# Patient Record
Sex: FEMALE | Race: WHITE | NOT HISPANIC OR LATINO | Employment: OTHER | URBAN - NONMETROPOLITAN AREA
[De-identification: names, ages, dates, MRNs, and addresses within clinical notes are randomized per-mention and may not be internally consistent; named-entity substitution may affect disease eponyms.]

---

## 2017-01-17 ENCOUNTER — APPOINTMENT (OUTPATIENT)
Dept: CT IMAGING | Facility: HOSPITAL | Age: 77
End: 2017-01-17

## 2017-01-17 ENCOUNTER — HOSPITAL ENCOUNTER (EMERGENCY)
Facility: HOSPITAL | Age: 77
Discharge: HOME | End: 2017-01-17
Attending: FAMILY MEDICINE | Admitting: EMERGENCY MEDICINE

## 2017-01-17 ENCOUNTER — APPOINTMENT (OUTPATIENT)
Dept: GENERAL RADIOLOGY | Facility: HOSPITAL | Age: 77
End: 2017-01-17

## 2017-01-17 VITALS
OXYGEN SATURATION: 96 % | WEIGHT: 153 LBS | BODY MASS INDEX: 26.12 KG/M2 | HEIGHT: 64 IN | TEMPERATURE: 98.6 F | SYSTOLIC BLOOD PRESSURE: 154 MMHG | RESPIRATION RATE: 17 BRPM | HEART RATE: 78 BPM | DIASTOLIC BLOOD PRESSURE: 92 MMHG

## 2017-01-17 DIAGNOSIS — G45.9 TRANSIENT CEREBRAL ISCHEMIA, UNSPECIFIED TYPE: ICD-10-CM

## 2017-01-17 DIAGNOSIS — R42 DIZZINESS: Primary | ICD-10-CM

## 2017-01-17 DIAGNOSIS — I16.0 HYPERTENSIVE URGENCY: ICD-10-CM

## 2017-01-17 DIAGNOSIS — G91.9 HYDROCEPHALUS (HCC): ICD-10-CM

## 2017-01-17 LAB
ANION GAP SERPL CALCULATED.3IONS-SCNC: 16 MMOL/L (ref 4–13)
BACTERIA UR QL AUTO: ABNORMAL /HPF
BASOPHILS # BLD AUTO: 0.01 10*3/MM3 (ref 0–0.2)
BASOPHILS NFR BLD AUTO: 0.1 % (ref 0–2)
BILIRUB UR QL STRIP: NEGATIVE
BUN BLD-MCNC: 19 MG/DL (ref 5–21)
BUN/CREAT SERPL: 14.8 (ref 7–25)
CALCIUM SPEC-SCNC: 9.5 MG/DL (ref 8.4–10.4)
CHLORIDE SERPL-SCNC: 98 MMOL/L (ref 98–110)
CK MB SERPL-CCNC: 1.7 NG/ML (ref 0–5)
CK SERPL-CCNC: 141 U/L (ref 0–203)
CLARITY UR: CLEAR
CO2 SERPL-SCNC: 25 MMOL/L (ref 24–31)
COLOR UR: YELLOW
CREAT BLD-MCNC: 1.28 MG/DL (ref 0.5–1.4)
D DIMER PPP FEU-MCNC: 0.74 MG/L (FEU) (ref 0–0.5)
DEPRECATED RDW RBC AUTO: 41.7 FL (ref 40–54)
EOSINOPHIL # BLD AUTO: 0.13 10*3/MM3 (ref 0–0.7)
EOSINOPHIL NFR BLD AUTO: 1 % (ref 0–4)
ERYTHROCYTE [DISTWIDTH] IN BLOOD BY AUTOMATED COUNT: 14.1 % (ref 12–15)
GFR SERPL CREATININE-BSD FRML MDRD: 41 ML/MIN/1.73
GLUCOSE BLD-MCNC: 129 MG/DL (ref 70–100)
GLUCOSE UR STRIP-MCNC: NEGATIVE MG/DL
HCT VFR BLD AUTO: 37.5 % (ref 37–47)
HGB BLD-MCNC: 12.7 G/DL (ref 12–16)
HGB UR QL STRIP.AUTO: NEGATIVE
HYALINE CASTS UR QL AUTO: ABNORMAL /LPF
IMM GRANULOCYTES # BLD: 0.03 10*3/MM3 (ref 0–0.03)
IMM GRANULOCYTES NFR BLD: 0.2 % (ref 0–5)
KETONES UR QL STRIP: NEGATIVE
LEUKOCYTE ESTERASE UR QL STRIP.AUTO: ABNORMAL
LYMPHOCYTES # BLD AUTO: 1.95 10*3/MM3 (ref 0.72–4.86)
LYMPHOCYTES NFR BLD AUTO: 14.4 % (ref 15–45)
MCH RBC QN AUTO: 27.7 PG (ref 28–32)
MCHC RBC AUTO-ENTMCNC: 33.9 G/DL (ref 33–36)
MCV RBC AUTO: 81.7 FL (ref 82–98)
MONOCYTES # BLD AUTO: 0.82 10*3/MM3 (ref 0.19–1.3)
MONOCYTES NFR BLD AUTO: 6.1 % (ref 4–12)
NEUTROPHILS # BLD AUTO: 10.58 10*3/MM3 (ref 1.87–8.4)
NEUTROPHILS NFR BLD AUTO: 78.2 % (ref 39–78)
NITRITE UR QL STRIP: NEGATIVE
PH UR STRIP.AUTO: 6.5 [PH] (ref 5–8)
PLATELET # BLD AUTO: 170 10*3/MM3 (ref 130–400)
PMV BLD AUTO: 10.5 FL (ref 6–12)
POTASSIUM BLD-SCNC: 4.1 MMOL/L (ref 3.5–5.3)
PROT UR QL STRIP: ABNORMAL
RBC # BLD AUTO: 4.59 10*6/MM3 (ref 4.2–5.4)
RBC # UR: ABNORMAL /HPF
REF LAB TEST METHOD: ABNORMAL
SODIUM BLD-SCNC: 139 MMOL/L (ref 135–145)
SP GR UR STRIP: 1.02 (ref 1–1.03)
SQUAMOUS #/AREA URNS HPF: ABNORMAL /HPF
TROPONIN I SERPL-MCNC: <0.012 NG/ML (ref 0–0.03)
UROBILINOGEN UR QL STRIP: ABNORMAL
WBC NRBC COR # BLD: 13.52 10*3/MM3 (ref 4.8–10.8)
WBC UR QL AUTO: ABNORMAL /HPF

## 2017-01-17 PROCEDURE — 25010000002 ONDANSETRON PER 1 MG: Performed by: EMERGENCY MEDICINE

## 2017-01-17 PROCEDURE — 82553 CREATINE MB FRACTION: CPT | Performed by: FAMILY MEDICINE

## 2017-01-17 PROCEDURE — 70450 CT HEAD/BRAIN W/O DYE: CPT

## 2017-01-17 PROCEDURE — 85025 COMPLETE CBC W/AUTO DIFF WBC: CPT | Performed by: FAMILY MEDICINE

## 2017-01-17 PROCEDURE — 0 IOPAMIDOL PER 1 ML: Performed by: EMERGENCY MEDICINE

## 2017-01-17 PROCEDURE — 81001 URINALYSIS AUTO W/SCOPE: CPT | Performed by: FAMILY MEDICINE

## 2017-01-17 PROCEDURE — 99285 EMERGENCY DEPT VISIT HI MDM: CPT

## 2017-01-17 PROCEDURE — 93005 ELECTROCARDIOGRAM TRACING: CPT

## 2017-01-17 PROCEDURE — 87086 URINE CULTURE/COLONY COUNT: CPT | Performed by: FAMILY MEDICINE

## 2017-01-17 PROCEDURE — 71275 CT ANGIOGRAPHY CHEST: CPT

## 2017-01-17 PROCEDURE — 82550 ASSAY OF CK (CPK): CPT | Performed by: FAMILY MEDICINE

## 2017-01-17 PROCEDURE — 96375 TX/PRO/DX INJ NEW DRUG ADDON: CPT

## 2017-01-17 PROCEDURE — 25010000002 CEFTRIAXONE: Performed by: EMERGENCY MEDICINE

## 2017-01-17 PROCEDURE — 85379 FIBRIN DEGRADATION QUANT: CPT | Performed by: FAMILY MEDICINE

## 2017-01-17 PROCEDURE — 84484 ASSAY OF TROPONIN QUANT: CPT | Performed by: FAMILY MEDICINE

## 2017-01-17 PROCEDURE — 80048 BASIC METABOLIC PNL TOTAL CA: CPT | Performed by: FAMILY MEDICINE

## 2017-01-17 PROCEDURE — 71010 HC CHEST PA OR AP: CPT

## 2017-01-17 PROCEDURE — 96365 THER/PROPH/DIAG IV INF INIT: CPT

## 2017-01-17 PROCEDURE — 93010 ELECTROCARDIOGRAM REPORT: CPT | Performed by: INTERNAL MEDICINE

## 2017-01-17 RX ORDER — CEFUROXIME AXETIL 500 MG/1
500 TABLET ORAL 2 TIMES DAILY
Qty: 20 TABLET | Refills: 0 | Status: SHIPPED | OUTPATIENT
Start: 2017-01-17 | End: 2017-02-13

## 2017-01-17 RX ORDER — ALPRAZOLAM 0.5 MG/1
0.5 TABLET ORAL NIGHTLY
Status: ON HOLD | COMMUNITY
End: 2019-05-09

## 2017-01-17 RX ORDER — CARBIDOPA/LEVODOPA 25MG-250MG
0.5 TABLET ORAL 2 TIMES DAILY
Status: ON HOLD | COMMUNITY
End: 2017-03-17

## 2017-01-17 RX ORDER — UBIDECARENONE 75 MG
100 CAPSULE ORAL DAILY
Status: ON HOLD | COMMUNITY
End: 2019-05-09 | Stop reason: ALTCHOICE

## 2017-01-17 RX ORDER — FUROSEMIDE 20 MG/1
20 TABLET ORAL EVERY OTHER DAY
Status: ON HOLD | COMMUNITY
End: 2019-05-07

## 2017-01-17 RX ORDER — CITALOPRAM 10 MG/1
10 TABLET ORAL NIGHTLY
COMMUNITY

## 2017-01-17 RX ORDER — METOPROLOL SUCCINATE 25 MG/1
25 TABLET, EXTENDED RELEASE ORAL DAILY
Status: ON HOLD | COMMUNITY
End: 2019-05-07

## 2017-01-17 RX ORDER — OMEPRAZOLE 20 MG/1
20 CAPSULE, DELAYED RELEASE ORAL DAILY
COMMUNITY
End: 2017-02-13

## 2017-01-17 RX ORDER — ONDANSETRON 2 MG/ML
4 INJECTION INTRAMUSCULAR; INTRAVENOUS ONCE
Status: COMPLETED | OUTPATIENT
Start: 2017-01-17 | End: 2017-01-17

## 2017-01-17 RX ORDER — AMLODIPINE BESYLATE 5 MG/1
5 TABLET ORAL DAILY
Status: ON HOLD | COMMUNITY
End: 2019-05-07

## 2017-01-17 RX ORDER — ACETAMINOPHEN 325 MG/1
325 TABLET ORAL EVERY 4 HOURS PRN
COMMUNITY
End: 2017-03-15

## 2017-01-17 RX ORDER — ONDANSETRON 2 MG/ML
4 INJECTION INTRAMUSCULAR; INTRAVENOUS ONCE
Status: DISCONTINUED | OUTPATIENT
Start: 2017-01-17 | End: 2017-01-17 | Stop reason: HOSPADM

## 2017-01-17 RX ORDER — ACETAMINOPHEN 500 MG
1000 TABLET ORAL ONCE
Status: COMPLETED | OUTPATIENT
Start: 2017-01-17 | End: 2017-01-17

## 2017-01-17 RX ORDER — LOSARTAN POTASSIUM 50 MG/1
50 TABLET ORAL DAILY
Status: ON HOLD | COMMUNITY
End: 2019-05-07

## 2017-01-17 RX ORDER — PROMETHAZINE HYDROCHLORIDE 25 MG/1
25 TABLET ORAL EVERY 6 HOURS PRN
Status: ON HOLD | COMMUNITY
End: 2017-03-17

## 2017-01-17 RX ADMIN — CEFTRIAXONE 1 G: 1 INJECTION, POWDER, FOR SOLUTION INTRAMUSCULAR; INTRAVENOUS at 09:00

## 2017-01-17 RX ADMIN — ONDANSETRON 4 MG: 2 INJECTION INTRAMUSCULAR; INTRAVENOUS at 07:50

## 2017-01-17 RX ADMIN — IOPAMIDOL 150 ML: 755 INJECTION, SOLUTION INTRAVENOUS at 07:32

## 2017-01-17 RX ADMIN — ACETAMINOPHEN 1000 MG: 500 TABLET ORAL at 07:51

## 2017-01-17 NOTE — DISCHARGE INSTRUCTIONS
I recommend that you follow-up with Dr. Callahan or the potential normal pressure hydrocephalus.  Please call his office and schedule an appointment urgently.  Please complete the course of antibiotics for the urinary tract infection that has been noted.  If your symptoms worsen or you are unable to follow-up with your own primary care physician within the next 24 hours I do recommend that you return to the emergency room immediately for reevaluation thank you.

## 2017-01-17 NOTE — ED PROVIDER NOTES
Subjective   Patient is a 76 y.o. female presenting with dizziness.   History provided by:  Spouse and relative   used: No    Dizziness   Quality:  Unable to specify  Severity:  Severe  Onset quality:  Sudden  Timing:  Constant  Progression:  Partially resolved  Chronicity:  New  Context: not when bending over, not with bowel movement, not with ear pain, not with eye movement, not with head movement, not with inactivity, not with loss of consciousness, not with medication, not with physical activity, not when standing up and not when urinating    Context comment:  Patient reports this terrible dizziness woke her up in the middle of the night she became severely nauseous secondary to the dizziness.  Relieved by:  Nothing  Worsened by:  Nothing  Ineffective treatments:  Being still, change in position, closing eyes, lying down and turning head  Associated symptoms: nausea and vomiting    Associated symptoms: no blood in stool, no chest pain, no diarrhea, no headaches, no hearing loss, no palpitations, no shortness of breath, no syncope, no tinnitus, no vision changes and no weakness        Review of Systems   Constitutional: Negative for activity change, appetite change, chills and fever.   HENT: Negative for congestion, dental problem, drooling, hearing loss and tinnitus.    Eyes: Negative for discharge and itching.   Respiratory: Negative for apnea, cough, chest tightness and shortness of breath.    Cardiovascular: Negative for chest pain, palpitations and syncope.   Gastrointestinal: Positive for nausea and vomiting. Negative for abdominal pain, blood in stool and diarrhea.   Endocrine: Negative for polydipsia and polyuria.   Genitourinary: Negative for difficulty urinating and dysuria.   Skin: Negative for color change, pallor and rash.   Neurological: Positive for dizziness. Negative for facial asymmetry, weakness and headaches.   Hematological: Negative for adenopathy. Does not bruise/bleed  easily.   Psychiatric/Behavioral: Negative for behavioral problems and suicidal ideas.   All other systems reviewed and are negative.      Past Medical History   Diagnosis Date   • Breast cancer    • Dementia    • Hypertension        Allergies   Allergen Reactions   • Adhesive Tape    • Morphine And Related        Past Surgical History   Procedure Laterality Date   • Colostomy     • Breast reconstruction Left    • Mastectomy Left    • Cholecystectomy     • Hysterectomy     • Appendectomy     • Colon surgery     • Abdominal surgery     • Colonoscopy         History reviewed. No pertinent family history.    Social History     Social History   • Marital status:      Spouse name: N/A   • Number of children: N/A   • Years of education: N/A     Social History Main Topics   • Smoking status: Former Smoker   • Smokeless tobacco: None   • Alcohol use No   • Drug use: No   • Sexual activity: Defer     Other Topics Concern   • None     Social History Narrative   • None       Lab Results (last 24 hours)     Procedure Component Value Units Date/Time    CBC & Differential [[id number]] Collected:  01/17/17 0530    Specimen:  Blood Updated:  01/17/17 0550    Narrative:       The following orders were created for panel order CBC & Differential.  Procedure                               Abnormality         Status                     ---------                               -----------         ------                     CBC Auto Differential[[id number]]         Abnormal            Final result                 Please view results for these tests on the individual orders.    Basic Metabolic Panel [[id number]]  (Abnormal) Collected:  01/17/17 0530    Specimen:  Blood Updated:  01/17/17 0558     Glucose 129 (H) mg/dL      BUN 19 mg/dL      Creatinine 1.28 mg/dL      Sodium 139 mmol/L      Potassium 4.1 mmol/L      Chloride 98 mmol/L      CO2 25.0 mmol/L      Calcium 9.5 mg/dL      eGFR Non African Amer 41 (L) mL/min/1.73       BUN/Creatinine Ratio 14.8      Anion Gap 16.0 (H) mmol/L     Narrative:       The MDRD GFR formula is only valid for adults with stable renal function between ages 18 and 70.    Troponin [[id number]] Collected:  01/17/17 0530    Specimen:  Blood Updated:  01/17/17 0539    CK-MB [[id number]] Collected:  01/17/17 0530    Specimen:  Blood Updated:  01/17/17 0539    CK [[id number]] Collected:  01/17/17 0530    Specimen:  Blood Updated:  01/17/17 0539    D-dimer, Quantitative [[id number]] Collected:  01/17/17 0530    Specimen:  Blood Updated:  01/17/17 0539    CBC Auto Differential [[id number]]  (Abnormal) Collected:  01/17/17 0530    Specimen:  Blood Updated:  01/17/17 0550     WBC 13.52 (H) 10*3/mm3      RBC 4.59 10*6/mm3      Hemoglobin 12.7 g/dL      Hematocrit 37.5 %      MCV 81.7 (L) fL      MCH 27.7 (L) pg      MCHC 33.9 g/dL      RDW 14.1 %      RDW-SD 41.7 fl      MPV 10.5 fL      Platelets 170 10*3/mm3      Neutrophil % 78.2 (H) %      Lymphocyte % 14.4 (L) %      Monocyte % 6.1 %      Eosinophil % 1.0 %      Basophil % 0.1 %      Immature Grans % 0.2 %      Neutrophils, Absolute 10.58 (H) 10*3/mm3      Lymphocytes, Absolute 1.95 10*3/mm3      Monocytes, Absolute 0.82 10*3/mm3      Eosinophils, Absolute 0.13 10*3/mm3      Basophils, Absolute 0.01 10*3/mm3      Immature Grans, Absolute 0.03 10*3/mm3           Objective   Physical Exam   Constitutional: She is oriented to person, place, and time. She appears well-developed and well-nourished.   HENT:   Head: Normocephalic and atraumatic.   Eyes: Conjunctivae and EOM are normal. Pupils are equal, round, and reactive to light.   Neck: Normal range of motion. Neck supple.   Cardiovascular: Normal rate, regular rhythm, normal heart sounds and intact distal pulses.    Pulmonary/Chest: Effort normal and breath sounds normal.   Abdominal: Soft. Bowel sounds are normal.   Neurological: She is alert and oriented to person, place, and time.   Skin: Skin is warm and dry.  "  Psychiatric: She has a normal mood and affect. Her behavior is normal.   Nursing note and vitals reviewed.      Procedures         XR Chest 1 View    (Results Pending)   CT Head Without Contrast    (Results Pending)       Visit Vitals   • /61   • Pulse 82   • Temp 98.8 °F (37.1 °C) (Oral)   • Resp 18   • Ht 64\" (162.6 cm)   • Wt 153 lb (69.4 kg)   • SpO2 99%   • BMI 26.26 kg/m2       ED Course    ED Course   Comment By Time   I did discuss the findings with the patient's daughter as well as the patient.  We will get a stat dose of IV antibiotics.  She tells me that her dizziness has resolved although she remained somewhat nauseated.  And is to discharge her to home following a short receiving her antibiotic. Courtney Hernandez MD 01/17 1252   Also discuss the findings on the CT with the daughter and recommended that the patient follow-up with Dr. Callahan of the neurosurgeon choice for further evaluation of her potential hydrocephalus.  I will discuss this with Dr. Callahan prior to the patient and discharge.  Her admitting blood pressure was 143/61 and discharge blood pressure will be 138/57.  These are acceptable blood pressures at this point.  Again her symptoms have completely resolved. Courtney Hernandez MD 01/17 9298       Medications - No data to display         MDM  Number of Diagnoses or Management Options  Dizziness: new and requires workup  Hypertensive urgency: new and requires workup  Transient cerebral ischemia, unspecified type: new and requires workup     Amount and/or Complexity of Data Reviewed  Clinical lab tests: ordered and reviewed  Tests in the radiology section of CPT®: ordered and reviewed  Tests in the medicine section of CPT®: ordered and reviewed  Decide to obtain previous medical records or to obtain history from someone other than the patient: yes  Review and summarize past medical records: yes  Independent visualization of images, tracings, or specimens: yes    Risk of " Complications, Morbidity, and/or Mortality  Presenting problems: moderate  Diagnostic procedures: moderate  Management options: moderate    Patient Progress  Patient progress: improved      Final diagnoses:   Dizziness   Transient cerebral ischemia, unspecified type   Hypertensive urgency          Tarah Boss DO  01/17/17 2024

## 2017-01-19 LAB — BACTERIA SPEC AEROBE CULT: ABNORMAL

## 2017-02-06 ENCOUNTER — OFFICE VISIT (OUTPATIENT)
Dept: NEUROSURGERY | Facility: CLINIC | Age: 77
End: 2017-02-06

## 2017-02-06 VITALS
DIASTOLIC BLOOD PRESSURE: 60 MMHG | HEIGHT: 64 IN | WEIGHT: 150 LBS | SYSTOLIC BLOOD PRESSURE: 132 MMHG | BODY MASS INDEX: 25.61 KG/M2

## 2017-02-06 DIAGNOSIS — Z78.9 TOBACCO NON-USER: ICD-10-CM

## 2017-02-06 DIAGNOSIS — E66.09 NON MORBID OBESITY DUE TO EXCESS CALORIES: ICD-10-CM

## 2017-02-06 DIAGNOSIS — G91.2 NORMAL PRESSURE HYDROCEPHALUS (HCC): Primary | ICD-10-CM

## 2017-02-06 PROCEDURE — 99204 OFFICE O/P NEW MOD 45 MIN: CPT | Performed by: NURSE PRACTITIONER

## 2017-02-06 NOTE — PROGRESS NOTES
Neurosurgery Initial Patient Visit    Patient: Susy Ko  : 1940    Primary Care Provider: Monster Zuniga MD    Requesting Provider: [facility] Emergency Department       History    Chief Complaint:   Chief Complaint   Patient presents with   • Headache     Patient awoke on 17 with severe headache, dizziness and vomiting. She came to [facility] ER where they found her BP very elevated and also discovered normal pressure hydrocephalus. Patient has had a few other episodes since of headaches and vomiting. She does have dementia, her daughter is assisting her today.       History of Present Illness: She is a 76-year-old  female who returns to the office for reexamination with a new issue of possible hydrocephalus.  She has been referred through the emergency Department here at [facility] where she was treated last month, and we have been asked to see her in consultation for further evaluation of apparent hydrocephalus on CT imaging.    She is a former patient known as Susy Huber, who was last seen in 2012.  She had mainly been followed for chronic low back and leg pain.  She currently resides at an assisted living facility and her daughter, who accompanies her, provides much of the needed information.  Apparently on , the patient woke up with a severe headache, was nauseated and vomiting.  Upon arrival to the emergency department her blood pressure was apparently very high.  Her evaluation included a CT of the head which questioned apparent hydrocephalus and the patient was to be seen here for outpatient evaluation and workup.  Review of her her records also indicates that she has had previous syncopal episodes as well.    Her daughter feels that the patient has been having issues for about the last 4 years, but that she has particularly been getting worse in the last year.  She seems to be having issues being very off balance, her memory is poor, she is having  bladder incontinence, and significant difficulty with walking.  She is known to have dementia and is currently being treated for apparent Parkinson's disease which is mostly manifested in tremor affecting the right upper extremity.  The patient does not particularly complain of headache.  He is aware that she has been having increasing difficulties and when asked about this becomes emotional and almost tearful.    Allergies:   Morphine and related and Adhesive tape    Past Medical History:    Past Medical History   Diagnosis Date   • Breast cancer    • Dementia    • Hypertension        Past Surgical History:   Past Surgical History   Procedure Laterality Date   • Colostomy     • Breast reconstruction Left    • Mastectomy Left    • Cholecystectomy     • Hysterectomy     • Appendectomy     • Colon surgery     • Abdominal surgery     • Colonoscopy         Medications: Tylenol, Xanax, Norvasc, Ceftin, Celexa, Lasix, Cozaar, Toprol, Prilosec, Phenergan, Vitamin B12     Social History:   reports that she has quit smoking. She does not have any smokeless tobacco history on file. She reports that she does not drink alcohol or use illicit drugs. She is  currently lives in assisted living.  Her daughter seems to be very supportive and caring.  She also seems very familiar with the patient's medical history.    Family History:  No family history on file.    Review of Systems:  Review of Systems   Constitutional: Negative for chills, fever and unexpected weight change.   HENT: Negative for congestion, hearing loss, rhinorrhea, sore throat and tinnitus.    Eyes: Negative for photophobia and visual disturbance.   Respiratory: Negative for cough, shortness of breath and wheezing.    Cardiovascular: Negative for chest pain and palpitations.   Gastrointestinal: Negative for constipation, diarrhea, nausea and vomiting.        Stoma in place left lower abdomen   Genitourinary: Positive for enuresis. Negative for difficulty  urinating.   Musculoskeletal: Negative for arthralgias, back pain, gait problem and neck pain.   Skin: Negative for rash.   Allergic/Immunologic: Negative for environmental allergies.   Neurological: Positive for dizziness, tremors and headaches. Negative for seizures and numbness.   Hematological: Does not bruise/bleed easily.   Psychiatric/Behavioral: Negative for confusion. The patient is not nervous/anxious.    All other systems reviewed and are negative.          Neurological Physical Examination    Physical Exam   Constitutional: She is oriented to person, place, and time. She appears well-developed and well-nourished. No distress.   She is generally pleasant and cooperative, appearing in no acute distress.  She sometimes seems a bit slow to respond, but is generally appropriate. ShHe is able to obey commands quite well.   HENT:   Head: Normocephalic and atraumatic.   Eyes: No scleral icterus.   Neck: Neck supple. No tracheal deviation and normal range of motion present.   Cardiovascular: Normal rate, regular rhythm and normal heart sounds.    No murmur heard.  Pulmonary/Chest: Effort normal and breath sounds normal. No respiratory distress. She has no wheezes.   Lung fields are coarse but generally clear.   Musculoskeletal:   She ambulates with a very slow, stuttered gait.  It may be slightly broad-based.  Physically uses a walker for assistance.  Unassisted, she can only ambulate a very short distance, probably 5 feet or so.   Neurological: She is alert and oriented to person, place, and time. She displays normal reflexes. No cranial nerve deficit.   Optic discs not visualized.  She is alert and oriented, speech is clear and appropriate.  She obeys commands quite well.  Rapid alternating movements and finger-to-nose are done relatively well.  She is noted with tremor affecting the right upper extremity.  She does not exhibit a significant deficit and short term memory on exam.   Skin: Skin is warm and dry.  No rash noted.   Psychiatric: She has a normal mood and affect. Her speech is normal and behavior is normal. Cognition and memory are normal.   Vitals reviewed.         Medical Decision Making    Management Options:  In the office we have talked about her plan of care at length.  I have reviewed her emergency department records and see that his CT of the head was also done in 2014.  The patient was noted with some degree of ventriculomegaly at that time; however, she was having significant abdominal issues and I believe required extensive abdominal surgery for treatment of diverticulitis.  He not see that there was actual follow-up of possible enlarged ventricles.  At that time I would measure ventricular size at approximately 46 mm.  And compared with a recent scan from last month I would measure these at approximately 49 mm, possibly supporting diagnosis of advancing hydrocephalus.    I have talked about this with the patient and her daughter.  Her examination today does not strongly indicate obvious normal pressure hydrocephalus.  However, there is finding on the imaging that may be showing a slight increase in ventricular size over the last 3-4 years.  This also coincides with the daughters report of the patient having increasing difficulties and symptoms that can very specifically be normal pressure hydrocephalus.  After a lengthy discussion we have talked about large volume CSF removal.  The patient was at first extremely reluctant to consider this, as well as any thought of  shunt placement if indicated.  After a lot of discussion she is agreeable to go ahead and have the large volume removal to determine if there is obvious improvement and response.  Following this, we will see the patient back in the office for Dr. Callahan to review the findings and determine if a  shunt would be in the patient's best interest.    The patient will probably need consideration of surgical clearance.  She has a stoma  "located in the left lower abdomen that is herniating; however, the patient has not wanted to consider any type of surgical revision.  The right side of the abdomen, the patient is thought to have a great deal of scar tissue and possible adhesions.  Her daughter explains that for breast reconstruction her abdominal tissue was \"pushed up to create a right breast.\"  All of these issues together might make placement of the shunt catheter much more difficult.  And again, it is not clear that the patient would be agreeable to proceed with placement of a shunt, even if it were thought to be helpful for her.  They understand and are agreeable with our plan of care.    Susy was seen today for headache.    Diagnoses and all orders for this visit:    Normal pressure hydrocephalus  -     IR Lumbar Puncture Diagnosis; Future    Non morbid obesity due to excess calories    Tobacco non-user        "

## 2017-02-07 DIAGNOSIS — Z01.812 PRE-PROCEDURE LAB EXAM: ICD-10-CM

## 2017-02-07 DIAGNOSIS — F41.9 ANXIETY: Primary | ICD-10-CM

## 2017-02-07 RX ORDER — LORAZEPAM 2 MG/ML
1 INJECTION INTRAMUSCULAR TAKE AS DIRECTED
Status: SHIPPED | OUTPATIENT
Start: 2017-02-13 | End: 2017-02-22

## 2017-02-13 ENCOUNTER — HOSPITAL ENCOUNTER (OUTPATIENT)
Dept: GENERAL RADIOLOGY | Facility: HOSPITAL | Age: 77
Discharge: HOME | End: 2017-02-13
Admitting: NURSE PRACTITIONER

## 2017-02-13 VITALS
BODY MASS INDEX: 25.99 KG/M2 | HEART RATE: 72 BPM | DIASTOLIC BLOOD PRESSURE: 67 MMHG | OXYGEN SATURATION: 97 % | TEMPERATURE: 97.1 F | WEIGHT: 152.2 LBS | HEIGHT: 64 IN | SYSTOLIC BLOOD PRESSURE: 129 MMHG | RESPIRATION RATE: 16 BRPM

## 2017-02-13 DIAGNOSIS — G91.2 NORMAL PRESSURE HYDROCEPHALUS (HCC): ICD-10-CM

## 2017-02-13 LAB
APPEARANCE CSF: CLEAR
APTT PPP: 29.4 SECONDS (ref 24.1–34.8)
BASOPHILS # BLD AUTO: 0.01 10*3/MM3 (ref 0–0.2)
BASOPHILS NFR BLD AUTO: 0.1 % (ref 0–2)
COLOR CSF: COLORLESS
COLOR SPUN CSF: COLORLESS
DEPRECATED RDW RBC AUTO: 42.7 FL (ref 40–54)
EOSINOPHIL # BLD AUTO: 0.25 10*3/MM3 (ref 0–0.7)
EOSINOPHIL NFR BLD AUTO: 2.7 % (ref 0–4)
ERYTHROCYTE [DISTWIDTH] IN BLOOD BY AUTOMATED COUNT: 14.3 % (ref 12–15)
GLUCOSE CSF-MCNC: 60 MG/DL (ref 40–70)
HCT VFR BLD AUTO: 36.6 % (ref 37–47)
HGB BLD-MCNC: 12.4 G/DL (ref 12–16)
IMM GRANULOCYTES # BLD: 0.01 10*3/MM3 (ref 0–0.03)
IMM GRANULOCYTES NFR BLD: 0.1 % (ref 0–5)
INR PPP: 0.88 (ref 0.91–1.09)
LYMPHOCYTES # BLD AUTO: 2.93 10*3/MM3 (ref 0.72–4.86)
LYMPHOCYTES NFR BLD AUTO: 31.4 % (ref 15–45)
MCH RBC QN AUTO: 27.7 PG (ref 28–32)
MCHC RBC AUTO-ENTMCNC: 33.9 G/DL (ref 33–36)
MCV RBC AUTO: 81.9 FL (ref 82–98)
METHOD: ABNORMAL
MONOCYTES # BLD AUTO: 0.84 10*3/MM3 (ref 0.19–1.3)
MONOCYTES NFR BLD AUTO: 9 % (ref 4–12)
NEUTROPHILS # BLD AUTO: 5.29 10*3/MM3 (ref 1.87–8.4)
NEUTROPHILS NFR BLD AUTO: 56.7 % (ref 39–78)
NUC CELL # CSF MANUAL: 1 /MM3 (ref 0–8)
PLATELET # BLD AUTO: 161 10*3/MM3 (ref 130–400)
PMV BLD AUTO: 11 FL (ref 6–12)
PROT CSF-MCNC: 32 MG/DL (ref 12–60)
PROTHROMBIN TIME: 12.2 SECONDS (ref 11.9–14.6)
RBC # BLD AUTO: 4.47 10*6/MM3 (ref 4.2–5.4)
RBC # CSF MANUAL: 2 /MM3 (ref 0–0)
SPECIMEN VOL CSF: 10 ML
TUBE # CSF: 4
WBC NRBC COR # BLD: 9.33 10*3/MM3 (ref 4.8–10.8)

## 2017-02-13 PROCEDURE — A9270 NON-COVERED ITEM OR SERVICE: HCPCS | Performed by: NURSE PRACTITIONER

## 2017-02-13 PROCEDURE — 89050 BODY FLUID CELL COUNT: CPT | Performed by: NURSE PRACTITIONER

## 2017-02-13 PROCEDURE — 63710000001 LORAZEPAM 1 MG TABLET: Performed by: NURSE PRACTITIONER

## 2017-02-13 PROCEDURE — 77003 FLUOROGUIDE FOR SPINE INJECT: CPT

## 2017-02-13 PROCEDURE — 84157 ASSAY OF PROTEIN OTHER: CPT | Performed by: NURSE PRACTITIONER

## 2017-02-13 PROCEDURE — 85610 PROTHROMBIN TIME: CPT | Performed by: PSYCHIATRY & NEUROLOGY

## 2017-02-13 PROCEDURE — 85730 THROMBOPLASTIN TIME PARTIAL: CPT | Performed by: PSYCHIATRY & NEUROLOGY

## 2017-02-13 PROCEDURE — 82945 GLUCOSE OTHER FLUID: CPT | Performed by: NURSE PRACTITIONER

## 2017-02-13 PROCEDURE — 85025 COMPLETE CBC W/AUTO DIFF WBC: CPT | Performed by: PSYCHIATRY & NEUROLOGY

## 2017-02-13 RX ORDER — LORAZEPAM 1 MG/1
2 TABLET ORAL ONCE
Status: COMPLETED | OUTPATIENT
Start: 2017-02-13 | End: 2017-02-13

## 2017-02-13 RX ADMIN — LORAZEPAM 2 MG: 1 TABLET ORAL at 11:18

## 2017-02-13 NOTE — PLAN OF CARE
Problem: Patient Care Overview (Adult)  Goal: Plan of Care Review  Outcome: Outcome(s) achieved Date Met:  02/13/17 02/13/17 1336   Coping/Psychosocial Response Interventions   Plan Of Care Reviewed With patient;daughter   Patient Care Overview   Progress improving         Problem: Perioperative Period (Adult)  Goal: Signs and Symptoms of Listed Potential Problems Will be Absent or Manageable (Perioperative Period)  Outcome: Outcome(s) achieved Date Met:  02/13/17 02/13/17 1336   Perioperative Period   Problems Assessed (Perioperative Period) all   Problems Present (Perioperative Period) none

## 2017-03-01 ENCOUNTER — OFFICE VISIT (OUTPATIENT)
Dept: NEUROSURGERY | Facility: CLINIC | Age: 77
End: 2017-03-01

## 2017-03-01 VITALS — HEIGHT: 64 IN | BODY MASS INDEX: 24.75 KG/M2 | WEIGHT: 145 LBS

## 2017-03-01 DIAGNOSIS — R26.9 ABNORMALITY OF GAIT: ICD-10-CM

## 2017-03-01 DIAGNOSIS — E66.09 NON MORBID OBESITY DUE TO EXCESS CALORIES: ICD-10-CM

## 2017-03-01 DIAGNOSIS — R25.1 TREMOR OF RIGHT HAND: ICD-10-CM

## 2017-03-01 DIAGNOSIS — G91.2 NORMAL PRESSURE HYDROCEPHALUS (HCC): Primary | ICD-10-CM

## 2017-03-01 PROCEDURE — 99213 OFFICE O/P EST LOW 20 MIN: CPT | Performed by: NURSE PRACTITIONER

## 2017-03-01 NOTE — PROGRESS NOTES
"Neurosurgery Follow Up Office Visit      Patient Name:  Susy Ko  Age:  77 y.o.  YOB: 1940  MR#:  [id number]    Visit Vitals   • Ht 64\" (162.6 cm)   • Wt 145 lb (65.8 kg)   • BMI 24.89 kg/m2       Social History   Substance Use Topics   • Smoking status: Former Smoker   • Smokeless tobacco: Never Used   • Alcohol use No       Chief Complaint   Patient presents with   • Results     Underwent lumbar puncture for NPH, patient and daughter state that she has drastically worsened since this test. She has severe naseua and vomiting a lot. No headaches to speak of. Daughter states that she is not eating or taking medications as prescribed (due to the facility).         History  Chief Complaint:  She and her daughter return to the office for follow-up of large volume CSF removal.  The patient had been seen having concern of potential normal pressure hydrocephalus with deteriorating mental status.  The patient reluctantly agreed to have the lumbar puncture.  I do not believe there was any particular complication with the procedure and the test results look good.  She did require some sedation be given immediately before the LP.  Her daughter again accompanies her today and explains that following the test the patient has actually been worse instead of better.  She currently resides at an assisted living facility.  She was only able to walk very short distances, but now her daughter indicates that she hardly does anything.  She is not wanting to get up at all.  She has been very nauseated and has been having vomiting.  She also has been unable to eat.  She complains of feeling very weak and just not feeling good.  She sees Dr. Zuniga regularly, about every 3 months.  She states that years ago Dr. Zapien had seen her briefly during the hospital stay, but otherwise she has never really had neurology follow-up.  And, she is felt to have Parkinson's disease and does exhibit a very shuffling gait with " tremor affecting the right upper extremity.  She tolerates Sinemet at a reduced dose.  Dr. Monsalve has also done previous surgery for diverticulitis and the patient has a stoma in the left lower abdomen.  This is been herniating for some time and surgery to revise it has been recommended at least a year ago.  The patient has been very reluctant to even consider it.      Physical Examination:  Upon exam, she looks reasonably well.  She is alert and oriented, generally pleasant and cooperative, speech is clear and appropriate.  Skin is warm and dry.  She is able to obey commands reasonably well.  With help she is able to stand and again walks only a few feet with a very slow, stuttered gait.  She has obvious tremor of the right upper extremity.  Her Callahan has also evaluated the patient and she is noted to actually have very good memory recall.      Medical Decision Making  Treatment Options:   In the office I initially discussed the test response and results with the patient and her daughter.  It did not appear that she had any benefit at all from the large volume CSF removal.  In fact, she has continued to decline.  Dr. Callahan has also seen the patient and review the test results as well.  He has also gone back to review serial CT images and agrees that  shunt placement would not help or benefit the patient.  Her presentation is more of Parkinson's with underlying dementia, and right now I believe she is having difficulty because of the herniated stoma.  It is worrisome that she is developing obstruction of the stoma and that she is at risk for developing infection.  We will assist them with getting an appointment with Dr. Lockwood's office as I think she is probably at the point that she will have to have revision.  I believe the patient understands this.  We are also going to refer them to neurology for additional evaluation.  From our standpoint we are releasing her to be seen on an as needed basis as again she  currently does not have neurosurgical indications.  They are agreeable.      Assessment and Plan  Susy was seen today for results.    Diagnoses and all orders for this visit:    Normal pressure hydrocephalus  -     Ambulatory Referral to General Surgery    Tremor of right hand  -     Ambulatory Referral to Neurology  -     Ambulatory Referral to General Surgery    Abnormality of gait  -     Ambulatory Referral to Neurology  -     Ambulatory Referral to General Surgery    Non morbid obesity due to excess calories        Return if symptoms worsen or fail to improve.      Chrissie Villa, APRN

## 2017-03-15 ENCOUNTER — APPOINTMENT (OUTPATIENT)
Dept: PREADMISSION TESTING | Facility: HOSPITAL | Age: 77
End: 2017-03-15

## 2017-03-15 VITALS
HEART RATE: 82 BPM | HEIGHT: 64 IN | WEIGHT: 148 LBS | SYSTOLIC BLOOD PRESSURE: 149 MMHG | DIASTOLIC BLOOD PRESSURE: 49 MMHG | OXYGEN SATURATION: 98 % | BODY MASS INDEX: 25.27 KG/M2

## 2017-03-15 LAB
ALBUMIN SERPL-MCNC: 4.4 G/DL (ref 3.5–5)
ALBUMIN/GLOB SERPL: 1.3 G/DL (ref 1.1–2.5)
ALP SERPL-CCNC: 96 U/L (ref 24–120)
ALT SERPL W P-5'-P-CCNC: 15 U/L (ref 0–54)
ANION GAP SERPL CALCULATED.3IONS-SCNC: 12 MMOL/L (ref 4–13)
AST SERPL-CCNC: 32 U/L (ref 7–45)
BASOPHILS # BLD AUTO: 0.01 10*3/MM3 (ref 0–0.2)
BASOPHILS NFR BLD AUTO: 0.1 % (ref 0–2)
BILIRUB SERPL-MCNC: 0.5 MG/DL (ref 0.1–1)
BUN BLD-MCNC: 22 MG/DL (ref 5–21)
BUN/CREAT SERPL: 14.5 (ref 7–25)
CALCIUM SPEC-SCNC: 9.6 MG/DL (ref 8.4–10.4)
CHLORIDE SERPL-SCNC: 99 MMOL/L (ref 98–110)
CO2 SERPL-SCNC: 28 MMOL/L (ref 24–31)
CREAT BLD-MCNC: 1.52 MG/DL (ref 0.5–1.4)
DEPRECATED RDW RBC AUTO: 41.9 FL (ref 40–54)
EOSINOPHIL # BLD AUTO: 0.18 10*3/MM3 (ref 0–0.7)
EOSINOPHIL NFR BLD AUTO: 1.9 % (ref 0–4)
ERYTHROCYTE [DISTWIDTH] IN BLOOD BY AUTOMATED COUNT: 14 % (ref 12–15)
GFR SERPL CREATININE-BSD FRML MDRD: 33 ML/MIN/1.73
GLOBULIN UR ELPH-MCNC: 3.3 GM/DL
GLUCOSE BLD-MCNC: 91 MG/DL (ref 70–100)
HCT VFR BLD AUTO: 36.7 % (ref 37–47)
HGB BLD-MCNC: 12.5 G/DL (ref 12–16)
IMM GRANULOCYTES # BLD: 0.02 10*3/MM3 (ref 0–0.03)
IMM GRANULOCYTES NFR BLD: 0.2 % (ref 0–5)
LYMPHOCYTES # BLD AUTO: 3.63 10*3/MM3 (ref 0.72–4.86)
LYMPHOCYTES NFR BLD AUTO: 37.7 % (ref 15–45)
MCH RBC QN AUTO: 27.8 PG (ref 28–32)
MCHC RBC AUTO-ENTMCNC: 34.1 G/DL (ref 33–36)
MCV RBC AUTO: 81.6 FL (ref 82–98)
MONOCYTES # BLD AUTO: 0.78 10*3/MM3 (ref 0.19–1.3)
MONOCYTES NFR BLD AUTO: 8.1 % (ref 4–12)
NEUTROPHILS # BLD AUTO: 5.02 10*3/MM3 (ref 1.87–8.4)
NEUTROPHILS NFR BLD AUTO: 52 % (ref 39–78)
PLATELET # BLD AUTO: 183 10*3/MM3 (ref 130–400)
PMV BLD AUTO: 10.7 FL (ref 6–12)
POTASSIUM BLD-SCNC: 4.1 MMOL/L (ref 3.5–5.3)
PROT SERPL-MCNC: 7.7 G/DL (ref 6.3–8.7)
RBC # BLD AUTO: 4.5 10*6/MM3 (ref 4.2–5.4)
SODIUM BLD-SCNC: 139 MMOL/L (ref 135–145)
WBC NRBC COR # BLD: 9.64 10*3/MM3 (ref 4.8–10.8)

## 2017-03-15 PROCEDURE — 80053 COMPREHEN METABOLIC PANEL: CPT | Performed by: SPECIALIST

## 2017-03-15 PROCEDURE — 36415 COLL VENOUS BLD VENIPUNCTURE: CPT

## 2017-03-15 PROCEDURE — 85025 COMPLETE CBC W/AUTO DIFF WBC: CPT | Performed by: SPECIALIST

## 2017-03-17 ENCOUNTER — ANESTHESIA EVENT (OUTPATIENT)
Dept: PERIOP | Facility: HOSPITAL | Age: 77
End: 2017-03-17

## 2017-03-17 ENCOUNTER — ANESTHESIA (OUTPATIENT)
Dept: PERIOP | Facility: HOSPITAL | Age: 77
End: 2017-03-17

## 2017-03-17 ENCOUNTER — HOSPITAL ENCOUNTER (INPATIENT)
Facility: HOSPITAL | Age: 77
LOS: 3 days | Discharge: NURSING, REHAB OR LONG-TERM CARE | End: 2017-03-22
Attending: SPECIALIST | Admitting: SPECIALIST

## 2017-03-17 DIAGNOSIS — R26.9 GAIT ABNORMALITY: Primary | ICD-10-CM

## 2017-03-17 DIAGNOSIS — Z74.09 IMPAIRED MOBILITY AND ADLS: ICD-10-CM

## 2017-03-17 DIAGNOSIS — Z78.9 IMPAIRED MOBILITY AND ADLS: ICD-10-CM

## 2017-03-17 DIAGNOSIS — K46.9 HERNIA: ICD-10-CM

## 2017-03-17 PROBLEM — K43.5 PARASTOMAL HERNIA: Status: ACTIVE | Noted: 2017-03-17

## 2017-03-17 LAB
ANION GAP SERPL CALCULATED.3IONS-SCNC: 10 MMOL/L (ref 4–13)
BASOPHILS # BLD AUTO: 0 10*3/MM3 (ref 0–0.2)
BASOPHILS NFR BLD AUTO: 0 % (ref 0–2)
BUN BLD-MCNC: 17 MG/DL (ref 5–21)
BUN/CREAT SERPL: 13.4 (ref 7–25)
CALCIUM SPEC-SCNC: 9.3 MG/DL (ref 8.4–10.4)
CHLORIDE SERPL-SCNC: 102 MMOL/L (ref 98–110)
CO2 SERPL-SCNC: 24 MMOL/L (ref 24–31)
CREAT BLD-MCNC: 1.27 MG/DL (ref 0.5–1.4)
DEPRECATED RDW RBC AUTO: 42.2 FL (ref 40–54)
EOSINOPHIL # BLD AUTO: 0.03 10*3/MM3 (ref 0–0.7)
EOSINOPHIL NFR BLD AUTO: 0.2 % (ref 0–4)
ERYTHROCYTE [DISTWIDTH] IN BLOOD BY AUTOMATED COUNT: 14 % (ref 12–15)
GFR SERPL CREATININE-BSD FRML MDRD: 41 ML/MIN/1.73
GLUCOSE BLD-MCNC: 164 MG/DL (ref 70–100)
HCT VFR BLD AUTO: 36 % (ref 37–47)
HGB BLD-MCNC: 12 G/DL (ref 12–16)
IMM GRANULOCYTES # BLD: 0.03 10*3/MM3 (ref 0–0.03)
IMM GRANULOCYTES NFR BLD: 0.2 % (ref 0–5)
LYMPHOCYTES # BLD AUTO: 1.38 10*3/MM3 (ref 0.72–4.86)
LYMPHOCYTES NFR BLD AUTO: 10.7 % (ref 15–45)
MCH RBC QN AUTO: 27.3 PG (ref 28–32)
MCHC RBC AUTO-ENTMCNC: 33.3 G/DL (ref 33–36)
MCV RBC AUTO: 82 FL (ref 82–98)
MONOCYTES # BLD AUTO: 0.15 10*3/MM3 (ref 0.19–1.3)
MONOCYTES NFR BLD AUTO: 1.2 % (ref 4–12)
NEUTROPHILS # BLD AUTO: 11.34 10*3/MM3 (ref 1.87–8.4)
NEUTROPHILS NFR BLD AUTO: 87.7 % (ref 39–78)
PLATELET # BLD AUTO: 172 10*3/MM3 (ref 130–400)
PMV BLD AUTO: 11.1 FL (ref 6–12)
POTASSIUM BLD-SCNC: 4.3 MMOL/L (ref 3.5–5.3)
RBC # BLD AUTO: 4.39 10*6/MM3 (ref 4.2–5.4)
SODIUM BLD-SCNC: 136 MMOL/L (ref 135–145)
WBC NRBC COR # BLD: 12.93 10*3/MM3 (ref 4.8–10.8)

## 2017-03-17 PROCEDURE — 25810000003 SODIUM CHLORIDE 0.9 % WITH KCL 20 MEQ 20-0.9 MEQ/L-% SOLUTION: Performed by: SPECIALIST

## 2017-03-17 PROCEDURE — 25010000002 PROPOFOL 10 MG/ML EMULSION: Performed by: NURSE ANESTHETIST, CERTIFIED REGISTERED

## 2017-03-17 PROCEDURE — 63710000001 ALPRAZOLAM 0.5 MG TABLET: Performed by: SPECIALIST

## 2017-03-17 PROCEDURE — 80048 BASIC METABOLIC PNL TOTAL CA: CPT | Performed by: SPECIALIST

## 2017-03-17 PROCEDURE — 25010000002 NEOSTIGMINE PER 0.5 MG: Performed by: NURSE ANESTHETIST, CERTIFIED REGISTERED

## 2017-03-17 PROCEDURE — 25010000002 HYDROMORPHONE PER 4 MG: Performed by: ANESTHESIOLOGY

## 2017-03-17 PROCEDURE — 0DBE0ZX EXCISION OF LARGE INTESTINE, OPEN APPROACH, DIAGNOSTIC: ICD-10-PCS | Performed by: SPECIALIST

## 2017-03-17 PROCEDURE — A9270 NON-COVERED ITEM OR SERVICE: HCPCS | Performed by: SPECIALIST

## 2017-03-17 PROCEDURE — 25010000002 DEXAMETHASONE PER 1 MG: Performed by: ANESTHESIOLOGY

## 2017-03-17 PROCEDURE — 25010000002 DEXAMETHASONE PER 1 MG: Performed by: NURSE ANESTHETIST, CERTIFIED REGISTERED

## 2017-03-17 PROCEDURE — 0WQF0ZZ REPAIR ABDOMINAL WALL, OPEN APPROACH: ICD-10-PCS | Performed by: SPECIALIST

## 2017-03-17 PROCEDURE — 88305 TISSUE EXAM BY PATHOLOGIST: CPT | Performed by: SPECIALIST

## 2017-03-17 PROCEDURE — 63710000001 CITALOPRAM 10 MG TABLET: Performed by: SPECIALIST

## 2017-03-17 PROCEDURE — 25010000002 HYDROMORPHONE PER 4 MG: Performed by: SPECIALIST

## 2017-03-17 PROCEDURE — 25010000002 FENTANYL CITRATE (PF) 250 MCG/5ML SOLUTION: Performed by: NURSE ANESTHETIST, CERTIFIED REGISTERED

## 2017-03-17 PROCEDURE — 85025 COMPLETE CBC W/AUTO DIFF WBC: CPT | Performed by: SPECIALIST

## 2017-03-17 PROCEDURE — 25010000002 ONDANSETRON PER 1 MG: Performed by: NURSE ANESTHETIST, CERTIFIED REGISTERED

## 2017-03-17 RX ORDER — PHENYLEPHRINE HCL IN 0.9% NACL 0.8MG/10ML
SYRINGE (ML) INTRAVENOUS AS NEEDED
Status: DISCONTINUED | OUTPATIENT
Start: 2017-03-17 | End: 2017-03-17 | Stop reason: SURG

## 2017-03-17 RX ORDER — ONDANSETRON 2 MG/ML
INJECTION INTRAMUSCULAR; INTRAVENOUS AS NEEDED
Status: DISCONTINUED | OUTPATIENT
Start: 2017-03-17 | End: 2017-03-17 | Stop reason: SURG

## 2017-03-17 RX ORDER — ONDANSETRON 2 MG/ML
4 INJECTION INTRAMUSCULAR; INTRAVENOUS ONCE AS NEEDED
Status: DISCONTINUED | OUTPATIENT
Start: 2017-03-17 | End: 2017-03-17 | Stop reason: HOSPADM

## 2017-03-17 RX ORDER — AMLODIPINE BESYLATE 5 MG/1
5 TABLET ORAL DAILY
Status: DISCONTINUED | OUTPATIENT
Start: 2017-03-18 | End: 2017-03-22 | Stop reason: HOSPADM

## 2017-03-17 RX ORDER — ONDANSETRON 2 MG/ML
4 INJECTION INTRAMUSCULAR; INTRAVENOUS EVERY 6 HOURS PRN
Status: DISCONTINUED | OUTPATIENT
Start: 2017-03-17 | End: 2017-03-22 | Stop reason: HOSPADM

## 2017-03-17 RX ORDER — MORPHINE SULFATE 2 MG/ML
2 INJECTION, SOLUTION INTRAMUSCULAR; INTRAVENOUS
Status: DISCONTINUED | OUTPATIENT
Start: 2017-03-17 | End: 2017-03-17

## 2017-03-17 RX ORDER — FUROSEMIDE 20 MG/1
20 TABLET ORAL EVERY OTHER DAY
Status: DISCONTINUED | OUTPATIENT
Start: 2017-03-17 | End: 2017-03-22 | Stop reason: HOSPADM

## 2017-03-17 RX ORDER — OXYBUTYNIN CHLORIDE 5 MG/1
5 TABLET, EXTENDED RELEASE ORAL DAILY
Status: DISCONTINUED | OUTPATIENT
Start: 2017-03-17 | End: 2017-03-22 | Stop reason: HOSPADM

## 2017-03-17 RX ORDER — NALOXONE HCL 0.4 MG/ML
0.4 VIAL (ML) INJECTION
Status: DISCONTINUED | OUTPATIENT
Start: 2017-03-17 | End: 2017-03-22 | Stop reason: HOSPADM

## 2017-03-17 RX ORDER — HYDRALAZINE HYDROCHLORIDE 20 MG/ML
5 INJECTION INTRAMUSCULAR; INTRAVENOUS
Status: DISCONTINUED | OUTPATIENT
Start: 2017-03-17 | End: 2017-03-17 | Stop reason: HOSPADM

## 2017-03-17 RX ORDER — MIDAZOLAM HYDROCHLORIDE 1 MG/ML
1 INJECTION INTRAMUSCULAR; INTRAVENOUS
Status: DISCONTINUED | OUTPATIENT
Start: 2017-03-17 | End: 2017-03-17 | Stop reason: HOSPADM

## 2017-03-17 RX ORDER — ALPRAZOLAM 0.5 MG/1
0.5 TABLET ORAL NIGHTLY
Status: DISCONTINUED | OUTPATIENT
Start: 2017-03-17 | End: 2017-03-22 | Stop reason: HOSPADM

## 2017-03-17 RX ORDER — CARBIDOPA/LEVODOPA 25MG-250MG
0.5 TABLET ORAL 2 TIMES DAILY
Status: DISCONTINUED | OUTPATIENT
Start: 2017-03-17 | End: 2017-03-22 | Stop reason: HOSPADM

## 2017-03-17 RX ORDER — SODIUM CHLORIDE AND POTASSIUM CHLORIDE 150; 900 MG/100ML; MG/100ML
100 INJECTION, SOLUTION INTRAVENOUS CONTINUOUS
Status: DISCONTINUED | OUTPATIENT
Start: 2017-03-17 | End: 2017-03-22 | Stop reason: HOSPADM

## 2017-03-17 RX ORDER — UREA 10 %
50 LOTION (ML) TOPICAL DAILY
Status: DISCONTINUED | OUTPATIENT
Start: 2017-03-17 | End: 2017-03-22 | Stop reason: HOSPADM

## 2017-03-17 RX ORDER — OXYBUTYNIN CHLORIDE 5 MG/1
5 TABLET, EXTENDED RELEASE ORAL DAILY
Status: ON HOLD | COMMUNITY
End: 2019-05-07

## 2017-03-17 RX ORDER — LABETALOL HYDROCHLORIDE 5 MG/ML
5 INJECTION, SOLUTION INTRAVENOUS
Status: DISCONTINUED | OUTPATIENT
Start: 2017-03-17 | End: 2017-03-17 | Stop reason: HOSPADM

## 2017-03-17 RX ORDER — MIDAZOLAM HYDROCHLORIDE 1 MG/ML
2 INJECTION INTRAMUSCULAR; INTRAVENOUS
Status: DISCONTINUED | OUTPATIENT
Start: 2017-03-17 | End: 2017-03-17 | Stop reason: HOSPADM

## 2017-03-17 RX ORDER — FENTANYL CITRATE 50 UG/ML
25 INJECTION, SOLUTION INTRAMUSCULAR; INTRAVENOUS
Status: DISCONTINUED | OUTPATIENT
Start: 2017-03-17 | End: 2017-03-17 | Stop reason: HOSPADM

## 2017-03-17 RX ORDER — ROCURONIUM BROMIDE 10 MG/ML
INJECTION, SOLUTION INTRAVENOUS AS NEEDED
Status: DISCONTINUED | OUTPATIENT
Start: 2017-03-17 | End: 2017-03-17 | Stop reason: SURG

## 2017-03-17 RX ORDER — LABETALOL HYDROCHLORIDE 5 MG/ML
INJECTION, SOLUTION INTRAVENOUS AS NEEDED
Status: DISCONTINUED | OUTPATIENT
Start: 2017-03-17 | End: 2017-03-17 | Stop reason: SURG

## 2017-03-17 RX ORDER — SODIUM CHLORIDE 0.9 % (FLUSH) 0.9 %
1-10 SYRINGE (ML) INJECTION AS NEEDED
Status: DISCONTINUED | OUTPATIENT
Start: 2017-03-17 | End: 2017-03-17 | Stop reason: HOSPADM

## 2017-03-17 RX ORDER — DEXTROSE MONOHYDRATE 25 G/50ML
12.5 INJECTION, SOLUTION INTRAVENOUS AS NEEDED
Status: DISCONTINUED | OUTPATIENT
Start: 2017-03-17 | End: 2017-03-17 | Stop reason: HOSPADM

## 2017-03-17 RX ORDER — DEXAMETHASONE SODIUM PHOSPHATE 4 MG/ML
INJECTION, SOLUTION INTRA-ARTICULAR; INTRALESIONAL; INTRAMUSCULAR; INTRAVENOUS; SOFT TISSUE AS NEEDED
Status: DISCONTINUED | OUTPATIENT
Start: 2017-03-17 | End: 2017-03-17 | Stop reason: SURG

## 2017-03-17 RX ORDER — MORPHINE SULFATE 2 MG/ML
2 INJECTION, SOLUTION INTRAMUSCULAR; INTRAVENOUS
Status: DISCONTINUED | OUTPATIENT
Start: 2017-03-17 | End: 2017-03-17 | Stop reason: HOSPADM

## 2017-03-17 RX ORDER — METOPROLOL SUCCINATE 25 MG/1
25 TABLET, EXTENDED RELEASE ORAL DAILY
Status: DISCONTINUED | OUTPATIENT
Start: 2017-03-17 | End: 2017-03-22 | Stop reason: HOSPADM

## 2017-03-17 RX ORDER — GLYCOPYRROLATE 0.2 MG/ML
INJECTION INTRAMUSCULAR; INTRAVENOUS AS NEEDED
Status: DISCONTINUED | OUTPATIENT
Start: 2017-03-17 | End: 2017-03-17 | Stop reason: SURG

## 2017-03-17 RX ORDER — ASPIRIN 81 MG
1 TABLET, DELAYED RELEASE (ENTERIC COATED) ORAL DAILY
Status: ON HOLD | COMMUNITY
End: 2019-05-07

## 2017-03-17 RX ORDER — LIDOCAINE HYDROCHLORIDE 20 MG/ML
INJECTION, SOLUTION INFILTRATION; PERINEURAL AS NEEDED
Status: DISCONTINUED | OUTPATIENT
Start: 2017-03-17 | End: 2017-03-17 | Stop reason: SURG

## 2017-03-17 RX ORDER — NALOXONE HCL 0.4 MG/ML
0.4 VIAL (ML) INJECTION AS NEEDED
Status: DISCONTINUED | OUTPATIENT
Start: 2017-03-17 | End: 2017-03-17 | Stop reason: HOSPADM

## 2017-03-17 RX ORDER — SODIUM CHLORIDE, SODIUM LACTATE, POTASSIUM CHLORIDE, CALCIUM CHLORIDE 600; 310; 30; 20 MG/100ML; MG/100ML; MG/100ML; MG/100ML
9 INJECTION, SOLUTION INTRAVENOUS CONTINUOUS
Status: DISCONTINUED | OUTPATIENT
Start: 2017-03-17 | End: 2017-03-20

## 2017-03-17 RX ORDER — LOSARTAN POTASSIUM 50 MG/1
50 TABLET ORAL DAILY
Status: DISCONTINUED | OUTPATIENT
Start: 2017-03-17 | End: 2017-03-22 | Stop reason: HOSPADM

## 2017-03-17 RX ORDER — IPRATROPIUM BROMIDE AND ALBUTEROL SULFATE 2.5; .5 MG/3ML; MG/3ML
3 SOLUTION RESPIRATORY (INHALATION) ONCE AS NEEDED
Status: DISCONTINUED | OUTPATIENT
Start: 2017-03-17 | End: 2017-03-17 | Stop reason: HOSPADM

## 2017-03-17 RX ORDER — FENTANYL CITRATE 50 UG/ML
INJECTION, SOLUTION INTRAMUSCULAR; INTRAVENOUS AS NEEDED
Status: DISCONTINUED | OUTPATIENT
Start: 2017-03-17 | End: 2017-03-17 | Stop reason: SURG

## 2017-03-17 RX ORDER — PROPOFOL 10 MG/ML
VIAL (ML) INTRAVENOUS AS NEEDED
Status: DISCONTINUED | OUTPATIENT
Start: 2017-03-17 | End: 2017-03-17 | Stop reason: SURG

## 2017-03-17 RX ORDER — MEPERIDINE HYDROCHLORIDE 25 MG/ML
12.5 INJECTION INTRAMUSCULAR; INTRAVENOUS; SUBCUTANEOUS
Status: DISCONTINUED | OUTPATIENT
Start: 2017-03-17 | End: 2017-03-17 | Stop reason: HOSPADM

## 2017-03-17 RX ORDER — PANTOPRAZOLE SODIUM 40 MG/1
40 TABLET, DELAYED RELEASE ORAL
Status: DISCONTINUED | OUTPATIENT
Start: 2017-03-17 | End: 2017-03-22 | Stop reason: HOSPADM

## 2017-03-17 RX ORDER — DEXAMETHASONE SODIUM PHOSPHATE 4 MG/ML
4 INJECTION, SOLUTION INTRA-ARTICULAR; INTRALESIONAL; INTRAMUSCULAR; INTRAVENOUS; SOFT TISSUE ONCE AS NEEDED
Status: COMPLETED | OUTPATIENT
Start: 2017-03-17 | End: 2017-03-17

## 2017-03-17 RX ORDER — CITALOPRAM 10 MG/1
10 TABLET ORAL NIGHTLY
Status: DISCONTINUED | OUTPATIENT
Start: 2017-03-17 | End: 2017-03-22 | Stop reason: HOSPADM

## 2017-03-17 RX ORDER — PROMETHAZINE HYDROCHLORIDE 25 MG/1
25 TABLET ORAL EVERY 6 HOURS PRN
Status: DISCONTINUED | OUTPATIENT
Start: 2017-03-17 | End: 2017-03-22 | Stop reason: HOSPADM

## 2017-03-17 RX ORDER — DIPHENHYDRAMINE HYDROCHLORIDE 50 MG/ML
12.5 INJECTION INTRAMUSCULAR; INTRAVENOUS
Status: DISCONTINUED | OUTPATIENT
Start: 2017-03-17 | End: 2017-03-17 | Stop reason: HOSPADM

## 2017-03-17 RX ADMIN — CEFAZOLIN 1 G: 1 INJECTION, POWDER, FOR SOLUTION INTRAMUSCULAR; INTRAVENOUS; PARENTERAL at 09:29

## 2017-03-17 RX ADMIN — FENTANYL CITRATE 100 MCG: 50 INJECTION INTRAMUSCULAR; INTRAVENOUS at 09:25

## 2017-03-17 RX ADMIN — CITALOPRAM 10 MG: 10 TABLET, FILM COATED ORAL at 20:10

## 2017-03-17 RX ADMIN — GLYCOPYRROLATE 0.2 MG: 0.2 INJECTION, SOLUTION INTRAMUSCULAR; INTRAVENOUS at 11:05

## 2017-03-17 RX ADMIN — SODIUM CHLORIDE, POTASSIUM CHLORIDE, SODIUM LACTATE AND CALCIUM CHLORIDE 1000 ML: 600; 310; 30; 20 INJECTION, SOLUTION INTRAVENOUS at 18:48

## 2017-03-17 RX ADMIN — SODIUM CHLORIDE, POTASSIUM CHLORIDE, SODIUM LACTATE AND CALCIUM CHLORIDE: 600; 310; 30; 20 INJECTION, SOLUTION INTRAVENOUS at 10:10

## 2017-03-17 RX ADMIN — SODIUM CHLORIDE, POTASSIUM CHLORIDE, SODIUM LACTATE AND CALCIUM CHLORIDE 9 ML/HR: 600; 310; 30; 20 INJECTION, SOLUTION INTRAVENOUS at 12:43

## 2017-03-17 RX ADMIN — ROCURONIUM BROMIDE 25 MG: 10 INJECTION INTRAVENOUS at 09:28

## 2017-03-17 RX ADMIN — SODIUM CHLORIDE, POTASSIUM CHLORIDE, SODIUM LACTATE AND CALCIUM CHLORIDE 9 ML/HR: 600; 310; 30; 20 INJECTION, SOLUTION INTRAVENOUS at 08:49

## 2017-03-17 RX ADMIN — DEXAMETHASONE SODIUM PHOSPHATE 4 MG: 4 INJECTION, SOLUTION INTRAMUSCULAR; INTRAVENOUS at 10:20

## 2017-03-17 RX ADMIN — HYDROMORPHONE HYDROCHLORIDE 0.5 MG: 1 INJECTION, SOLUTION INTRAMUSCULAR; INTRAVENOUS; SUBCUTANEOUS at 13:25

## 2017-03-17 RX ADMIN — Medication 80 MCG: at 09:47

## 2017-03-17 RX ADMIN — PROPOFOL 100 MG: 10 INJECTION, EMULSION INTRAVENOUS at 09:27

## 2017-03-17 RX ADMIN — FENTANYL CITRATE 150 MCG: 50 INJECTION INTRAMUSCULAR; INTRAVENOUS at 09:50

## 2017-03-17 RX ADMIN — ALPRAZOLAM 0.5 MG: 0.5 TABLET ORAL at 20:10

## 2017-03-17 RX ADMIN — DEXAMETHASONE SODIUM PHOSPHATE 4 MG: 4 INJECTION, SOLUTION INTRA-ARTICULAR; INTRALESIONAL; INTRAMUSCULAR; INTRAVENOUS; SOFT TISSUE at 08:49

## 2017-03-17 RX ADMIN — HYDROMORPHONE HYDROCHLORIDE 0.5 MG: 1 INJECTION, SOLUTION INTRAMUSCULAR; INTRAVENOUS; SUBCUTANEOUS at 13:30

## 2017-03-17 RX ADMIN — LIDOCAINE HYDROCHLORIDE 0.5 ML: 10 INJECTION, SOLUTION EPIDURAL; INFILTRATION; INTRACAUDAL; PERINEURAL at 08:49

## 2017-03-17 RX ADMIN — ONDANSETRON HYDROCHLORIDE 4 MG: 2 SOLUTION INTRAMUSCULAR; INTRAVENOUS at 10:20

## 2017-03-17 RX ADMIN — Medication 2 MG: at 11:05

## 2017-03-17 RX ADMIN — LIDOCAINE HYDROCHLORIDE 75 MG: 20 INJECTION, SOLUTION INFILTRATION; PERINEURAL at 09:27

## 2017-03-17 RX ADMIN — LABETALOL HYDROCHLORIDE 10 MG: 5 INJECTION, SOLUTION INTRAVENOUS at 10:57

## 2017-03-17 RX ADMIN — HYDROMORPHONE HYDROCHLORIDE 0.25 MG: 1 INJECTION, SOLUTION INTRAMUSCULAR; INTRAVENOUS; SUBCUTANEOUS at 19:39

## 2017-03-17 RX ADMIN — SODIUM CHLORIDE AND POTASSIUM CHLORIDE 100 ML/HR: 9; 1.49 INJECTION, SOLUTION INTRAVENOUS at 14:45

## 2017-03-17 NOTE — ANESTHESIA POSTPROCEDURE EVALUATION
Patient: Susy Ko    Procedure Summary     Date Anesthesia Start Anesthesia Stop Room / Location    03/17/17 0922 1114  [facility] OR 06 / [facility]       Procedure Diagnosis Surgeon Provider    REPAIR PARASTOMAL HERNIA (N/A Abdomen) (PARASTOMAL HERNIA) MD Angelito Echevarria CRNA          Anesthesia Type: general  Last vitals  /53 (03/17/17 1330)    Temp 97.4 °F (36.3 °C) (03/17/17 1330)    Pulse 79 (03/17/17 1330)   Resp 14 (03/17/17 1330)    SpO2 99 % (03/17/17 1330)      Post Anesthesia Care and Evaluation    Patient location during evaluation: PACU  Patient participation: complete - patient participated  Level of consciousness: awake  Pain score: 2  Pain management: adequate  Airway patency: patent  Anesthetic complications: No anesthetic complications  PONV Status: none  Cardiovascular status: acceptable  Respiratory status: acceptable  Hydration status: acceptable

## 2017-03-17 NOTE — PLAN OF CARE
Problem: Patient Care Overview (Adult)  Goal: Plan of Care Review  Outcome: Ongoing (interventions implemented as appropriate)    03/17/17 1647   Coping/Psychosocial Response Interventions   Plan Of Care Reviewed With patient   Patient Care Overview   Progress no change       Goal: Adult Individualization and Mutuality  Outcome: Ongoing (interventions implemented as appropriate)  Goal: Discharge Needs Assessment  Outcome: Ongoing (interventions implemented as appropriate)    Problem: Perioperative Period (Adult)  Goal: Signs and Symptoms of Listed Potential Problems Will be Absent or Manageable (Perioperative Period)  Outcome: Ongoing (interventions implemented as appropriate)  Pt incisional dressing D/I. Stoma pink, raised, and appliance intact. Peralta to BSD with clear yellow urine. Denies pain.     03/17/17 1647   Perioperative Period   Problems Assessed (Perioperative Period) all   Problems Present (Perioperative Period) pain

## 2017-03-17 NOTE — ANESTHESIA PROCEDURE NOTES
Airway  Urgency: elective    Airway not difficult    General Information and Staff    Patient location during procedure: OR  CRNA: KISHORE AUSTIN    Indications and Patient Condition  Indications for airway management: airway protection    Preoxygenated: yes  MILS not maintained throughout  Mask difficulty assessment: 1 - vent by mask    Final Airway Details  Final airway type: endotracheal airway      Successful airway: ETT  Cuffed: yes   Successful intubation technique: direct laryngoscopy  Endotracheal tube insertion site: oral  Blade: Bridges  Blade size: #2  ETT size: 7.5 mm  Cormack-Lehane Classification: grade I - full view of glottis  Placement verified by: chest auscultation and capnometry   Measured from: lips  ETT to lips (cm): 23  Number of attempts at approach: 1

## 2017-03-17 NOTE — ANESTHESIA PREPROCEDURE EVALUATION
Anesthesia Evaluation     Patient summary reviewed   no history of anesthetic complications:  NPO Status: > 8 hours   Airway   Mallampati: II  TM distance: >3 FB  Neck ROM: full  Dental      Pulmonary    (-) asthma, sleep apnea, not a smoker  Cardiovascular   Exercise tolerance: poor (<4 METS)    ECG reviewed  Patient on routine beta blocker and Beta blocker given within 24 hours of surgery    (+) hypertension,   (-) pacemaker, past MI, angina, cardiac stents      Neuro/Psych  (-) seizures, CVA    ROS Comment: NPH  Parkinsons  GI/Hepatic/Renal/Endo    (+)  chronic renal disease CRI,   (-) GERD, liver disease, diabetes    Musculoskeletal     Abdominal    Substance History      OB/GYN          Other                                    Anesthesia Plan    ASA 3     general     intravenous induction   Anesthetic plan and risks discussed with patient.

## 2017-03-17 NOTE — PROGRESS NOTES
Discharge Planning Assessment  [facility]     Patient Name: Susy Ko  MRN: [id number]  Today's Date: 3/17/2017    Admit Date: 3/17/2017          Discharge Needs Assessment       03/17/17 1524    Living Environment    Lives With facility resident   [facility]    Living Arrangements assisted living    Provides Primary Care For no one    Primary Care Provided By other (see comments);child(grecia) (specify)   [facility] AND DAUGHTERS    Quality Of Family Relationships supportive    Able to Return to Prior Living Arrangements other (see comments)   DAUGHTERS STATE PT NEEDS SNF AND CANNOT RETURN TO senior living    Discharge Needs Assessment    Concerns To Be Addressed discharge planning concerns    Readmission Within The Last 30 Days no previous admission in last 30 days    Outpatient/Agency/Support Group Needs assisted living facility (specify)    Anticipated Changes Related to Illness inability to care for self    Equipment Currently Used at Home colostomy/ostomy supplies;wheelchair    Equipment Needed After Discharge none    Discharge Facility/Level Of Care Needs nursing facility, skilled    Transportation Available ambulance;family or friend will provide    Current Discharge Risk physical impairment    Discharge Disposition nursing facility    Discharge Planning Comments MET WITH DAUGHTERS IN ROOM AND THEY VOICED CONCERN THAT PT CANNOT RETURN TO ASSISTED LIVING FACILITY AND NEEDS SKILLED NURSING FACILITY. INFORMED DAUGHTERS THAT PT DOES NOT MEET INPT STATUS AND HER PROCEDURE WAS OUTPT.  EXPLAINED THAT PART OF IT TO THE DAUGHTERS. DAUGHTER (LUIS) STATES PHYSICIAN SAID PT WOULD REMAIN IN HOSPITAL OVER WEEKEND. INFORMED DTR THAT CASE MGMT WOULD FOLLOW UP ON MONDAY REGARDING DC PLAN AND IF THERE ARE ANY STATUS CHANGES.             Discharge Plan     None        Discharge Placement     No information found        Expected Discharge Date and Time     Expected Discharge Date Expected Discharge Time    Mar  18, 2017               Demographic Summary     None            Functional Status     None            Psychosocial     None            Abuse/Neglect     None            Legal     None            Substance Abuse     None            Patient Forms     None          CUATE Alanis

## 2017-03-17 NOTE — ANESTHESIA POSTPROCEDURE EVALUATION
Patient: Susy Ko    Procedure Summary     Date Anesthesia Start Anesthesia Stop Room / Location    03/17/17 0922 1114  [facility] OR 06 / [facility] OR       Procedure Diagnosis Surgeon Provider    REPAIR PARASTOMAL HERNIA (N/A Abdomen) (PARASTOMAL HERNIA) MD Angelito Echevarria CRNA          Anesthesia Type: general  Last vitals  /60 (03/17/17 1301)    Temp      Pulse 77 (03/17/17 1301)   Resp 16 (03/17/17 1301)    SpO2 99 % (03/17/17 1301)      Post Anesthesia Care and Evaluation    Patient location during evaluation: PACU  Patient participation: complete - patient participated  Level of consciousness: awake and alert  Pain score: 0  Airway patency: patent  Anesthetic complications: No anesthetic complications  PONV Status: none  Cardiovascular status: acceptable  Respiratory status: acceptable  Hydration status: acceptable

## 2017-03-18 PROCEDURE — 97163 PT EVAL HIGH COMPLEX 45 MIN: CPT | Performed by: PHYSICAL THERAPIST

## 2017-03-18 PROCEDURE — A9270 NON-COVERED ITEM OR SERVICE: HCPCS | Performed by: SPECIALIST

## 2017-03-18 PROCEDURE — G8988 SELF CARE GOAL STATUS: HCPCS

## 2017-03-18 PROCEDURE — G8987 SELF CARE CURRENT STATUS: HCPCS

## 2017-03-18 PROCEDURE — 63710000001 AMLODIPINE 5 MG TABLET: Performed by: SPECIALIST

## 2017-03-18 PROCEDURE — 25010000002 ENOXAPARIN PER 10 MG: Performed by: SPECIALIST

## 2017-03-18 PROCEDURE — 25810000003 SODIUM CHLORIDE 0.9 % WITH KCL 20 MEQ 20-0.9 MEQ/L-% SOLUTION: Performed by: SPECIALIST

## 2017-03-18 PROCEDURE — 63710000001 OXYBUTYNIN XL 5 MG TABLET SUSTAINED-RELEASE 24 HOUR: Performed by: SPECIALIST

## 2017-03-18 PROCEDURE — 25010000002 HYDROMORPHONE PER 4 MG: Performed by: SPECIALIST

## 2017-03-18 PROCEDURE — 63710000001 LOSARTAN 50 MG TABLET: Performed by: SPECIALIST

## 2017-03-18 PROCEDURE — 63710000001 PANTOPRAZOLE 40 MG TABLET DELAYED-RELEASE: Performed by: SPECIALIST

## 2017-03-18 PROCEDURE — G8978 MOBILITY CURRENT STATUS: HCPCS | Performed by: PHYSICAL THERAPIST

## 2017-03-18 PROCEDURE — 63710000001 CYANCOBALAMIN 100 MCG TABLET: Performed by: SPECIALIST

## 2017-03-18 PROCEDURE — 63710000001 METOPROLOL SUCCINATE XL 25 MG TABLET SUSTAINED-RELEASE 24 HOUR: Performed by: SPECIALIST

## 2017-03-18 PROCEDURE — 63710000001 CARBIDOPA-LEVODOPA 25-250 MG TABLET: Performed by: SPECIALIST

## 2017-03-18 PROCEDURE — 63710000001 ALPRAZOLAM 0.5 MG TABLET: Performed by: SPECIALIST

## 2017-03-18 PROCEDURE — G8979 MOBILITY GOAL STATUS: HCPCS | Performed by: PHYSICAL THERAPIST

## 2017-03-18 PROCEDURE — 63710000001 CITALOPRAM 10 MG TABLET: Performed by: SPECIALIST

## 2017-03-18 PROCEDURE — 97166 OT EVAL MOD COMPLEX 45 MIN: CPT

## 2017-03-18 RX ADMIN — CITALOPRAM 10 MG: 10 TABLET, FILM COATED ORAL at 20:09

## 2017-03-18 RX ADMIN — HYDROMORPHONE HYDROCHLORIDE 0.25 MG: 1 INJECTION, SOLUTION INTRAMUSCULAR; INTRAVENOUS; SUBCUTANEOUS at 12:22

## 2017-03-18 RX ADMIN — HYDROMORPHONE HYDROCHLORIDE 0.5 MG: 1 INJECTION, SOLUTION INTRAMUSCULAR; INTRAVENOUS; SUBCUTANEOUS at 21:51

## 2017-03-18 RX ADMIN — PANTOPRAZOLE SODIUM 40 MG: 40 TABLET, DELAYED RELEASE ORAL at 06:31

## 2017-03-18 RX ADMIN — ENOXAPARIN SODIUM 30 MG: 30 INJECTION SUBCUTANEOUS at 08:07

## 2017-03-18 RX ADMIN — SODIUM CHLORIDE AND POTASSIUM CHLORIDE 100 ML/HR: 9; 1.49 INJECTION, SOLUTION INTRAVENOUS at 10:12

## 2017-03-18 RX ADMIN — SODIUM CHLORIDE AND POTASSIUM CHLORIDE 100 ML/HR: 9; 1.49 INJECTION, SOLUTION INTRAVENOUS at 00:28

## 2017-03-18 RX ADMIN — METOPROLOL SUCCINATE 25 MG: 25 TABLET, FILM COATED, EXTENDED RELEASE ORAL at 20:09

## 2017-03-18 RX ADMIN — ALPRAZOLAM 0.5 MG: 0.5 TABLET ORAL at 20:09

## 2017-03-18 RX ADMIN — CARBIDOPA AND LEVODOPA 0.5 TABLET: 25; 250 TABLET ORAL at 17:29

## 2017-03-18 RX ADMIN — HYDROMORPHONE HYDROCHLORIDE 0.25 MG: 1 INJECTION, SOLUTION INTRAMUSCULAR; INTRAVENOUS; SUBCUTANEOUS at 06:31

## 2017-03-18 RX ADMIN — SODIUM CHLORIDE AND POTASSIUM CHLORIDE 100 ML/HR: 9; 1.49 INJECTION, SOLUTION INTRAVENOUS at 20:10

## 2017-03-18 RX ADMIN — HYDROMORPHONE HYDROCHLORIDE 0.5 MG: 1 INJECTION, SOLUTION INTRAMUSCULAR; INTRAVENOUS; SUBCUTANEOUS at 15:34

## 2017-03-18 RX ADMIN — CARBIDOPA AND LEVODOPA 0.5 TABLET: 25; 250 TABLET ORAL at 08:08

## 2017-03-18 RX ADMIN — LOSARTAN POTASSIUM 50 MG: 50 TABLET, FILM COATED ORAL at 08:07

## 2017-03-18 RX ADMIN — VITAMIN B12 0.1 MG ORAL TABLET 50 MCG: 0.1 TABLET ORAL at 08:07

## 2017-03-18 RX ADMIN — AMLODIPINE BESYLATE 5 MG: 5 TABLET ORAL at 08:08

## 2017-03-18 RX ADMIN — OXYBUTYNIN CHLORIDE 5 MG: 5 TABLET, FILM COATED, EXTENDED RELEASE ORAL at 08:07

## 2017-03-18 NOTE — PROGRESS NOTES
Acute Care - Occupational Therapy Initial Evaluation  [facility]     Patient Name: Susy Ko  : 1940  MRN: [id number]  Today's Date: 3/18/2017  Onset of Illness/Injury or Date of Surgery Date: 17  Date of Referral to OT: 17  Referring Physician: Dr. Perez Zuniga    Admit Date: 3/17/2017       ICD-10-CM ICD-9-CM   1. Gait abnormality R26.9 781.2   2. Hernia K46.9 553.9   3. Impaired mobility and ADLs Z74.09 799.89     Patient Active Problem List   Diagnosis   • Normal pressure hydrocephalus   • Non morbid obesity due to excess calories   • Tobacco non-user   • Tremor of right hand   • Abnormality of gait   • Parastomal hernia   • Hernia     Past Medical History   Diagnosis Date   • Anxiety    • Breast cancer       LEFT WITH RECONSTRUCTION   • Colostomy in place    • Dementia    • Depression    • Edema      LOWER FEET   • Hydrocephalus      NORMAL PRESSURE   • Hypertension    • Incontinence    • Nausea    • Parastomal hernia    • Parkinson disease    • Tremor      RIGHT HAND   • Unsteady gait      Past Surgical History   Procedure Laterality Date   • Colostomy     • Mastectomy Left    • Cholecystectomy     • Hysterectomy     • Appendectomy     • Colon surgery     • Abdominal surgery     • Colonoscopy     • Breast reconstruction Left      LEFT          OT ASSESSMENT FLOWSHEET (last 72 hours)      OT Evaluation       17 1511 17 1429 17 1418 17 1545 17 1423    Rehab Evaluation    Document Type  evaluation  -AC evaluation   see MAR  -KR      Subjective Information  agree to therapy;complains of;pain  -AC agree to therapy;complains of;pain  -KR      Patient Effort, Rehab Treatment  fair  -AC       Symptoms Noted During/After Treatment  increased pain  -AC       General Information    Patient Profile Review  yes  -AC yes  -KR      Onset of Illness/Injury or Date of Surgery Date  17  -AC 17  -KR      Referring Physician  Dr. Perez Zuniga  - Dr. Todd  Efrain  -KR      General Observations  lying supine in bed, daughter present, pill rolling tremor R hand, IV, colostomy  -AC       Pertinent History Of Current Problem   patient was having progressive abdominal pain; s/p repair of parastomal hernia and resection of colon polyp at ostomy  -KR      Precautions/Limitations  fall precautions  -AC other (see comments);fall precautions   colostomy  -KR      Prior Level of Function  mod assist:;bathing;dressing  -AC --   mostly stays/pushed in  uses RW for short distances/t/f  -KR      Equipment Currently Used at Home wheelchair  -SK  wheelchair;walker, rolling  -KR colostomy/ostomy supplies;wheelchair  -LJ colostomy/ostomy supplies;commode;grab bar;wheelchair  -CF    Plans/Goals Discussed With  patient and family;agreed upon  -AC patient;family;agreed upon  -KR      Risks Reviewed  patient and family:;LOB;nausea/vomiting;dizziness;increased discomfort;change in vital signs;increased drainage;lines disloged  -AC patient:;family:;LOB;increased discomfort  -KR      Benefits Reviewed  patient and family:;improve function;increase independence;increase strength;increase balance;decrease pain;increase knowledge  -AC patient:;family:;increase independence;increase strength;increase balance;decrease pain  -KR      Barriers to Rehab  medically complex;previous functional deficit;cognitive status  -AC medically complex  -KR      Living Environment    Lives With  facility resident  - facility resident   [facility]  -KR facility resident   [facility] resident  -CF    Living Arrangements  assisted living  -AC assisted living  -KR assisted living  - assisted living  -CF    Home Accessibility     no concerns  -CF    Stair Railings at Home     none  -CF    Type of Financial/Environmental Concern     none  -CF    Transportation Available car;family or friend will provide  -SK   ambulance;family or friend will provide  - car;family or  friend will provide  -    Living Environment Comment  daughter checks on pt   -AC       Clinical Impression    Date of Referral to OT  03/18/17  -       OT Diagnosis  decreased ADL  -AC       Prognosis  good  -       Impairments Found (describe specific impairments)  gait, locomotion, and balance;motor function;muscle performance  -       Patient/Family Goals Statement  get more [facility]  -       Criteria for Skilled Therapeutic Interventions Met  yes;treatment indicated  -       Rehab Potential  good, to achieve stated therapy goals  -       Therapy Frequency  3-5 times/wk  -       Predicted Duration of Therapy Intervention (days/wks)  10 days  -       Anticipated Discharge Disposition  skilled nursing facility;home with home health  -       Functional Level Prior    Ambulation     3-->assistive equipment and person  -CF    Transferring     3-->assistive equipment and person  -CF    Toileting     3-->assistive equipment and person  -CF    Bathing     3-->assistive equipment and person  -CF    Dressing     2-->assistive person  -CF    Eating     2-->assistive person  -CF    Communication     0-->understands/communicates without difficulty  -CF    Swallowing     0-->swallows foods/liquids without difficulty  -CF    Prior Functional Level Comment     no  -CF    Pain Assessment    Pain Assessment  0-10  -AC 0-10  -KR      Pain Score  8  -AC 8  -KR      Pain Type  Acute pain;Surgical pain  - Acute pain;Surgical pain  -KR      Pain Location  Abdomen  - Abdomen  -KR      Pain Frequency  Constant/continuous  -AC Constant/continuous  -KR      Pain Intervention(s)  Medication (See MAR);Ambulation/increased activity;Repositioned  -AC Medication (See MAR);Repositioned  -KR      Response to Interventions  not improved, RN notified  - tolerated  -KR      Cognitive Assessment/Intervention    Current Cognitive/Communication Assessment  functional  -AC functional  -KR      Orientation Status  oriented  x 4  -AC oriented x 4  -KR      Follows Commands/Answers Questions  100% of the time;able to follow multi-step instructions  -% of the time;able to follow multi-step instructions  -KR      Personal Safety  WNL/WFL  -AC       Personal Safety Interventions  fall prevention program maintained;gait belt;nonskid shoes/slippers when out of bed;supervised activity  -AC       ROM (Range of Motion)    General ROM Detail  WFL AROM BUE  -AC WFL  -KR      MMT (Manual Muscle Testing)    General MMT Assessment Detail  4/5 BUE  -AC 3+ to 4-/5 B LEd  -KR      Bed Mobility, Assessment/Treatment    Bed Mobility, Assistive Device  bed rails;head of bed elevated  -AC bed rails;head of bed elevated  -KR      Bed Mobility, Scoot/Bridge, [place]  supervision required  -AC       Bed Mob, Supine to Sit, [place]  contact guard assist;verbal cues required;nonverbal cues required (demo/gesture)  -AC contact guard assist;verbal cues required  -KR      Bed Mob, Sit to Supine, [place]  minimum assist (75% patient effort);nonverbal cues required (demo/gesture);verbal cues required  -AC contact guard assist;minimum assist (75% patient effort);2 person assist required;verbal cues required  -KR      Bed Mobility, Impairments  pain  -AC       Transfer Assessment/Treatment    Transfers, Sit-Stand [place]  contact guard assist;2 person assist required;minimum assist (75% patient effort);verbal cues required  -AC contact guard assist;verbal cues required;minimum assist (75% patient effort)  -KR      Transfers, Stand-Sit [place]  contact guard assist;2 person assist required;verbal cues required;nonverbal cues required (demo/gesture)  -AC contact guard assist;verbal cues required  -KR      Functional Mobility    Functional Mobility- Ind. Level  contact guard assist;2 person assist required;verbal cues required  -AC       Functional Mobility- Comment  hand held assist, side steps toward EOB, pt c/o pain with activity  -AC        Lower Body Dressing Assessment/Training    LB Dressing Assess/Train, Clothing Type  donning:;doffing:;socks  -       LB Dressing Assess/Train, Position  edge of bed  -AC       LB Dressing Assess/Train, [place]  maximum assist (25% patient effort)  -       Motor Skills/Interventions    Motor Response Observations  --   resting tremor R hand  -AC       Additional Documentation  Balance Skills Training (Group);Fine Motor Coordination Training (Group);Gross Motor Coordination Training (Group)  -       Balance Skills Training    Sitting-Level of Assistance  Close supervision  -       Sitting-Balance Support  Right upper extremity supported;Left upper extremity supported;Feet supported  -       Standing-Level of Assistance  Contact guard;x2  -AC       Static Standing Balance Support  Right upper extremity supported;Left upper extremity supported  -       Gait Balance-Level of Assistance  Contact guard;x2  -AC       Gait Balance Support  Right upper extremity supported;Left upper extremity supported  -       Gross Motor Coordination Training    Gross Motor Skill, Impairments Detail  resting tremor R hand  -AC       Fine Motor Coordination Training    Opposition  Right:;Left:;impaired;other (comment)   decreased accuracy  -       Sensory Assessment/Intervention    Sensory Impairment  --   WNL per pt  -       General Therapy Interventions    Planned Therapy Interventions  activity intolerance;ADL retraining;balance training;bed mobility training;home exercise program;strengthening;transfer training  -       Positioning and Restraints    Pre-Treatment Position  in bed  -AC in bed  -KR      Post Treatment Position  bed  -AC bed  -KR      In Bed  supine;call light within reach;encouraged to call for assist;with family/caregiver;side rails up x2;legs elevated  - fowlers;call light within reach;encouraged to call for assist;with family/caregiver  -KR        03/15/17 8054                General  Information    Equipment Currently Used at Home colostomy/ostomy supplies;commode;grab bar;wheelchair  -DF        Living Environment    Lives With other (see comments);alone  -DF        Living Arrangements assisted living   [facility]  -DF        Home Accessibility no concerns  -DF        Transportation Available car  -DF        Functional Level Prior    Ambulation 3-->assistive equipment and person  -DF        Transferring 3-->assistive equipment and person  -DF        Toileting 3-->assistive equipment and person  -DF        Bathing 3-->assistive equipment and person  -DF        Dressing 2-->assistive person  -DF        Eating 2-->assistive person  -DF          User Key  (r) = Recorded By, (t) = Taken By, (c) = Cosigned By    Initials Name Effective Dates     Ernst To OTR/L 06/22/15 -     KR Elizabeth Bowers, PT DPT 06/19/15 -     SK Venecia Corral RN 08/02/16 -     CF Eamni Leiva RN 08/02/16 -     DF Teresita Miranda RN 08/02/16 -     CUATE Segovia 09/15/16 -            Occupational Therapy Education     Title: PT OT SLP Therapies (Done)     Topic: Occupational Therapy (Done)     Point: ADL training (Done)    Description: Instruct learner(s) on proper safety adaptation and remediation techniques during self care or transfers.   Instruct in proper use of assistive devices.    Learning Progress Summary    Learner Readiness Method Response Comment Documented by Status   Patient Acceptance E VU OT POC, discharge planning  03/18/17 1514 Done   Family Acceptance E VU OT POC, discharge planning  03/18/17 1514 Done                      User Key     Initials Effective Dates Name Provider Type Discipline     06/22/15 -  Ernst To OTR/L Occupational Therapist OT                  OT Recommendation and Plan  Anticipated Discharge Disposition: skilled nursing facility, home with home health  Planned Therapy Interventions: activity intolerance, ADL retraining, balance  training, bed mobility training, home exercise program, strengthening, transfer training  Therapy Frequency: 3-5 times/wk  Plan of Care Review  Plan Of Care Reviewed With: patient  Progress: improving  Outcome Summary/Follow up Plan: OT jennifer completed.  Pt is s/p hernia repair and has resultant abdominal pain causing decrease in ADL function especially LB ADL and bed mobility.  Pt not motivated to ambulate more than side steps toward EOB due to pain.  MaxA to don/doff socks.  Pt also demo parkonsonian symptoms including resting tremor in RUE.  She has generalized weakness, decreased endurance, knowledge deficit and decreased safety.  Pt would benefit from skilled OT to address the above deficits and return pt to OF.  Anticipate discharge home with HH due to pt not meetingn inpatient criteria.  Pt would benefit from SNF placement if she is able to meet criteria.          OT Goals       03/18/17 1517          Bed Mobility OT LTG    Bed Mobility OT LTG, Date Established 03/18/17  -AC      Bed Mobility OT LTG, Time to Achieve by discharge  -AC      Bed Mobility OT LTG, Activity Type all bed mobility  -AC      Bed Mobility OT LTG, [place] Level supervision required  -AC      Transfer Training OT LTG    Transfer Training OT LTG, Date Established 03/18/17  -AC      Transfer Training OT LTG, Time to Achieve by discharge  -AC      Transfer Training OT LTG, Activity Type bed to chair /chair to bed;walk-in shower;shower chair;sit to stand/stand to sit;toilet  -AC      Transfer Training OT LTG, [place] Level supervision required  -AC      Bathing OT LTG    Bathing Goal OT LTG, Date Established 03/18/17  -AC      Bathing Goal OT LTG, Time to Achieve by discharge  -AC      Bathing Goal OT LTG, Activity Type upper body bathing;lower body bathing  -AC      Bathing Goal OT LTG, [place] Level minimum assist (75% patient effort)  -AC      Bathing Goal OT LTG, Assist Device shower chair with back  -AC      LB Dressing  OT LTG    LB Dressing Goal OT LTG, Date Established 03/18/17  -AC      LB Dressing Goal OT LTG, Time to Achieve by discharge  -AC      LB Dressing Goal OT LTG, [place] Level minimum assist (75% patient effort)  -AC      LB Dressing Goal OT LTG, Additional Goal AE PRN  -AC        User Key  (r) = Recorded By, (t) = Taken By, (c) = Cosigned By    Initials Name Provider Type    AC Ernst To, OTR/L Occupational Therapist                Outcome Measures       03/18/17 1513 03/18/17 1500       How much help from another person do you currently need...    Turning from your back to your side while in flat bed without using bedrails?  3  -KR     Moving from lying on back to sitting on the side of a flat bed without bedrails?  3  -KR     Moving to and from a bed to a chair (including a wheelchair)?  3  -KR     Standing up from a chair using your arms (e.g., wheelchair, bedside chair)?  3  -KR     Climbing 3-5 steps with a railing?  1  -KR     To walk in hospital room?  2  -KR     AM-PAC 6 Clicks Score  15  -KR     How much help from another is currently needed...    Putting on and taking off regular lower body clothing? 2  -AC      Bathing (including washing, rinsing, and drying) 2  -AC      Toileting (which includes using toilet bed pan or urinal) 2  -AC      Putting on and taking off regular upper body clothing 3  -AC      Taking care of personal grooming (such as brushing teeth) 3  -AC      Eating meals 3  -AC      Score 15  -AC      Functional Assessment    Outcome Measure Options AM-PAC 6 Clicks Daily Activity (OT)  -AC AM-PAC 6 Clicks Basic Mobility (PT)  -KR       User Key  (r) = Recorded By, (t) = Taken By, (c) = Cosigned By    Initials Name Provider Type    EMANUEL To, OTR/L Occupational Therapist    RIKI Bowers, PT DPT Physical Therapist          Time Calculation:   OT Start Time: 1429  OT Stop Time: 1514  OT Time Calculation (min): 45 min    Therapy Charges for Today     Code Description  Service Date Service Provider Modifiers Qty    [id number]  OT SELFCARE CURRENT 3/18/2017 Ernst To OTR/L GO, CK 1    [id number] HC OT SELFCARE PROJECTED 3/18/2017 Ernst To OTR/L GO, CJ 1    [id number]  OT EVAL MOD COMPLEXITY 3 3/18/2017 Ernst To OTR/L GO, KX 1          OT G-codes  OT Functional Scales Options: AM-PAC 6 Clicks Daily Activity (OT)  Score: 15  Functional Limitation: Self care  Self Care Current Status (): At least 40 percent but less than 60 percent impaired, limited or restricted  Self Care Goal Status (): At least 20 percent but less than 40 percent impaired, limited or restricted    GARY Hopper/L  3/18/2017

## 2017-03-18 NOTE — PROGRESS NOTES
Kathi Dunn MD FACS  Progress Note     LOS: 0 days   Patient Care Team:  Monster Zuniga MD as PCP - General  Monster Zuniga MD as PCP - Family Medicine      Subjective     Interval History:      enrico clears.  Pain controlled.     Objective     Vital Signs  Temp:  [97.4 °F (36.3 °C)-98.7 °F (37.1 °C)] 97.5 °F (36.4 °C)  Heart Rate:  [69-86] 72  Resp:  [12-18] 16  BP: (106-180)/(42-80) 135/55    Physical Exam:  General appearance - alert, well appearing, and in no distress  Abdomen - soft, clean, stoma pink, pain appropriate      Results Review:    Lab Results (last 24 hours)     Procedure Component Value Units Date/Time    Tissue Exam [[id number]] Collected:  03/17/17 1050    Specimen:  Tissue from Ostomy Updated:  03/17/17 1206    CBC & Differential [[id number]] Collected:  03/17/17 1136    Specimen:  Blood Updated:  03/17/17 1209    Narrative:       The following orders were created for panel order CBC & Differential.  Procedure                               Abnormality         Status                     ---------                               -----------         ------                     CBC Auto Differential[[id number]]         Abnormal            Final result                 Please view results for these tests on the individual orders.    CBC Auto Differential [[id number]]  (Abnormal) Collected:  03/17/17 1136    Specimen:  Blood Updated:  03/17/17 1209     WBC 12.93 (H) 10*3/mm3      RBC 4.39 10*6/mm3      Hemoglobin 12.0 g/dL      Hematocrit 36.0 (L) %      MCV 82.0 fL      MCH 27.3 (L) pg      MCHC 33.3 g/dL      RDW 14.0 %      RDW-SD 42.2 fl      MPV 11.1 fL      Platelets 172 10*3/mm3      Neutrophil % 87.7 (H) %      Lymphocyte % 10.7 (L) %      Monocyte % 1.2 (L) %      Eosinophil % 0.2 %      Basophil % 0.0 %      Immature Grans % 0.2 %      Neutrophils, Absolute 11.34 (H) 10*3/mm3      Lymphocytes, Absolute 1.38 10*3/mm3      Monocytes, Absolute 0.15 (L) 10*3/mm3      Eosinophils, Absolute  0.03 10*3/mm3      Basophils, Absolute 0.00 10*3/mm3      Immature Grans, Absolute 0.03 10*3/mm3     Basic Metabolic Panel [[id number]]  (Abnormal) Collected:  03/17/17 1222    Specimen:  Blood Updated:  03/17/17 1248     Glucose 164 (H) mg/dL      BUN 17 mg/dL      Creatinine 1.27 mg/dL      Sodium 136 mmol/L      Potassium 4.3 mmol/L      Chloride 102 mmol/L      CO2 24.0 mmol/L      Calcium 9.3 mg/dL      eGFR Non African Amer 41 (L) mL/min/1.73      BUN/Creatinine Ratio 13.4      Anion Gap 10.0 mmol/L     Narrative:       The MDRD GFR formula is only valid for adults with stable renal function between ages 18 and 70.        Imaging Results (last 24 hours)     ** No results found for the last 24 hours. **            Assessment/Plan       Continue present care      Kathi Dunn MD  03/18/17  10:30 AM

## 2017-03-18 NOTE — PLAN OF CARE
Problem: Patient Care Overview (Adult)  Goal: Plan of Care Review  Outcome: Ongoing (interventions implemented as appropriate)    03/18/17 1517   Coping/Psychosocial Response Interventions   Plan Of Care Reviewed With patient   Patient Care Overview   Progress improving   Outcome Evaluation   Outcome Summary/Follow up Plan OT eval completed. Pt is s/p hernia repair and has resultant abdominal pain causing decrease in ADL function especially LB ADL and bed mobility. Pt not motivated to ambulate more than side steps toward EOB due to pain. MaxA to don/doff socks. Pt also demo parkonsonian symptoms including resting tremor in RUE. She has generalized weakness, decreased endurance, knowledge deficit and decreased safety. Pt would benefit from skilled OT to address the above deficits and return pt to PLOF. Anticipate discharge home with HH due to pt not meetingn inpatient criteria. Pt would benefit from SNF placement if she is able to meet criteria.         Problem: Inpatient Occupational Therapy  Goal: Bed Mobility Goal LTG- OT  Outcome: Ongoing (interventions implemented as appropriate)    03/18/17 1517   Bed Mobility OT LTG   Bed Mobility OT LTG, Date Established 03/18/17   Bed Mobility OT LTG, Time to Achieve by discharge   Bed Mobility OT LTG, Activity Type all bed mobility   Bed Mobility OT LTG, [place] Level supervision required       Goal: Transfer Training Goal 1 LTG- OT  Outcome: Ongoing (interventions implemented as appropriate)    03/18/17 1517   Transfer Training OT LTG   Transfer Training OT LTG, Date Established 03/18/17   Transfer Training OT LTG, Time to Achieve by discharge   Transfer Training OT LTG, Activity Type bed to chair /chair to bed;walk-in shower;shower chair;sit to stand/stand to sit;toilet   Transfer Training OT LTG, [place] Level supervision required       Goal: Bathing Goal LTG- OT  Outcome: Ongoing (interventions implemented as appropriate)    03/18/17 1517   Bathing OT LTG    Bathing Goal OT LTG, Date Established 03/18/17   Bathing Goal OT LTG, Time to Achieve by discharge   Bathing Goal OT LTG, Activity Type upper body bathing;lower body bathing   Bathing Goal OT LTG, [place] Level minimum assist (75% patient effort)   Bathing Goal OT LTG, Assist Device shower chair with back       Goal: LB Dressing Goal LTG- OT  Outcome: Ongoing (interventions implemented as appropriate)    03/18/17 1517   LB Dressing OT LTG   LB Dressing Goal OT LTG, Date Established 03/18/17   LB Dressing Goal OT LTG, Time to Achieve by discharge   LB Dressing Goal OT LTG, [place] Level minimum assist (75% patient effort)   LB Dressing Goal OT LTG, Additional Goal AE PRN

## 2017-03-18 NOTE — PLAN OF CARE
Problem: Patient Care Overview (Adult)  Goal: Plan of Care Review  Outcome: Ongoing (interventions implemented as appropriate)    03/18/17 1512   Coping/Psychosocial Response Interventions   Plan Of Care Reviewed With patient;daughter   Outcome Evaluation   Outcome Summary/Follow up Plan Initial physicaly therapy evaluation performed. Pt needed CGA to min A for bed mobiltiy, transfers and ambulation. She would jac take 3 side steps at EOB, refused further gait. Skilled therapy is currently needed to impove her abilitiy to complete transfers and gait more independently and decrease risk for falls. She also needs improvements with her tolerance to activity. She is currently living in an assisted living facility, but at this point needs assistance for mobiltiy.

## 2017-03-18 NOTE — CONSULTS
Inpatient Consult to Internal Medicine  Consult performed by: EPIFANIO TOMAS  Consult ordered by: TICO RAMIREZ  Assessment/Recommendations: See other note

## 2017-03-18 NOTE — PROGRESS NOTES
Acute Care - Physical Therapy Initial Evaluation  [facility]     Patient Name: Susy Ko  : 1940  MRN: [id number]  Today's Date: 3/18/2017   Onset of Illness/Injury or Date of Surgery Date: 17  Date of Referral to PT: 17  Referring Physician: Dr. Perez Zuniga      Admit Date: 3/17/2017     Visit Dx:    ICD-10-CM ICD-9-CM   1. Gait abnormality R26.9 781.2   2. Hernia K46.9 553.9   3. Impaired mobility and ADLs Z74.09 799.89     Patient Active Problem List   Diagnosis   • Normal pressure hydrocephalus   • Non morbid obesity due to excess calories   • Tobacco non-user   • Tremor of right hand   • Abnormality of gait   • Parastomal hernia   • Hernia     Past Medical History   Diagnosis Date   • Anxiety    • Breast cancer       LEFT WITH RECONSTRUCTION   • Colostomy in place    • Dementia    • Depression    • Edema      LOWER FEET   • Hydrocephalus      NORMAL PRESSURE   • Hypertension    • Incontinence    • Nausea    • Parastomal hernia    • Parkinson disease    • Tremor      RIGHT HAND   • Unsteady gait      Past Surgical History   Procedure Laterality Date   • Colostomy     • Mastectomy Left    • Cholecystectomy     • Hysterectomy     • Appendectomy     • Colon surgery     • Abdominal surgery     • Colonoscopy     • Breast reconstruction Left      LEFT          PT ASSESSMENT (last 72 hours)      PT Evaluation       17 1511 17 1429    Rehab Evaluation    Document Type  evaluation  -AC    Subjective Information  agree to therapy;complains of;pain  -AC    Patient Effort, Rehab Treatment  fair  -AC    Symptoms Noted During/After Treatment  increased pain  -AC    General Information    Patient Profile Review  yes  -AC    Onset of Illness/Injury or Date of Surgery Date  17  -AC    Referring Physician  Dr. Perez Zuniga  -AC    General Observations  lying supine in bed, daughter present, pill rolling tremor R hand, IV, colostomy  -AC    Precautions/Limitations  fall precautions   -AC    Prior Level of Function  mod assist:;bathing;dressing  -AC    Equipment Currently Used at Home (P)  wheelchair  -SK     Plans/Goals Discussed With  patient and family;agreed upon  -AC    Risks Reviewed  patient and family:;LOB;nausea/vomiting;dizziness;increased discomfort;change in vital signs;increased drainage;lines disloged  -AC    Benefits Reviewed  patient and family:;improve function;increase independence;increase strength;increase balance;decrease pain;increase knowledge  -AC    Barriers to Rehab  medically complex;previous functional deficit;cognitive status  -AC    Living Environment    Lives With  facility resident  -    Living Arrangements  assisted living  -    Transportation Available (P)  car;family or friend will provide  -SK     Living Environment Comment  daughter checks on pt   -AC    Pain Assessment    Pain Assessment  0-10  -AC    Pain Score  8  -AC    Pain Type  Acute pain;Surgical pain  -AC    Pain Location  Abdomen  -AC    Pain Frequency  Constant/continuous  -AC    Pain Intervention(s)  Medication (See MAR);Ambulation/increased activity;Repositioned  -AC    Response to Interventions  not improved, RN notified  -AC    Cognitive Assessment/Intervention    Current Cognitive/Communication Assessment  functional  -AC    Orientation Status  oriented x 4  -AC    Follows Commands/Answers Questions  100% of the time;able to follow multi-step instructions  -AC    Personal Safety  WNL/WFL  -AC    Personal Safety Interventions  fall prevention program maintained;gait belt;nonskid shoes/slippers when out of bed;supervised activity  -AC    ROM (Range of Motion)    General ROM Detail  WFL AROM BUE  -AC    MMT (Manual Muscle Testing)    General MMT Assessment Detail  4/5 BUE  -AC    Bed Mobility, Assessment/Treatment    Bed Mobility, Assistive Device  bed rails;head of bed elevated  -AC    Bed Mobility, Scoot/Bridge, [place]  supervision required  -AC    Bed Mob, Supine to Sit, [place]   contact guard assist;verbal cues required;nonverbal cues required (demo/gesture)  -AC    Bed Mob, Sit to Supine, [place]  minimum assist (75% patient effort);nonverbal cues required (demo/gesture);verbal cues required  -AC    Bed Mobility, Impairments  pain  -AC    Transfer Assessment/Treatment    Transfers, Sit-Stand [place]  contact guard assist;2 person assist required;minimum assist (75% patient effort);verbal cues required  -AC    Transfers, Stand-Sit [place]  contact guard assist;2 person assist required;verbal cues required;nonverbal cues required (demo/gesture)  -AC    Motor Skills/Interventions    Motor Response Observations  --   resting tremor R hand  -AC    Additional Documentation  Balance Skills Training (Group);Fine Motor Coordination Training (Group);Gross Motor Coordination Training (Group)  -AC    Balance Skills Training    Sitting-Level of Assistance  Close supervision  -    Sitting-Balance Support  Right upper extremity supported;Left upper extremity supported;Feet supported  -    Standing-Level of Assistance  Contact guard;x2  -AC    Static Standing Balance Support  Right upper extremity supported;Left upper extremity supported  -AC    Gait Balance-Level of Assistance  Contact guard;x2  -AC    Gait Balance Support  Right upper extremity supported;Left upper extremity supported  -AC    Fine Motor Coordination Training    Opposition  Right:;Left:;impaired;other (comment)   decreased accuracy  -AC    Gross Motor Coordination Training    Gross Motor Skill, Impairments Detail  resting tremor R hand  -AC    Sensory Assessment/Intervention    Sensory Impairment  --   WNL per pt  -AC    Positioning and Restraints    Pre-Treatment Position  in bed  -AC    Post Treatment Position  bed  -AC    In Bed  supine;call light within reach;encouraged to call for assist;with family/caregiver;side rails up x2;legs elevated  -      03/18/17 1418 03/17/17 1545    Rehab Evaluation    Document Type  evaluation   see MAR  -KR     Subjective Information agree to therapy;complains of;pain  -KR     General Information    Patient Profile Review yes  -KR     Onset of Illness/Injury or Date of Surgery Date 03/17/17  -KR     Referring Physician Dr. Perez Zuniga  -KR     Pertinent History Of Current Problem patient was having progressive abdominal pain; s/p repair of parastomal hernia and resection of colon polyp at ostomy  -KR     Precautions/Limitations other (see comments);fall precautions   colostomy  -KR     Prior Level of Function --   mostly stays/pushed in  uses RW for short distances/t/f  -KR     Equipment Currently Used at Home wheelchair;walker, rolling  -KR colostomy/ostomy supplies;wheelchair  -    Plans/Goals Discussed With patient;family;agreed upon  -KR     Risks Reviewed patient:;family:;LOB;increased discomfort  -KR     Benefits Reviewed patient:;family:;increase independence;increase strength;increase balance;decrease pain  -KR     Barriers to Rehab medically complex  -KR     Living Environment    Lives With facility resident   [facility]  -KR facility resident   FROM [facility]    Living Arrangements assisted living  -KR assisted living  -    Transportation Available  ambulance;family or friend will provide  -    Clinical Impression    Date of Referral to PT 03/18/17  -KR     Patient/Family Goals Statement go to SNF for rehab  -KR     Criteria for Skilled Therapeutic Interventions Met yes  -KR     Rehab Potential good, to achieve stated therapy goals  -KR     Predicted Duration of Therapy Intervention (days/wks) until discharge  -KR     Pain Assessment    Pain Assessment 0-10  -KR     Pain Score 8  -KR     Pain Type Acute pain;Surgical pain  -KR     Pain Location Abdomen  -KR     Pain Frequency Constant/continuous  -KR     Pain Intervention(s) Medication (See MAR);Repositioned  -KR     Response to Interventions tolerated  -KR     Cognitive Assessment/Intervention     Current Cognitive/Communication Assessment functional  -KR     Orientation Status oriented x 4  -KR     Follows Commands/Answers Questions 100% of the time;able to follow multi-step instructions  -KR     ROM (Range of Motion)    General ROM Detail WFL  -KR     MMT (Manual Muscle Testing)    General MMT Assessment Detail 3+ to 4-/5 B LEd  -KR     Bed Mobility, Assessment/Treatment    Bed Mobility, Assistive Device bed rails;head of bed elevated  -KR     Bed Mob, Supine to Sit, [place] contact guard assist;verbal cues required  -KR     Bed Mob, Sit to Supine, [place] contact guard assist;minimum assist (75% patient effort);2 person assist required;verbal cues required  -KR     Transfer Assessment/Treatment    Transfers, Sit-Stand [place] contact guard assist;verbal cues required;minimum assist (75% patient effort)  -KR     Transfers, Stand-Sit [place] contact guard assist;verbal cues required  -KR     Gait Assessment/Treatment    Gait, [place] Level contact guard assist;minimum assist (75% patient effort);hand held assist;2 person assist required  -KR     Gait, Distance (Feet) 3   side steps towardsHOB; refused otherwise  -KR     Gait, Gait Deviations step length decreased  -KR     Gait, Safety Issues step length decreased  -KR     Positioning and Restraints    Pre-Treatment Position in bed  -KR     Post Treatment Position bed  -KR     In Bed fowlers;call light within reach;encouraged to call for assist;with family/caregiver  -KR       03/17/17 1423 03/15/17 1635    General Information    Equipment Currently Used at Home colostomy/ostomy supplies;commode;grab bar;wheelchair  -CF colostomy/ostomy supplies;commode;grab bar;wheelchair  -DF    Living Environment    Lives With facility resident  -CF other (see comments);alone  -DF    Living Arrangements assisted living  -CF assisted living   [facility]  -DF    Home Accessibility no concerns  -CF no concerns  -DF    Stair  Railings at Home none  -CF     Type of Financial/Environmental Concern none  -CF     Transportation Available car;family or friend will provide  -CF car  -DF      User Key  (r) = Recorded By, (t) = Taken By, (c) = Cosigned By    Initials Name Provider Type    AC Ernst To, OTR/L Occupational Therapist    RIKI Bowers, PT DPT Physical Therapist    SK Venecia Corral, RN Registered Nurse    CF Emani Leiva, RN Registered Nurse    ALLISON Miranda, RN Registered Nurse    ALIX Espinoza W           Physical Therapy Education     Title: PT OT SLP Therapies (Done)     Topic: Physical Therapy (Done)     Point: Mobility training (Done)    Learning Progress Summary    Learner Readiness Method Response Comment Documented by Status   Patient Acceptance E VU OT POC, discharge planning  03/18/17 1514 Done    Acceptance E VU bed mobiltiy, activity benefit KR 03/18/17 1509 Done   Family Acceptance E VU OT POC, discharge planning  03/18/17 1514 Done    Acceptance E VU bed mobiltiy, activity benefit KR 03/18/17 1509 Done               Point: Home exercise program (Done)    Learning Progress Summary    Learner Readiness Method Response Comment Documented by Status   Patient Acceptance E VU OT POC, discharge planning  03/18/17 1514 Done    Acceptance E VU bed mobiltiy, activity benefit KR 03/18/17 1509 Done   Family Acceptance E VU OT POC, discharge planning  03/18/17 1514 Done    Acceptance E VU bed mobiltiy, activity benefit KR 03/18/17 1509 Done               Point: Body mechanics (Done)    Learning Progress Summary    Learner Readiness Method Response Comment Documented by Status   Patient Acceptance E VU OT POC, discharge planning  03/18/17 1514 Done    Acceptance E VU bed mobiltiy, activity benefit KR 03/18/17 1509 Done   Family Acceptance E VU OT POC, discharge planning  03/18/17 1514 Done    Acceptance E VU bed mobiltiy, activity benefit KR 03/18/17 1509 Done                Point: Precautions (Done)    Learning Progress Summary    Learner Readiness Method Response Comment Documented by Status   Patient Acceptance E VU OT POC, discharge planning AC 03/18/17 1514 Done    Acceptance E VU bed mobiltiy, activity benefit KR 03/18/17 1509 Done   Family Acceptance E VU OT POC, discharge planning AC 03/18/17 1514 Done    Acceptance E VU bed mobiltiy, activity benefit KR 03/18/17 1509 Done                      User Key     Initials Effective Dates Name Provider Type Discipline     06/22/15 -  Ernst To, OTR/L Occupational Therapist OT     06/19/15 -  Elizabeth Bowers, PT DPT Physical Therapist PT                PT Recommendation and Plan  Anticipated Discharge Disposition: assisted living, home with home health, skilled nursing facility, home with /7 care  Planned Therapy Interventions: balance training, bed mobility training, gait training, home exercise program, neuromuscular re-education, patient/family education, postural re-education, transfer training  PT Frequency: 2 times/day, daily, per priority policy  Plan of Care Review  Plan Of Care Reviewed With: patient, daughter  Outcome Summary/Follow up Plan: Initial physicaly therapy evaluation performed. Pt needed CGA to min A for bed mobiltiy, transfers and ambulation. She would jac take 3 side steps at EOB, refused further gait. Skilled therapy is currently needed to impove her abilitiy to complete transfers and gait more independently and decrease risk for falls. She also needs improvements with her tolerance to activity. She is currently living in an assisted living facility, but at this point needs assistance for mobiltiy.           IP PT Goals       03/18/17 1510          Bed Mobility PT LTG    Bed Mobility PT LTG, Date Established 03/18/17  -KR      Bed Mobility PT LTG, Time to Achieve by discharge  -KR      Bed Mobility PT LTG, Activity Type all bed mobility  -KR      Bed Mobility PT LTG, [place] Level independent   -KR      Transfer Training PT LTG    Transfer Training PT LTG, Date Established 03/18/17  -KR      Transfer Training PT LTG, Time to Achieve by discharge  -KR      Transfer Training PT LTG, Activity Type (P)  all transfers  -KR      Transfer Training PT LTG, [place] Level supervision required  -KR      Transfer Training PT LTG, Assist Device other (see comments)   appropriate AD  -KR      Gait Training PT LTG    Gait Training Goal PT LTG, Date Established 03/18/17  -KR      Gait Training Goal PT LTG, Time to Achieve by discharge  -KR      Gait Training Goal PT LTG, [place] Level supervision required  -KR      Gait Training Goal PT LTG, Assist Device other (see comments)   with appropriate  -KR      Gait Training Goal PT LTG, Distance to Achieve 50  -KR      Patient Education PT LTG    Patient Education PT LTG, Date Established 03/18/17  -KR      Patient Education PT LTG, Time to Achieve by discharge  -KR      Patient Education PT LTG, Education Type HEP;bed mobility;transfers;gait;posture/body mechanics;positioning;progression of POC;benefits of activity;home safety  -KR      Patient Education PT LTG, Education Understanding demonstrate adequately;verbalize understanding  -KR        User Key  (r) = Recorded By, (t) = Taken By, (c) = Cosigned By    Initials Name Provider Type    RIKI Bowers, PT DPT Physical Therapist                Outcome Measures       03/18/17 1513 03/18/17 1500       How much help from another person do you currently need...    Turning from your back to your side while in flat bed without using bedrails?  3  -KR     Moving from lying on back to sitting on the side of a flat bed without bedrails?  3  -KR     Moving to and from a bed to a chair (including a wheelchair)?  3  -KR     Standing up from a chair using your arms (e.g., wheelchair, bedside chair)?  3  -KR     Climbing 3-5 steps with a railing?  1  -KR     To walk in hospital room?  2  -KR     AM-PAC 6 Clicks Score  15   -KR     How much help from another is currently needed...    Putting on and taking off regular lower body clothing? 2  -AC      Bathing (including washing, rinsing, and drying) 2  -AC      Toileting (which includes using toilet bed pan or urinal) 2  -AC      Putting on and taking off regular upper body clothing 3  -AC      Taking care of personal grooming (such as brushing teeth) 3  -AC      Eating meals 3  -AC      Score 15  -AC      Functional Assessment    Outcome Measure Options AM-PAC 6 Clicks Daily Activity (OT)  -AC AM-PAC 6 Clicks Basic Mobility (PT)  -KR       User Key  (r) = Recorded By, (t) = Taken By, (c) = Cosigned By    Initials Name Provider Type    AC Ernst To, OTR/L Occupational Therapist    RIKI Bowers, PT DPT Physical Therapist           Time Calculation:         PT Charges       03/18/17 1418          Time Calculation    Start Time 1418  -KR      Stop Time 1500  -KR      Time Calculation (min) 42 min  -KR      PT Received On 03/18/17  -KR      PT Goal Re-Cert Due Date 03/28/17  -KR      Time Calculation- PT    Total Timed Code Minutes- PT 0 minute(s)  -KR        User Key  (r) = Recorded By, (t) = Taken By, (c) = Cosigned By    Initials Name Provider Type    RIKI Bowers, PT DPT Physical Therapist          Therapy Charges for Today     Code Description Service Date Service Provider Modifiers Qty    54104461994 HC PT MOBILITY CURRENT 3/18/2017 Elizabeth Bowers, PT DPT GP, CL 1    65499528703 HC PT MOBILITY PROJECTED 3/18/2017 Elizabeth Bowers, PT DPT GP, CJ 1    [id number] HC PT EVAL HIGH COMPLEXITY 3 3/18/2017 Elizabeth Bowers, PT DPT GP, KX 1          PT G-Codes  PT Professional Judgement Used?: Yes  Outcome Measure Options: AM-PAC 6 Clicks Daily Activity (OT)  Functional Limitation: Mobility: Walking and moving around  Mobility: Walking and Moving Around Current Status (): At least 60 percent but less than 80 percent impaired, limited or  restricted  Mobility: Walking and Moving Around Goal Status (): At least 20 percent but less than 40 percent impaired, limited or restricted      Elizabeth Bowers, PT DPT  3/18/2017

## 2017-03-18 NOTE — PROGRESS NOTES
[facility]  ARIES Abdalla M.D.  COLTON Hunter      Internal Medicine Consultation      Name: Susy Ko  MRN: [id number]     Acct: [id number]  Room: [facility].    Admit Date: 3/17/2017  PCP: Monster Zuniga MD    Physician Requesting Consult: Dr Walters    Reason for Consult:  Acute kidney injury    Chief Complaint:    Abdominal pain    History Obtained From:    the patient    History of Present Illness:     Susy Ko is a  77 y.o.  female who presents with abdominal pain which is constant in duration and getting progressively worse, aggravated by a parastomal hernia, improved with surgery, and is associated with adhesions.    Past Medical History:    Past Medical History   Diagnosis Date   • Anxiety    • Breast cancer       LEFT WITH RECONSTRUCTION   • Colostomy in place    • Dementia    • Depression    • Edema      LOWER FEET   • Hydrocephalus      NORMAL PRESSURE   • Hypertension    • Incontinence    • Nausea    • Parastomal hernia    • Parkinson disease    • Tremor      RIGHT HAND   • Unsteady gait         Past Surgical History:    Past Surgical History   Procedure Laterality Date   • Colostomy     • Mastectomy Left    • Cholecystectomy     • Hysterectomy     • Appendectomy     • Colon surgery     • Abdominal surgery     • Colonoscopy     • Breast reconstruction Left      LEFT        Medications Prior to Admission:      Prior to Admission medications    Medication Sig Start Date End Date Taking? Authorizing Provider   ALPRAZolam (XANAX) 0.5 MG tablet Take 0.5 mg by mouth Every Night.   Yes Historical Provider, MD   amLODIPine (NORVASC) 5 MG tablet Take 5 mg by mouth Daily.   Yes Historical Provider, MD   citalopram (CeleXA) 10 MG tablet Take 10 mg by mouth Every Night.   Yes Historical Provider, MD   furosemide (LASIX) 20 MG tablet Take 20 mg by mouth Every Other Day.   Yes Historical Provider, MD   losartan (COZAAR) 50 MG tablet Take 50  mg by mouth Daily.   Yes Historical Provider, MD   metoprolol succinate XL (TOPROL-XL) 25 MG 24 hr tablet Take 25 mg by mouth Daily.   Yes Historical Provider, MD   multivitamin-minerals (THERA-M) tablet tablet Take 1 tablet by mouth Daily.   Yes Historical Provider, MD   oxybutynin XL (DITROPAN-XL) 5 MG 24 hr tablet Take 5 mg by mouth Daily.   Yes Historical Provider, MD   vitamin B-12 (CYANOCOBALAMIN) 100 MCG tablet Take 100 mcg by mouth Daily.   Yes Historical Provider, MD        Allergies:      Morphine and related and Adhesive tape    Social History:    Tobacco:    reports that she has quit smoking. She has never used smokeless tobacco.  Alcohol:      reports that she does not drink alcohol.  Drug Use:  reports that she does not use illicit drugs.    Family History:    History reviewed. No pertinent family history.    Review of Systems:    Review of Systems   Constitution: Negative for chills, fever, weakness, malaise/fatigue and weight loss.   HENT: Negative for congestion, headaches, hoarse voice and nosebleeds.    Eyes: Negative for blurred vision and photophobia.   Cardiovascular: Negative for chest pain, dyspnea on exertion, near-syncope and orthopnea.   Respiratory: Negative for cough, shortness of breath and sputum production.    Endocrine: Negative for cold intolerance and polyuria.   Hematologic/Lymphatic: Negative for adenopathy and bleeding problem.   Skin: Negative for dry skin, itching and rash.   Musculoskeletal: Negative for arthritis, back pain and joint pain.   Gastrointestinal: Positive for abdominal pain. Negative for diarrhea, heartburn, nausea and vomiting.   Genitourinary: Negative for dysuria and flank pain.   Neurological: Negative for dizziness, paresthesias and seizures.   Psychiatric/Behavioral: Negative for altered mental status and memory loss. The patient is not nervous/anxious.    Allergic/Immunologic: Negative for environmental allergies and hives.         Code Status:  Full  "Code    Physical Exam:    Vitals:    Visit Vitals   • /55   • Pulse 72   • Temp 97.5 °F (36.4 °C) (Temporal Artery )   • Resp 16   • Ht 64.02\" (162.6 cm)   • Wt 148 lb (67.1 kg)   • SpO2 100%   • BMI 25.39 kg/m2     Temp (24hrs), Av.1 °F (36.7 °C), Min:97.4 °F (36.3 °C), Max:98.7 °F (37.1 °C)    Physical Exam   Constitutional: She is oriented to person, place, and time. She appears well-developed and well-nourished.   HENT:   Head: Normocephalic and atraumatic.   Mouth/Throat: Oropharynx is clear and moist.   Eyes: Conjunctivae and EOM are normal. Pupils are equal, round, and reactive to light.   Neck: Normal range of motion. Neck supple.   Cardiovascular: Normal rate, regular rhythm, normal heart sounds and intact distal pulses.    Pulmonary/Chest: Effort normal and breath sounds normal.   Abdominal: Soft. Bowel sounds are normal. She exhibits no distension. There is tenderness (diffusely). There is no rebound and no guarding.   Musculoskeletal: Normal range of motion. She exhibits no edema.   Neurological: She is alert and oriented to person, place, and time. No cranial nerve deficit.   Skin: Skin is warm and dry.   Psychiatric: She has a normal mood and affect. Her behavior is normal. Thought content normal.               Data:  Lab Results (last 24 hours)     Procedure Component Value Units Date/Time    Tissue Exam [[id number]] Collected:  17 1050    Specimen:  Tissue from Ostomy Updated:  17 1206    CBC & Differential [[id number]] Collected:  17 1136    Specimen:  Blood Updated:  17 1209    Narrative:       The following orders were created for panel order CBC & Differential.  Procedure                               Abnormality         Status                     ---------                               -----------         ------                     CBC Auto Differential[[id number]]         Abnormal            Final result                 Please view results for these tests on the " individual orders.    CBC Auto Differential [[id number]]  (Abnormal) Collected:  03/17/17 1136    Specimen:  Blood Updated:  03/17/17 1209     WBC 12.93 (H) 10*3/mm3      RBC 4.39 10*6/mm3      Hemoglobin 12.0 g/dL      Hematocrit 36.0 (L) %      MCV 82.0 fL      MCH 27.3 (L) pg      MCHC 33.3 g/dL      RDW 14.0 %      RDW-SD 42.2 fl      MPV 11.1 fL      Platelets 172 10*3/mm3      Neutrophil % 87.7 (H) %      Lymphocyte % 10.7 (L) %      Monocyte % 1.2 (L) %      Eosinophil % 0.2 %      Basophil % 0.0 %      Immature Grans % 0.2 %      Neutrophils, Absolute 11.34 (H) 10*3/mm3      Lymphocytes, Absolute 1.38 10*3/mm3      Monocytes, Absolute 0.15 (L) 10*3/mm3      Eosinophils, Absolute 0.03 10*3/mm3      Basophils, Absolute 0.00 10*3/mm3      Immature Grans, Absolute 0.03 10*3/mm3     Basic Metabolic Panel [[id number]]  (Abnormal) Collected:  03/17/17 1222    Specimen:  Blood Updated:  03/17/17 1248     Glucose 164 (H) mg/dL      BUN 17 mg/dL      Creatinine 1.27 mg/dL      Sodium 136 mmol/L      Potassium 4.3 mmol/L      Chloride 102 mmol/L      CO2 24.0 mmol/L      Calcium 9.3 mg/dL      eGFR Non African Amer 41 (L) mL/min/1.73      BUN/Creatinine Ratio 13.4      Anion Gap 10.0 mmol/L     Narrative:       The MDRD GFR formula is only valid for adults with stable renal function between ages 18 and 70.          No results found.      Assesment:    Primary Problem  <principal problem not specified>      Active Problems:    Parastomal hernia    Hernia    Past Medical History   Diagnosis Date   • Anxiety    • Breast cancer       LEFT WITH RECONSTRUCTION   • Colostomy in place    • Dementia    • Depression    • Edema      LOWER FEET   • Hydrocephalus      NORMAL PRESSURE   • Hypertension    • Incontinence    • Nausea    • Parastomal hernia    • Parkinson disease    • Tremor      RIGHT HAND   • Unsteady gait          Plan:    Acute kidney injury  Parkinson's dementia  Dehydration  Parastomal hernia   Abdominal  pain  Generalized weakness    Continue iv hydration, monitor electrolytes, consult therapies        Electronically signed by Monster Zuniga MD on 3/18/2017 at 9:37 AM     Copy sent to Dr. Monster Zuniga MD

## 2017-03-18 NOTE — PLAN OF CARE
Problem: Patient Care Overview (Adult)  Goal: Plan of Care Review  Outcome: Ongoing (interventions implemented as appropriate)    03/18/17 1511   Coping/Psychosocial Response Interventions   Plan Of Care Reviewed With patient;daughter       Goal: Adult Individualization and Mutuality  Outcome: Ongoing (interventions implemented as appropriate)  Goal: Discharge Needs Assessment  Outcome: Ongoing (interventions implemented as appropriate)  Daughter is concerned that she will be unable to return to assisted living and care for her self    03/18/17 1511   Discharge Needs Assessment   Concerns To Be Addressed discharge planning concerns   Readmission Within The Last 30 Days no previous admission in last 30 days   Discharge Facility/Level Of Care Needs nursing facility, skilled   Current Discharge Risk physical impairment   Discharge Disposition nursing facility   Current Health   Outpatient/Agency/Support Group Needs assisted living facility (specify)   Anticipated Changes Related to Illness inability to care for self   Self-Care   Equipment Currently Used at Home wheelchair   Living Environment   Transportation Available car;family or friend will provide         Problem: Perioperative Period (Adult)  Goal: Signs and Symptoms of Listed Potential Problems Will be Absent or Manageable (Perioperative Period)  Outcome: Ongoing (interventions implemented as appropriate)    Problem: Fall Risk (Adult)  Goal: Identify Related Risk Factors and Signs and Symptoms  Outcome: Ongoing (interventions implemented as appropriate)  Goal: Absence of Falls  Outcome: Ongoing (interventions implemented as appropriate)  Bed check in place    03/18/17 1511   Fall Risk (Adult)   Absence of Falls making progress toward outcome

## 2017-03-18 NOTE — PLAN OF CARE
Problem: Pain, Acute (Adult)  Goal: Identify Related Risk Factors and Signs and Symptoms  Outcome: Ongoing (interventions implemented as appropriate)  Goal: Acceptable Pain Control/Comfort Level  Outcome: Ongoing (interventions implemented as appropriate)  More pain today, call to doctor with increase in pain meds

## 2017-03-18 NOTE — PLAN OF CARE
Problem: Patient Care Overview (Adult)  Goal: Plan of Care Review  Outcome: Ongoing (interventions implemented as appropriate)    03/18/17 0232   Coping/Psychosocial Response Interventions   Plan Of Care Reviewed With patient   Patient Care Overview   Progress no change   Outcome Evaluation   Outcome Summary/Follow up Plan rested well this shift. no output from colostomy at this time.        Goal: Adult Individualization and Mutuality  Outcome: Ongoing (interventions implemented as appropriate)  Goal: Discharge Needs Assessment  Outcome: Ongoing (interventions implemented as appropriate)    Problem: Perioperative Period (Adult)  Goal: Signs and Symptoms of Listed Potential Problems Will be Absent or Manageable (Perioperative Period)  Outcome: Ongoing (interventions implemented as appropriate)    Problem: Fall Risk (Adult)  Goal: Identify Related Risk Factors and Signs and Symptoms  Outcome: Ongoing (interventions implemented as appropriate)  Goal: Absence of Falls  Outcome: Ongoing (interventions implemented as appropriate)

## 2017-03-19 PROBLEM — R26.9 GAIT ABNORMALITY: Status: ACTIVE | Noted: 2017-03-19

## 2017-03-19 PROCEDURE — A9270 NON-COVERED ITEM OR SERVICE: HCPCS | Performed by: SPECIALIST

## 2017-03-19 PROCEDURE — 25810000003 SODIUM CHLORIDE 0.9 % WITH KCL 20 MEQ 20-0.9 MEQ/L-% SOLUTION: Performed by: SPECIALIST

## 2017-03-19 PROCEDURE — 25010000002 ENOXAPARIN PER 10 MG: Performed by: SPECIALIST

## 2017-03-19 PROCEDURE — 63710000001 PANTOPRAZOLE 40 MG TABLET DELAYED-RELEASE: Performed by: SPECIALIST

## 2017-03-19 PROCEDURE — 25010000002 HYDROMORPHONE PER 4 MG: Performed by: SPECIALIST

## 2017-03-19 RX ADMIN — CARBIDOPA AND LEVODOPA 0.5 TABLET: 25; 250 TABLET ORAL at 08:32

## 2017-03-19 RX ADMIN — CITALOPRAM 10 MG: 10 TABLET, FILM COATED ORAL at 20:25

## 2017-03-19 RX ADMIN — LOSARTAN POTASSIUM 50 MG: 50 TABLET, FILM COATED ORAL at 08:32

## 2017-03-19 RX ADMIN — PANTOPRAZOLE SODIUM 40 MG: 40 TABLET, DELAYED RELEASE ORAL at 05:17

## 2017-03-19 RX ADMIN — HYDROMORPHONE HYDROCHLORIDE 0.5 MG: 1 INJECTION, SOLUTION INTRAMUSCULAR; INTRAVENOUS; SUBCUTANEOUS at 20:39

## 2017-03-19 RX ADMIN — AMLODIPINE BESYLATE 5 MG: 5 TABLET ORAL at 08:33

## 2017-03-19 RX ADMIN — METOPROLOL SUCCINATE 25 MG: 25 TABLET, FILM COATED, EXTENDED RELEASE ORAL at 20:25

## 2017-03-19 RX ADMIN — SODIUM CHLORIDE AND POTASSIUM CHLORIDE 100 ML/HR: 9; 1.49 INJECTION, SOLUTION INTRAVENOUS at 16:19

## 2017-03-19 RX ADMIN — HYDROMORPHONE HYDROCHLORIDE 0.5 MG: 1 INJECTION, SOLUTION INTRAMUSCULAR; INTRAVENOUS; SUBCUTANEOUS at 05:17

## 2017-03-19 RX ADMIN — SODIUM CHLORIDE AND POTASSIUM CHLORIDE 100 ML/HR: 9; 1.49 INJECTION, SOLUTION INTRAVENOUS at 05:17

## 2017-03-19 RX ADMIN — HYDROMORPHONE HYDROCHLORIDE 0.5 MG: 1 INJECTION, SOLUTION INTRAMUSCULAR; INTRAVENOUS; SUBCUTANEOUS at 11:22

## 2017-03-19 RX ADMIN — FUROSEMIDE 20 MG: 20 TABLET ORAL at 08:32

## 2017-03-19 RX ADMIN — VITAMIN B12 0.1 MG ORAL TABLET 50 MCG: 0.1 TABLET ORAL at 08:32

## 2017-03-19 RX ADMIN — ENOXAPARIN SODIUM 30 MG: 30 INJECTION SUBCUTANEOUS at 08:33

## 2017-03-19 RX ADMIN — HYDROMORPHONE HYDROCHLORIDE 0.5 MG: 1 INJECTION, SOLUTION INTRAMUSCULAR; INTRAVENOUS; SUBCUTANEOUS at 15:52

## 2017-03-19 RX ADMIN — OXYBUTYNIN CHLORIDE 5 MG: 5 TABLET, FILM COATED, EXTENDED RELEASE ORAL at 08:32

## 2017-03-19 RX ADMIN — ALPRAZOLAM 0.5 MG: 0.5 TABLET ORAL at 20:25

## 2017-03-19 RX ADMIN — CARBIDOPA AND LEVODOPA 0.5 TABLET: 25; 250 TABLET ORAL at 18:46

## 2017-03-19 NOTE — DISCHARGE PLACEMENT REQUEST
"To:  Superior  From:  Edna Hernandez, BSW.  [phone].    Susy Pittman (77 y.o. Female)     Date of Birth Social Security Number Address Home Phone MRN    1940  [address] [place] [address] [phone] [id number]    Latter-day Marital Status          Oriental orthodox        Admission Date Admission Type Admitting Provider Attending Provider Department, Room/Bed    3/17/17 Elective Princess Walters MD Tyrrell, Dana R, MD [facility] 3C, 370/1    Discharge Date Discharge Disposition Discharge Destination                      Attending Provider: Princess Walters MD     Allergies:  Morphine And Related, Adhesive Tape    Isolation:  None   Infection:  None   Code Status:  FULL    Ht:  64.02\" (162.6 cm)   Wt:  148 lb (67.1 kg)    Admission Cmt:  None   Principal Problem:  None                Active Insurance as of 3/17/2017     Primary Coverage     Payor Plan Insurance Group Employer/Plan Group    MEDICARE RAILROAD MEDICARE      Payor Plan Address Payor Plan Phone Number Effective From Effective To    [address] [phone] 9/1/1998     [place] [address]       Subscriber Name Subscriber Birth Date Member ID       SUSY PITTMAN 1940 [id number]           Secondary Coverage     Payor Plan Insurance Group Employer/Plan Group    [employer] [id number]     Payor Plan Address Payor Plan Phone Number Effective From Effective To    [address] [address]  1/1/2017     [place] [address]       Subscriber Name Subscriber Birth Date Member ID       MRS SHAYLA PITTMAN 2/12/1919 [id number]                 Emergency Contacts      (Rel.) Home Phone Work Phone Mobile Phone    Petty Riggs (Daughter) -- -- [phone]               History & Physical      H&P filed by Ismael Cartagena MD at 3/16/2017  3:43 PM      Scan on 3/17/2017 : ASHLEY - 3/14/2017 (below)              Electronically signed by Interface, Scans Incoming at " 3/16/2017  3:43 PM      Princess Walters MD at 3/17/2017  9:05 AM            H&P reviewed. The patient was examined and there are no changes to the H&P.         Electronically signed by Princess Walters MD at 3/17/2017  9:06 AM        Prior to Admission Medications     Prescriptions Last Dose Informant Patient Reported? Taking?    ALPRAZolam (XANAX) 0.5 MG tablet 3/16/2017 Pharmacy Yes Yes    Take 0.5 mg by mouth Every Night.    amLODIPine (NORVASC) 5 MG tablet 3/17/2017 Pharmacy Yes Yes    Take 5 mg by mouth Daily.    citalopram (CeleXA) 10 MG tablet 3/16/2017 Pharmacy Yes Yes    Take 10 mg by mouth Every Night.    furosemide (LASIX) 20 MG tablet Past Week Pharmacy Yes Yes    Take 20 mg by mouth Every Other Day.    losartan (COZAAR) 50 MG tablet Past Week Pharmacy Yes Yes    Take 50 mg by mouth Daily.    metoprolol succinate XL (TOPROL-XL) 25 MG 24 hr tablet 3/17/2017 Pharmacy Yes Yes    Take 25 mg by mouth Daily.    multivitamin-minerals (THERA-M) tablet tablet 3/15/2017 Self Yes Yes    Take 1 tablet by mouth Daily.    oxybutynin XL (DITROPAN-XL) 5 MG 24 hr tablet 3/15/2017 Pharmacy Yes Yes    Take 5 mg by mouth Daily.    vitamin B-12 (CYANOCOBALAMIN) 100 MCG tablet 3/15/2017 Self Yes Yes    Take 100 mcg by mouth Daily.          Hospital Medications (active)       Dose Frequency Start End    ALPRAZolam (XANAX) tablet 0.5 mg 0.5 mg Nightly 3/17/2017     Sig - Route: Take 1 tablet by mouth Every Night. - Oral    amLODIPine (NORVASC) tablet 5 mg 5 mg Daily 3/18/2017     Sig - Route: Take 1 tablet by mouth Daily. - Oral    carbidopa-levodopa (SINEMET)  MG per tablet 0.5 tablet 0.5 tablet 2 Times Daily 3/17/2017     Sig - Route: Take 0.5 tablets by mouth 2 (Two) Times a Day. - Oral    citalopram (CeleXA) tablet 10 mg 10 mg Nightly 3/17/2017     Sig - Route: Take 1 tablet by mouth Every Night. - Oral    cyancobalamin (VITAMIN B-12) tablet 50 mcg 50 mcg Daily 3/17/2017     Sig - Route: Take 0.5 tablets by mouth  "Daily. - Oral    enoxaparin (LOVENOX) syringe 30 mg 30 mg Daily 3/18/2017     Sig - Route: Inject 0.3 mL under the skin Daily. - Subcutaneous    furosemide (LASIX) tablet 20 mg 20 mg Every Other Day 3/17/2017     Sig - Route: Take 1 tablet by mouth Every Other Day. - Oral    HYDROmorphone (DILAUDID) injection 0.25 mg 0.25 mg Every 4 Hours PRN 3/18/2017 3/27/2017    Sig - Route: Infuse 0.25 mg into a venous catheter Every 4 (Four) Hours As Needed for Moderate Pain (4-6). - Intravenous    HYDROmorphone (DILAUDID) injection 0.5 mg 0.5 mg Every 4 Hours PRN 3/18/2017 3/28/2017    Sig - Route: Infuse 0.5 mg into a venous catheter Every 4 (Four) Hours As Needed for Severe Pain (7-10). - Intravenous    lactated ringers infusion 9 mL/hr Continuous 3/17/2017     Sig - Route: Infuse 9 mL/hr into a venous catheter Continuous. - Intravenous    losartan (COZAAR) tablet 50 mg 50 mg Daily 3/17/2017     Sig - Route: Take 1 tablet by mouth Daily. - Oral    metoprolol succinate XL (TOPROL-XL) 24 hr tablet 25 mg 25 mg Daily 3/17/2017     Sig - Route: Take 1 tablet by mouth Daily. - Oral    naloxone (NARCAN) injection 0.4 mg 0.4 mg Every 5 Minutes PRN 3/17/2017     Sig - Route: Infuse 1 mL into a venous catheter Every 5 (Five) Minutes As Needed for Respiratory Depression. - Intravenous    Linked Group 1:  \"And\" Linked Group Details        ondansetron (ZOFRAN) injection 4 mg 4 mg Every 6 Hours PRN 3/17/2017     Sig - Route: Infuse 2 mL into a venous catheter Every 6 (Six) Hours As Needed for Nausea or Vomiting. - Intravenous    oxybutynin XL (DITROPAN-XL) 24 hr tablet 5 mg 5 mg Daily 3/17/2017     Sig - Route: Take 1 tablet by mouth Daily. - Oral    pantoprazole (PROTONIX) EC tablet 40 mg 40 mg Every Early Morning 3/17/2017     Sig - Route: Take 1 tablet by mouth Every Morning. - Oral    promethazine (PHENERGAN) tablet 25 mg 25 mg Every 6 Hours PRN 3/17/2017     Sig - Route: Take 1 tablet by mouth Every 6 (Six) Hours As Needed for Nausea " or Vomiting. - Oral    sodium chloride 0.9 % with KCl 20 mEq/L infusion 100 mL/hr Continuous 3/17/2017     Sig - Route: Infuse 100 mL/hr into a venous catheter Continuous. - Intravenous    HYDROmorphone (DILAUDID) injection 0.25 mg (Discontinued) 0.25 mg Every 4 Hours PRN 3/17/2017 3/18/2017    Sig - Route: Infuse 0.25 mg into a venous catheter Every 4 (Four) Hours As Needed for Moderate Pain (4-6) or Severe Pain (7-10). - Intravenous             Operative/Procedure Notes (most recent note)      Princess Walters MD at 3/17/2017 11:01 AM  Version 1 of 1         Princess Walters MD Operative Note    Susy JOEL Maikel  3/17/2017    Pre-op Diagnosis:   PARASTOMAL HERNIA    Post-op Diagnosis:     same plus colon polyp at ostomy site    Procedure/CPT® Codes:      Procedure(s):  REPAIR PARASTOMAL HERNIA and resection of colon polyp at ostomy    Surgeon(s):  Princess Walters MD    Anesthesia: General    Staff:   Circulator: Morales Pichardo RN  Scrub Person: Sarita Aguilar  Assistant: [address]    Estimated Blood Loss: minimal    Specimens:                  ID Type Source Tests Collected by Time Destination   A : Polyp Tissue Ostomy TISSUE EXAM Princess Walters MD 3/17/2017 1050          Drains:   Urethral Catheter 03/17/17 0950 100% silicone 16 (Active)           Indications: Symptomatic parastomal hernia in patient with severe Alzheimer's    Findings: Severe adhesions.  Very thin abdominal wall musculature    Complications: none    Procedure: The patient was brought to the operating room and placed in the supine position.  After induction of general anesthesia and infusion of IV antibiotics, the patient was prepped and draped in the usual sterile fashion.  Midline incision reopened.  Very thin wall.  Just basically with the skin incision I was exposing fascia and Prolene sutures.  The Prolene sutures were removed.  The abdomen entered.  Electrocautery, sharp, and blunt dissection was performed freeing up the abdominal  wall of adhesions.  Circumferentially I dissected around the ostomy and reduce the hernia contents..  I placed around the ostomy a Penrose drain to help with retraction and exposed laterally to aid in dissection.  With this completed I debated my options.  I did not want to put a piece of mesh around near the stoma.  I really felt that moving the ostomy was a very large undertaking in this patient I opted to do a primary closure of the parastomal hernia.  This was accomplished with interrupted oh Prolenes.  Again the abdominal musculature was quite thinned from her prior reconstruction.  She also had mesh inferiorly.  However this was the best option in this patient.  Once that was completed we irrigated and then closed the abdomen with interrupted 0 Prolene.  Irrigated the wound staples used on the skin.  We then seated to excise polyp around the ostomy.  I suspect this was an inflammatory polyp but it did appear to tension to cause difficulty with placing the ostomy appliance which was one of the main reasons that we are performing this procedure to begin with.  Therefore the skin edge at the level of the polyp was removed in an ellipse with the polyp.  Hemostasis was obtained with cautery and this was closed with interrupted 3-0 Vicryl.  Dressing was placed and patient was awakened and transferred to the recovery room in stable condition having tolerated the procedure well.  At the end of the procedure all counts were correct.    Princess Walters MD     Date: 3/17/2017  Time: 11:02 AM       Electronically signed by Princess Walters MD at 3/17/2017 11:08 AM           Physician Progress Notes (most recent note)      Monster Zuniga MD at 3/19/2017 12:53 PM  Version 1 of 1         [facility]  Hieu Zuniga M.D.  ARIES Joiner APRN      Internal Medicine Consultation      Name: Susy Ko  MRN: [id number]     Acct: [id number]  Room: 370/.    Admit Date:  3/17/2017  PCP: Monster Zuniga MD    Physician Requesting Consult: Dr Walters    Reason for Consult:  Acute kidney injury    Chief Complaint:    Abdominal pain    History Obtained From:    the patient    History of Present Illness:     Susy Ko is a  77 y.o.  female who presents with abdominal pain which is constant in duration and getting progressively worse, aggravated by a parastomal hernia, improved with surgery, and is associated with adhesions.    Last 24hrs:  Had some hypoxia and had to resume o2 usage.    Past Medical History:    Past Medical History   Diagnosis Date   • Anxiety    • Breast cancer       LEFT WITH RECONSTRUCTION   • Colostomy in place    • Dementia    • Depression    • Edema      LOWER FEET   • Hydrocephalus      NORMAL PRESSURE   • Hypertension    • Incontinence    • Nausea    • Parastomal hernia    • Parkinson disease    • Tremor      RIGHT HAND   • Unsteady gait         Past Surgical History:    Past Surgical History   Procedure Laterality Date   • Colostomy     • Mastectomy Left    • Cholecystectomy     • Hysterectomy     • Appendectomy     • Colon surgery     • Abdominal surgery     • Colonoscopy     • Breast reconstruction Left      LEFT        Medications Prior to Admission:      Prior to Admission medications    Medication Sig Start Date End Date Taking? Authorizing Provider   ALPRAZolam (XANAX) 0.5 MG tablet Take 0.5 mg by mouth Every Night.   Yes Historical Provider, MD   amLODIPine (NORVASC) 5 MG tablet Take 5 mg by mouth Daily.   Yes Historical Provider, MD   citalopram (CeleXA) 10 MG tablet Take 10 mg by mouth Every Night.   Yes Historical Provider, MD   furosemide (LASIX) 20 MG tablet Take 20 mg by mouth Every Other Day.   Yes Historical Provider, MD   losartan (COZAAR) 50 MG tablet Take 50 mg by mouth Daily.   Yes Historical Provider, MD   metoprolol succinate XL (TOPROL-XL) 25 MG 24 hr tablet Take 25 mg by mouth Daily.   Yes Historical Provider, MD    multivitamin-minerals (THERA-M) tablet tablet Take 1 tablet by mouth Daily.   Yes Historical Provider, MD   oxybutynin XL (DITROPAN-XL) 5 MG 24 hr tablet Take 5 mg by mouth Daily.   Yes Historical Provider, MD   vitamin B-12 (CYANOCOBALAMIN) 100 MCG tablet Take 100 mcg by mouth Daily.   Yes Historical Provider, MD        Allergies:      Morphine and related and Adhesive tape    Social History:    Tobacco:    reports that she has quit smoking. She has never used smokeless tobacco.  Alcohol:      reports that she does not drink alcohol.  Drug Use:  reports that she does not use illicit drugs.    Family History:    History reviewed. No pertinent family history.    Review of Systems:    Review of Systems   Constitution: Negative for chills, fever, weakness, malaise/fatigue and weight loss.   HENT: Negative for congestion, headaches, hoarse voice and nosebleeds.    Eyes: Negative for blurred vision and photophobia.   Cardiovascular: Negative for chest pain, dyspnea on exertion, near-syncope and orthopnea.   Respiratory: Negative for cough, shortness of breath and sputum production.    Endocrine: Negative for cold intolerance and polyuria.   Hematologic/Lymphatic: Negative for adenopathy and bleeding problem.   Skin: Negative for dry skin, itching and rash.   Musculoskeletal: Negative for arthritis, back pain and joint pain.   Gastrointestinal: Positive for abdominal pain. Negative for diarrhea, heartburn, nausea and vomiting.   Genitourinary: Negative for dysuria and flank pain.   Neurological: Negative for dizziness, paresthesias and seizures.   Psychiatric/Behavioral: Negative for altered mental status and memory loss. The patient is not nervous/anxious.    Allergic/Immunologic: Negative for environmental allergies and hives.         Code Status:  Full Code    Physical Exam:    Vitals:    Visit Vitals   • /54 (BP Location: Left arm, Patient Position: Lying)   • Pulse 73   • Temp 97.8 °F (36.6 °C) (Oral)   •  "Resp 18   • Ht 64.02\" (162.6 cm)   • Wt 148 lb (67.1 kg)   • SpO2 95%   • BMI 25.39 kg/m2     Temp (24hrs), Av °F (36.7 °C), Min:97.8 °F (36.6 °C), Max:98.2 °F (36.8 °C)    Physical Exam   Constitutional: She is oriented to person, place, and time. She appears well-developed and well-nourished.   HENT:   Head: Normocephalic and atraumatic.   Mouth/Throat: Oropharynx is clear and moist.   Eyes: Conjunctivae and EOM are normal. Pupils are equal, round, and reactive to light.   Neck: Normal range of motion. Neck supple.   Cardiovascular: Normal rate, regular rhythm, normal heart sounds and intact distal pulses.    Pulmonary/Chest: Effort normal and breath sounds normal.   Abdominal: Soft. Bowel sounds are normal. She exhibits no distension. There is tenderness (diffusely). There is no rebound and no guarding.   Musculoskeletal: Normal range of motion. She exhibits no edema.   Neurological: She is alert and oriented to person, place, and time. No cranial nerve deficit.   Skin: Skin is warm and dry.   Psychiatric: She has a normal mood and affect. Her behavior is normal. Thought content normal.               Data:  Lab Results (last 24 hours)     ** No results found for the last 24 hours. **          No results found.      Assesment:    Primary Problem  <principal problem not specified>      Active Problems:    Parastomal hernia    Hernia    Gait abnormality    Past Medical History   Diagnosis Date   • Anxiety    • Breast cancer       LEFT WITH RECONSTRUCTION   • Colostomy in place    • Dementia    • Depression    • Edema      LOWER FEET   • Hydrocephalus      NORMAL PRESSURE   • Hypertension    • Incontinence    • Nausea    • Parastomal hernia    • Parkinson disease    • Tremor      RIGHT HAND   • Unsteady gait          Plan:    Acute kidney injury  Parkinson's dementia  Dehydration  Parastomal hernia   Abdominal pain  Generalized weakness  Acute hypoxic respiratory failure probably secondary to sedation    Continue " iv hydration, monitor electrolytes, encourage therapy.        Electronically signed by Epifanio Zuniga MD on 3/19/2017 at 12:54 PM     Copy sent to Dr. Epifanio Zuniga MD           Electronically signed by Epifanio Zuniga MD at 3/19/2017 12:55 PM           Consult Notes (most recent note)      Epifanio Zuniga MD at 3/18/2017  9:36 AM  Version 1 of 1     Consult Orders:    1. Inpatient Consult to Internal Medicine [[id number]] ordered by Princess Walters MD at 17 1113                Inpatient Consult to Internal Medicine  Consult performed by: EPIFANIO ZUNIGA  Consult ordered by: PRINCESS WALTERS  Assessment/Recommendations: See other note           Electronically signed by Epifanio Zuniga MD at 3/18/2017  9:37 AM        Nutrition Notes (most recent note)     No notes of this type exist for this encounter.           Physical Therapy Notes (most recent note)      Elizabeth Bowers, PT DPT at 3/18/2017  3:16 PM  Version 1 of 1         Acute Care - Physical Therapy Initial Evaluation  [facility]     Patient Name: Susy Ko  : 1940  MRN: [id number]  Today's Date: 3/18/2017   Onset of Illness/Injury or Date of Surgery Date: 17  Date of Referral to PT: 17  Referring Physician: Dr. Perez Zuniga      Admit Date: 3/17/2017     Visit Dx:    ICD-10-CM ICD-9-CM   1. Gait abnormality R26.9 781.2   2. Hernia K46.9 553.9   3. Impaired mobility and ADLs Z74.09 799.89     Patient Active Problem List   Diagnosis   • Normal pressure hydrocephalus   • Non morbid obesity due to excess calories   • Tobacco non-user   • Tremor of right hand   • Abnormality of gait   • Parastomal hernia   • Hernia     Past Medical History   Diagnosis Date   • Anxiety    • Breast cancer       LEFT WITH RECONSTRUCTION   • Colostomy in place    • Dementia    • Depression    • Edema      LOWER FEET   • Hydrocephalus      NORMAL PRESSURE   • Hypertension    • Incontinence    • Nausea    •  Parastomal hernia    • Parkinson disease    • Tremor      RIGHT HAND   • Unsteady gait      Past Surgical History   Procedure Laterality Date   • Colostomy     • Mastectomy Left    • Cholecystectomy     • Hysterectomy     • Appendectomy     • Colon surgery     • Abdominal surgery     • Colonoscopy     • Breast reconstruction Left      LEFT          PT ASSESSMENT (last 72 hours)      PT Evaluation       03/18/17 1511 03/18/17 1429    Rehab Evaluation    Document Type  evaluation  -AC    Subjective Information  agree to therapy;complains of;pain  -AC    Patient Effort, Rehab Treatment  fair  -AC    Symptoms Noted During/After Treatment  increased pain  -AC    General Information    Patient Profile Review  yes  -AC    Onset of Illness/Injury or Date of Surgery Date  03/17/17  -    Referring Physician  Dr. Perez Zuniga  -    General Observations  lying supine in bed, daughter present, pill rolling tremor R hand, IV, colostomy  -AC    Precautions/Limitations  fall precautions  -    Prior Level of Function  mod assist:;bathing;dressing  -AC    Equipment Currently Used at Home (P)  wheelchair  -SK     Plans/Goals Discussed With  patient and family;agreed upon  -AC    Risks Reviewed  patient and family:;LOB;nausea/vomiting;dizziness;increased discomfort;change in vital signs;increased drainage;lines disloged  -    Benefits Reviewed  patient and family:;improve function;increase independence;increase strength;increase balance;decrease pain;increase knowledge  -    Barriers to Rehab  medically complex;previous functional deficit;cognitive status  -AC    Living Environment    Lives With  facility resident  -    Living Arrangements  assisted living  -    Transportation Available (P)  car;family or friend will provide  -SK     Living Environment Comment  daughter checks on pt   -AC    Pain Assessment    Pain Assessment  0-10  -AC    Pain Score  8  -AC    Pain Type  Acute pain;Surgical pain  -AC    Pain Location   Abdomen  -AC    Pain Frequency  Constant/continuous  -AC    Pain Intervention(s)  Medication (See MAR);Ambulation/increased activity;Repositioned  -AC    Response to Interventions  not improved, RN notified  -AC    Cognitive Assessment/Intervention    Current Cognitive/Communication Assessment  functional  -AC    Orientation Status  oriented x 4  -AC    Follows Commands/Answers Questions  100% of the time;able to follow multi-step instructions  -AC    Personal Safety  WNL/WFL  -AC    Personal Safety Interventions  fall prevention program maintained;gait belt;nonskid shoes/slippers when out of bed;supervised activity  -AC    ROM (Range of Motion)    General ROM Detail  WFL AROM BUE  -AC    MMT (Manual Muscle Testing)    General MMT Assessment Detail  4/5 BUE  -AC    Bed Mobility, Assessment/Treatment    Bed Mobility, Assistive Device  bed rails;head of bed elevated  -AC    Bed Mobility, Scoot/Bridge, [place]  supervision required  -AC    Bed Mob, Supine to Sit, [place]  contact guard assist;verbal cues required;nonverbal cues required (demo/gesture)  -AC    Bed Mob, Sit to Supine, [place]  minimum assist (75% patient effort);nonverbal cues required (demo/gesture);verbal cues required  -AC    Bed Mobility, Impairments  pain  -AC    Transfer Assessment/Treatment    Transfers, Sit-Stand [place]  contact guard assist;2 person assist required;minimum assist (75% patient effort);verbal cues required  -AC    Transfers, Stand-Sit [place]  contact guard assist;2 person assist required;verbal cues required;nonverbal cues required (demo/gesture)  -AC    Motor Skills/Interventions    Motor Response Observations  --   resting tremor R hand  -AC    Additional Documentation  Balance Skills Training (Group);Fine Motor Coordination Training (Group);Gross Motor Coordination Training (Group)  -AC    Balance Skills Training    Sitting-Level of Assistance  Close supervision  -AC    Sitting-Balance Support  Right  upper extremity supported;Left upper extremity supported;Feet supported  -AC    Standing-Level of Assistance  Contact guard;x2  -AC    Static Standing Balance Support  Right upper extremity supported;Left upper extremity supported  -AC    Gait Balance-Level of Assistance  Contact guard;x2  -AC    Gait Balance Support  Right upper extremity supported;Left upper extremity supported  -AC    Fine Motor Coordination Training    Opposition  Right:;Left:;impaired;other (comment)   decreased accuracy  -AC    Gross Motor Coordination Training    Gross Motor Skill, Impairments Detail  resting tremor R hand  -AC    Sensory Assessment/Intervention    Sensory Impairment  --   WNL per pt  -AC    Positioning and Restraints    Pre-Treatment Position  in bed  -AC    Post Treatment Position  bed  -AC    In Bed  supine;call light within reach;encouraged to call for assist;with family/caregiver;side rails up x2;legs elevated  -AC      03/18/17 1418 03/17/17 1545    Rehab Evaluation    Document Type evaluation   see MAR  -KR     Subjective Information agree to therapy;complains of;pain  -KR     General Information    Patient Profile Review yes  -KR     Onset of Illness/Injury or Date of Surgery Date 03/17/17  -KR     Referring Physician Dr. Perez Zuniga  -KR     Pertinent History Of Current Problem patient was having progressive abdominal pain; s/p repair of parastomal hernia and resection of colon polyp at ostomy  -KR     Precautions/Limitations other (see comments);fall precautions   colostomy  -KR     Prior Level of Function --   mostly stays/pushed in  uses RW for short distances/t/f  -KR     Equipment Currently Used at Home wheelchair;walker, rolling  -KR colostomy/ostomy supplies;wheelchair  -LJ    Plans/Goals Discussed With patient;family;agreed upon  -KR     Risks Reviewed patient:;family:;LOB;increased discomfort  -KR     Benefits Reviewed patient:;family:;increase independence;increase strength;increase balance;decrease pain   -KR     Barriers to Rehab medically complex  -KR     Living Environment    Lives With facility resident   [facility]  -KR facility resident   [facility]    Living Arrangements assisted living  -KR assisted living  -    Transportation Available  ambulance;family or friend will provide  -    Clinical Impression    Date of Referral to PT 03/18/17  -KR     Patient/Family Goals Statement go to SNF for rehab  -KR     Criteria for Skilled Therapeutic Interventions Met yes  -KR     Rehab Potential good, to achieve stated therapy goals  -KR     Predicted Duration of Therapy Intervention (days/wks) until discharge  -KR     Pain Assessment    Pain Assessment 0-10  -KR     Pain Score 8  -KR     Pain Type Acute pain;Surgical pain  -KR     Pain Location Abdomen  -KR     Pain Frequency Constant/continuous  -KR     Pain Intervention(s) Medication (See MAR);Repositioned  -KR     Response to Interventions tolerated  -KR     Cognitive Assessment/Intervention    Current Cognitive/Communication Assessment functional  -KR     Orientation Status oriented x 4  -KR     Follows Commands/Answers Questions 100% of the time;able to follow multi-step instructions  -KR     ROM (Range of Motion)    General ROM Detail WFL  -KR     MMT (Manual Muscle Testing)    General MMT Assessment Detail 3+ to 4-/5 B LEd  -KR     Bed Mobility, Assessment/Treatment    Bed Mobility, Assistive Device bed rails;head of bed elevated  -KR     Bed Mob, Supine to Sit, [place] contact guard assist;verbal cues required  -KR     Bed Mob, Sit to Supine, [place] contact guard assist;minimum assist (75% patient effort);2 person assist required;verbal cues required  -KR     Transfer Assessment/Treatment    Transfers, Sit-Stand [place] contact guard assist;verbal cues required;minimum assist (75% patient effort)  -KR     Transfers, Stand-Sit [place] contact guard assist;verbal cues required  -KR     Gait  Assessment/Treatment    Gait, [place] Level contact guard assist;minimum assist (75% patient effort);hand held assist;2 person assist required  -KR     Gait, Distance (Feet) 3   side steps towardsHOB; refused otherwise  -KR     Gait, Gait Deviations step length decreased  -KR     Gait, Safety Issues step length decreased  -KR     Positioning and Restraints    Pre-Treatment Position in bed  -KR     Post Treatment Position bed  -KR     In Bed fowlers;call light within reach;encouraged to call for assist;with family/caregiver  -KR       03/17/17 1423 03/15/17 1635    General Information    Equipment Currently Used at Home colostomy/ostomy supplies;commode;grab bar;wheelchair  -CF colostomy/ostomy supplies;commode;grab bar;wheelchair  -DF    Living Environment    Lives With facility resident  -CF other (see comments);alone  -DF    Living Arrangements assisted living  -CF assisted living   [facility]  -DF    Home Accessibility no concerns  -CF no concerns  -DF    Stair Railings at Home none  -CF     Type of Financial/Environmental Concern none  -CF     Transportation Available car;family or friend will provide  -CF car  -DF      User Key  (r) = Recorded By, (t) = Taken By, (c) = Cosigned By    Initials Name Provider Type    AC Ernst To, OTR/L Occupational Therapist    RIKI Bwoers, PT DPT Physical Therapist    SK Venecia Corral, VESTA Registered Nurse    CF Emani Leiva, RN Registered Nurse    DF Teresita Miranda, RN Registered Nurse    BAMBI SegoviaW           Physical Therapy Education     Title: PT OT SLP Therapies (Done)     Topic: Physical Therapy (Done)     Point: Mobility training (Done)    Learning Progress Summary    Learner Readiness Method Response Comment Documented by Status   Patient Acceptance E VU OT POC, discharge planning AC 03/18/17 1514 Done    Acceptance E VU bed mobiltiy, activity benefit  03/18/17 1509 Done   Family Acceptance E VU OT POC,  discharge planning AC 03/18/17 1514 Done    Acceptance E VU bed mobiltiy, activity benefit KR 03/18/17 1509 Done               Point: Home exercise program (Done)    Learning Progress Summary    Learner Readiness Method Response Comment Documented by Status   Patient Acceptance E VU OT POC, discharge planning AC 03/18/17 1514 Done    Acceptance E VU bed mobiltiy, activity benefit KR 03/18/17 1509 Done   Family Acceptance E VU OT POC, discharge planning AC 03/18/17 1514 Done    Acceptance E VU bed mobiltiy, activity benefit KR 03/18/17 1509 Done               Point: Body mechanics (Done)    Learning Progress Summary    Learner Readiness Method Response Comment Documented by Status   Patient Acceptance E VU OT POC, discharge planning AC 03/18/17 1514 Done    Acceptance E VU bed mobiltiy, activity benefit KR 03/18/17 1509 Done   Family Acceptance E VU OT POC, discharge planning AC 03/18/17 1514 Done    Acceptance E VU bed mobiltiy, activity benefit KR 03/18/17 1509 Done               Point: Precautions (Done)    Learning Progress Summary    Learner Readiness Method Response Comment Documented by Status   Patient Acceptance E VU OT POC, discharge planning AC 03/18/17 1514 Done    Acceptance E VU bed mobiltiy, activity benefit KR 03/18/17 1509 Done   Family Acceptance E VU OT POC, discharge planning AC 03/18/17 1514 Done    Acceptance E VU bed mobiltiy, activity benefit KR 03/18/17 1509 Done                      User Key     Initials Effective Dates Name Provider Type Discipline    AC 06/22/15 -  Ernst To, OTR/L Occupational Therapist OT    KR 06/19/15 -  Elizabeth Bowers, PT DPT Physical Therapist PT                PT Recommendation and Plan  Anticipated Discharge Disposition: assisted living, home with home health, skilled nursing facility, home with 24/7 care  Planned Therapy Interventions: balance training, bed mobility training, gait training, home exercise program, neuromuscular re-education,  patient/family education, postural re-education, transfer training  PT Frequency: 2 times/day, daily, per priority policy  Plan of Care Review  Plan Of Care Reviewed With: patient, daughter  Outcome Summary/Follow up Plan: Initial physicaly therapy evaluation performed. Pt needed CGA to min A for bed mobiltiy, transfers and ambulation. She would jac take 3 side steps at EOB, refused further gait. Skilled therapy is currently needed to impove her abilitiy to complete transfers and gait more independently and decrease risk for falls. She also needs improvements with her tolerance to activity. She is currently living in an assisted living facility, but at this point needs assistance for mobiltiy.           IP PT Goals       03/18/17 1510          Bed Mobility PT LTG    Bed Mobility PT LTG, Date Established 03/18/17  -KR      Bed Mobility PT LTG, Time to Achieve by discharge  -KR      Bed Mobility PT LTG, Activity Type all bed mobility  -KR      Bed Mobility PT LTG, [place] Level independent  -KR      Transfer Training PT LTG    Transfer Training PT LTG, Date Established 03/18/17  -KR      Transfer Training PT LTG, Time to Achieve by discharge  -KR      Transfer Training PT LTG, Activity Type (P)  all transfers  -KR      Transfer Training PT LTG, [place] Level supervision required  -KR      Transfer Training PT LTG, Assist Device other (see comments)   appropriate AD  -KR      Gait Training PT LTG    Gait Training Goal PT LTG, Date Established 03/18/17  -KR      Gait Training Goal PT LTG, Time to Achieve by discharge  -KR      Gait Training Goal PT LTG, [place] Level supervision required  -KR      Gait Training Goal PT LTG, Assist Device other (see comments)   with appropriate  -KR      Gait Training Goal PT LTG, Distance to Achieve 50  -KR      Patient Education PT LTG    Patient Education PT LTG, Date Established 03/18/17  -KR      Patient Education PT LTG, Time to Achieve by discharge  -KR      Patient  Education PT LTG, Education Type HEP;bed mobility;transfers;gait;posture/body mechanics;positioning;progression of POC;benefits of activity;home safety  -KR      Patient Education PT LTG, Education Understanding demonstrate adequately;verbalize understanding  -KR        User Key  (r) = Recorded By, (t) = Taken By, (c) = Cosigned By    Initials Name Provider Type    RIKI Bowers, PT DPT Physical Therapist                Outcome Measures       03/18/17 1513 03/18/17 1500       How much help from another person do you currently need...    Turning from your back to your side while in flat bed without using bedrails?  3  -KR     Moving from lying on back to sitting on the side of a flat bed without bedrails?  3  -KR     Moving to and from a bed to a chair (including a wheelchair)?  3  -KR     Standing up from a chair using your arms (e.g., wheelchair, bedside chair)?  3  -KR     Climbing 3-5 steps with a railing?  1  -KR     To walk in hospital room?  2  -KR     AM-PAC 6 Clicks Score  15  -KR     How much help from another is currently needed...    Putting on and taking off regular lower body clothing? 2  -AC      Bathing (including washing, rinsing, and drying) 2  -AC      Toileting (which includes using toilet bed pan or urinal) 2  -AC      Putting on and taking off regular upper body clothing 3  -AC      Taking care of personal grooming (such as brushing teeth) 3  -AC      Eating meals 3  -AC      Score 15  -AC      Functional Assessment    Outcome Measure Options AM-PAC 6 Clicks Daily Activity (OT)  -AC AM-PAC 6 Clicks Basic Mobility (PT)  -KR       User Key  (r) = Recorded By, (t) = Taken By, (c) = Cosigned By    Initials Name Provider Type    AC Ernst To, OTR/L Occupational Therapist    RIKI Bowers, PT DPT Physical Therapist           Time Calculation:         PT Charges       03/18/17 1418          Time Calculation    Start Time 1418  -KR      Stop Time 1500  -KR      Time Calculation  (min) 42 min  -KR      PT Received On 17  -KR      PT Goal Re-Cert Due Date 17  -KR      Time Calculation- PT    Total Timed Code Minutes- PT 0 minute(s)  -KR        User Key  (r) = Recorded By, (t) = Taken By, (c) = Cosigned By    Initials Name Provider Type    RIKI Bowers, PT DPT Physical Therapist          Therapy Charges for Today     Code Description Service Date Service Provider Modifiers Qty    94264410953 HC PT MOBILITY CURRENT 3/18/2017 Elizabeth Bowers, PT DPT GP, CL 1    01173888619 HC PT MOBILITY PROJECTED 3/18/2017 Elizabeth Bowers, PT DPT GP, CJ 1    28930733975 HC PT EVAL HIGH COMPLEXITY 3 3/18/2017 Elizabeth Bowers, PT DPT GP, KX 1          PT G-Codes  PT Professional Judgement Used?: Yes  Outcome Measure Options: AM-PAC 6 Clicks Daily Activity (OT)  Functional Limitation: Mobility: Walking and moving around  Mobility: Walking and Moving Around Current Status (): At least 60 percent but less than 80 percent impaired, limited or restricted  Mobility: Walking and Moving Around Goal Status (): At least 20 percent but less than 40 percent impaired, limited or restricted      Elizabeth Bowers PT DPT  3/18/2017          Electronically signed by Elizabeth Bowers PT DPT at 3/18/2017  3:16 PM           Occupational Therapy Notes (most recent note)      Ernst To, OTR/L at 3/18/2017  3:25 PM  Version 1 of 1         Acute Care - Occupational Therapy Initial Evaluation  [facility]     Patient Name: Susy Ko  : 1940  MRN: [id number]  Today's Date: 3/18/2017  Onset of Illness/Injury or Date of Surgery Date: 17  Date of Referral to OT: 17  Referring Physician: Dr. Perez Zuniga    Admit Date: 3/17/2017       ICD-10-CM ICD-9-CM   1. Gait abnormality R26.9 781.2   2. Hernia K46.9 553.9   3. Impaired mobility and ADLs Z74.09 799.89     Patient Active Problem List   Diagnosis   • Normal pressure hydrocephalus   • Non morbid obesity due to  excess calories   • Tobacco non-user   • Tremor of right hand   • Abnormality of gait   • Parastomal hernia   • Hernia     Past Medical History   Diagnosis Date   • Anxiety    • Breast cancer       LEFT WITH RECONSTRUCTION   • Colostomy in place    • Dementia    • Depression    • Edema      LOWER FEET   • Hydrocephalus      NORMAL PRESSURE   • Hypertension    • Incontinence    • Nausea    • Parastomal hernia    • Parkinson disease    • Tremor      RIGHT HAND   • Unsteady gait      Past Surgical History   Procedure Laterality Date   • Colostomy     • Mastectomy Left    • Cholecystectomy     • Hysterectomy     • Appendectomy     • Colon surgery     • Abdominal surgery     • Colonoscopy     • Breast reconstruction Left      LEFT          OT ASSESSMENT FLOWSHEET (last 72 hours)      OT Evaluation       03/18/17 1511 03/18/17 1429 03/18/17 1418 03/17/17 1545 03/17/17 1423    Rehab Evaluation    Document Type  evaluation  -AC evaluation   see MAR  -KR      Subjective Information  agree to therapy;complains of;pain  -AC agree to therapy;complains of;pain  -KR      Patient Effort, Rehab Treatment  fair  -AC       Symptoms Noted During/After Treatment  increased pain  -AC       General Information    Patient Profile Review  yes  -AC yes  -KR      Onset of Illness/Injury or Date of Surgery Date  03/17/17  -AC 03/17/17  -KR      Referring Physician  Dr. Perez Zuniga  - Dr. Perez Zuniga  -      General Observations  lying supine in bed, daughter present, pill rolling tremor R hand, IV, colostomy  -AC       Pertinent History Of Current Problem   patient was having progressive abdominal pain; s/p repair of parastomal hernia and resection of colon polyp at ostomy  -KR      Precautions/Limitations  fall precautions  -AC other (see comments);fall precautions   colostomy  -KR      Prior Level of Function  mod assist:;bathing;dressing  -AC --   mostly stays/pushed in  uses RW for short distances/t/f  -KR      Equipment Currently  Used at Home wheelchair  -SK  wheelchair;walker, rolling  -KR colostomy/ostomy supplies;wheelchair  -LJ colostomy/ostomy supplies;commode;grab bar;wheelchair  -CF    Plans/Goals Discussed With  patient and family;agreed upon  -AC patient;family;agreed upon  -KR      Risks Reviewed  patient and family:;LOB;nausea/vomiting;dizziness;increased discomfort;change in vital signs;increased drainage;lines disloged  -AC patient:;family:;LOB;increased discomfort  -KR      Benefits Reviewed  patient and family:;improve function;increase independence;increase strength;increase balance;decrease pain;increase knowledge  -AC patient:;family:;increase independence;increase strength;increase balance;decrease pain  -KR      Barriers to Rehab  medically complex;previous functional deficit;cognitive status  -AC medically complex  -KR      Living Environment    Lives With  facility resident  - facility resident   [facility] assisted living  -KR facility resident   [facility] facility resident  -CF    Living Arrangements  assisted living  - assisted living  -KR assisted living  - assisted living  -CF    Home Accessibility     no concerns  -CF    Stair Railings at Home     none  -CF    Type of Financial/Environmental Concern     none  -CF    Transportation Available car;family or friend will provide  -SK   ambulance;family or friend will provide  - car;family or friend will provide  -    Living Environment Comment  daughter checks on pt   -AC       Clinical Impression    Date of Referral to OT  03/18/17  -       OT Diagnosis  decreased ADL  -AC       Prognosis  good  -AC       Impairments Found (describe specific impairments)  gait, locomotion, and balance;motor function;muscle performance  -       Patient/Family Goals Statement  get more rehab at [facility]  -       Criteria for Skilled Therapeutic Interventions Met  yes;treatment indicated  -       Rehab Potential  good, to achieve stated therapy goals  -        Therapy Frequency  3-5 times/wk  -AC       Predicted Duration of Therapy Intervention (days/wks)  10 days  -AC       Anticipated Discharge Disposition  skilled nursing facility;home with home health  -AC       Functional Level Prior    Ambulation     3-->assistive equipment and person  -CF    Transferring     3-->assistive equipment and person  -CF    Toileting     3-->assistive equipment and person  -CF    Bathing     3-->assistive equipment and person  -CF    Dressing     2-->assistive person  -CF    Eating     2-->assistive person  -CF    Communication     0-->understands/communicates without difficulty  -CF    Swallowing     0-->swallows foods/liquids without difficulty  -CF    Prior Functional Level Comment     no  -CF    Pain Assessment    Pain Assessment  0-10  -AC 0-10  -KR      Pain Score  8  -AC 8  -KR      Pain Type  Acute pain;Surgical pain  -AC Acute pain;Surgical pain  -KR      Pain Location  Abdomen  -AC Abdomen  -KR      Pain Frequency  Constant/continuous  -AC Constant/continuous  -KR      Pain Intervention(s)  Medication (See MAR);Ambulation/increased activity;Repositioned  -AC Medication (See MAR);Repositioned  -KR      Response to Interventions  not improved, RN notified  -AC tolerated  -KR      Cognitive Assessment/Intervention    Current Cognitive/Communication Assessment  functional  -AC functional  -KR      Orientation Status  oriented x 4  -AC oriented x 4  -KR      Follows Commands/Answers Questions  100% of the time;able to follow multi-step instructions  -% of the time;able to follow multi-step instructions  -KR      Personal Safety  WNL/WFL  -AC       Personal Safety Interventions  fall prevention program maintained;gait belt;nonskid shoes/slippers when out of bed;supervised activity  -AC       ROM (Range of Motion)    General ROM Detail  WFL AROM BUE  -AC WFL  -KR      MMT (Manual Muscle Testing)    General MMT Assessment Detail  4/5 BUE  -AC 3+ to 4-/5 B LEd  -KR      Bed  Mobility, Assessment/Treatment    Bed Mobility, Assistive Device  bed rails;head of bed elevated  -AC bed rails;head of bed elevated  -KR      Bed Mobility, Scoot/Bridge, [place]  supervision required  -AC       Bed Mob, Supine to Sit, [place]  contact guard assist;verbal cues required;nonverbal cues required (demo/gesture)  -AC contact guard assist;verbal cues required  -KR      Bed Mob, Sit to Supine, [place]  minimum assist (75% patient effort);nonverbal cues required (demo/gesture);verbal cues required  -AC contact guard assist;minimum assist (75% patient effort);2 person assist required;verbal cues required  -KR      Bed Mobility, Impairments  pain  -AC       Transfer Assessment/Treatment    Transfers, Sit-Stand [place]  contact guard assist;2 person assist required;minimum assist (75% patient effort);verbal cues required  -AC contact guard assist;verbal cues required;minimum assist (75% patient effort)  -KR      Transfers, Stand-Sit [place]  contact guard assist;2 person assist required;verbal cues required;nonverbal cues required (demo/gesture)  -AC contact guard assist;verbal cues required  -KR      Functional Mobility    Functional Mobility- Ind. Level  contact guard assist;2 person assist required;verbal cues required  -AC       Functional Mobility- Comment  hand held assist, side steps toward EOB, pt c/o pain with activity  -AC       Lower Body Dressing Assessment/Training    LB Dressing Assess/Train, Clothing Type  donning:;doffing:;socks  -AC       LB Dressing Assess/Train, Position  edge of bed  -AC       LB Dressing Assess/Train, [place]  maximum assist (25% patient effort)  -AC       Motor Skills/Interventions    Motor Response Observations  --   resting tremor R hand  -AC       Additional Documentation  Balance Skills Training (Group);Fine Motor Coordination Training (Group);Gross Motor Coordination Training (Group)  -AC       Balance Skills Training    Sitting-Level of  Assistance  Close supervision  -AC       Sitting-Balance Support  Right upper extremity supported;Left upper extremity supported;Feet supported  -       Standing-Level of Assistance  Contact guard;x2  -AC       Static Standing Balance Support  Right upper extremity supported;Left upper extremity supported  -AC       Gait Balance-Level of Assistance  Contact guard;x2  -AC       Gait Balance Support  Right upper extremity supported;Left upper extremity supported  -AC       Gross Motor Coordination Training    Gross Motor Skill, Impairments Detail  resting tremor R hand  -AC       Fine Motor Coordination Training    Opposition  Right:;Left:;impaired;other (comment)   decreased accuracy  -       Sensory Assessment/Intervention    Sensory Impairment  --   WNL per pt  -AC       General Therapy Interventions    Planned Therapy Interventions  activity intolerance;ADL retraining;balance training;bed mobility training;home exercise program;strengthening;transfer training  -       Positioning and Restraints    Pre-Treatment Position  in bed  -AC in bed  -KR      Post Treatment Position  bed  -AC bed  -KR      In Bed  supine;call light within reach;encouraged to call for assist;with family/caregiver;side rails up x2;legs elevated  - fowlers;call light within reach;encouraged to call for assist;with family/caregiver  -        03/15/17 [id number]                General Information    Equipment Currently Used at Home colostomy/ostomy supplies;commode;grab bar;wheelchair  -DF        Living Environment    Lives With other (see comments);alone  -DF        Living Arrangements assisted living   [facility]  -DF        Home Accessibility no concerns  -DF        Transportation Available car  -DF        Functional Level Prior    Ambulation 3-->assistive equipment and person  -DF        Transferring 3-->assistive equipment and person  -DF        Toileting 3-->assistive equipment and person  -DF        Bathing  3-->assistive equipment and person  -DF        Dressing 2-->assistive person  -DF        Eating 2-->assistive person  -DF          User Key  (r) = Recorded By, (t) = Taken By, (c) = Cosigned By    Initials Name Effective Dates     Ernst To, OTR/L 06/22/15 -     KR Elizabeth LUC Bowers, PT DPT 06/19/15 -     SK Venecia Corral RN 08/02/16 -     CF Emani Leiva RN 08/02/16 -     DF Teresita Miranda RN 08/02/16 -      CUATE Alanis 09/15/16 -            Occupational Therapy Education     Title: PT OT SLP Therapies (Done)     Topic: Occupational Therapy (Done)     Point: ADL training (Done)    Description: Instruct learner(s) on proper safety adaptation and remediation techniques during self care or transfers.   Instruct in proper use of assistive devices.    Learning Progress Summary    Learner Readiness Method Response Comment Documented by Status   Patient Acceptance E VU OT POC, discharge planning  03/18/17 1514 Done   Family Acceptance E VU OT POC, discharge planning  03/18/17 1514 Done                      User Key     Initials Effective Dates Name Provider Type Discipline     06/22/15 -  Ernst To, OTR/L Occupational Therapist OT                  OT Recommendation and Plan  Anticipated Discharge Disposition: skilled nursing facility, home with home health  Planned Therapy Interventions: activity intolerance, ADL retraining, balance training, bed mobility training, home exercise program, strengthening, transfer training  Therapy Frequency: 3-5 times/wk  Plan of Care Review  Plan Of Care Reviewed With: patient  Progress: improving  Outcome Summary/Follow up Plan: OT eval completed.  Pt is s/p hernia repair and has resultant abdominal pain causing decrease in ADL function especially LB ADL and bed mobility.  Pt not motivated to ambulate more than side steps toward EOB due to pain.  MaxA to don/doff socks.  Pt also demo parkonsonian symptoms including resting tremor in RUE.  She has  generalized weakness, decreased endurance, knowledge deficit and decreased safety.  Pt would benefit from skilled OT to address the above deficits and return pt to PLOF.  Anticipate discharge home with HH due to pt not meetingn inpatient criteria.  Pt would benefit from SNF placement if she is able to meet criteria.          OT Goals       03/18/17 1517          Bed Mobility OT LTG    Bed Mobility OT LTG, Date Established 03/18/17  -AC      Bed Mobility OT LTG, Time to Achieve by discharge  -AC      Bed Mobility OT LTG, Activity Type all bed mobility  -AC      Bed Mobility OT LTG, [place] Level supervision required  -AC      Transfer Training OT LTG    Transfer Training OT LTG, Date Established 03/18/17  -AC      Transfer Training OT LTG, Time to Achieve by discharge  -AC      Transfer Training OT LTG, Activity Type bed to chair /chair to bed;walk-in shower;shower chair;sit to stand/stand to sit;toilet  -AC      Transfer Training OT LTG, [place] Level supervision required  -AC      Bathing OT LTG    Bathing Goal OT LTG, Date Established 03/18/17  -AC      Bathing Goal OT LTG, Time to Achieve by discharge  -AC      Bathing Goal OT LTG, Activity Type upper body bathing;lower body bathing  -AC      Bathing Goal OT LTG, [place] Level minimum assist (75% patient effort)  -AC      Bathing Goal OT LTG, Assist Device shower chair with back  -AC      LB Dressing OT LTG    LB Dressing Goal OT LTG, Date Established 03/18/17  -AC      LB Dressing Goal OT LTG, Time to Achieve by discharge  -AC      LB Dressing Goal OT LTG, [place] Level minimum assist (75% patient effort)  -AC      LB Dressing Goal OT LTG, Additional Goal AE PRN  -AC        User Key  (r) = Recorded By, (t) = Taken By, (c) = Cosigned By    Initials Name Provider Type    EMANUEL To, OTR/L Occupational Therapist                Outcome Measures       03/18/17 1513 03/18/17 1500       How much help from another person do you currently  need...    Turning from your back to your side while in flat bed without using bedrails?  3  -KR     Moving from lying on back to sitting on the side of a flat bed without bedrails?  3  -KR     Moving to and from a bed to a chair (including a wheelchair)?  3  -KR     Standing up from a chair using your arms (e.g., wheelchair, bedside chair)?  3  -KR     Climbing 3-5 steps with a railing?  1  -KR     To walk in hospital room?  2  -KR     AM-PAC 6 Clicks Score  15  -KR     How much help from another is currently needed...    Putting on and taking off regular lower body clothing? 2  -AC      Bathing (including washing, rinsing, and drying) 2  -AC      Toileting (which includes using toilet bed pan or urinal) 2  -AC      Putting on and taking off regular upper body clothing 3  -AC      Taking care of personal grooming (such as brushing teeth) 3  -AC      Eating meals 3  -AC      Score 15  -AC      Functional Assessment    Outcome Measure Options AM-PAC 6 Clicks Daily Activity (OT)  -AC AM-PAC 6 Clicks Basic Mobility (PT)  -KR       User Key  (r) = Recorded By, (t) = Taken By, (c) = Cosigned By    Initials Name Provider Type    AC Ernst To, OTR/L Occupational Therapist    RIKI Bowers, PT DPT Physical Therapist          Time Calculation:   OT Start Time: 1429  OT Stop Time: 1514  OT Time Calculation (min): 45 min    Therapy Charges for Today     Code Description Service Date Service Provider Modifiers Qty    78892851316  OT SELFCARE CURRENT 3/18/2017 Ernst To OTR/L TRE CK 1    [id number]  OT SELFCARE PROJECTED 3/18/2017 Ernst To OTR/L TRE CJ 1    [id number]  OT EVAL MOD COMPLEXITY 3 3/18/2017 Ernst To OTR/L GO, KX 1          OT G-codes  OT Functional Scales Options: AM-PAC 6 Clicks Daily Activity (OT)  Score: 15  Functional Limitation: Self care  Self Care Current Status (): At least 40 percent but less than 60 percent impaired, limited or restricted  Self Care Goal  Status (): At least 20 percent but less than 40 percent impaired, limited or restricted    Ernst To OTR/L  3/18/2017     Electronically signed by GARY Schuler/L at 3/18/2017  3:26 PM        Speech Language Pathology Notes (most recent note)     No notes of this type exist for this encounter.        Respiratory Therapy Notes (most recent note)     No notes of this type exist for this encounter.

## 2017-03-19 NOTE — PROGRESS NOTES
Continued Stay Note   [facility]     Patient Name: Susy Ko  MRN: [id number]  Today's Date: 3/19/2017    Admit Date: 3/17/2017          Discharge Plan       03/19/17 1444    Case Management/Social Work Plan    Plan ROBERTO Ricardo notified SW that pt's status has been changed.  I spoke to pt's daughter, Petty [phone].  She stated pt has been to [place] [id number] in the past and does not want to return there.  She asked that a referral be made to [facility].  SW has faxed referral and will follow for bed offer.  Phone: [phone], fax: [phone].  ABHINAV Cates.     Patient/Family In Agreement With Plan yes              Discharge Codes     None        Expected Discharge Date and Time     Expected Discharge Date Expected Discharge Time    Mar 18, 2017             ABHINAV Simmons

## 2017-03-19 NOTE — PLAN OF CARE
Problem: Patient Care Overview (Adult)  Goal: Plan of Care Review  Outcome: Ongoing (interventions implemented as appropriate)    03/19/17 1611   Coping/Psychosocial Response Interventions   Plan Of Care Reviewed With patient;daughter   Patient Care Overview   Progress improving   Outcome Evaluation   Outcome Summary/Follow up Plan Peralta d/c'd. Pt voiding frequently. External pure wick cath placed. IVF cont. Patient out of bed most of day in w/c. Daughter assisted most of the day. Pt oriented x 4. c/o pain in stoma area. States prn meds allow for rest and decreased pain. Tolerating diet. No output from stoma at this point.        Goal: Adult Individualization and Mutuality  Outcome: Ongoing (interventions implemented as appropriate)  Goal: Discharge Needs Assessment  Outcome: Ongoing (interventions implemented as appropriate)    Problem: Perioperative Period (Adult)  Goal: Signs and Symptoms of Listed Potential Problems Will be Absent or Manageable (Perioperative Period)  Outcome: Ongoing (interventions implemented as appropriate)    03/19/17 1611   Perioperative Period   Problems Assessed (Perioperative Period) all   Problems Present (Perioperative Period) pain         Problem: Fall Risk (Adult)  Goal: Identify Related Risk Factors and Signs and Symptoms  Outcome: Ongoing (interventions implemented as appropriate)    03/19/17 1611   Fall Risk   Fall Risk: Related Risk Factors age-related changes;fear of falling;gait/mobility problems   Fall Risk: Signs and Symptoms presence of risk factors       Goal: Absence of Falls  Outcome: Ongoing (interventions implemented as appropriate)    Problem: Pain, Acute (Adult)  Goal: Identify Related Risk Factors and Signs and Symptoms  Outcome: Ongoing (interventions implemented as appropriate)    03/19/17 1611   Pain, Acute   Related Risk Factors (Acute Pain) surgery   Signs and Symptoms (Acute Pain) verbalization of pain descriptors;guarding/abnormal posturing/positioning        Goal: Acceptable Pain Control/Comfort Level  Outcome: Ongoing (interventions implemented as appropriate)

## 2017-03-19 NOTE — PROGRESS NOTES
[facility]  ARIES Abdalla M.D.  COLTON Hunter      Internal Medicine Consultation      Name: Susy Ko  MRN: [id number]     Acct: [id number]  Room: [facility].    Admit Date: 3/17/2017  PCP: Monster Zuniga MD    Physician Requesting Consult: Dr Walters    Reason for Consult:  Acute kidney injury    Chief Complaint:    Abdominal pain    History Obtained From:    the patient    History of Present Illness:     Susy Ko is a  77 y.o.  female who presents with abdominal pain which is constant in duration and getting progressively worse, aggravated by a parastomal hernia, improved with surgery, and is associated with adhesions.    Last 24hrs:  Had some hypoxia and had to resume o2 usage.    Past Medical History:    Past Medical History   Diagnosis Date   • Anxiety    • Breast cancer       LEFT WITH RECONSTRUCTION   • Colostomy in place    • Dementia    • Depression    • Edema      LOWER FEET   • Hydrocephalus      NORMAL PRESSURE   • Hypertension    • Incontinence    • Nausea    • Parastomal hernia    • Parkinson disease    • Tremor      RIGHT HAND   • Unsteady gait         Past Surgical History:    Past Surgical History   Procedure Laterality Date   • Colostomy     • Mastectomy Left    • Cholecystectomy     • Hysterectomy     • Appendectomy     • Colon surgery     • Abdominal surgery     • Colonoscopy     • Breast reconstruction Left      LEFT        Medications Prior to Admission:      Prior to Admission medications    Medication Sig Start Date End Date Taking? Authorizing Provider   ALPRAZolam (XANAX) 0.5 MG tablet Take 0.5 mg by mouth Every Night.   Yes Historical Provider, MD   amLODIPine (NORVASC) 5 MG tablet Take 5 mg by mouth Daily.   Yes Historical Provider, MD   citalopram (CeleXA) 10 MG tablet Take 10 mg by mouth Every Night.   Yes Historical Provider, MD   furosemide (LASIX) 20 MG tablet Take 20 mg by mouth Every Other Day.   Yes  Historical Provider, MD   losartan (COZAAR) 50 MG tablet Take 50 mg by mouth Daily.   Yes Historical Provider, MD   metoprolol succinate XL (TOPROL-XL) 25 MG 24 hr tablet Take 25 mg by mouth Daily.   Yes Historical Provider, MD   multivitamin-minerals (THERA-M) tablet tablet Take 1 tablet by mouth Daily.   Yes Historical Provider, MD   oxybutynin XL (DITROPAN-XL) 5 MG 24 hr tablet Take 5 mg by mouth Daily.   Yes Historical Provider, MD   vitamin B-12 (CYANOCOBALAMIN) 100 MCG tablet Take 100 mcg by mouth Daily.   Yes Historical Provider, MD        Allergies:      Morphine and related and Adhesive tape    Social History:    Tobacco:    reports that she has quit smoking. She has never used smokeless tobacco.  Alcohol:      reports that she does not drink alcohol.  Drug Use:  reports that she does not use illicit drugs.    Family History:    History reviewed. No pertinent family history.    Review of Systems:    Review of Systems   Constitution: Negative for chills, fever, weakness, malaise/fatigue and weight loss.   HENT: Negative for congestion, headaches, hoarse voice and nosebleeds.    Eyes: Negative for blurred vision and photophobia.   Cardiovascular: Negative for chest pain, dyspnea on exertion, near-syncope and orthopnea.   Respiratory: Negative for cough, shortness of breath and sputum production.    Endocrine: Negative for cold intolerance and polyuria.   Hematologic/Lymphatic: Negative for adenopathy and bleeding problem.   Skin: Negative for dry skin, itching and rash.   Musculoskeletal: Negative for arthritis, back pain and joint pain.   Gastrointestinal: Positive for abdominal pain. Negative for diarrhea, heartburn, nausea and vomiting.   Genitourinary: Negative for dysuria and flank pain.   Neurological: Negative for dizziness, paresthesias and seizures.   Psychiatric/Behavioral: Negative for altered mental status and memory loss. The patient is not nervous/anxious.    Allergic/Immunologic: Negative for  "environmental allergies and hives.         Code Status:  Full Code    Physical Exam:    Vitals:    Visit Vitals   • /54 (BP Location: Left arm, Patient Position: Lying)   • Pulse 73   • Temp 97.8 °F (36.6 °C) (Oral)   • Resp 18   • Ht 64.02\" (162.6 cm)   • Wt 148 lb (67.1 kg)   • SpO2 95%   • BMI 25.39 kg/m2     Temp (24hrs), Av °F (36.7 °C), Min:97.8 °F (36.6 °C), Max:98.2 °F (36.8 °C)    Physical Exam   Constitutional: She is oriented to person, place, and time. She appears well-developed and well-nourished.   HENT:   Head: Normocephalic and atraumatic.   Mouth/Throat: Oropharynx is clear and moist.   Eyes: Conjunctivae and EOM are normal. Pupils are equal, round, and reactive to light.   Neck: Normal range of motion. Neck supple.   Cardiovascular: Normal rate, regular rhythm, normal heart sounds and intact distal pulses.    Pulmonary/Chest: Effort normal and breath sounds normal.   Abdominal: Soft. Bowel sounds are normal. She exhibits no distension. There is tenderness (diffusely). There is no rebound and no guarding.   Musculoskeletal: Normal range of motion. She exhibits no edema.   Neurological: She is alert and oriented to person, place, and time. No cranial nerve deficit.   Skin: Skin is warm and dry.   Psychiatric: She has a normal mood and affect. Her behavior is normal. Thought content normal.               Data:  Lab Results (last 24 hours)     ** No results found for the last 24 hours. **          No results found.      Assesment:    Primary Problem  <principal problem not specified>      Active Problems:    Parastomal hernia    Hernia    Gait abnormality    Past Medical History   Diagnosis Date   • Anxiety    • Breast cancer       LEFT WITH RECONSTRUCTION   • Colostomy in place    • Dementia    • Depression    • Edema      LOWER FEET   • Hydrocephalus      NORMAL PRESSURE   • Hypertension    • Incontinence    • Nausea    • Parastomal hernia    • Parkinson disease    • Tremor      RIGHT HAND "   • Unsteady gait          Plan:    Acute kidney injury  Parkinson's dementia  Dehydration  Parastomal hernia   Abdominal pain  Generalized weakness  Acute hypoxic respiratory failure probably secondary to sedation    Continue iv hydration, monitor electrolytes, encourage therapy.        Electronically signed by Monster Zuniga MD on 3/19/2017 at 12:54 PM     Copy sent to Dr. Monster Zuniga MD

## 2017-03-19 NOTE — PLAN OF CARE
Problem: Patient Care Overview (Adult)  Goal: Plan of Care Review  Outcome: Ongoing (interventions implemented as appropriate)  Goal: Adult Individualization and Mutuality  Outcome: Ongoing (interventions implemented as appropriate)    Problem: Perioperative Period (Adult)  Goal: Signs and Symptoms of Listed Potential Problems Will be Absent or Manageable (Perioperative Period)  Outcome: Ongoing (interventions implemented as appropriate)    Problem: Fall Risk (Adult)  Goal: Identify Related Risk Factors and Signs and Symptoms  Outcome: Ongoing (interventions implemented as appropriate)  Goal: Absence of Falls  Outcome: Ongoing (interventions implemented as appropriate)    Problem: Pain, Acute (Adult)  Goal: Acceptable Pain Control/Comfort Level  Outcome: Ongoing (interventions implemented as appropriate)

## 2017-03-19 NOTE — PROGRESS NOTES
Continued Stay Note  LAURA Lugo     Patient Name: Susy Ko  MRN: [id number]  Today's Date: 3/19/2017    Admit Date: 3/17/2017          Discharge Plan       03/19/17 0857    Case Management/Social Work Plan    Plan ROBERTO Ricardo informed SW that pt is now inpt status.  SW left a message for pt's daughter, Petty [phone] to see where the family wants referrals made.  ABHINAV Cates.     Patient/Family In Agreement With Plan yes              Discharge Codes     None        Expected Discharge Date and Time     Expected Discharge Date Expected Discharge Time    Mar 18, 2017             ABHINAV Simmons

## 2017-03-19 NOTE — PROGRESS NOTES
Kathi Dunn MD FACS  Progress Note     LOS: 0 days   Patient Care Team:  Monster Zuniga MD as PCP - General  Monster Zuniga MD as PCP - Family Medicine      Subjective     Interval History:      no complaint.  enrico clears.       Objective     Vital Signs  Temp:  [97.4 °F (36.3 °C)-98.2 °F (36.8 °C)] 97.8 °F (36.6 °C)  Heart Rate:  [73-84] 73  Resp:  [16-18] 18  BP: (120-144)/(51-54) 144/54    Physical Exam:  General appearance - alert, well appearing, and in no distress  Abdomen - soft, clean, stoma pink, +flatus      Results Review:    Lab Results (last 24 hours)     ** No results found for the last 24 hours. **        Imaging Results (last 24 hours)     ** No results found for the last 24 hours. **            Assessment/Plan       Fulls        Kathi Dunn MD  03/19/17  9:55 AM

## 2017-03-20 LAB
CYTO UR: NORMAL
LAB AP CASE REPORT: NORMAL
LAB AP CLINICAL INFORMATION: NORMAL
Lab: NORMAL
PATH REPORT.FINAL DX SPEC: NORMAL
PATH REPORT.GROSS SPEC: NORMAL

## 2017-03-20 PROCEDURE — 25810000003 SODIUM CHLORIDE 0.9 % WITH KCL 20 MEQ 20-0.9 MEQ/L-% SOLUTION: Performed by: SPECIALIST

## 2017-03-20 PROCEDURE — 97110 THERAPEUTIC EXERCISES: CPT | Performed by: OCCUPATIONAL THERAPIST

## 2017-03-20 PROCEDURE — 97530 THERAPEUTIC ACTIVITIES: CPT | Performed by: OCCUPATIONAL THERAPIST

## 2017-03-20 PROCEDURE — 25010000002 HYDROMORPHONE PER 4 MG: Performed by: SPECIALIST

## 2017-03-20 PROCEDURE — 97535 SELF CARE MNGMENT TRAINING: CPT | Performed by: OCCUPATIONAL THERAPIST

## 2017-03-20 PROCEDURE — 97530 THERAPEUTIC ACTIVITIES: CPT

## 2017-03-20 PROCEDURE — 99223 1ST HOSP IP/OBS HIGH 75: CPT | Performed by: PSYCHIATRY & NEUROLOGY

## 2017-03-20 PROCEDURE — 25010000002 ENOXAPARIN PER 10 MG: Performed by: SPECIALIST

## 2017-03-20 RX ADMIN — SODIUM CHLORIDE AND POTASSIUM CHLORIDE 100 ML/HR: 9; 1.49 INJECTION, SOLUTION INTRAVENOUS at 13:05

## 2017-03-20 RX ADMIN — VITAMIN B12 0.1 MG ORAL TABLET 50 MCG: 0.1 TABLET ORAL at 08:20

## 2017-03-20 RX ADMIN — CARBIDOPA AND LEVODOPA 0.5 TABLET: 25; 250 TABLET ORAL at 17:24

## 2017-03-20 RX ADMIN — METOPROLOL SUCCINATE 25 MG: 25 TABLET, FILM COATED, EXTENDED RELEASE ORAL at 22:22

## 2017-03-20 RX ADMIN — PANTOPRAZOLE SODIUM 40 MG: 40 TABLET, DELAYED RELEASE ORAL at 05:44

## 2017-03-20 RX ADMIN — SODIUM CHLORIDE AND POTASSIUM CHLORIDE 100 ML/HR: 9; 1.49 INJECTION, SOLUTION INTRAVENOUS at 22:47

## 2017-03-20 RX ADMIN — HYDROMORPHONE HYDROCHLORIDE 0.5 MG: 1 INJECTION, SOLUTION INTRAMUSCULAR; INTRAVENOUS; SUBCUTANEOUS at 08:28

## 2017-03-20 RX ADMIN — CITALOPRAM 10 MG: 10 TABLET, FILM COATED ORAL at 22:22

## 2017-03-20 RX ADMIN — SODIUM CHLORIDE AND POTASSIUM CHLORIDE 100 ML/HR: 9; 1.49 INJECTION, SOLUTION INTRAVENOUS at 02:11

## 2017-03-20 RX ADMIN — AMLODIPINE BESYLATE 5 MG: 5 TABLET ORAL at 08:20

## 2017-03-20 RX ADMIN — ALPRAZOLAM 0.5 MG: 0.5 TABLET ORAL at 22:22

## 2017-03-20 RX ADMIN — HYDROMORPHONE HYDROCHLORIDE 0.5 MG: 1 INJECTION, SOLUTION INTRAMUSCULAR; INTRAVENOUS; SUBCUTANEOUS at 22:46

## 2017-03-20 RX ADMIN — OXYBUTYNIN CHLORIDE 5 MG: 5 TABLET, FILM COATED, EXTENDED RELEASE ORAL at 08:21

## 2017-03-20 RX ADMIN — ENOXAPARIN SODIUM 30 MG: 30 INJECTION SUBCUTANEOUS at 08:20

## 2017-03-20 RX ADMIN — HYDROMORPHONE HYDROCHLORIDE 0.5 MG: 1 INJECTION, SOLUTION INTRAMUSCULAR; INTRAVENOUS; SUBCUTANEOUS at 17:24

## 2017-03-20 RX ADMIN — LOSARTAN POTASSIUM 50 MG: 50 TABLET, FILM COATED ORAL at 08:21

## 2017-03-20 RX ADMIN — HYDROMORPHONE HYDROCHLORIDE 0.5 MG: 1 INJECTION, SOLUTION INTRAMUSCULAR; INTRAVENOUS; SUBCUTANEOUS at 02:06

## 2017-03-20 RX ADMIN — CARBIDOPA AND LEVODOPA 0.5 TABLET: 25; 250 TABLET ORAL at 08:21

## 2017-03-20 NOTE — PLAN OF CARE
Problem: Patient Care Overview (Adult)  Goal: Discharge Needs Assessment  Outcome: Ongoing (interventions implemented as appropriate)    03/17/17 1545 03/18/17 0232 03/18/17 1511   Discharge Needs Assessment   Concerns To Be Addressed --  --  discharge planning concerns   Readmission Within The Last 30 Days --  --  no previous admission in last 30 days   Community Agency Name(S) --  dtr states patient cannot return to assisted living in current condition --    Equipment Needed After Discharge none --  --    Discharge Facility/Level Of Care Needs --  --  nursing facility, skilled   Current Discharge Risk --  --  physical impairment   Discharge Disposition --  --  nursing facility   Discharge Planning Comments --  --  --    Current Health   Outpatient/Agency/Support Group Needs --  --  assisted living facility (specify)   Anticipated Changes Related to Illness --  --  inability to care for self   Self-Care   Equipment Currently Used at Home --  --  wheelchair   Living Environment   Transportation Available --  --  car;family or friend will provide     03/20/17 0226   Discharge Needs Assessment   Concerns To Be Addressed --    Readmission Within The Last 30 Days --    Community Agency Name(S) --    Equipment Needed After Discharge --    Discharge Facility/Level Of Care Needs --    Current Discharge Risk --    Discharge Disposition --    Discharge Planning Comments PRN Dilaudid given x2 as of 0228, external catheter in place to cont suction, dressing dry and intact, IVF cont, 02 at 2L , pt resting at this time - will cont to monitor.   Current Health   Outpatient/Agency/Support Group Needs --    Anticipated Changes Related to Illness --    Self-Care   Equipment Currently Used at Home --    Living Environment   Transportation Available --          Problem: Perioperative Period (Adult)  Goal: Signs and Symptoms of Listed Potential Problems Will be Absent or Manageable (Perioperative Period)  Outcome: Ongoing  (interventions implemented as appropriate)    Problem: Fall Risk (Adult)  Goal: Identify Related Risk Factors and Signs and Symptoms  Outcome: Ongoing (interventions implemented as appropriate)  Goal: Absence of Falls  Outcome: Ongoing (interventions implemented as appropriate)    Problem: Pain, Acute (Adult)  Goal: Identify Related Risk Factors and Signs and Symptoms  Outcome: Ongoing (interventions implemented as appropriate)  Goal: Acceptable Pain Control/Comfort Level  Outcome: Ongoing (interventions implemented as appropriate)

## 2017-03-20 NOTE — PROGRESS NOTES
Continued Stay Note   [facility]     Patient Name: Susy Ko  MRN: [id number]  Today's Date: 3/20/2017    Admit Date: 3/17/2017          Discharge Plan       03/20/17 1022    Case Management/Social Work Plan    Plan [facility]; PENDING EVAL AND BED OFFER    Patient/Family In Agreement With Plan yes    Additional Comments SPOKE TO ASTER AT [facility] AND SHE ADVISED THAT THEY ARE EVAL'ING PT. TODAY.  WILL FOLLOW FOR OUTCOME.                Discharge Codes     None        Expected Discharge Date and Time     Expected Discharge Date Expected Discharge Time    Mar 18, 2017             JAYCE Frank

## 2017-03-20 NOTE — PROGRESS NOTES
Doing well.  Dressing intact.  Ostomy functioning well.  Will advance to regular diet.  Placement issues pending

## 2017-03-20 NOTE — PLAN OF CARE
Problem: Patient Care Overview (Adult)  Goal: Plan of Care Review  Outcome: Ongoing (interventions implemented as appropriate)    03/20/17 1033   Coping/Psychosocial Response Interventions   Plan Of Care Reviewed With patient;daughter   Patient Care Overview   Progress progress towards functional goals is fair   Outcome Evaluation   Outcome Summary/Follow up Plan OT tx completed. Pt very lethargic. Daughter reported pts pain medication makes her sleepy. B UE strengthening completed while seated in w/c. Min to Mod A for sit to stand transfers. Static standing completed with Min to Mod A for balance using a RW. Min A for w/c to bed transfer. Mod A for bed mobility. CGA for grooming. Anticipated d/c is SNF for continued OT/PT.

## 2017-03-20 NOTE — CONSULTS
Neurology Consult Note    Referring Provider: Dr Perez Zuniga  Reason for Consultation: nph      History of present illness:    77-year-old female who underwent a normal pressure hydrocephalus evaluation on 03/01/2017.  Her daughters in the room and represents an excellent historian.  She reports that the patient has not ambulated well in at least 5 years.  She has had progression of a shuffling gait and an underlying dementia over that time period.  She is been in out of nursing facilities over the past year.  One of her biggest down falls per her daughter is that she does not produce pain with therapy.  Even if she does per despite with physical therapy she stops doing the therapy immediately after the therapy session ends.  Over the past 3 months the patient has been confined normal stool wheelchair for any sort of ambulation.  Occasionally with max assist and she gets up and uses a rolling walker.  Her dementia is described as mild to moderate per the daughter.  She has no difficulties are with remote memory however recent recall is very difficult for her.  She is been seen by Dr. James Zapien in the past in the inpatient setting but does not wish to return to his practice.  She is currently admitted for a hernia surgery.  While inpatient the daughter has requested a neurologic evaluation.  She is also been diagnosed with Parkinson's disease by her primary care physician.  She was started on Sinemet 3 times a day however she became extremity somnolent on this per her daughter.  She is now on twice per day.  It is difficult to say whether she gained any benefit from this.  Occasionally her daughter has witnessed a right hand tremor.  It is not present now.  It is difficult to say whether it is at rest or with position according to the daughter's description.    In regards to the high-volume tap that occurred in March, the patient actually worsened overall in her mental state in ambulatory state after the  high-volume lumbar puncture.  The patient also has some baseline urinary incontinence.      Past Medical History   Diagnosis Date   • Anxiety    • Breast cancer       LEFT WITH RECONSTRUCTION   • Colostomy in place    • Dementia    • Depression    • Edema      LOWER FEET   • Hydrocephalus      NORMAL PRESSURE   • Hypertension    • Incontinence    • Nausea    • Parastomal hernia    • Parkinson disease    • Tremor      RIGHT HAND   • Unsteady gait        Allergies   Allergen Reactions   • Morphine And Related Mental Status Change   • Adhesive Tape Rash     No current facility-administered medications on file prior to encounter.      Current Outpatient Prescriptions on File Prior to Encounter   Medication Sig   • ALPRAZolam (XANAX) 0.5 MG tablet Take 0.5 mg by mouth Every Night.   • amLODIPine (NORVASC) 5 MG tablet Take 5 mg by mouth Daily.   • citalopram (CeleXA) 10 MG tablet Take 10 mg by mouth Every Night.   • furosemide (LASIX) 20 MG tablet Take 20 mg by mouth Every Other Day.   • losartan (COZAAR) 50 MG tablet Take 50 mg by mouth Daily.   • metoprolol succinate XL (TOPROL-XL) 25 MG 24 hr tablet Take 25 mg by mouth Daily.   • vitamin B-12 (CYANOCOBALAMIN) 100 MCG tablet Take 100 mcg by mouth Daily.       Social History     Social History   • Marital status:      Spouse name: N/A   • Number of children: N/A   • Years of education: N/A     Occupational History   • Not on file.     Social History Main Topics   • Smoking status: Former Smoker   • Smokeless tobacco: Never Used   • Alcohol use No   • Drug use: No   • Sexual activity: Defer     Other Topics Concern   • Not on file     Social History Narrative     History reviewed. No pertinent family history.    Review of Systems  A 14 point review of systems was reviewed and was negative except for ambulation issues    Vital Signs   Temp:  [98.2 °F (36.8 °C)-99 °F (37.2 °C)] 98.2 °F (36.8 °C)  Heart Rate:  [88-90] 88  Resp:  [18] 18  BP: (123-155)/(64-70)  123/70    General Exam:  Head:  Normal cephalic, atraumatic  HEENT:  Neck supple  Fundoscopic Exam:  No signs of disc edema  CVS:  Regular rate and rhythm.  No murmurs  Carotid Examination:  No bruits  Lungs:  Clear to auscultation  Abdomen:  Non-tender, Non-distended.  Post surgical  Extremities:  No signs of peripheral edema  Skin:  No rashes    Neurologic Exam:    Mental Status:    -Awake, Alert, Oriented X 2  -somnolent but wakes up when spoken to   -No word finding difficulties  -No aphasia  -No dysarthria  -Follows simple and complex commands    CN II:  Visual fields full.  Pupils equally reactive to light  CN III, IV, VI:  Extraocular Muscles full with no signs of nystagmus  CN V:  Facial sensory is symmetric with no asymetries.  CN VII:  Facial motor symmetric  CN VIII:  Gross hearing intact bilaterally  CN IX:  Palate elevates symmetrically  CN X:  Palate elevates symmetrically  CN XI:  Shoulder shrug symmetric  CN XII:  Tongue is midline on protrusion    Motor:     4/5 strength throughout in all four extremities.     Tone:  No signs of increased tone on exam    DTR:  1+ reflexes throughout in all four extremities    Sensory:  -Intact to light touch, pinprick, temperature, pain, and proprioception    Coordination:  -no significant ataxia on finger to nose  -no current tremors    Gait  -requires max assist      Results Review:  Lab Results (last 24 hours)     ** No results found for the last 24 hours. **          .  Imaging Results (last 24 hours)     ** No results found for the last 24 hours. **              Impression    1.  Ambulation difficulties  2.  Tremor  3.  Underlying dementia    Discussion: Currently the patient is sedated secondary to postop pain medications.  In addition to this she is just on the heels of an active abdominal surgery.  That being the case I think this is likely not the most optimal time to diagnose a chronic and progressive underlying neurologic condition.  Based on her history  her gait abnormalities may be multifactorial in nature.  She may have a parkinsonism; however, there is no evidence of increased tone on examination today and no signs of a resting tremor.  That being said, this is the on state of her exam given the fact that she did receive carbidopo/dopa this morning.  While she may have some components of normal pressure hydrocephalus, high-volume lumbar puncture has obviously shown that she would not be a candidate for a ventriculoperitoneal shunt.  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  Her daughter is very adamant that she is not compliant with physical therapy and has decreased her activity level drastically over the last 5 years.  Regardless of her underlying neuropathology, this is likely at the forefront of what is causing her to progress in her neurologic disabilities.    At this point I would recommend that she continue with this inpatient hospitalization be discharged to nursing home.  When she becomes stable from a medication a metabolic standpoint I believe she would be best served by a comprehensive neurologic outpatient visit.  At that time an off state examination away from Sinemet is highly recommended.  In addition to this and ambulation test will be much more telling in regards to her underlying pathology then under the cape of post-op medications.    Plan    Follow up as outpatient in either our clinic here or with Dr. Briggs who she has a standing referral to.  SNF with acute rehab and drastic increase in baseline activity level.    I discussed the patients findings and my recommendations with patient and family (daughter)    45 min spent face to face with patient over 1/2 of which was in counseling to patient and daughter.    Demarco Sue MD  03/20/17  10:33 AM

## 2017-03-20 NOTE — PROGRESS NOTES
Continued Stay Note   [facility]     Patient Name: Susy Ko  MRN: [id number]  Today's Date: 3/20/2017    Admit Date: 3/17/2017          Discharge Plan       03/20/17 1134    Case Management/Social Work Plan    Plan [facility]    Additional Comments REC'D CALL FROM [facility] ADVISING THAT [facility] WILL OFFER PT. A BED. WILL FOLLOW TO COORDINATE PT'S TRANSFER AT D/C.       03/20/17 1022    Case Management/Social Work Plan    Plan [facility]; PENDING EVAL AND BED OFFER    Patient/Family In Agreement With Plan yes    Additional Comments SPOKE TO [facility] AT [facility] AND SHE ADVISED THAT THEY ARE EVAL'ING PT. TODAY.  WILL FOLLOW FOR OUTCOME.                Discharge Codes     None        Expected Discharge Date and Time     Expected Discharge Date Expected Discharge Time    Mar 18, 2017             JAYCE Frank

## 2017-03-20 NOTE — PROGRESS NOTES
[facility]  ARIES Abdalla M.D.  COLTON Hunter      Internal Medicine Progress Note    3/20/2017   8:45 AM    Name:  Susy Ko  MRN:    [id number]     Acct:     [id number]   Room:  [address] [place] [address] Day: 1     Admit Date: 3/17/2017  6:17 AM  PCP: Monster Zuniga MD    Subjective:     C/C: weakness and abdominal pain    Interval History: Status:  stayed the same. Up to chair. Daughter at bedside. No production from stoma. Otherwise, patient appears to be doing fairly well. Pain fairly well controlled. Daughter would like neurology consult to further investigate ataxia.     Review of Systems   Constitution: Positive for weakness and malaise/fatigue. Negative for chills, decreased appetite, weight gain and weight loss.   HENT: Negative for congestion, ear discharge, hoarse voice and tinnitus.    Eyes: Negative for blurred vision, discharge, visual disturbance and visual halos.   Cardiovascular: Negative for chest pain, claudication, dyspnea on exertion, irregular heartbeat, leg swelling, orthopnea and paroxysmal nocturnal dyspnea.   Respiratory: Negative for cough, shortness of breath, sputum production and wheezing.    Endocrine: Negative for cold intolerance, heat intolerance and polyuria.   Hematologic/Lymphatic: Negative for adenopathy. Does not bruise/bleed easily.   Skin: Negative for dry skin, itching and suspicious lesions.   Musculoskeletal: Positive for muscle weakness. Negative for arthritis, back pain, falls, joint pain and myalgias.   Gastrointestinal: Positive for abdominal pain. Negative for constipation, diarrhea, dysphagia and hematemesis.   Genitourinary: Negative for bladder incontinence, dysuria and frequency.   Neurological: Negative for aphonia, disturbances in coordination and dizziness.   Psychiatric/Behavioral: Negative for altered mental status, depression, memory loss and substance abuse. The patient does not have insomnia and is not  nervous/anxious.          Medications:     Allergies:   Allergies   Allergen Reactions   • Morphine And Related Mental Status Change   • Adhesive Tape Rash       Current Meds:   Current Facility-Administered Medications:   •  ALPRAZolam (XANAX) tablet 0.5 mg, 0.5 mg, Oral, Nightly, Princess Walters MD, 0.5 mg at 03/19/17 2025  •  amLODIPine (NORVASC) tablet 5 mg, 5 mg, Oral, Daily, Princess Walters MD, 5 mg at 03/20/17 0820  •  carbidopa-levodopa (SINEMET)  MG per tablet 0.5 tablet, 0.5 tablet, Oral, BID, Princess Walters MD, 0.5 tablet at 03/20/17 0821  •  citalopram (CeleXA) tablet 10 mg, 10 mg, Oral, Nightly, Princess Walters MD, 10 mg at 03/19/17 2025  •  cyancobalamin (VITAMIN B-12) tablet 50 mcg, 50 mcg, Oral, Daily, Princess Walters MD, 50 mcg at 03/20/17 0820  •  enoxaparin (LOVENOX) syringe 30 mg, 30 mg, Subcutaneous, Daily, Princess Walters MD, 30 mg at 03/20/17 0820  •  furosemide (LASIX) tablet 20 mg, 20 mg, Oral, Every Other Day, Princess Walters MD, 20 mg at 03/19/17 0832  •  HYDROmorphone (DILAUDID) injection 0.25 mg, 0.25 mg, Intravenous, Q4H PRN, Princess Walters MD  •  HYDROmorphone (DILAUDID) injection 0.5 mg, 0.5 mg, Intravenous, Q4H PRN, Kathi Dunn MD, 0.5 mg at 03/20/17 0828  •  lactated ringers infusion, 9 mL/hr, Intravenous, Continuous, Michale Stein MD, Last Rate: 9 mL/hr at 03/17/17 1243, 9 mL/hr at 03/17/17 1243  •  losartan (COZAAR) tablet 50 mg, 50 mg, Oral, Daily, Princess Walters MD, 50 mg at 03/20/17 0821  •  metoprolol succinate XL (TOPROL-XL) 24 hr tablet 25 mg, 25 mg, Oral, Daily, Princess Walters MD, 25 mg at 03/19/17 2025  •  [DISCONTINUED] Morphine sulfate (PF) injection 2 mg, 2 mg, Intravenous, Q2H PRN **AND** naloxone (NARCAN) injection 0.4 mg, 0.4 mg, Intravenous, Q5 Min PRN, Princess Walters MD  •  ondansetron (ZOFRAN) injection 4 mg, 4 mg, Intravenous, Q6H PRN, Princess Walters MD  •  oxybutynin XL (DITROPAN-XL) 24 hr tablet 5 mg, 5 mg, Oral, Daily, Princess LOPEZ  "MD Selena, 5 mg at 17 0821  •  pantoprazole (PROTONIX) EC tablet 40 mg, 40 mg, Oral, Q AM, Princess Walters MD, 40 mg at 17 0544  •  promethazine (PHENERGAN) tablet 25 mg, 25 mg, Oral, Q6H PRN, Princess Walters MD  •  sodium chloride 0.9 % with KCl 20 mEq/L infusion, 100 mL/hr, Intravenous, Continuous, Princess Walters MD, Last Rate: 100 mL/hr at 17 0211, 100 mL/hr at 17 0211    Data:     Code Status:  Full Code    History reviewed. No pertinent family history.    Social History     Social History   • Marital status:      Spouse name: N/A   • Number of children: N/A   • Years of education: N/A     Occupational History   • Not on file.     Social History Main Topics   • Smoking status: Former Smoker   • Smokeless tobacco: Never Used   • Alcohol use No   • Drug use: No   • Sexual activity: Defer     Other Topics Concern   • Not on file     Social History Narrative       Vitals:  Visit Vitals   • /70 (BP Location: Left arm, Patient Position: Sitting)   • Pulse 88   • Temp 98.2 °F (36.8 °C) (Oral)   • Resp 18   • Ht 64.02\" (162.6 cm)   • Wt 148 lb (67.1 kg)   • SpO2 95%   • BMI 25.39 kg/m2     Temp (24hrs), Av.6 °F (37 °C), Min:98.2 °F (36.8 °C), Max:99 °F (37.2 °C)            I/O (24Hr):    Intake/Output Summary (Last 24 hours) at 17 0845  Last data filed at 17 0800   Gross per 24 hour   Intake   3937 ml   Output   2225 ml   Net   1712 ml       Labs and imaging:      No results found for this or any previous visit (from the past 12 hour(s)).            Physical Examination:        Physical Exam   Constitutional: She is oriented to person, place, and time. She appears well-developed and well-nourished.   HENT:   Head: Normocephalic and atraumatic.   Nose: Nose normal.   Eyes: Conjunctivae and EOM are normal. Pupils are equal, round, and reactive to light.   Neck: Normal range of motion. Neck supple.   Cardiovascular: Normal rate, regular rhythm, normal heart sounds and " intact distal pulses.    Pulmonary/Chest: Effort normal and breath sounds normal.   Abdominal: Soft.   ruq stoma. No output.    Musculoskeletal: Normal range of motion.   Generalized weakness   Neurological: She is alert and oriented to person, place, and time. She has normal reflexes.   Skin: Skin is warm and dry.   Psychiatric:   Flat affect         Assessment:             Active Problems:    Parastomal hernia    Hernia    Gait abnormality    Past Medical History   Diagnosis Date   • Anxiety    • Breast cancer       LEFT WITH RECONSTRUCTION   • Colostomy in place    • Dementia    • Depression    • Edema      LOWER FEET   • Hydrocephalus      NORMAL PRESSURE   • Hypertension    • Incontinence    • Nausea    • Parastomal hernia    • Parkinson disease    • Tremor      RIGHT HAND   • Unsteady gait         Plan:        1. Acute kidney injury  2. Parkinson's dementia  3. Dehydration  4. Parastomal hernia   5. Abdominal pain  6. Generalized weakness  7. Acute hypoxic respiratory failure probably secondary to sedation - resolved  8. Ataxia  9. Normal pressure hydrocephalus    Continue current treatment. Encourage increased activity. Monitor counts.  Monitor for stoma production. Consult neurology - family would like to further investigate worsening ataxia. Work toward discharge to SNF.       Electronically signed by COLTON Ross on 3/20/2017 at 8:45 AM

## 2017-03-20 NOTE — PLAN OF CARE
Problem: Patient Care Overview (Adult)  Goal: Plan of Care Review  Outcome: Ongoing (interventions implemented as appropriate)    03/20/17 1505   Coping/Psychosocial Response Interventions   Plan Of Care Reviewed With patient   Patient Care Overview   Progress no change   Outcome Evaluation   Outcome Summary/Follow up Plan pt co of pain, med given with relief. pt voiding can be incot at times. ostomy to LLQ. Drg dry and intact to abd.       Goal: Adult Individualization and Mutuality  Outcome: Ongoing (interventions implemented as appropriate)  Goal: Discharge Needs Assessment  Outcome: Ongoing (interventions implemented as appropriate)    Problem: Perioperative Period (Adult)  Goal: Signs and Symptoms of Listed Potential Problems Will be Absent or Manageable (Perioperative Period)  Outcome: Ongoing (interventions implemented as appropriate)    Problem: Fall Risk (Adult)  Goal: Identify Related Risk Factors and Signs and Symptoms  Outcome: Ongoing (interventions implemented as appropriate)  Goal: Absence of Falls  Outcome: Ongoing (interventions implemented as appropriate)    Problem: Pain, Acute (Adult)  Goal: Identify Related Risk Factors and Signs and Symptoms  Outcome: Ongoing (interventions implemented as appropriate)  Goal: Acceptable Pain Control/Comfort Level  Outcome: Ongoing (interventions implemented as appropriate)

## 2017-03-20 NOTE — PLAN OF CARE
Problem: Patient Care Overview (Adult)  Goal: Plan of Care Review  Outcome: Ongoing (interventions implemented as appropriate)    03/20/17 0931   Coping/Psychosocial Response Interventions   Plan Of Care Reviewed With patient;daughter   Patient Care Overview   Progress progress toward functional goals is gradual   Outcome Evaluation   Outcome Summary/Follow up Plan pt sitting in wheelchair upon entering the room. pt. completed 15 reps LAQ AROM but needed constant cues to stay on task and stay alert. pt. completed sit to stand with min assist and performed chair to BSC transfer mod assist. she was able to take small steps to complete transfer but needed constant cues to stay on task and stay alert. pt. will continue to benefit from transfer training and increased mobility. recommend d/c back to extended care facility

## 2017-03-21 LAB
ANION GAP SERPL CALCULATED.3IONS-SCNC: 9 MMOL/L (ref 4–13)
BASOPHILS # BLD AUTO: 0.01 10*3/MM3 (ref 0–0.2)
BASOPHILS NFR BLD AUTO: 0.1 % (ref 0–2)
BUN BLD-MCNC: 12 MG/DL (ref 5–21)
BUN/CREAT SERPL: 10.5 (ref 7–25)
CALCIUM SPEC-SCNC: 8.8 MG/DL (ref 8.4–10.4)
CHLORIDE SERPL-SCNC: 100 MMOL/L (ref 98–110)
CO2 SERPL-SCNC: 30 MMOL/L (ref 24–31)
CREAT BLD-MCNC: 1.14 MG/DL (ref 0.5–1.4)
DEPRECATED RDW RBC AUTO: 42.4 FL (ref 40–54)
EOSINOPHIL # BLD AUTO: 0.23 10*3/MM3 (ref 0–0.7)
EOSINOPHIL NFR BLD AUTO: 2.6 % (ref 0–4)
ERYTHROCYTE [DISTWIDTH] IN BLOOD BY AUTOMATED COUNT: 14 % (ref 12–15)
GFR SERPL CREATININE-BSD FRML MDRD: 46 ML/MIN/1.73
GLUCOSE BLD-MCNC: 96 MG/DL (ref 70–100)
HCT VFR BLD AUTO: 32.5 % (ref 37–47)
HGB BLD-MCNC: 10.6 G/DL (ref 12–16)
IMM GRANULOCYTES # BLD: 0.02 10*3/MM3 (ref 0–0.03)
IMM GRANULOCYTES NFR BLD: 0.2 % (ref 0–5)
LYMPHOCYTES # BLD AUTO: 1.87 10*3/MM3 (ref 0.72–4.86)
LYMPHOCYTES NFR BLD AUTO: 21.3 % (ref 15–45)
MCH RBC QN AUTO: 26.9 PG (ref 28–32)
MCHC RBC AUTO-ENTMCNC: 32.6 G/DL (ref 33–36)
MCV RBC AUTO: 82.5 FL (ref 82–98)
MONOCYTES # BLD AUTO: 0.99 10*3/MM3 (ref 0.19–1.3)
MONOCYTES NFR BLD AUTO: 11.3 % (ref 4–12)
NEUTROPHILS # BLD AUTO: 5.66 10*3/MM3 (ref 1.87–8.4)
NEUTROPHILS NFR BLD AUTO: 64.5 % (ref 39–78)
PLATELET # BLD AUTO: 111 10*3/MM3 (ref 130–400)
PMV BLD AUTO: 10.7 FL (ref 6–12)
POTASSIUM BLD-SCNC: 3.9 MMOL/L (ref 3.5–5.3)
RBC # BLD AUTO: 3.94 10*6/MM3 (ref 4.2–5.4)
SODIUM BLD-SCNC: 139 MMOL/L (ref 135–145)
WBC NRBC COR # BLD: 8.78 10*3/MM3 (ref 4.8–10.8)

## 2017-03-21 PROCEDURE — 80048 BASIC METABOLIC PNL TOTAL CA: CPT | Performed by: NURSE PRACTITIONER

## 2017-03-21 PROCEDURE — 25010000002 KETOROLAC TROMETHAMINE PER 15 MG: Performed by: SPECIALIST

## 2017-03-21 PROCEDURE — 97116 GAIT TRAINING THERAPY: CPT

## 2017-03-21 PROCEDURE — 97110 THERAPEUTIC EXERCISES: CPT

## 2017-03-21 PROCEDURE — 25810000003 SODIUM CHLORIDE 0.9 % WITH KCL 20 MEQ 20-0.9 MEQ/L-% SOLUTION: Performed by: SPECIALIST

## 2017-03-21 PROCEDURE — 85025 COMPLETE CBC W/AUTO DIFF WBC: CPT | Performed by: NURSE PRACTITIONER

## 2017-03-21 PROCEDURE — 25010000002 ENOXAPARIN PER 10 MG: Performed by: SPECIALIST

## 2017-03-21 RX ORDER — KETOROLAC TROMETHAMINE 30 MG/ML
15 INJECTION, SOLUTION INTRAMUSCULAR; INTRAVENOUS EVERY 6 HOURS
Status: COMPLETED | OUTPATIENT
Start: 2017-03-21 | End: 2017-03-22

## 2017-03-21 RX ORDER — KETOROLAC TROMETHAMINE 30 MG/ML
15 INJECTION, SOLUTION INTRAMUSCULAR; INTRAVENOUS EVERY 6 HOURS PRN
Status: DISCONTINUED | OUTPATIENT
Start: 2017-03-22 | End: 2017-03-22 | Stop reason: HOSPADM

## 2017-03-21 RX ADMIN — CITALOPRAM 10 MG: 10 TABLET, FILM COATED ORAL at 22:13

## 2017-03-21 RX ADMIN — ENOXAPARIN SODIUM 30 MG: 30 INJECTION SUBCUTANEOUS at 09:02

## 2017-03-21 RX ADMIN — FUROSEMIDE 20 MG: 20 TABLET ORAL at 09:04

## 2017-03-21 RX ADMIN — SODIUM CHLORIDE AND POTASSIUM CHLORIDE 100 ML/HR: 9; 1.49 INJECTION, SOLUTION INTRAVENOUS at 19:46

## 2017-03-21 RX ADMIN — AMLODIPINE BESYLATE 5 MG: 5 TABLET ORAL at 09:03

## 2017-03-21 RX ADMIN — CARBIDOPA AND LEVODOPA 0.5 TABLET: 25; 250 TABLET ORAL at 17:45

## 2017-03-21 RX ADMIN — CARBIDOPA AND LEVODOPA 0.5 TABLET: 25; 250 TABLET ORAL at 09:03

## 2017-03-21 RX ADMIN — LOSARTAN POTASSIUM 50 MG: 50 TABLET, FILM COATED ORAL at 09:04

## 2017-03-21 RX ADMIN — KETOROLAC TROMETHAMINE 15 MG: 30 INJECTION, SOLUTION INTRAMUSCULAR at 15:59

## 2017-03-21 RX ADMIN — PANTOPRAZOLE SODIUM 40 MG: 40 TABLET, DELAYED RELEASE ORAL at 05:22

## 2017-03-21 RX ADMIN — KETOROLAC TROMETHAMINE 15 MG: 30 INJECTION, SOLUTION INTRAMUSCULAR at 22:36

## 2017-03-21 RX ADMIN — METOPROLOL SUCCINATE 25 MG: 25 TABLET, FILM COATED, EXTENDED RELEASE ORAL at 22:20

## 2017-03-21 RX ADMIN — ALPRAZOLAM 0.5 MG: 0.5 TABLET ORAL at 22:12

## 2017-03-21 RX ADMIN — KETOROLAC TROMETHAMINE 15 MG: 30 INJECTION, SOLUTION INTRAMUSCULAR at 09:09

## 2017-03-21 RX ADMIN — OXYBUTYNIN CHLORIDE 5 MG: 5 TABLET, FILM COATED, EXTENDED RELEASE ORAL at 09:04

## 2017-03-21 RX ADMIN — VITAMIN B12 0.1 MG ORAL TABLET 50 MCG: 0.1 TABLET ORAL at 09:03

## 2017-03-21 RX ADMIN — SODIUM CHLORIDE AND POTASSIUM CHLORIDE 100 ML/HR: 9; 1.49 INJECTION, SOLUTION INTRAVENOUS at 09:13

## 2017-03-21 NOTE — PROGRESS NOTES
[facility]  ARIES Abdalla M.D.  COLTON Hunter      Internal Medicine Progress Note    3/21/2017   3:55 PM    Name:  Susy Ko  MRN:    [id number]     Acct:     [id number]   Room:  [address] [place] [address] Day: 2     Admit Date: 3/17/2017  6:17 AM  PCP: Monster Zuniga MD    Subjective:     C/C: weakness and abdominal pain    Interval History: Status:  stayed the same. Resting in bed. Family at the bedside. Working with therapy daily.    Review of Systems   Constitution: Positive for weakness and malaise/fatigue. Negative for chills, decreased appetite, weight gain and weight loss.   HENT: Negative for congestion, ear discharge, hoarse voice and tinnitus.    Eyes: Negative for blurred vision, discharge, visual disturbance and visual halos.   Cardiovascular: Negative for chest pain, claudication, dyspnea on exertion, irregular heartbeat, leg swelling, orthopnea and paroxysmal nocturnal dyspnea.   Respiratory: Negative for cough, shortness of breath, sputum production and wheezing.    Endocrine: Negative for cold intolerance, heat intolerance and polyuria.   Hematologic/Lymphatic: Negative for adenopathy. Does not bruise/bleed easily.   Skin: Negative for dry skin, itching and suspicious lesions.   Musculoskeletal: Positive for muscle weakness. Negative for arthritis, back pain, falls, joint pain and myalgias.   Gastrointestinal: Positive for abdominal pain. Negative for constipation, diarrhea, dysphagia and hematemesis.   Genitourinary: Negative for bladder incontinence, dysuria and frequency.   Neurological: Negative for aphonia, disturbances in coordination and dizziness.   Psychiatric/Behavioral: Negative for altered mental status, depression, memory loss and substance abuse. The patient does not have insomnia and is not nervous/anxious.          Medications:     Allergies:   Allergies   Allergen Reactions   • Morphine And Related Mental Status Change   • Adhesive Tape  Rash       Current Meds:   Current Facility-Administered Medications:   •  ALPRAZolam (XANAX) tablet 0.5 mg, 0.5 mg, Oral, Nightly, Princess Walters MD, 0.5 mg at 03/20/17 2222  •  amLODIPine (NORVASC) tablet 5 mg, 5 mg, Oral, Daily, Princess Walters MD, 5 mg at 03/21/17 0903  •  carbidopa-levodopa (SINEMET)  MG per tablet 0.5 tablet, 0.5 tablet, Oral, BID, Princess Walters MD, 0.5 tablet at 03/21/17 0903  •  citalopram (CeleXA) tablet 10 mg, 10 mg, Oral, Nightly, Princess Walters MD, 10 mg at 03/20/17 2222  •  cyancobalamin (VITAMIN B-12) tablet 50 mcg, 50 mcg, Oral, Daily, Princess Walters MD, 50 mcg at 03/21/17 0903  •  enoxaparin (LOVENOX) syringe 30 mg, 30 mg, Subcutaneous, Daily, Princess Walters MD, 30 mg at 03/21/17 0902  •  furosemide (LASIX) tablet 20 mg, 20 mg, Oral, Every Other Day, Princess Walters MD, 20 mg at 03/21/17 0904  •  HYDROmorphone (DILAUDID) injection 0.25 mg, 0.25 mg, Intravenous, Q4H PRN, Princess Walters MD  •  HYDROmorphone (DILAUDID) injection 0.5 mg, 0.5 mg, Intravenous, Q4H PRN, Kathi Dunn MD, 0.5 mg at 03/20/17 2246  •  [START ON 3/22/2017] ketorolac (TORADOL) injection 15 mg, 15 mg, Intravenous, Q6H PRN, Princess Walters MD  •  ketorolac (TORADOL) injection 15 mg, 15 mg, Intravenous, Q6H, Princess Walters MD, 15 mg at 03/21/17 0909  •  losartan (COZAAR) tablet 50 mg, 50 mg, Oral, Daily, Princess Walters MD, 50 mg at 03/21/17 0904  •  metoprolol succinate XL (TOPROL-XL) 24 hr tablet 25 mg, 25 mg, Oral, Daily, Princess Walters MD, 25 mg at 03/20/17 2222  •  [DISCONTINUED] Morphine sulfate (PF) injection 2 mg, 2 mg, Intravenous, Q2H PRN **AND** naloxone (NARCAN) injection 0.4 mg, 0.4 mg, Intravenous, Q5 Min PRN, Princess Walters MD  •  ondansetron (ZOFRAN) injection 4 mg, 4 mg, Intravenous, Q6H PRN, Princess Walters MD  •  oxybutynin XL (DITROPAN-XL) 24 hr tablet 5 mg, 5 mg, Oral, Daily, Princess Walters MD, 5 mg at 03/21/17 0904  •  pantoprazole (PROTONIX) EC tablet 40 mg, 40 mg, Oral, Q  "AM, Princess Walters MD, 40 mg at 17 0522  •  promethazine (PHENERGAN) tablet 25 mg, 25 mg, Oral, Q6H PRN, Princess Walters MD  •  sodium chloride 0.9 % with KCl 20 mEq/L infusion, 100 mL/hr, Intravenous, Continuous, Princess Walters MD, Last Rate: 100 mL/hr at 17, 100 mL/hr at 17    Data:     Code Status:  Full Code    History reviewed. No pertinent family history.    Social History     Social History   • Marital status:      Spouse name: N/A   • Number of children: N/A   • Years of education: N/A     Occupational History   • Not on file.     Social History Main Topics   • Smoking status: Former Smoker   • Smokeless tobacco: Never Used   • Alcohol use No   • Drug use: No   • Sexual activity: Defer     Other Topics Concern   • Not on file     Social History Narrative       Vitals:  Visit Vitals   • /70 (BP Location: Right arm, Patient Position: Sitting)   • Pulse 79   • Temp 99.3 °F (37.4 °C) (Oral)   • Resp 16   • Ht 64.02\" (162.6 cm)   • Wt 148 lb (67.1 kg)   • SpO2 99%   • BMI 25.39 kg/m2     Temp (24hrs), Av.6 °F (37 °C), Min:97.9 °F (36.6 °C), Max:99.3 °F (37.4 °C)            I/O (24Hr):    Intake/Output Summary (Last 24 hours) at 17 1555  Last data filed at 17 1200   Gross per 24 hour   Intake   2696 ml   Output      0 ml   Net   2696 ml       Labs and imaging:      Recent Results (from the past 12 hour(s))   Basic Metabolic Panel    Collection Time: 17  6:02 AM   Result Value Ref Range    Glucose 96 70 - 100 mg/dL    BUN 12 5 - 21 mg/dL    Creatinine 1.14 0.50 - 1.40 mg/dL    Sodium 139 135 - 145 mmol/L    Potassium 3.9 3.5 - 5.3 mmol/L    Chloride 100 98 - 110 mmol/L    CO2 30.0 24.0 - 31.0 mmol/L    Calcium 8.8 8.4 - 10.4 mg/dL    eGFR Non African Amer 46 (L) >60 mL/min/1.73    BUN/Creatinine Ratio 10.5 7.0 - 25.0    Anion Gap 9.0 4.0 - 13.0 mmol/L   CBC Auto Differential    Collection Time: 17  6:02 AM   Result Value Ref Range    WBC 8.78 " 4.80 - 10.80 10*3/mm3    RBC 3.94 (L) 4.20 - 5.40 10*6/mm3    Hemoglobin 10.6 (L) 12.0 - 16.0 g/dL    Hematocrit 32.5 (L) 37.0 - 47.0 %    MCV 82.5 82.0 - 98.0 fL    MCH 26.9 (L) 28.0 - 32.0 pg    MCHC 32.6 (L) 33.0 - 36.0 g/dL    RDW 14.0 12.0 - 15.0 %    RDW-SD 42.4 40.0 - 54.0 fl    MPV 10.7 6.0 - 12.0 fL    Platelets 111 (L) 130 - 400 10*3/mm3    Neutrophil % 64.5 39.0 - 78.0 %    Lymphocyte % 21.3 15.0 - 45.0 %    Monocyte % 11.3 4.0 - 12.0 %    Eosinophil % 2.6 0.0 - 4.0 %    Basophil % 0.1 0.0 - 2.0 %    Immature Grans % 0.2 0.0 - 5.0 %    Neutrophils, Absolute 5.66 1.87 - 8.40 10*3/mm3    Lymphocytes, Absolute 1.87 0.72 - 4.86 10*3/mm3    Monocytes, Absolute 0.99 0.19 - 1.30 10*3/mm3    Eosinophils, Absolute 0.23 0.00 - 0.70 10*3/mm3    Basophils, Absolute 0.01 0.00 - 0.20 10*3/mm3    Immature Grans, Absolute 0.02 0.00 - 0.03 10*3/mm3               Physical Examination:        Physical Exam   Constitutional: She is oriented to person, place, and time. She appears well-developed and well-nourished.   HENT:   Head: Normocephalic and atraumatic.   Nose: Nose normal.   Eyes: Conjunctivae and EOM are normal. Pupils are equal, round, and reactive to light.   Neck: Normal range of motion. Neck supple.   Cardiovascular: Normal rate, regular rhythm, normal heart sounds and intact distal pulses.    Pulmonary/Chest: Effort normal and breath sounds normal.   Abdominal: Soft.   ruq stoma. No output.    Musculoskeletal: Normal range of motion.   Generalized weakness   Neurological: She is alert and oriented to person, place, and time. She has normal reflexes.   Skin: Skin is warm and dry.   Psychiatric:   Flat affect         Assessment:             Active Problems:    Parastomal hernia    Hernia    Gait abnormality    Past Medical History   Diagnosis Date   • Anxiety    • Breast cancer       LEFT WITH RECONSTRUCTION   • Colostomy in place    • Dementia    • Depression    • Edema      LOWER FEET   • Hydrocephalus       NORMAL PRESSURE   • Hypertension    • Incontinence    • Nausea    • Parastomal hernia    • Parkinson disease    • Tremor      RIGHT HAND   • Unsteady gait         Plan:        1. Acute kidney injury  2. Parkinson's dementia  3. Dehydration  4. Parastomal hernia   5. Abdominal pain  6. Generalized weakness  7. Acute hypoxic respiratory failure probably secondary to sedation - resolved  8. Ataxia  9. Normal pressure hydrocephalus    Continue current treatment. Agree with snf placement and outpt neuro follow up.      Electronically signed by Monster Zuniga MD on 3/21/2017 at 3:55 PM

## 2017-03-21 NOTE — NURSING NOTE
Pt awake and up in chair.  Stoma pink and viable.  Only small amount of sero-sanginous fluid in bag.  Dght in room.  Ostomy appliances supplied for change when needed.  Had used pre-cut bags before and from description (Hernia had been so big) will need cut to fit bags again.

## 2017-03-21 NOTE — PLAN OF CARE
Problem: Patient Care Overview (Adult)  Goal: Plan of Care Review  Outcome: Ongoing (interventions implemented as appropriate)    03/21/17 0325   Coping/Psychosocial Response Interventions   Plan Of Care Reviewed With patient   Patient Care Overview   Progress no change   Outcome Evaluation   Outcome Summary/Follow up Plan C/o pain at colostomy site x1, medicated PRN per orders. Small amount of serosanguineous ostomy output this shift, no stool. Incont at times. ML incision with staples intact, dressing changed. O2 @ 2L NC. Tolerating regular diet. IVF continues. Will continue to monitor.        Goal: Adult Individualization and Mutuality  Outcome: Ongoing (interventions implemented as appropriate)    03/17/17 1647 03/20/17 1505 03/21/17 0325   Individualization   Patient Specific Preferences --  give dilaudid 0.5mg at night and the less dose in the daytime. --    Patient Specific Goals Pain controled at acceptable level. Return of normal stool habit from ostomy. --  --    Patient Specific Interventions Socical Services consulted. --  --    Mutuality/Individual Preferences   What Anxieties, Fears or Concerns Do You Have About Your Health or Care? --  --  None identified at this time.   What Questions Do You Have About Your Health or Care? --  --  None identified at this time.       Goal: Discharge Needs Assessment  Outcome: Ongoing (interventions implemented as appropriate)    03/17/17 1545 03/18/17 1511 03/21/17 0325   Discharge Needs Assessment   Concerns To Be Addressed --  discharge planning concerns --    Readmission Within The Last 30 Days --  no previous admission in last 30 days --    Equipment Needed After Discharge none --  --    Discharge Facility/Level Of Care Needs --  nursing facility, skilled --    Current Discharge Risk --  physical impairment --    Discharge Disposition --  nursing facility --    Discharge Planning Comments --  --  Discharge planning continues.   Current Health    Outpatient/Agency/Support Group Needs --  assisted living facility (specify) --    Anticipated Changes Related to Illness --  inability to care for self --    Self-Care   Equipment Currently Used at Home --  wheelchair --    Living Environment   Transportation Available --  car;family or friend will provide --          Problem: Perioperative Period (Adult)  Goal: Signs and Symptoms of Listed Potential Problems Will be Absent or Manageable (Perioperative Period)  Outcome: Ongoing (interventions implemented as appropriate)    03/21/17 0325   Perioperative Period   Problems Assessed (Perioperative Period) all   Problems Present (Perioperative Period) pain         Problem: Fall Risk (Adult)  Goal: Identify Related Risk Factors and Signs and Symptoms  Outcome: Ongoing (interventions implemented as appropriate)    03/20/17 1505   Fall Risk   Fall Risk: Related Risk Factors age-related changes;bladder function altered;fatigue/slow reaction;gait/mobility problems;history of falls   Fall Risk: Signs and Symptoms presence of risk factors       Goal: Absence of Falls  Outcome: Ongoing (interventions implemented as appropriate)    03/21/17 0325   Fall Risk (Adult)   Absence of Falls making progress toward outcome         Problem: Pain, Acute (Adult)  Goal: Identify Related Risk Factors and Signs and Symptoms  Outcome: Ongoing (interventions implemented as appropriate)    03/20/17 1505   Pain, Acute   Related Risk Factors (Acute Pain) surgery;procedure/treatment   Signs and Symptoms (Acute Pain) fatigue/weakness;verbalization of pain descriptors       Goal: Acceptable Pain Control/Comfort Level  Outcome: Ongoing (interventions implemented as appropriate)    03/21/17 0325   Pain, Acute (Adult)   Acceptable Pain Control/Comfort Level making progress toward outcome

## 2017-03-21 NOTE — PLAN OF CARE
Problem: Patient Care Overview (Adult)  Goal: Plan of Care Review  Outcome: Ongoing (interventions implemented as appropriate)    03/21/17 9909   Coping/Psychosocial Response Interventions   Plan Of Care Reviewed With patient   Patient Care Overview   Progress progress toward functional goals is gradual   Outcome Evaluation   Outcome Summary/Follow up Plan Patient completed bed mobility with CGA/min A and transferred CGA/min A. She was able to ambulate 6' in room then another 15' after sitting rest with rolling walker.  We will continue to work with therapy to increase mobility.

## 2017-03-21 NOTE — PROGRESS NOTES
Princess Walters MD Progress Note     LOS: 2 days   Patient Care Team:  Monster Zuniga MD as PCP - General  Monster Zuniga MD as PCP - Family Medicine        Subjective     Tolerating regular diet.  Complaining of pain.    Objective     Vital Signs  Temp:  [97.9 °F (36.6 °C)-99.3 °F (37.4 °C)] 99.3 °F (37.4 °C)  Heart Rate:  [77-79] 79  Resp:  [16] 16  BP: (151-152)/(60-70) 151/70    Intake & Output (last 3 days)       03/18 0701 - 03/19 0700 03/19 0701 - 03/20 0700 03/20 0701 - 03/21 0700 03/21 0701 - 03/22 0700    P.O.  960 20     I.V. (mL/kg) 1218 (18.1) 2977 (44.3) 1956 (29.1)     Irrigation        Total Intake(mL/kg) 1218 (18.1) 3937 (58.6) 1976 (29.4)     Urine (mL/kg/hr) 2570 (1.6) 1625 (1) 600 (0.4)     Stool 0 (0) 0 (0)      Blood        Total Output 2570 1625 600      Net -1352 +2312 +1376              Unmeasured Urine Occurrence  6 x 5 x           Physical Exam:     General Appearance:    Alert, cooperative, in no acute distress   Lungs:     respirations regular, even and unlabored    Heart:    Regular rhythm and normal rate, normal S1 and S2, no            murmur, no gallop, no rub   Chest Wall:    No abnormalities observed   Abdomen:      air in ostomy bag.  Wound intact.     Extremities: No edema, + SCDs   Results Review:     I reviewed the patient's new clinical results.    Lab Results (last 72 hours)     Procedure Component Value Units Date/Time    Tissue Exam [[id number]] Collected:  03/17/17 1050    Specimen:  Tissue from Ostomy Updated:  03/20/17 4012     Case Report --      Surgical Pathology Report                         Case: [id number]                                  Authorizing Provider:  Princess Walters MD         Collected:           03/17/2017 10:50 AM          Ordering Location:     [facility] OR  Received:            03/17/2017 12:06 PM          Pathologist:           Annamarie Henry MD                                                           Specimen:    Ostomy,  "Ostomy polyp                                                                        Clinical Information --      Pre-Op Diagnosis:     Ostomy polyp.         Final Diagnosis --      Ostomy site, polyp, excision:  Granulation tissue polyp   Negative for dysplasia or malignancy       Gross Description --      Specimen #1 is received in formalin, labeled with the patient's name, medical record number and designated \"ostomy polyp.\" The specimen consists of a mucosa-covered tissue fragment measuring 2.0 x 1.1 x 0.5 cm.  The mucosa surface is tan-pink and slightly lobulated with a yellow-pink slightly more polypoid area measuring 0.5 x 0.3 cm.  The resection margin is inked black.  The cut surface shows some mucosal thickening and is otherwise unremarkable. The specimen is serially sectioned and submitted in (blocks 1A and 1B).   JBT/js           Microscopic Description --      Histologic sections demonstrate a polypoid fragment of granulation tissue.  It is at the transformation from stratified squamous skin to columnar epithelium.  Negative for dysplasia or malignancy.       Embedded Images --     CBC & Differential [[id number]] Collected:  03/21/17 0602    Specimen:  Blood Updated:  03/21/17 0608    Narrative:       The following orders were created for panel order CBC & Differential.  Procedure                               Abnormality         Status                     ---------                               -----------         ------                     CBC Auto Differential[[id number]]         Abnormal            Final result                 Please view results for these tests on the individual orders.    CBC Auto Differential [[id number]]  (Abnormal) Collected:  03/21/17 0602    Specimen:  Blood Updated:  03/21/17 0608     WBC 8.78 10*3/mm3      RBC 3.94 (L) 10*6/mm3      Hemoglobin 10.6 (L) g/dL      Hematocrit 32.5 (L) %      MCV 82.5 fL      MCH 26.9 (L) pg      MCHC 32.6 (L) g/dL      RDW 14.0 %      RDW-SD 42.4 fl  "     MPV 10.7 fL      Platelets 111 (L) 10*3/mm3      Neutrophil % 64.5 %      Lymphocyte % 21.3 %      Monocyte % 11.3 %      Eosinophil % 2.6 %      Basophil % 0.1 %      Immature Grans % 0.2 %      Neutrophils, Absolute 5.66 10*3/mm3      Lymphocytes, Absolute 1.87 10*3/mm3      Monocytes, Absolute 0.99 10*3/mm3      Eosinophils, Absolute 0.23 10*3/mm3      Basophils, Absolute 0.01 10*3/mm3      Immature Grans, Absolute 0.02 10*3/mm3     Basic Metabolic Panel [[id number]]  (Abnormal) Collected:  03/21/17 0602    Specimen:  Blood Updated:  03/21/17 0642     Glucose 96 mg/dL      BUN 12 mg/dL      Creatinine 1.14 mg/dL      Sodium 139 mmol/L      Potassium 3.9 mmol/L      Chloride 100 mmol/L      CO2 30.0 mmol/L      Calcium 8.8 mg/dL      eGFR Non African Amer 46 (L) mL/min/1.73      BUN/Creatinine Ratio 10.5      Anion Gap 9.0 mmol/L     Narrative:       The MDRD GFR formula is only valid for adults with stable renal function between ages 18 and 70.        Imaging Results (last 72 hours)     ** No results found for the last 72 hours. **              Assessment/Plan     Active Problems:    Parastomal hernia    Hernia    Gait abnormality      Await placement.  We will try some Toradol to assist with pain.  Use abdominal binder      Princess Walters MD  03/21/17  8:24 AM

## 2017-03-22 VITALS
DIASTOLIC BLOOD PRESSURE: 57 MMHG | SYSTOLIC BLOOD PRESSURE: 142 MMHG | WEIGHT: 148 LBS | OXYGEN SATURATION: 98 % | RESPIRATION RATE: 16 BRPM | HEIGHT: 64 IN | HEART RATE: 72 BPM | BODY MASS INDEX: 25.27 KG/M2 | TEMPERATURE: 98.2 F

## 2017-03-22 PROCEDURE — 97110 THERAPEUTIC EXERCISES: CPT

## 2017-03-22 PROCEDURE — 97530 THERAPEUTIC ACTIVITIES: CPT

## 2017-03-22 PROCEDURE — 97116 GAIT TRAINING THERAPY: CPT

## 2017-03-22 PROCEDURE — 25810000003 SODIUM CHLORIDE 0.9 % WITH KCL 20 MEQ 20-0.9 MEQ/L-% SOLUTION: Performed by: SPECIALIST

## 2017-03-22 PROCEDURE — 97535 SELF CARE MNGMENT TRAINING: CPT

## 2017-03-22 PROCEDURE — 25010000002 KETOROLAC TROMETHAMINE PER 15 MG: Performed by: SPECIALIST

## 2017-03-22 RX ORDER — HYDROCODONE BITARTRATE AND ACETAMINOPHEN 7.5; 325 MG/1; MG/1
1 TABLET ORAL EVERY 4 HOURS PRN
Qty: 30 TABLET | Refills: 0 | Status: ON HOLD | OUTPATIENT
Start: 2017-03-22 | End: 2019-05-09

## 2017-03-22 RX ADMIN — LOSARTAN POTASSIUM 50 MG: 50 TABLET, FILM COATED ORAL at 10:18

## 2017-03-22 RX ADMIN — CARBIDOPA AND LEVODOPA 0.5 TABLET: 25; 250 TABLET ORAL at 10:19

## 2017-03-22 RX ADMIN — PANTOPRAZOLE SODIUM 40 MG: 40 TABLET, DELAYED RELEASE ORAL at 06:58

## 2017-03-22 RX ADMIN — OXYBUTYNIN CHLORIDE 5 MG: 5 TABLET, FILM COATED, EXTENDED RELEASE ORAL at 10:18

## 2017-03-22 RX ADMIN — AMLODIPINE BESYLATE 5 MG: 5 TABLET ORAL at 10:18

## 2017-03-22 RX ADMIN — KETOROLAC TROMETHAMINE 15 MG: 30 INJECTION, SOLUTION INTRAMUSCULAR at 05:11

## 2017-03-22 RX ADMIN — SODIUM CHLORIDE AND POTASSIUM CHLORIDE 100 ML/HR: 9; 1.49 INJECTION, SOLUTION INTRAVENOUS at 07:00

## 2017-03-22 RX ADMIN — VITAMIN B12 0.1 MG ORAL TABLET 50 MCG: 0.1 TABLET ORAL at 10:19

## 2017-03-22 NOTE — PLAN OF CARE
Problem: Patient Care Overview (Adult)  Goal: Plan of Care Review  Outcome: Ongoing (interventions implemented as appropriate)    03/22/17 1144   Coping/Psychosocial Response Interventions   Plan Of Care Reviewed With patient   Patient Care Overview   Progress progress towards functional goals is fair   Outcome Evaluation   Outcome Summary/Follow up Plan OT tx completed. Pt. very flat affect and and not motivated to participate stating she already saw therapy. Pt. completed grooming ADLs with set up and supervision. Pt. completed BUE strengthening exercises 2x15 reps all planes and jts with 1 lb dumb bell. Pt. cont to benefit from skilled OT due to decreased strength, balance, and decreased adls. 3/22/17 1156

## 2017-03-22 NOTE — THERAPY DISCHARGE NOTE
Acute Care - Occupational Therapy Initial Eval/Discharge  [facility]     Patient Name: Susy Ko  : 1940  MRN: [id number]  Today's Date: 3/22/2017  Onset of Illness/Injury or Date of Surgery Date: 17  Date of Referral to OT: 17  Referring Physician: Dr. Perez Zuniga      Admit Date: 3/17/2017       ICD-10-CM ICD-9-CM   1. Gait abnormality R26.9 781.2   2. Hernia K46.9 553.9   3. Impaired mobility and ADLs Z74.09 799.89     Patient Active Problem List   Diagnosis   • Normal pressure hydrocephalus   • Non morbid obesity due to excess calories   • Tobacco non-user   • Tremor of right hand   • Abnormality of gait   • Parastomal hernia   • Hernia   • Gait abnormality     Past Medical History:   Diagnosis Date   • Anxiety    • Breast cancer      LEFT WITH RECONSTRUCTION   • Colostomy in place    • Dementia    • Depression    • Edema     LOWER FEET   • Hydrocephalus     NORMAL PRESSURE   • Hypertension    • Incontinence    • Nausea    • Parastomal hernia    • Parkinson disease    • Tremor     RIGHT HAND   • Unsteady gait      Past Surgical History:   Procedure Laterality Date   • ABDOMINAL SURGERY     • APPENDECTOMY     • BREAST RECONSTRUCTION Left     LEFT   • CHOLECYSTECTOMY     • COLON SURGERY     • COLONOSCOPY     • COLOSTOMY     • HYSTERECTOMY     • MASTECTOMY Left    • PARASTOMAL HERNIA REPAIR N/A 3/17/2017    Procedure: REPAIR PARASTOMAL HERNIA;  Surgeon: Princess Walters MD;  Location: [facility];  Service:           OT ASSESSMENT FLOWSHEET (last 72 hours)      OT Evaluation       17 1616 17 1135 17 1018 17 1425 17 0947    Rehab Evaluation    Document Type  therapy note (daily note)  -ND therapy note (daily note)  -TR therapy note (daily note)  -HERB therapy note (daily note)  -TRA    Subjective Information  agree to therapy;complains of;fatigue;weakness;pain  -ND agree to therapy;complains of;dizziness  -TR agree to therapy;complains of;pain;weakness;fatigue   -HERB agree to therapy;complains of;weakness;fatigue;pain  -TRA    Patient Effort, Rehab Treatment  fair  -ND fair  -TR  fair  -TRA    Symptoms Noted During/After Treatment  fatigue  -ND fatigue;dizziness  -TR  fatigue  -TRA    Symptoms Noted Comment     Pt lethargic. Daughter reports pt sleeply due to pain medication.   -TRA    General Information    Precautions/Limitations  fall precautions   colostomy, abd binder  -ND fall precautions;other (see comments)   Colstomy, abd binder  -TR fall precautions   colostomy, abd binder  -HERB fall precautions   colostomy  -TRA    Clinical Impression    Anticipated Discharge Disposition skilled nursing facility  -ND        Vital Signs    Pretreatment Heart Rate (beats/min)     80  -TRA    Intratreatment Heart Rate (beats/min)     88  -TRA    Pre SpO2 (%)   98  -TR 98  -HERB 95  -TRA    O2 Delivery Pre Treatment   supplemental O2    2 LO2/NC  -TR supplemental O2   2 L  -HERB supplemental O2   2L NC  -TRA    Intra SpO2 (%)   98  -TR 96  -HERB 94  -TRA    O2 Delivery Intra Treatment   room air  -TR room air  -HERB supplemental O2   2L NC  -TRA    Post SpO2 (%)    98  -HERB     O2 Delivery Post Treatment   room air  -TR --   2 L  -HERB     Pre Patient Position     Sitting  -TRA    Intra Patient Position     Standing  -TRA    Post Patient Position     Supine  -TRA    Pain Assessment    Pain Assessment  0-10  -ND 0-10  -TR 0-10  -HERB [place]ined[place] FACES  -TRA    Benson-Pineda FACES Pain Rating     4   pt unable to verbally rate pain.   -TRA    Pain Score  8  -ND 8  -TR 6  -HERB     Post Pain Score   8  -TR 7  -HERB     Pain Type  Acute pain;Surgical pain  -ND Acute pain;Surgical pain  -TR Acute pain;Surgical pain  -HERB Acute pain;Surgical pain  -TRA    Pain Location  Abdomen  -ND Abdomen   Around stoma  -TR Abdomen   Around stoma  -HERB Abdomen  -TRA    Pain Descriptors  Aching  -ND       Pain Frequency  Constant/continuous  -ND Constant/continuous  -TR Constant/continuous  -HERB     Pain Intervention(s)   Medication (See MAR);Repositioned  -ND Repositioned;Ambulation/increased activity  -TR Repositioned;Ambulation/increased activity  -HERB Medication (See MAR);Repositioned;Ambulation/increased activity  -TRA    Response to Interventions  tolerated  -ND tolerated  -TR tolerated  -HERB Tolerated  -TRA    Bed Mobility, Assessment/Treatment    Bed Mobility, Assistive Device   bed rails;head of bed elevated  -TR bed rails;head of bed elevated  -HERB     Bed Mob, Supine to Sit, [place]   verbal cues required;minimum assist (75% patient effort)   HHA  -TR contact guard assist  -HERB     Bed Mob, Sit to Supine, [place]  verbal cues required;nonverbal cues required (demo/gesture);minimum assist (75% patient effort)  -ND --  -TR minimum assist (75% patient effort)  -HERB moderate assist (50% patient effort);verbal cues required  -TRA    Bed Mobility, Safety Issues   decreased use of legs for bridging/pushing;cognitive deficits limit understanding  -TR      Bed Mobility, Impairments  impaired balance;strength decreased;pain  -ND strength decreased;impaired balance  -TR strength decreased;impaired balance  -HERB strength decreased;impaired balance  -TRA    Bed Mobility, Comment  --  -ND       Transfer Assessment/Treatment    Transfers, Chair-Bed [place]  verbal cues required;nonverbal cues required (demo/gesture);minimum assist (75% patient effort)  -ND   minimum assist (75% patient effort);verbal cues required  -TRA    Transfers, Bed-Chair-Bed, Assist Device  --   BUE support  -ND   rolling walker  -TRA    Transfers, Sit-Stand [place]   verbal cues required;contact guard assist;minimum assist (75% patient effort)  -TR verbal cues required;contact guard assist;minimum assist (75% patient effort)  -HERB minimum assist (75% patient effort);moderate assist (50% patient effort);verbal cues required  -TRA    Transfers, Stand-Sit [place]   verbal cues required;contact guard assist  -TR verbal cues required;contact guard  assist  -HERB minimum assist (75% patient effort);verbal cues required  -TRA    Transfers, Sit-Stand-Sit, Assist Device   rolling walker  -TR  rolling walker  -TRA    Transfer, Safety Issues   balance decreased during turns;step length decreased;weight-shifting ability decreased  -TR balance decreased during turns;step length decreased;weight-shifting ability decreased  -HERB balance decreased during turns;sequencing ability decreased;step length decreased;weight-shifting ability decreased;loses balance backward  -TRA    Transfer, Impairments  strength decreased;impaired balance;pain  -ND impaired balance;strength decreased   Posterior lean  -TR impaired balance;strength decreased  -HERB impaired balance;strength decreased  -TRA    Transfer, Comment   Stood x3, assisted with changing brief  -TR Stood x 2.  Upon standing patient had strong posterior lean but was able to correct with cueing.  -HERB Posterior lean  -TRA    Functional Mobility    Functional Mobility- Ind. Level  verbal cues required;nonverbal cues required (demo/gesture);contact guard assist  -ND       Functional Mobility- Device  --   BUE SUPPORT  -ND       Functional Mobility-Distance (Feet)  --   from w/c to bed  -ND       Functional Mobility- Safety Issues  step length decreased  -ND       Grooming Assessment/Training    Grooming Assess/Train, Assistive Device  --   wash face, wash teeth/dentures  -ND       Grooming Assess/Train, Position  sitting  -ND   sitting  -TRA    Grooming Assess/Train, Indepen Level  set up required;supervision required  -ND   contact guard assist;verbal cues required  -TRA    Grooming Assess/Train, Impairments     pain;strength decreased  -TRA    Grooming Assess/Train, Comment     Washed face with mod cues due to pt being lethargic.  -TRA    Balance Skills Training    Sitting-Level of Assistance   Contact guard  -TR Contact guard  -HERB Contact guard  -TRA    Sitting-Balance Support   Feet supported  -TR Feet supported  -HERB Feet  supported  -TRA    Standing-Level of Assistance   Minimum assistance  -TR Minimum assistance  -HERB Minimum assistance;Moderate assistance  -TRA    Static Standing Balance Support   assistive device  -TR assistive device  -HERB assistive device  -TRA    Gait Balance-Level of Assistance   Contact guard;Minimum assistance  -TR Contact guard;Minimum assistance  -HERB     Gait Balance Support   assistive device  -TR assistive device  -HERB     Therapy Exercises    Bilateral Lower Extremities   AROM:;15 reps;sitting;LAQ;hip flexion;glut sets;ankle pumps/circles;hip abduction/adduction  -TR AROM:;15 reps;sitting;LAQ;hip flexion  -HERB     Bilateral Upper Extremity  AROM:;15 reps;sitting;elbow flexion/extension;hand pumps;shoulder horizontal abd/add;shoulder extension/flexion   1lb hand weight, 2x15  -ND AROM:;15 reps;sitting;shoulder abduction/adduction;shoulder rolls/shrugs;shoulder extension/flexion;elbow flexion/extension  -TR  AROM:;10 reps;15 reps;sitting;elbow flexion/extension;hand pumps;pronation/supination;shoulder extension/flexion;shoulder horizontal abd/add;shoulder protraction/retraction   10-15 reps to fatigue  -TRA    BUE Resistance     --   1# hand weight  -TRA    Sensory Assessment/Intervention    Sensory Impairment  --   WNL per pt.   -ND       Positioning and Restraints    Pre-Treatment Position  sitting in chair/recliner  -ND in bed  -TR in bed  -HERB sitting in chair/recliner  -TRA    Post Treatment Position  bed  -ND wheelchair  -TR bed  -HERB bed  -TRA    In Bed  supine;call light within reach;encouraged to call for assist;side rails up x2  -ND  fowlers;call light within reach;encouraged to call for assist;exit alarm on;side rails up x2  -HERB fowlers;call light within reach;encouraged to call for assist;with family/caregiver;side rails up x2;heels elevated  -TRA    In Wheelchair  --  -ND sitting;call light within reach;encouraged to call for assist;notified melinda FU        03/20/17 0900                Rehab  Evaluation    Document Type therapy note (daily note)  - (r) AK (t)  (c)        Subjective Information agree to therapy;complains of;weakness;fatigue;pain  - (r) AK (t)  (c)        Patient Effort, Rehab Treatment poor  - (r) AK (t)  (c)        General Information    Precautions/Limitations fall precautions   colostomy  - (r) AK (t)  (c)        Pain Assessment    Pain Assessment 0-10  - (r) AK (t)  (c)        Pain Score 8  - (r) AK (t)  (c)        Pain Type Acute pain;Surgical pain  - (r) AK (t)  (c)        Pain Location Abdomen  - (r) AK (t)  (c)        Pain Frequency Constant/continuous  - (r) AK (t)  (c)        Pain Intervention(s) Medication (See MAR);Repositioned;Ambulation/increased activity  - (r) AK (t)  (c)        Response to Interventions tolerated  - (r) AK (t)  (c)        Cognitive Assessment/Intervention    Current Cognitive/Communication Assessment functional  - (r) AK (t)  (c)        Orientation Status oriented x 4  - (r) AK (t)  (c)        Follows Commands/Answers Questions 75% of the time;able to follow multi-step instructions;needs cueing;needs increased time;needs repetition   pt. kept falling asleep during tx.   - (r) AK (t)  (c)        Personal Safety decreased awareness, need for assist;decreased awareness, need for safety  - (r) AK (t)  (c)        Personal Safety Interventions fall prevention program maintained;gait belt;nonskid shoes/slippers when out of bed;supervised activity  - (r) AK (t)  (c)        Transfer Assessment/Treatment    Transfers, Sit-Stand [place] minimum assist (75% patient effort);verbal cues required  - (r) AK (t)  (c)        Transfers, Stand-Sit [place] minimum assist (75% patient effort);moderate assist (50% patient effort);verbal cues required  - (r) AK (t)  (c)        Toilet Transfer, [place] moderate assist (50% patient effort);2 person assist required;verbal cues required  - (r)  AK (t) LH (c)        Toilet Transfer, Assistive Device bedside commode without drop arms  - (r) AK (t) LH (c)        Transfer, Safety Issues balance decreased during turns;sequencing ability decreased;step length decreased;weight-shifting ability decreased;loses balance backward;knees buckling;impulsivity  - (r) AK (t) LH (c)        Transfer, Impairments impaired balance;coordination impaired  - (r) AK (t) LH (c)        Transfer, Comment pt. able to take small steps to assist with transfer but needs constant repeated cues to do so  - (r) AK (t) LH (c)        Motor Skills/Interventions    Additional Documentation Balance Skills Training (Group)  - (r) AK (t) LH (c)        Balance Skills Training    Sitting-Level of Assistance Close supervision  - (r) AK (t) LH (c)        Sitting-Balance Support Left upper extremity supported;Right upper extremity supported;Feet supported  - (r) AK (t) LH (c)        Standing-Level of Assistance Minimum assistance;x2  - (r) AK (t) LH (c)        Static Standing Balance Support Right upper extremity supported;Left upper extremity supported  - (r) AK (t) LH (c)        Gait Balance-Level of Assistance Minimum assistance;Moderate assistance;x2  - (r) AK (t) LH (c)        Gait Balance Support Right upper extremity supported;Left upper extremity supported  - (r) AK (t) LH (c)        Therapy Exercises    Bilateral Lower Extremities AROM:;15 reps;sitting;LAQ  - (r) AK (t) LH (c)        Positioning and Restraints    Pre-Treatment Position sitting in chair/recliner  - (r) AK (t) LH (c)        Post Treatment Position chair  - (r) AK (t) LH (c)        In Bed sitting;call light within reach;encouraged to call for assist;with family/caregiver;notified nsg  - (r) AK (t) LH (c)          User Key  (r) = Recorded By, (t) = Taken By, (c) = Cosigned By    Initials Name Effective Dates     Ronald Raymundo, PT 08/02/16 -     HERB Angela, PTA 08/02/16 -     TR Jordyn Robledo  Germán, PTA 08/02/16 -     TRA Mahogany Langford, OTR/L 06/22/15 -     ND Vandana Reddy, OTR/L 10/21/16 -     AK Teo Floyd, PT Student 12/19/16 -           Occupational Therapy Education     Title: PT OT SLP Therapies (Active)     Topic: Occupational Therapy (Resolved)     Point: ADL training (Resolved)    Description: Instruct learner(s) on proper safety adaptation and remediation techniques during self care or transfers.   Instruct in proper use of assistive devices.    Learning Progress Summary    Learner Readiness Method Response Comment Documented by Status   Patient Acceptance E VU Pt. educated on role of OT, benefit of activity, progreesion with poc ND 03/22/17 1144 Done    Acceptance E,D VU,NR Education provided on purpose of OT, progression of POC, UE HEP, proper technique with ADL tasks/transfers, fall prevention, positioning and progress made. TR 03/20/17 1033 Done    Acceptance E VU OT POC, discharge planning AC 03/18/17 1514 Done   Family Acceptance E,LOTTIE VU,NR Education provided on purpose of OT, progression of POC, UE HEP, proper technique with ADL tasks/transfers, fall prevention, positioning and progress made. TR 03/20/17 1033 Done    Acceptance E VU OT POC, discharge planning AC 03/18/17 1514 Done                      User Key     Initials Effective Dates Name Provider Type Discipline     06/22/15 -  Ernst To, OTR/L Occupational Therapist OT     06/22/15 -  Mahogany Langford, OTR/L Occupational Therapist OT    ND 10/21/16 -  Vandana Reddy, OTR/L Occupational Therapist OT                OT Recommendation and Plan  Anticipated Discharge Disposition: skilled nursing facility  Planned Therapy Interventions: activity intolerance, ADL retraining, balance training, bed mobility training, home exercise program, strengthening, transfer training  Therapy Frequency: 3-5 times/wk  Plan of Care Review  Plan Of Care Reviewed With: patient  Progress: progress towards functional goals is  fair  Outcome Summary/Follow up Plan: OT tx completed. Pt. very flat affect and and not motivated to participate stating she already saw therapy. Pt. completed grooming ADLs with set up and supervision. Pt. completed BUE strengthening exercises 2x15 reps all planes and jts with 1 lb dumb bell. Pt. cont to benefit from skilled OT due to decreased strength, balance, and decreased adls. 3/22/17 1150           OT Goals       03/22/17 1615 03/22/17 1534 03/18/17 1517    Bed Mobility OT LTG    Bed Mobility OT LTG, Date Established   03/18/17  -AC    Bed Mobility OT LTG, Time to Achieve   by discharge  -AC    Bed Mobility OT LTG, Activity Type   all bed mobility  -AC    Bed Mobility OT LTG, [place] Level   supervision required  -AC    Bed Mobility OT LTG, Date Goal Reviewed 03/22/17  -ND (P)  03/22/17  -TR     Bed Mobility OT LTG, Outcome goal not met  -ND (P)  goal not met  -TR     Bed Mobility OT LTG, Reason Goal Not Met discharged from facility  -ND (P)  discharged from facility  -TR     Transfer Training OT LTG    Transfer Training OT LTG, Date Established   03/18/17  -AC    Transfer Training OT LTG, Time to Achieve   by discharge  -AC    Transfer Training OT LTG, Activity Type   bed to chair /chair to bed;walk-in shower;shower chair;sit to stand/stand to sit;toilet  -AC    Transfer Training OT LTG, [place] Level   supervision required  -AC    Transfer Training OT LTG, Date Goal Reviewed 03/22/17  -ND (P)  03/22/17   Simultaneous filing. User may be unaware of other data.  -TR     Transfer Training OT LTG, Outcome goal not met  -ND (P)  goal not met   Simultaneous filing. User may be unaware of other data.  -TR     Transfer Training OT LTG, Reason Goal Not Met discharged from facility  -ND (P)  discharged from facility   Simultaneous filing. User may be unaware of other data.  -TR     Bathing OT LTG    Bathing Goal OT LTG, Date Established   03/18/17  -AC    Bathing Goal OT LTG, Time to Achieve   by discharge   -AC    Bathing Goal OT LTG, Activity Type   upper body bathing;lower body bathing  -AC    Bathing Goal OT LTG, [place] Level   minimum assist (75% patient effort)  -AC    Bathing Goal OT LTG, Assist Device   shower chair with back  -AC    Bathing Goal OT LTG, Date Goal Reviewed 03/22/17  -ND (P)  03/22/17  -ND     Bathing Goal OT LTG, Outcome goal not met  -ND (P)  goal not met  -ND     Bathing Goal OT LTG, Reason Goal Not Met discharged from facility  -ND (P)  discharged from facility  -ND     LB Dressing OT LTG    LB Dressing Goal OT LTG, Date Established   03/18/17  -AC    LB Dressing Goal OT LTG, Time to Achieve   by discharge  -AC    LB Dressing Goal OT LTG, [place] Level   minimum assist (75% patient effort)  -AC    LB Dressing Goal OT LTG, Additional Goal   AE PRN  -AC    LB Dressing Goal OT LTG, Date Goal Reviewed 03/22/17  -ND (P)  03/22/17  -ND     LB Dressing Goal OT LTG, Outcome goal not met  -ND (P)  goal not met  -ND     LB Dressing Goal OT LTG, Reason Goal Not Met discharged from facility  -ND        User Key  (r) = Recorded By, (t) = Taken By, (c) = Cosigned By    Initials Name Provider Type    EMANUEL To, OTR/L Occupational Therapist    TR Mahogany Langford, OTR/L Occupational Therapist    LUZ MARINA Reddy, OTR/L Occupational Therapist                Outcome Measures       03/22/17 1200 03/22/17 1018 03/21/17 1425    How much help from another person do you currently need...    Turning from your back to your side while in flat bed without using bedrails?  3  -TR 3  -HERB    Moving from lying on back to sitting on the side of a flat bed without bedrails?  3  -TR 3  -HERB    Moving to and from a bed to a chair (including a wheelchair)?  3  -TR 3  -HERB    Standing up from a chair using your arms (e.g., wheelchair, bedside chair)?  3  -TR 3  -HERB    Climbing 3-5 steps with a railing?  1  -TR 1  -HERB    To walk in hospital room?  3  -TR 2  -HERB    AM-PAC 6 Clicks Score  16  -TR 15  -HERB     How much help from another is currently needed...    Putting on and taking off regular lower body clothing? 2  -ND      Bathing (including washing, rinsing, and drying) 2  -ND      Toileting (which includes using toilet bed pan or urinal) 2  -ND      Putting on and taking off regular upper body clothing 3  -ND      Taking care of personal grooming (such as brushing teeth) 3  -ND      Eating meals 3  -ND      Score 15  -ND      Functional Assessment    Outcome Measure Options  AM-PAC 6 Clicks Basic Mobility (PT)  -TR AM-PAC 6 Clicks Basic Mobility (PT)  -HERB      03/20/17 0947 03/20/17 0900       How much help from another person do you currently need...    Turning from your back to your side while in flat bed without using bedrails?  3  -LH (r) AK (t) LH (c)     Moving from lying on back to sitting on the side of a flat bed without bedrails?  3  -LH (r) AK (t) LH (c)     Moving to and from a bed to a chair (including a wheelchair)?  2  -LH (r) AK (t) LH (c)     Standing up from a chair using your arms (e.g., wheelchair, bedside chair)?  3  -LH (r) AK (t) LH (c)     Climbing 3-5 steps with a railing?  1  -LH (r) AK (t) LH (c)     To walk in hospital room?  1  -LH (r) AK (t) LH (c)     AM-PAC 6 Clicks Score  13  -LH (r) AK (t)     How much help from another is currently needed...    Putting on and taking off regular lower body clothing? 2  -TRA      Bathing (including washing, rinsing, and drying) 2  -TRA      Toileting (which includes using toilet bed pan or urinal) 2  -TRA      Putting on and taking off regular upper body clothing 3  -TRA      Taking care of personal grooming (such as brushing teeth) 3  -TRA      Eating meals 3  -TRA      Score 15  -TRA      Functional Assessment    Outcome Measure Options AM-PAC 6 Clicks Daily Activity (OT)  -TRA AM-PAC 6 Clicks Basic Mobility (PT)  -LH (r) AK (t) LH (c)       User Key  (r) = Recorded By, (t) = Taken By, (c) = Cosigned By    Initials Name Provider Type    ISIDORO TAFOYA  Breanne, PT Physical Therapist    HERB Angela, PTA Physical Therapy Assistant    TR Jordyn Dunlap, PTA Physical Therapy Assistant    TRA Mahogany Langford, OTR/L Occupational Therapist    ND Vandana Reddy, OTR/L Occupational Therapist    JOYCE Floyd, PT Student PT Student          Time Calculation:         Time Calculation- OT       03/22/17 1203          Time Calculation- OT    OT Start Time 1135  -ND      OT Stop Time 1158  -ND      OT Time Calculation (min) 23 min  -ND      OT Received On 03/22/17  -ND      OT Goal Re-Cert Due Date 03/28/17  -ND        User Key  (r) = Recorded By, (t) = Taken By, (c) = Cosigned By    Initials Name Provider Type    ND Vandana Reddy OTR/L Occupational Therapist          Therapy Charges for Today     Code Description Service Date Service Provider Modifiers Qty    17925180669 HC OT SELF CARE/MGMT/TRAIN EA 15 MIN 3/22/2017 Vandana Reddy OTR/L GO, KX 1    14125523653 HC OT THER PROC EA 15 MIN 3/22/2017 Vandana Reddy OTR/L GO, KX 1          OT G-codes  OT Functional Scales Options: AM-PAC 6 Clicks Daily Activity (OT)  Score: 15  Functional Limitation: Self care  Self Care Current Status (): At least 40 percent but less than 60 percent impaired, limited or restricted  Self Care Goal Status (): At least 20 percent but less than 40 percent impaired, limited or restricted    OT Discharge Summary  Anticipated Discharge Disposition: skilled nursing facility  Reason for Discharge: Discharge from facility  Outcomes Achieved: Refer to plan of care for updates on goals achieved  Discharge Destination: SNF    GARY [place]/LILLIAN  3/22/2017

## 2017-03-22 NOTE — PLAN OF CARE
Problem: Inpatient Physical Therapy  Goal: Bed Mobility Goal LTG- PT  Outcome: Unable to achieve outcome(s) by discharge Date Met:  03/22/17 03/18/17 1510 03/22/17 1629   Bed Mobility PT LTG   Bed Mobility PT LTG, Date Established 03/18/17 --    Bed Mobility PT LTG, Time to Achieve by discharge --    Bed Mobility PT LTG, Activity Type all bed mobility --    Bed Mobility PT LTG, [place] Level independent --    Bed Mobility PT LTG, Date Goal Reviewed --  03/22/17   Bed Mobility PT LTG, Outcome --  goal not met   Bed Mobility PT LTG, Reason Goal Not Met --  discharged from facility       Goal: Transfer Training Goal 1 LTG- PT  Outcome: Unable to achieve outcome(s) by discharge Date Met:  03/22/17 03/18/17 1510 03/22/17 1629   Transfer Training PT LTG   Transfer Training PT LTG, Date Established 03/18/17 --    Transfer Training PT LTG, Time to Achieve by discharge --    Transfer Training PT LTG, Activity Type all transfers --    Transfer Training PT LTG, [place] Level supervision required --    Transfer Training PT LTG, Assist Device other (see comments)  (appropriate AD) --    Transfer Training PT LTG, Date Goal Reviewed --  03/22/17   Transfer Training PT LTG, Outcome --  goal not met   Transfer Training PT LTG, Reason Goal Not Met --  discharged from facility       Goal: Gait Training Goal LTG- PT  Outcome: Unable to achieve outcome(s) by discharge Date Met:  03/22/17 03/18/17 1510 03/22/17 1629   Gait Training PT LTG   Gait Training Goal PT LTG, Date Established 03/18/17 --    Gait Training Goal PT LTG, Time to Achieve by discharge --    Gait Training Goal PT LTG, [place] Level supervision required --    Gait Training Goal PT LTG, Assist Device other (see comments)  (with appropriate) --    Gait Training Goal PT LTG, Distance to Achieve 50 --    Gait Training Goal PT LTG, Date Goal Reviewed --  03/22/17   Gait Training Goal PT LTG, Outcome --  goal not met   Gait Training Goal PT LTG, Reason  Goal Not Met --  discharged from facility       Goal: Patient Education Goal LTG- PT  Outcome: Ongoing (interventions implemented as appropriate)    03/18/17 1510 03/22/17 1629   Patient Education PT LTG   Patient Education PT LTG, Date Established 03/18/17 --    Patient Education PT LTG, Time to Achieve by discharge --    Patient Education PT LTG, Education Type HEP;bed mobility;transfers;gait;posture/body mechanics;positioning;progression of POC;benefits of activity;home safety --    Patient Education PT LTG, Education Understanding demonstrate adequately;verbalize understanding --    Patient Education PT LTG, Date Goal Reviewed --  03/22/17   Patient Education PT LTG Outcome --  goal met

## 2017-03-22 NOTE — PLAN OF CARE
Problem: Patient Care Overview (Adult)  Goal: Plan of Care Review  Outcome: Ongoing (interventions implemented as appropriate)    03/22/17 0602   Coping/Psychosocial Response Interventions   Plan Of Care Reviewed With patient   Patient Care Overview   Progress no change   Outcome Evaluation   Outcome Summary/Follow up Plan Patient continues to have complaints of pain. Scheduled pain medication given. Midline with gauze and tegaderm clean, dry and intact. Abdominal binder in place. Ostomy with air. IV fluids continued. Will continue to monitor.        Goal: Adult Individualization and Mutuality  Outcome: Ongoing (interventions implemented as appropriate)    03/22/17 0602   Individualization   Patient Specific Preferences No specific preferences at this time.    Patient Specific Goals No specific goals at this time.    Patient Specific Interventions No specific interventions at this time.    Mutuality/Individual Preferences   What Anxieties, Fears or Concerns Do You Have About Your Health or Care? None at this time.    What Questions Do You Have About Your Health or Care? None at this time.    What Information Would Help Us Give You More Personalized Care? None at this time.        Goal: Discharge Needs Assessment  Outcome: Ongoing (interventions implemented as appropriate)    Problem: Perioperative Period (Adult)  Goal: Signs and Symptoms of Listed Potential Problems Will be Absent or Manageable (Perioperative Period)  Outcome: Ongoing (interventions implemented as appropriate)    03/22/17 0602   Perioperative Period   Problems Assessed (Perioperative Period) all   Problems Present (Perioperative Period) pain         Problem: Fall Risk (Adult)  Goal: Identify Related Risk Factors and Signs and Symptoms  Outcome: Ongoing (interventions implemented as appropriate)    03/22/17 0602   Fall Risk   Fall Risk: Related Risk Factors fatigue/slow reaction;gait/mobility problems   Fall Risk: Signs and Symptoms presence of risk  factors       Goal: Absence of Falls  Outcome: Ongoing (interventions implemented as appropriate)    03/22/17 0602   Fall Risk (Adult)   Absence of Falls making progress toward outcome         Problem: Pain, Acute (Adult)  Goal: Identify Related Risk Factors and Signs and Symptoms  Outcome: Ongoing (interventions implemented as appropriate)    03/22/17 0602   Pain, Acute   Related Risk Factors (Acute Pain) surgery;procedure/treatment   Signs and Symptoms (Acute Pain) fatigue/weakness;verbalization of pain descriptors       Goal: Acceptable Pain Control/Comfort Level  Outcome: Ongoing (interventions implemented as appropriate)    03/22/17 0602   Pain, Acute (Adult)   Acceptable Pain Control/Comfort Level making progress toward outcome

## 2017-03-22 NOTE — PROGRESS NOTES
Continued Stay Note  [facility]     Patient Name: Susy Ko  MRN: [id number]  Today's Date: 3/22/2017    Admit Date: 3/17/2017          Discharge Plan       03/22/17 1355    Case Management/Social Work Plan    Plan [facility]    Patient/Family In Agreement With Plan yes    Final Note    Final Note NOTIFIED ASTER AT [facility] OF PT'S D/C.  PT. WILL BE ADMITTED TO THE SKILLED LEVEL OF CARE AND TRANSPORT VIA [facility].                Discharge Codes     None        Expected Discharge Date and Time     Expected Discharge Date Expected Discharge Time    Mar 22, 2017             JAYCE Frank

## 2017-03-22 NOTE — DISCHARGE SUMMARY
Princess Walters MD Discharge Summary    Date of Admission: 3/17/2017  Date of Discharge:  3/22/2017    Discharge Diagnosis: Parastomal hernia, severe dementia    Presenting Problem/History of Present Illness    History and Physical as outlined in the chart    Hospital Course  Patient was admitted after undergoing the above operation.  Hospital course postoperatively was uneventful.  Patient will be discharged to .  Care for continued rehabilitation..  See medication reconciliation for medications at discharge.    Procedures Performed  Procedure(s):  REPAIR PARASTOMAL HERNIA       Consults:   Consults     Date and Time Order Name Status Description    3/20/2017 0854 Inpatient Consult to Neurology Completed     3/17/2017 1113 Inpatient Consult to Internal Medicine Completed           Condition on Discharge:  stable      Discharge Disposition  Skilled Nursing Facility ([place] - External)    Discharge Medications   Susy Ko   Home Medication Instructions ERIN:[id number]    Printed on:03/22/17 0946   Medication Information                      ALPRAZolam (XANAX) 0.5 MG tablet  Take 0.5 mg by mouth Every Night.             amLODIPine (NORVASC) 5 MG tablet  Take 5 mg by mouth Daily.             citalopram (CeleXA) 10 MG tablet  Take 10 mg by mouth Every Night.             furosemide (LASIX) 20 MG tablet  Take 20 mg by mouth Every Other Day.             HYDROcodone-acetaminophen (NORCO) 7.5-325 MG per tablet  Take 1 tablet by mouth Every 4 (Four) Hours As Needed for Moderate Pain (4-6) for up to 30 doses.             losartan (COZAAR) 50 MG tablet  Take 50 mg by mouth Daily.             metoprolol succinate XL (TOPROL-XL) 25 MG 24 hr tablet  Take 25 mg by mouth Daily.             multivitamin-minerals (THERA-M) tablet tablet  Take 1 tablet by mouth Daily.             oxybutynin XL (DITROPAN-XL) 5 MG 24 hr tablet  Take 5 mg by mouth Daily.             vitamin B-12 (CYANOCOBALAMIN) 100 MCG tablet  Take 100 mcg by mouth  Daily.                 Discharge Diet:     Activity at Discharge:     Follow-up Appointments  No future appointments.  Additional Instructions for the Follow-ups that You Need to Schedule     Discharge Follow-Up With Specified Provider    As directed    To:  me   Follow Up Details:  next thursday                 Test Results Pending at Discharge       Princess Walters MD  03/22/17  9:46 AM    Time: Time spent at discharge 30 minutes

## 2017-03-22 NOTE — PROGRESS NOTES
Continued Stay Note   [facility]     Patient Name: Susy Ko  MRN: [id number]  Today's Date: 3/22/2017    Admit Date: 3/17/2017          Discharge Plan       03/22/17 1412    Final Note    Final Note PT'S DTR WILL TRANSPORT PT. TO [facility] VIA HER CAR.       03/22/17 1355    Case Management/Social Work Plan    Plan [facility]    Patient/Family In Agreement With Plan yes    Final Note    Final Note NOTIFIED ASTER AT [facility] OF PT'S D/C.  PT. WILL BE ADMITTED TO THE SKILLED LEVEL OF CARE AND TRANSPORT VIA [employer] [facility].                Discharge Codes       03/22/17 1358    Discharge Codes    Discharge Codes 03  Discharged/transferred to skilled nursing facility (SNF) with Medicare certification in anticipation of skilled care        Expected Discharge Date and Time     Expected Discharge Date Expected Discharge Time    Mar 22, 2017             JAYCE Frank

## 2017-03-22 NOTE — THERAPY DISCHARGE NOTE
Acute Care - Physical Therapy Discharge Summary  [facility]       Patient Name: Susy Ko  : 1940  MRN: [id number]    Today's Date: 3/22/2017  Onset of Illness/Injury or Date of Surgery Date: 17    Date of Referral to PT: 17  Referring Physician: Dr. Perez Zuniga      Admit Date: 3/17/2017      PT Recommendation and Plan    Visit Dx:    ICD-10-CM ICD-9-CM   1. Gait abnormality R26.9 781.2   2. Hernia K46.9 553.9   3. Impaired mobility and ADLs Z74.09 799.89             Outcome Measures       17 1200 17 1018 17 1425    How much help from another person do you currently need...    Turning from your back to your side while in flat bed without using bedrails?  3  -TR 3  -HERB    Moving from lying on back to sitting on the side of a flat bed without bedrails?  3  -TR 3  -HERB    Moving to and from a bed to a chair (including a wheelchair)?  3  -TR 3  -HERB    Standing up from a chair using your arms (e.g., wheelchair, bedside chair)?  3  -TR 3  -HERB    Climbing 3-5 steps with a railing?  1  -TR 1  -HERB    To walk in hospital room?  3  -TR 2  -HERB    AM-PAC 6 Clicks Score  16  -TR 15  -HERB    How much help from another is currently needed...    Putting on and taking off regular lower body clothing? 2  -ND      Bathing (including washing, rinsing, and drying) 2  -ND      Toileting (which includes using toilet bed pan or urinal) 2  -ND      Putting on and taking off regular upper body clothing 3  -ND      Taking care of personal grooming (such as brushing teeth) 3  -ND      Eating meals 3  -ND      Score 15  -ND      Functional Assessment    Outcome Measure Options  AM-PAC 6 Clicks Basic Mobility (PT)  -TR AM-PAC 6 Clicks Basic Mobility (PT)  -HERB      17 0947 17 0900       How much help from another person do you currently need...    Turning from your back to your side while in flat bed without using bedrails?  3  -LH (r) AK (t) LH (c)     Moving from lying on back to  sitting on the side of a flat bed without bedrails?  3  -LH (r) AK (t) LH (c)     Moving to and from a bed to a chair (including a wheelchair)?  2  -LH (r) AK (t) LH (c)     Standing up from a chair using your arms (e.g., wheelchair, bedside chair)?  3  -LH (r) AK (t) LH (c)     Climbing 3-5 steps with a railing?  1  -LH (r) AK (t) LH (c)     To walk in hospital room?  1  -LH (r) AK (t) LH (c)     AM-PAC 6 Clicks Score  13  -LH (r) AK (t)     How much help from another is currently needed...    Putting on and taking off regular lower body clothing? 2  -TRA      Bathing (including washing, rinsing, and drying) 2  -TRA      Toileting (which includes using toilet bed pan or urinal) 2  -TRA      Putting on and taking off regular upper body clothing 3  -TRA      Taking care of personal grooming (such as brushing teeth) 3  -TRA      Eating meals 3  -TRA      Score 15  -TRA      Functional Assessment    Outcome Measure Options AM-PAC 6 Clicks Daily Activity (OT)  -TRA AM-PAC 6 Clicks Basic Mobility (PT)  - (r) AK (t) LH (c)       User Key  (r) = Recorded By, (t) = Taken By, (c) = Cosigned By    Initials Name Provider Type     Ronald Raymundo, PT Physical Therapist    HERB Angela, PTA Physical Therapy Assistant    KAMRAN Dunlap PTA Physical Therapy Assistant    CHANTE Langford, OTR/L Occupational Therapist    LUZ MARINA Reddy, OTR/L Occupational Therapist    JOYCE Floyd, PT Student PT Student                PT Charges       03/22/17 1055          Time Calculation    Start Time 1018  -TR      Stop Time 1056  -TR      Time Calculation (min) 38 min  -TR      PT Received On 03/22/17  -TR      PT Goal Re-Cert Due Date 03/28/17  -TR      Time Calculation- PT    Total Timed Code Minutes- PT 38 minute(s)  -TR        User Key  (r) = Recorded By, (t) = Taken By, (c) = Cosigned By    Initials Name Provider Type    KAMRAN Dunlap PTA Physical Therapy Assistant                  IP PT Goals        03/22/17 1629 03/18/17 1510       Bed Mobility PT LTG    Bed Mobility PT LTG, Date Established  03/18/17  -KR     Bed Mobility PT LTG, Time to Achieve  by discharge  -KR     Bed Mobility PT LTG, Activity Type  all bed mobility  -KR     Bed Mobility PT LTG, [place] Level  independent  -KR     Bed Mobility PT LTG, Date Goal Reviewed 03/22/17  -NW      Bed Mobility PT LTG, Outcome goal not met  -NW      Bed Mobility PT LTG, Reason Goal Not Met discharged from facility  -NW      Transfer Training PT LTG    Transfer Training PT LTG, Date Established  03/18/17  -KR     Transfer Training PT LTG, Time to Achieve  by discharge  -KR     Transfer Training PT LTG, Activity Type  (P)  all transfers  -KR     Transfer Training PT LTG, [place] Level  supervision required  -KR     Transfer Training PT LTG, Assist Device  other (see comments)   appropriate AD  -KR     Transfer Training PT  LTG, Date Goal Reviewed 03/22/17  -NW      Transfer Training PT LTG, Outcome goal not met  -NW      Transfer Training PT LTG, Reason Goal Not Met discharged from facility  -NW      Gait Training PT LTG    Gait Training Goal PT LTG, Date Established  03/18/17  -KR     Gait Training Goal PT LTG, Time to Achieve  by discharge  -KR     Gait Training Goal PT LTG, [place] Level  supervision required  -KR     Gait Training Goal PT LTG, Assist Device  other (see comments)   with appropriate  -KR     Gait Training Goal PT LTG, Distance to Achieve  50  -KR     Gait Training Goal PT LTG, Date Goal Reviewed 03/22/17  -NW      Gait Training Goal PT LTG, Outcome goal not met  -NW      Gait Training Goal PT LTG, Reason Goal Not Met discharged from facility  -NW      Patient Education PT LTG    Patient Education PT LTG, Date Established  03/18/17  -KR     Patient Education PT LTG, Time to Achieve  by discharge  -KR     Patient Education PT LTG, Education Type  HEP;bed mobility;transfers;gait;posture/body mechanics;positioning;progression of  POC;benefits of activity;home safety  -KR     Patient Education PT LTG, Education Understanding  demonstrate adequately;verbalize understanding  -KR     Patient Education PT LTG, Date Goal Reviewed 03/22/17  -NW      Patient Education PT LTG Outcome goal met  -NW        User Key  (r) = Recorded By, (t) = Taken By, (c) = Cosigned By    Initials Name Provider Type    RIKI Bowers, PT DPT Physical Therapist    NW Genevieve Tripp, PTA Physical Therapy Assistant              PT Discharge Summary  Reason for Discharge: Discharge from facility  Outcomes Achieved: Refer to plan of care for updates on goals achieved  Discharge Destination: SNF      Genevieve Tripp, MAGALI   3/22/2017

## 2017-03-22 NOTE — PLAN OF CARE
Problem: Inpatient Occupational Therapy  Goal: Bed Mobility Goal LTG- OT  Outcome: Unable to achieve outcome(s) by discharge Date Met:  03/22/17 03/18/17 1517 03/22/17 1615   Bed Mobility OT LTG   Bed Mobility OT LTG, Date Established 03/18/17 --    Bed Mobility OT LTG, Time to Achieve by discharge --    Bed Mobility OT LTG, Activity Type all bed mobility --    Bed Mobility OT LTG, [place] Level supervision required --    Bed Mobility OT LTG, Date Goal Reviewed --  03/22/17   Bed Mobility OT LTG, Outcome --  goal not met   Bed Mobility OT LTG, Reason Goal Not Met --  discharged from facility       Goal: Transfer Training Goal 1 LTG- OT  Outcome: Unable to achieve outcome(s) by discharge Date Met:  03/22/17 03/18/17 1517 03/22/17 1615   Transfer Training OT LTG   Transfer Training OT LTG, Date Established 03/18/17 --    Transfer Training OT LTG, Time to Achieve by discharge --    Transfer Training OT LTG, Activity Type bed to chair /chair to bed;walk-in shower;shower chair;sit to stand/stand to sit;toilet --    Transfer Training OT LTG, [place] Level supervision required --    Transfer Training OT LTG, Date Goal Reviewed --  03/22/17   Transfer Training OT LTG, Outcome --  goal not met   Transfer Training OT LTG, Reason Goal Not Met --  discharged from facility       Goal: Bathing Goal LTG- OT  Outcome: Unable to achieve outcome(s) by discharge Date Met:  03/22/17 03/18/17 1517 03/22/17 1615   Bathing OT LTG   Bathing Goal OT LTG, Date Established 03/18/17 --    Bathing Goal OT LTG, Time to Achieve by discharge --    Bathing Goal OT LTG, Activity Type upper body bathing;lower body bathing --    Bathing Goal OT LTG, [place] Level minimum assist (75% patient effort) --    Bathing Goal OT LTG, Assist Device shower chair with back --    Bathing Goal OT LTG, Date Goal Reviewed --  03/22/17   Bathing Goal OT LTG, Outcome --  goal not met   Bathing Goal OT LTG, Reason Goal Not Met --  discharged from  facility       Goal: LB Dressing Goal LTG- OT  Outcome: Unable to achieve outcome(s) by discharge Date Met:  03/22/17 03/18/17 1517 03/22/17 1615   LB Dressing OT LTG   LB Dressing Goal OT LTG, Date Established 03/18/17 --    LB Dressing Goal OT LTG, Time to Achieve by discharge --    LB Dressing Goal OT LTG, [place] Level minimum assist (75% patient effort) --    LB Dressing Goal OT LTG, Additional Goal AE PRN --    LB Dressing Goal OT LTG, Date Goal Reviewed --  03/22/17   LB Dressing Goal OT LTG, Outcome --  goal not met   LB Dressing Goal OT LTG, Reason Goal Not Met --  discharged from facility

## 2017-03-22 NOTE — PLAN OF CARE
Problem: Patient Care Overview (Adult)  Goal: Plan of Care Review  Outcome: Ongoing (interventions implemented as appropriate)    03/22/17 1018   Coping/Psychosocial Response Interventions   Plan Of Care Reviewed With patient   Patient Care Overview   Progress progress towards functional goals is fair   Outcome Evaluation   Outcome Summary/Follow up Plan Pt reports pain around stoma and rates it at an 8/10. Pt required min assist for bed mobility and min assist to stand. Pt has posterior lean upon standing requiring min assist to correct. Stood x3. Pt ambulated 15 feet x2 in the room and stated that she was dizzy requesting to sit down. Pt then performed 15 reps of BLE and BUE exercise. Pt will D/C to SNF today to continue with therapy before returning home.

## 2017-04-05 ENCOUNTER — TELEPHONE (OUTPATIENT)
Dept: NEUROLOGY | Age: 77
End: 2017-04-05

## 2017-04-16 ENCOUNTER — APPOINTMENT (OUTPATIENT)
Dept: GENERAL RADIOLOGY | Facility: HOSPITAL | Age: 77
End: 2017-04-16

## 2017-04-16 ENCOUNTER — HOSPITAL ENCOUNTER (EMERGENCY)
Facility: HOSPITAL | Age: 77
Discharge: HOME | End: 2017-04-16
Attending: FAMILY MEDICINE | Admitting: FAMILY MEDICINE

## 2017-04-16 VITALS
HEART RATE: 73 BPM | SYSTOLIC BLOOD PRESSURE: 140 MMHG | DIASTOLIC BLOOD PRESSURE: 67 MMHG | OXYGEN SATURATION: 97 % | TEMPERATURE: 98.5 F | RESPIRATION RATE: 12 BRPM

## 2017-04-16 DIAGNOSIS — R10.9 ABDOMINAL PAIN OF UNKNOWN CAUSE: Primary | ICD-10-CM

## 2017-04-16 DIAGNOSIS — R82.81 PYURIA: ICD-10-CM

## 2017-04-16 LAB
BACTERIA UR QL AUTO: ABNORMAL /HPF
BILIRUB UR QL STRIP: NEGATIVE
CLARITY UR: CLEAR
COLOR UR: YELLOW
GLUCOSE UR STRIP-MCNC: NEGATIVE MG/DL
HGB UR QL STRIP.AUTO: NEGATIVE
HYALINE CASTS UR QL AUTO: ABNORMAL /LPF
KETONES UR QL STRIP: NEGATIVE
LEUKOCYTE ESTERASE UR QL STRIP.AUTO: ABNORMAL
NITRITE UR QL STRIP: NEGATIVE
PH UR STRIP.AUTO: 6.5 [PH] (ref 5–8)
PROT UR QL STRIP: NEGATIVE
RBC # UR: ABNORMAL /HPF
REF LAB TEST METHOD: ABNORMAL
SP GR UR STRIP: 1.01 (ref 1–1.03)
SQUAMOUS #/AREA URNS HPF: ABNORMAL /HPF
UROBILINOGEN UR QL STRIP: ABNORMAL
WBC UR QL AUTO: ABNORMAL /HPF

## 2017-04-16 PROCEDURE — 99283 EMERGENCY DEPT VISIT LOW MDM: CPT

## 2017-04-16 PROCEDURE — 87086 URINE CULTURE/COLONY COUNT: CPT | Performed by: FAMILY MEDICINE

## 2017-04-16 PROCEDURE — 74020 HC XR ABDOMEN FLAT & UPRIGHT: CPT

## 2017-04-16 PROCEDURE — 81001 URINALYSIS AUTO W/SCOPE: CPT | Performed by: FAMILY MEDICINE

## 2017-04-16 RX ORDER — SULFAMETHOXAZOLE AND TRIMETHOPRIM 800; 160 MG/1; MG/1
1 TABLET ORAL 2 TIMES DAILY
Qty: 14 TABLET | Refills: 0 | Status: SHIPPED | OUTPATIENT
Start: 2017-04-16 | End: 2017-04-23

## 2017-04-16 NOTE — ED PROVIDER NOTES
Subjective   HPI Comments: Family relates that the nursing home was concerned that the patients stoma was red and had bleed some.  Family relates that the area looks the same and they can not see any problems at this time.  Patient denies any pain or issues.  Family relates that patient might have complained of pain to the area today.  Patient denies this.    Patient is a 77 y.o. female presenting with general illness.   Illness   Location:  Stoma  Quality:  Redness  Severity:  Unable to specify  Onset quality:  Unable to specify  Timing:  Unable to specify  Progression:  Unable to specify  Associated symptoms: no abdominal pain, no chest pain, no congestion, no cough, no ear pain, no fatigue, no fever, no headaches, no loss of consciousness, no myalgias, no nausea, no rash, no rhinorrhea, no shortness of breath, no sore throat, no vomiting and no wheezing        Review of Systems   Constitutional: Negative for fatigue and fever.   HENT: Negative for congestion, ear pain, rhinorrhea and sore throat.    Respiratory: Negative for cough, shortness of breath and wheezing.    Cardiovascular: Negative for chest pain.   Gastrointestinal: Negative for abdominal pain, nausea and vomiting.   Musculoskeletal: Negative for myalgias.   Skin: Negative for rash.   Neurological: Negative for loss of consciousness and headaches.       Past Medical History:   Diagnosis Date   • Anxiety    • Breast cancer      LEFT WITH RECONSTRUCTION   • Colostomy in place    • Dementia    • Depression    • Edema     LOWER FEET   • Hydrocephalus     NORMAL PRESSURE   • Hypertension    • Incontinence    • Nausea    • Parastomal hernia    • Parkinson disease    • Tremor     RIGHT HAND   • Unsteady gait        Allergies   Allergen Reactions   • Morphine And Related Mental Status Change   • Adhesive Tape Rash       Past Surgical History:   Procedure Laterality Date   • ABDOMINAL SURGERY     • APPENDECTOMY     • BREAST RECONSTRUCTION Left     LEFT   •  CHOLECYSTECTOMY     • COLON SURGERY     • COLONOSCOPY     • COLOSTOMY     • HYSTERECTOMY     • MASTECTOMY Left    • PARASTOMAL HERNIA REPAIR N/A 3/17/2017    Procedure: REPAIR PARASTOMAL HERNIA;  Surgeon: Princess Walters MD;  Location: [facility] OR;  Service:        History reviewed. No pertinent family history.    Social History     Social History   • Marital status:      Spouse name: N/A   • Number of children: N/A   • Years of education: N/A     Social History Main Topics   • Smoking status: Former Smoker   • Smokeless tobacco: Never Used   • Alcohol use No   • Drug use: No   • Sexual activity: Defer     Other Topics Concern   • None     Social History Narrative           Objective   Physical Exam   Constitutional: She is oriented to person, place, and time. She appears well-developed and well-nourished.   HENT:   Head: Normocephalic.   Nose: Nose normal.   Mouth/Throat: Oropharynx is clear and moist.   Eyes: Conjunctivae and EOM are normal. Pupils are equal, round, and reactive to light.   Neck: Normal range of motion. Neck supple. No JVD present. No thyromegaly present.   Cardiovascular: Normal rate, regular rhythm, normal heart sounds and intact distal pulses.    Pulmonary/Chest: Effort normal and breath sounds normal.   Abdominal: Soft. Bowel sounds are normal. She exhibits no distension and no mass. There is no tenderness. There is no rebound and no guarding.       Musculoskeletal: Normal range of motion.   Lymphadenopathy:     She has no cervical adenopathy.   Neurological: She is alert and oriented to person, place, and time.   Skin: Skin is warm and dry. No rash noted. No erythema.   Psychiatric: She has a normal mood and affect. Her behavior is normal. Judgment and thought content normal.   Nursing note and vitals reviewed.      Procedures         ED Course  ED Course                  MDM  Number of Diagnoses or Management Options  Abdominal pain of unknown cause: new and requires workup     Amount  and/or Complexity of Data Reviewed  Tests in the radiology section of CPT®: ordered and reviewed  Decide to obtain previous medical records or to obtain history from someone other than the patient: yes  Review and summarize past medical records: yes  Independent visualization of images, tracings, or specimens: yes    Risk of Complications, Morbidity, and/or Mortality  Presenting problems: low  Diagnostic procedures: low  Management options: low    Patient Progress  Patient progress: resolved      Final diagnoses:   None            Fuentes Chance MD  04/16/17 5683

## 2017-04-16 NOTE — ED NOTES
Cleaned patient of urinary incontinence, provided gowmisael, new brief and blanket     Jenny Jefferson  04/16/17 2874

## 2017-04-18 LAB — BACTERIA SPEC AEROBE CULT: NORMAL

## 2018-01-04 ENCOUNTER — OFFICE VISIT (OUTPATIENT)
Dept: NEUROLOGY | Age: 78
End: 2018-01-04
Payer: MEDICARE

## 2018-01-04 VITALS — HEART RATE: 73 BPM | DIASTOLIC BLOOD PRESSURE: 66 MMHG | SYSTOLIC BLOOD PRESSURE: 126 MMHG

## 2018-01-04 DIAGNOSIS — R25.1 TREMOR: ICD-10-CM

## 2018-01-04 DIAGNOSIS — G20 PARKINSON'S DISEASE (HCC): Primary | ICD-10-CM

## 2018-01-04 DIAGNOSIS — R26.9 GAIT ABNORMALITY: ICD-10-CM

## 2018-01-04 DIAGNOSIS — G91.9 HYDROCEPHALUS (HCC): ICD-10-CM

## 2018-01-04 DIAGNOSIS — M25.559 ARTHRALGIA OF HIP, UNSPECIFIED LATERALITY: ICD-10-CM

## 2018-01-04 DIAGNOSIS — M25.569 KNEE PAIN, UNSPECIFIED CHRONICITY, UNSPECIFIED LATERALITY: ICD-10-CM

## 2018-01-04 PROCEDURE — 99204 OFFICE O/P NEW MOD 45 MIN: CPT | Performed by: PSYCHIATRY & NEUROLOGY

## 2018-01-04 RX ORDER — IBUPROFEN 600 MG/1
600 TABLET ORAL 2 TIMES DAILY PRN
COMMUNITY

## 2018-01-04 RX ORDER — CYANOCOBALAMIN 1000 UG/ML
1000 INJECTION INTRAMUSCULAR; SUBCUTANEOUS ONCE
COMMUNITY

## 2018-01-04 RX ORDER — CARBIDOPA/LEVODOPA 25MG-250MG
1 TABLET ORAL
COMMUNITY

## 2018-01-04 RX ORDER — ACETAMINOPHEN 325 MG/1
650 TABLET ORAL 2 TIMES DAILY PRN
COMMUNITY

## 2018-01-04 NOTE — PROGRESS NOTES
Review of Systems    Constitutional - No fever or chills. No diaphoresis or significant fatigue. HENT -  No tinnitus or significant hearing loss. Eyes - no sudden vision change or eye pain  Respiratory - no significant shortness of breath or cough  Cardiovascular - no chest pain No palpitations yes significant leg swelling  Gastrointestinal - no abdominal swelling or pain. Genitourinary - yes difficulty urinating, yes dysuria  Musculoskeletal - no back pain or myalgia. Skin - no color change or rash  Neurologic - No seizures. No lateralizing weakness. Hematologic - no easy bruising or excessive bleeding. Psychiatric - yes severe anxiety or nervousness. All other review of systems are negative.

## 2018-01-04 NOTE — PROGRESS NOTES
Chief Complaint   Patient presents with    New Patient     Referred by Dr. Nathaniel Bolton for parkinsons, normal pressure hydrocephalus        Shamar Mancini is a 68y.o. year old female who is seen for evaluation of Parkinson's and tremor along with gait abnormalities. The patient is here with her daughter indicates that she has had tremor in the right hand about 4 or 5 years. She has had increasing difficulty walking during this time and the last few months she has not been walking. She does have significant hip and knee pain for which she is awaiting orthopedic evaluation. She was found to have some evidence of hydrocephalus and saw Dr. Heidy Miles of neurosurgery for evaluation of normal pressure hydrocephalus. She underwent a high-volume spinal tap with no significant improvement. She was placed on Sinemet previously higher dose petite and significant adverse effects. It is unclear if the medication was helpful. She does have cognitive issues.             Active Ambulatory Problems     Diagnosis Date Noted    Anxiety 09/05/2014    GERD (gastroesophageal reflux disease) 09/05/2014    Neuropathy ([facility] [address] 09/05/2014    H/O colostomy 09/05/2014    Parkinson's disease ([facility] [address] 01/04/2018    Tremor 01/04/2018    Hydrocephalus 01/04/2018    Gait abnormality 01/04/2018    Arthralgia of hip 01/04/2018    Knee pain 01/04/2018     Resolved Ambulatory Problems     Diagnosis Date Noted    No Resolved Ambulatory Problems     Past Medical History:   Diagnosis Date    (Idiopathic) normal pressure hydrocephalus     Allergic rhinitis     Alzheimer disease     Anxiety     B12 deficiency     Cancer ([address]     Chronic kidney disease     Dementia     Depression     Diverticulitis     Hypertension     Hyponatremia     Osteoarthritis     Overactive bladder     Parkinson's disease ([address]     Tremor        Past Surgical History:   Procedure Laterality Date    APPENDECTOMY      COLON SURGERY      colostomy    HYSTERECTOMY Take 10 mg by mouth daily.  furosemide (LASIX) 40 MG tablet Take 40 mg by mouth daily.  losartan (COZAAR) 100 MG tablet Take 50 mg by mouth daily       metoprolol (TOPROL-XL) 50 MG XL tablet Take 50 mg by mouth daily       omeprazole (PRILOSEC) 10 MG capsule Take 1 capsule by mouth daily. (Patient taking differently: Take 20 mg by mouth daily ) 30 capsule 5    ALPRAZolam (XANAX) 0.5 MG tablet Take 0.5 mg by mouth nightly as needed for Sleep.  atorvastatin (LIPITOR) 10 MG tablet Take 10 mg by mouth daily.  potassium chloride (MICRO-K) 10 MEQ CR capsule       HYDROcodone-acetaminophen (NORCO) 5-325 MG per tablet Take 1 tablet by mouth nightly as needed for Pain.  gabapentin (NEURONTIN) 300 MG capsule Take 1 capsule by mouth 3 times daily. 90 capsule 3     No current facility-administered medications for this visit. /66   Pulse 73     Constitutional - well developed, well nourished. Eyes - conjunctiva normal.  Pupils react to light  Ear, nose, throat -hearing intact to finger rub No scars, masses, or lesions over external nose or ears, no atrophy of tongue  Neck-symmetric, no masses noted, no jugular vein distension  Respiration- chest wall appears symmetric, good expansion,   normal effort without use of accessory muscles  Musculoskeletal - no significant wasting of muscles noted, no bony deformities  Extremities-no clubbing, cyanosis or edema  Skin - warm, dry, and intact. No rash, erythema, or pallor. Psychiatric - mood, affect, and behavior appearslow   Neurological exam  Awake, alert, fluent oriented x 3 appropriate affect  Attention and concentration appear appropriate  Recent and remote memory appears.    Speech normal without dysarthria  No clear issues with language of fund of knowledge    Cranial Nerve Exam   CN II- Visual fields grossly unremarkable  CN III, IV,VI-EOMI, No nystagmus, conjugate eye movements, no ptosis  CN V-sensation intact to LT over face  CN is to follow-up with me in approximately 1 month and call with any further problems. Plan    No orders of the defined types were placed in this encounter. Orders Placed This Encounter   Medications    carbidopa-levodopa (SINEMET)  MG per tablet     Sig: Take 1 tablet by mouth 3 times daily     Dispense:  90 tablet     Refill:  5       Return in about 4 weeks (around 2/1/2018).

## 2018-02-07 ENCOUNTER — TELEPHONE (OUTPATIENT)
Dept: NEUROLOGY | Age: 78
End: 2018-02-07

## 2018-02-19 ENCOUNTER — TELEPHONE (OUTPATIENT)
Dept: NEUROSURGERY | Age: 78
End: 2018-02-19

## 2018-02-21 NOTE — TELEPHONE ENCOUNTER
Spoke to Courtney Aguirre and they need a printed script sent to [phone] or [phone]. If you will sign it I will fax it. We also need to write at the bottom before we fax it that she is to discontinue the Sinemet once the nursing home receives the new medication.

## 2018-02-22 RX ORDER — TRIHEXYPHENIDYL HYDROCHLORIDE 2 MG/1
2 TABLET ORAL 3 TIMES DAILY
Qty: 90 TABLET | Refills: 2 | Status: SHIPPED | OUTPATIENT
Start: 2018-02-22 | End: 2018-02-22 | Stop reason: SDUPTHER

## 2018-02-23 RX ORDER — TRIHEXYPHENIDYL HYDROCHLORIDE 2 MG/1
2 TABLET ORAL 3 TIMES DAILY
Qty: 90 TABLET | Refills: 2 | Status: SHIPPED | OUTPATIENT
Start: 2018-02-23

## 2018-03-01 ENCOUNTER — OFFICE VISIT (OUTPATIENT)
Dept: NEUROLOGY | Age: 78
End: 2018-03-01
Payer: MEDICARE

## 2018-03-01 VITALS — SYSTOLIC BLOOD PRESSURE: 132 MMHG | HEART RATE: 76 BPM | DIASTOLIC BLOOD PRESSURE: 62 MMHG

## 2018-03-01 DIAGNOSIS — G20 PARKINSON'S DISEASE (HCC): Primary | ICD-10-CM

## 2018-03-01 DIAGNOSIS — R26.9 GAIT ABNORMALITY: ICD-10-CM

## 2018-03-01 DIAGNOSIS — R30.0 DYSURIA: ICD-10-CM

## 2018-03-01 DIAGNOSIS — G91.9 HYDROCEPHALUS (HCC): ICD-10-CM

## 2018-03-01 DIAGNOSIS — M25.569 KNEE PAIN, UNSPECIFIED CHRONICITY, UNSPECIFIED LATERALITY: ICD-10-CM

## 2018-03-01 DIAGNOSIS — M25.559 ARTHRALGIA OF HIP, UNSPECIFIED LATERALITY: ICD-10-CM

## 2018-03-01 DIAGNOSIS — R25.1 TREMOR: ICD-10-CM

## 2018-03-01 PROCEDURE — G8484 FLU IMMUNIZE NO ADMIN: HCPCS | Performed by: PSYCHIATRY & NEUROLOGY

## 2018-03-01 PROCEDURE — 1036F TOBACCO NON-USER: CPT | Performed by: PSYCHIATRY & NEUROLOGY

## 2018-03-01 PROCEDURE — 4040F PNEUMOC VAC/ADMIN/RCVD: CPT | Performed by: PSYCHIATRY & NEUROLOGY

## 2018-03-01 PROCEDURE — G8421 BMI NOT CALCULATED: HCPCS | Performed by: PSYCHIATRY & NEUROLOGY

## 2018-03-01 PROCEDURE — 1123F ACP DISCUSS/DSCN MKR DOCD: CPT | Performed by: PSYCHIATRY & NEUROLOGY

## 2018-03-01 PROCEDURE — G8427 DOCREV CUR MEDS BY ELIG CLIN: HCPCS | Performed by: PSYCHIATRY & NEUROLOGY

## 2018-03-01 PROCEDURE — 99214 OFFICE O/P EST MOD 30 MIN: CPT | Performed by: PSYCHIATRY & NEUROLOGY

## 2018-03-01 PROCEDURE — G8400 PT W/DXA NO RESULTS DOC: HCPCS | Performed by: PSYCHIATRY & NEUROLOGY

## 2018-03-01 PROCEDURE — 1090F PRES/ABSN URINE INCON ASSESS: CPT | Performed by: PSYCHIATRY & NEUROLOGY

## 2018-03-01 NOTE — PROGRESS NOTES
Exam  antogravity throughout upper and lower extremities bilaterally,some giveaway in the legs secondary to pain          Tremors-resting tremor in the right hand   GaitIn a wheelchair     No results found for: NBOFIEXD23  No results found for: WBC, HGB, HCT, MCV, PLT  No results found for: NA, K, CL, CO2, BUN, CREATININE, GLUCOSE, CALCIUM, PROT, LABALBU, BILITOT, ALKPHOS, AST, ALT, LABGLOM, GFRAA, AGRATIO, GLOB        Assessment    ICD-10-CM ICD-9-CM    1. Parkinson's disease ([facility] [address] G20 332.0    2. Tremor R25.1 781.0    3. Hydrocephalus G91.9 331.4    4. Arthralgia of hip, unspecified laterality M25.559 719.45    5. Knee pain, unspecified chronicity, unspecified laterality M25.569 719.46    6. Gait abnormality R26.9 781.2    7. Dysuria R30.0 788. 1 Urinalysis Reflex to Culture       Her neurological examination today was significant for a resting tremor in the right hand. She had some giveaway weakness in all extremities to hip and knee pain. She was sitting in a wheelchair and did not speak much. Based upon her history and examination, her tremor appears to be consistent with a parkinsonian tremor. At this time she will be started on Sinemet 25 100, 1 tablet in the morning to be increased slowly to 3 times a day as tolerated. She is waiting evaluation with orthopedics which I suspect will hopefully help her pain subsequent ambulation. Her neurocognitive test revealed significant impairment in essentially all domains tested except for visual spatial function suggestive of global cognitive impairment. We will check a urine to see if this is causing confusion. If UA negative then can stop Triehexylphenidyl or restart Sinemet as she was taking her Celexa in am. Now in PM.The patient and daughter indicated understanding of the management plan. She is to follow-up with me in approximately 1 month and call with any further problems.    Plan    Orders Placed This Encounter   Procedures    Urinalysis Reflex to Culture No orders of the defined types were placed in this encounter. Return in about 4 weeks (around 3/29/2018).

## 2018-03-03 ENCOUNTER — LAB REQUISITION (OUTPATIENT)
Dept: LAB | Facility: HOSPITAL | Age: 78
End: 2018-03-03

## 2018-03-03 DIAGNOSIS — Z00.00 ROUTINE GENERAL MEDICAL EXAMINATION AT A HEALTH CARE FACILITY: ICD-10-CM

## 2018-03-03 LAB
BACTERIA UR QL AUTO: ABNORMAL /HPF
BILIRUB UR QL STRIP: NEGATIVE
CLARITY UR: ABNORMAL
COLOR UR: YELLOW
GLUCOSE UR STRIP-MCNC: NEGATIVE MG/DL
HGB UR QL STRIP.AUTO: ABNORMAL
HYALINE CASTS UR QL AUTO: ABNORMAL /LPF
KETONES UR QL STRIP: NEGATIVE
LEUKOCYTE ESTERASE UR QL STRIP.AUTO: ABNORMAL
NITRITE UR QL STRIP: POSITIVE
PH UR STRIP.AUTO: 8.5 [PH] (ref 5–8)
PROT UR QL STRIP: ABNORMAL
RBC # UR: ABNORMAL /HPF
REF LAB TEST METHOD: ABNORMAL
SP GR UR STRIP: 1.02 (ref 1–1.03)
SQUAMOUS #/AREA URNS HPF: ABNORMAL /HPF
UROBILINOGEN UR QL STRIP: ABNORMAL
WBC UR QL AUTO: ABNORMAL /HPF

## 2018-03-03 PROCEDURE — 87186 SC STD MICRODIL/AGAR DIL: CPT

## 2018-03-03 PROCEDURE — 81001 URINALYSIS AUTO W/SCOPE: CPT

## 2018-03-03 PROCEDURE — 87086 URINE CULTURE/COLONY COUNT: CPT

## 2018-03-03 PROCEDURE — 87088 URINE BACTERIA CULTURE: CPT

## 2018-03-05 LAB
BACTERIA SPEC AEROBE CULT: ABNORMAL
BACTERIA SPEC AEROBE CULT: ABNORMAL

## 2018-03-29 ENCOUNTER — OFFICE VISIT (OUTPATIENT)
Dept: NEUROLOGY | Age: 78
End: 2018-03-29
Payer: MEDICARE

## 2018-03-29 VITALS
HEIGHT: 64 IN | SYSTOLIC BLOOD PRESSURE: 112 MMHG | DIASTOLIC BLOOD PRESSURE: 60 MMHG | WEIGHT: 140 LBS | BODY MASS INDEX: 23.9 KG/M2

## 2018-03-29 DIAGNOSIS — R25.1 TREMOR: ICD-10-CM

## 2018-03-29 DIAGNOSIS — G91.9 HYDROCEPHALUS (HCC): ICD-10-CM

## 2018-03-29 DIAGNOSIS — G20 PARKINSON'S DISEASE (HCC): Primary | ICD-10-CM

## 2018-03-29 DIAGNOSIS — R26.9 GAIT ABNORMALITY: ICD-10-CM

## 2018-03-29 DIAGNOSIS — R30.0 DYSURIA: ICD-10-CM

## 2018-03-29 DIAGNOSIS — M25.559 ARTHRALGIA OF HIP, UNSPECIFIED LATERALITY: ICD-10-CM

## 2018-03-29 DIAGNOSIS — M25.569 KNEE PAIN, UNSPECIFIED CHRONICITY, UNSPECIFIED LATERALITY: ICD-10-CM

## 2018-03-29 PROCEDURE — 99214 OFFICE O/P EST MOD 30 MIN: CPT | Performed by: PSYCHIATRY & NEUROLOGY

## 2018-03-29 PROCEDURE — G8484 FLU IMMUNIZE NO ADMIN: HCPCS | Performed by: PSYCHIATRY & NEUROLOGY

## 2018-03-29 PROCEDURE — 1036F TOBACCO NON-USER: CPT | Performed by: PSYCHIATRY & NEUROLOGY

## 2018-03-29 PROCEDURE — G8400 PT W/DXA NO RESULTS DOC: HCPCS | Performed by: PSYCHIATRY & NEUROLOGY

## 2018-03-29 PROCEDURE — 1123F ACP DISCUSS/DSCN MKR DOCD: CPT | Performed by: PSYCHIATRY & NEUROLOGY

## 2018-03-29 PROCEDURE — G8420 CALC BMI NORM PARAMETERS: HCPCS | Performed by: PSYCHIATRY & NEUROLOGY

## 2018-03-29 PROCEDURE — 4040F PNEUMOC VAC/ADMIN/RCVD: CPT | Performed by: PSYCHIATRY & NEUROLOGY

## 2018-03-29 PROCEDURE — G8428 CUR MEDS NOT DOCUMENT: HCPCS | Performed by: PSYCHIATRY & NEUROLOGY

## 2018-03-29 PROCEDURE — 1090F PRES/ABSN URINE INCON ASSESS: CPT | Performed by: PSYCHIATRY & NEUROLOGY

## 2018-03-29 NOTE — PROGRESS NOTES
MG per tablet Take 1 tablet by mouth 3 times daily 90 tablet 5    ALPRAZolam (XANAX) 0.5 MG tablet Take 0.5 mg by mouth nightly as needed for Sleep.  amLODIPine (NORVASC) 5 MG tablet Take 5 mg by mouth daily.  atorvastatin (LIPITOR) 10 MG tablet Take 10 mg by mouth daily.  citalopram (CELEXA) 10 MG tablet Take 10 mg by mouth daily.  furosemide (LASIX) 40 MG tablet Take 20 mg by mouth daily       losartan (COZAAR) 100 MG tablet Take 50 mg by mouth daily       metoprolol (TOPROL-XL) 50 MG XL tablet Take 50 mg by mouth daily       potassium chloride (MICRO-K) 10 MEQ CR capsule       HYDROcodone-acetaminophen (NORCO) 5-325 MG per tablet Take 1 tablet by mouth nightly as needed for Pain.  omeprazole (PRILOSEC) 10 MG capsule Take 1 capsule by mouth daily. (Patient taking differently: Take 20 mg by mouth daily ) 30 capsule 5    gabapentin (NEURONTIN) 300 MG capsule Take 1 capsule by mouth 3 times daily. 90 capsule 3     No current facility-administered medications for this visit. /60   Ht 5' 4\" (1.626 m)   Wt 140 lb (63.5 kg)   BMI 24.03 kg/m²     Constitutional - well developed, well nourished. Eyes - conjunctiva normal.   Ear, nose, throat -No scars, masses, or lesions over external nose or ears, no atrophy of tongue  Neck-symmetric, no masses noted, no jugular vein distension  Respiration- chest wall appears symmetric, good expansion,   normal effort without use of accessory muscles  Musculoskeletal - no significant wasting of muscles noted, no bony deformities  Extremities-no clubbing, cyanosis or edema  Skin - warm, dry, and intact. No rash, erythema, or pallor. Psychiatric - mood, affect, and behavior appearslow   Neurological exam  Awake, alert, fluent oriented appropriate affect  Attention and concentration appear appropriate  Recent and remote memory appears.    Speech normal without dysarthria  No clear issues with language of fund of knowledge    Cranial

## 2018-04-09 ENCOUNTER — TELEPHONE (OUTPATIENT)
Dept: NEUROSURGERY | Age: 78
End: 2018-04-09
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
## 2018-10-05 NOTE — PLAN OF CARE
Problem: Inpatient Physical Therapy  Goal: Bed Mobility Goal LTG- PT  Outcome: Ongoing (interventions implemented as appropriate)    03/18/17 1510   Bed Mobility PT LTG   Bed Mobility PT LTG, Date Established 03/18/17   Bed Mobility PT LTG, Time to Achieve by discharge   Bed Mobility PT LTG, Activity Type all bed mobility   Bed Mobility PT LTG, [place] Level independent       Goal: Transfer Training Goal 1 LTG- PT  Outcome: Ongoing (interventions implemented as appropriate)    03/18/17 1510   Transfer Training PT LTG   Transfer Training PT LTG, Time to Achieve by discharge   Transfer Training PT LTG, [place] Level supervision required   Transfer Training PT LTG, Assist Device other (see comments)  (appropriate AD)         03/18/17 1510   Transfer Training PT LTG   Transfer Training PT LTG, Date Established 03/18/17   Transfer Training PT LTG, Time to Achieve by discharge   Transfer Training PT LTG, [place] Level supervision required   Transfer Training PT LTG, Assist Device other (see comments)  (appropriate AD)       Goal: Gait Training Goal LTG- PT  Outcome: Ongoing (interventions implemented as appropriate)    03/18/17 1510   Gait Training PT LTG   Gait Training Goal PT LTG, Date Established 03/18/17   Gait Training Goal PT LTG, Time to Achieve by discharge   Gait Training Goal PT LTG, [place] Level supervision required   Gait Training Goal PT LTG, Assist Device other (see comments)  (with appropriate)   Gait Training Goal PT LTG, Distance to Achieve 50       Goal: Patient Education Goal LTG- PT  Outcome: Ongoing (interventions implemented as appropriate)    03/18/17 1510   Patient Education PT LTG   Patient Education PT LTG, Date Established 03/18/17   Patient Education PT LTG, Time to Achieve by discharge   Patient Education PT LTG, Education Type HEP;bed mobility;transfers;gait;posture/body mechanics;positioning;progression of POC;benefits of activity;home safety   Patient Education PT LTG,  Education Understanding demonstrate adequately;verbalize understanding            I will STOP taking the medications listed below when I get home from the hospital:    voriconazole 200 mg oral tablet  -- 1 tab(s) by mouth every 12 hours MDD:2 tabs    acyclovir 400 mg oral tablet  -- 1 tab(s) by mouth every 8 hours MDD:3 tabs    folic acid 1 mg oral tablet  -- 1 tab(s) by mouth once a day MDD:1 tab    Mepron 750 mg/5 mL oral suspension  -- 5 milliliter(s) by mouth 2 times a day MDD:10 ml    PriLOSEC 20 mg oral delayed release capsule  -- 1 cap(s) by mouth once a day    imatinib 400 mg oral tablet  -- 1 tab(s) by mouth once a day

## 2019-05-06 ENCOUNTER — HOSPITAL ENCOUNTER (INPATIENT)
Facility: HOSPITAL | Age: 79
LOS: 10 days | Discharge: NURSING, REHAB OR LONG-TERM CARE | End: 2019-05-16
Attending: EMERGENCY MEDICINE | Admitting: INTERNAL MEDICINE

## 2019-05-06 ENCOUNTER — APPOINTMENT (OUTPATIENT)
Dept: CT IMAGING | Facility: HOSPITAL | Age: 79
End: 2019-05-06

## 2019-05-06 ENCOUNTER — APPOINTMENT (OUTPATIENT)
Dept: GENERAL RADIOLOGY | Facility: HOSPITAL | Age: 79
End: 2019-05-06

## 2019-05-06 DIAGNOSIS — E87.5 HYPERKALEMIA: ICD-10-CM

## 2019-05-06 DIAGNOSIS — Z74.09 IMPAIRED MOBILITY AND ADLS: ICD-10-CM

## 2019-05-06 DIAGNOSIS — N17.9 ACUTE KIDNEY INJURY (HCC): ICD-10-CM

## 2019-05-06 DIAGNOSIS — R13.19 OTHER DYSPHAGIA: ICD-10-CM

## 2019-05-06 DIAGNOSIS — Z74.09 IMPAIRED MOBILITY: ICD-10-CM

## 2019-05-06 DIAGNOSIS — R65.20 SEVERE SEPSIS (HCC): Primary | ICD-10-CM

## 2019-05-06 DIAGNOSIS — Z78.9 IMPAIRED MOBILITY AND ADLS: ICD-10-CM

## 2019-05-06 DIAGNOSIS — A41.9 SEVERE SEPSIS (HCC): Primary | ICD-10-CM

## 2019-05-06 DIAGNOSIS — N39.0 URINARY TRACT INFECTION, ACUTE: ICD-10-CM

## 2019-05-06 DIAGNOSIS — R41.82 ALTERED MENTAL STATUS, UNSPECIFIED ALTERED MENTAL STATUS TYPE: ICD-10-CM

## 2019-05-06 LAB
ALBUMIN SERPL-MCNC: 3.4 G/DL (ref 3.5–5)
ALBUMIN/GLOB SERPL: 1.2 G/DL (ref 1.1–2.5)
ALP SERPL-CCNC: 120 U/L (ref 24–120)
ALT SERPL W P-5'-P-CCNC: 16 U/L (ref 0–54)
AMMONIA BLD-SCNC: <9 UMOL/L (ref 9–33)
AMYLASE SERPL-CCNC: 74 U/L (ref 30–110)
ANION GAP SERPL CALCULATED.3IONS-SCNC: ABNORMAL MMOL/L (ref 4–13)
ARTERIAL PATENCY WRIST A: POSITIVE
AST SERPL-CCNC: 186 U/L (ref 7–45)
ATMOSPHERIC PRESS: 752 MMHG
BACTERIA UR QL AUTO: ABNORMAL /HPF
BASE EXCESS BLDA CALC-SCNC: -21.4 MMOL/L (ref 0–2)
BASOPHILS # BLD AUTO: 0.03 10*3/MM3 (ref 0–0.2)
BASOPHILS NFR BLD AUTO: 0.2 % (ref 0–2)
BDY SITE: ABNORMAL
BILIRUB SERPL-MCNC: 0.6 MG/DL (ref 0.1–1)
BILIRUB UR QL STRIP: NEGATIVE
BODY TEMPERATURE: 37 C
BUN BLD-MCNC: 115 MG/DL (ref 5–21)
BUN/CREAT SERPL: 18.2 (ref 7–25)
CALCIUM SPEC-SCNC: 6.5 MG/DL (ref 8.4–10.4)
CHLORIDE SERPL-SCNC: 103 MMOL/L (ref 98–110)
CK SERPL-CCNC: ABNORMAL U/L (ref 0–203)
CLARITY UR: ABNORMAL
CO2 SERPL-SCNC: <5 MMOL/L (ref 24–31)
COLOR UR: YELLOW
CREAT BLD-MCNC: 6.33 MG/DL (ref 0.5–1.4)
D-LACTATE SERPL-SCNC: 1.4 MMOL/L (ref 0.5–2)
DEPRECATED RDW RBC AUTO: 52 FL (ref 40–54)
EOSINOPHIL # BLD AUTO: 0 10*3/MM3 (ref 0–0.7)
EOSINOPHIL NFR BLD AUTO: 0 % (ref 0–4)
ERYTHROCYTE [DISTWIDTH] IN BLOOD BY AUTOMATED COUNT: 18.1 % (ref 12–15)
GFR SERPL CREATININE-BSD FRML MDRD: 6 ML/MIN/1.73
GFR SERPL CREATININE-BSD FRML MDRD: ABNORMAL ML/MIN/1.73
GLOBULIN UR ELPH-MCNC: 2.8 GM/DL
GLUCOSE BLD-MCNC: 131 MG/DL (ref 70–100)
GLUCOSE BLDC GLUCOMTR-MCNC: 146 MG/DL (ref 70–130)
GLUCOSE UR STRIP-MCNC: NEGATIVE MG/DL
HCO3 BLDA-SCNC: 6.5 MMOL/L (ref 20–26)
HCT VFR BLD AUTO: 29.8 % (ref 37–47)
HGB BLD-MCNC: 9.7 G/DL (ref 12–16)
HGB UR QL STRIP.AUTO: ABNORMAL
HOLD SPECIMEN: NORMAL
HOLD SPECIMEN: NORMAL
HYALINE CASTS UR QL AUTO: ABNORMAL /LPF
IMM GRANULOCYTES # BLD AUTO: 0.19 10*3/MM3 (ref 0–0.05)
IMM GRANULOCYTES NFR BLD AUTO: 1.1 % (ref 0–5)
KETONES UR QL STRIP: ABNORMAL
LEUKOCYTE ESTERASE UR QL STRIP.AUTO: ABNORMAL
LIPASE SERPL-CCNC: 128 U/L (ref 23–203)
LYMPHOCYTES # BLD AUTO: 0.62 10*3/MM3 (ref 0.72–4.86)
LYMPHOCYTES NFR BLD AUTO: 3.5 % (ref 15–45)
Lab: ABNORMAL
Lab: ABNORMAL
MAGNESIUM SERPL-MCNC: 2.6 MG/DL (ref 1.4–2.2)
MCH RBC QN AUTO: 25.7 PG (ref 28–32)
MCHC RBC AUTO-ENTMCNC: 32.6 G/DL (ref 33–36)
MCV RBC AUTO: 78.8 FL (ref 82–98)
MODALITY: ABNORMAL
MONOCYTES # BLD AUTO: 0.51 10*3/MM3 (ref 0.19–1.3)
MONOCYTES NFR BLD AUTO: 2.9 % (ref 4–12)
MYOGLOBIN SERPL-MCNC: ABNORMAL NG/ML (ref 0–110)
NEUTROPHILS # BLD AUTO: 16.4 10*3/MM3 (ref 1.87–8.4)
NEUTROPHILS NFR BLD AUTO: 92.3 % (ref 39–78)
NITRITE UR QL STRIP: NEGATIVE
NOTIFIED BY: ABNORMAL
NOTIFIED WHO: ABNORMAL
NRBC BLD AUTO-RTO: 0 /100 WBC (ref 0–0.2)
PCO2 BLDA: 21.2 MM HG (ref 35–45)
PH BLDA: 7.1 PH UNITS (ref 7.35–7.45)
PH UR STRIP.AUTO: 5.5 [PH] (ref 5–8)
PLATELET # BLD AUTO: 198 10*3/MM3 (ref 130–400)
PMV BLD AUTO: 11.2 FL (ref 6–12)
PO2 BLDA: 92.1 MM HG (ref 83–108)
POTASSIUM BLD-SCNC: 6.8 MMOL/L (ref 3.5–5.3)
PROT SERPL-MCNC: 6.2 G/DL (ref 6.3–8.7)
PROT UR QL STRIP: ABNORMAL
RBC # BLD AUTO: 3.78 10*6/MM3 (ref 4.2–5.4)
RBC # UR: ABNORMAL /HPF
REF LAB TEST METHOD: ABNORMAL
SAO2 % BLDCOA: 95.3 % (ref 94–99)
SODIUM BLD-SCNC: 132 MMOL/L (ref 135–145)
SP GR UR STRIP: 1.02 (ref 1–1.03)
SQUAMOUS #/AREA URNS HPF: ABNORMAL /HPF
T4 FREE SERPL-MCNC: 1.26 NG/DL (ref 0.78–2.19)
TROPONIN I SERPL-MCNC: 0.04 NG/ML (ref 0–0.03)
TSH SERPL DL<=0.05 MIU/L-ACNC: 4.06 MIU/ML (ref 0.47–4.68)
UROBILINOGEN UR QL STRIP: ABNORMAL
VENTILATOR MODE: ABNORMAL
WBC NRBC COR # BLD: 17.75 10*3/MM3 (ref 4.8–10.8)
WBC UR QL AUTO: ABNORMAL /HPF
WHOLE BLOOD HOLD SPECIMEN: NORMAL
WHOLE BLOOD HOLD SPECIMEN: NORMAL

## 2019-05-06 PROCEDURE — 84443 ASSAY THYROID STIM HORMONE: CPT | Performed by: EMERGENCY MEDICINE

## 2019-05-06 PROCEDURE — 70450 CT HEAD/BRAIN W/O DYE: CPT

## 2019-05-06 PROCEDURE — 82803 BLOOD GASES ANY COMBINATION: CPT

## 2019-05-06 PROCEDURE — 83874 ASSAY OF MYOGLOBIN: CPT | Performed by: EMERGENCY MEDICINE

## 2019-05-06 PROCEDURE — 87076 CULTURE ANAEROBE IDENT EACH: CPT | Performed by: EMERGENCY MEDICINE

## 2019-05-06 PROCEDURE — 83690 ASSAY OF LIPASE: CPT | Performed by: EMERGENCY MEDICINE

## 2019-05-06 PROCEDURE — 71045 X-RAY EXAM CHEST 1 VIEW: CPT

## 2019-05-06 PROCEDURE — 25010000002 CALCIUM GLUCONATE PER 10 ML: Performed by: EMERGENCY MEDICINE

## 2019-05-06 PROCEDURE — 82150 ASSAY OF AMYLASE: CPT | Performed by: EMERGENCY MEDICINE

## 2019-05-06 PROCEDURE — 93010 ELECTROCARDIOGRAM REPORT: CPT | Performed by: INTERNAL MEDICINE

## 2019-05-06 PROCEDURE — 84439 ASSAY OF FREE THYROXINE: CPT | Performed by: EMERGENCY MEDICINE

## 2019-05-06 PROCEDURE — 84484 ASSAY OF TROPONIN QUANT: CPT | Performed by: EMERGENCY MEDICINE

## 2019-05-06 PROCEDURE — 82962 GLUCOSE BLOOD TEST: CPT

## 2019-05-06 PROCEDURE — 83605 ASSAY OF LACTIC ACID: CPT | Performed by: EMERGENCY MEDICINE

## 2019-05-06 PROCEDURE — 83735 ASSAY OF MAGNESIUM: CPT | Performed by: EMERGENCY MEDICINE

## 2019-05-06 PROCEDURE — 82550 ASSAY OF CK (CPK): CPT | Performed by: EMERGENCY MEDICINE

## 2019-05-06 PROCEDURE — 80053 COMPREHEN METABOLIC PANEL: CPT | Performed by: EMERGENCY MEDICINE

## 2019-05-06 PROCEDURE — 74176 CT ABD & PELVIS W/O CONTRAST: CPT

## 2019-05-06 PROCEDURE — 87088 URINE BACTERIA CULTURE: CPT | Performed by: EMERGENCY MEDICINE

## 2019-05-06 PROCEDURE — 81001 URINALYSIS AUTO W/SCOPE: CPT | Performed by: EMERGENCY MEDICINE

## 2019-05-06 PROCEDURE — 87086 URINE CULTURE/COLONY COUNT: CPT | Performed by: EMERGENCY MEDICINE

## 2019-05-06 PROCEDURE — 25010000002 CEFTRIAXONE PER 250 MG: Performed by: EMERGENCY MEDICINE

## 2019-05-06 PROCEDURE — 87186 SC STD MICRODIL/AGAR DIL: CPT | Performed by: EMERGENCY MEDICINE

## 2019-05-06 PROCEDURE — 93005 ELECTROCARDIOGRAM TRACING: CPT | Performed by: EMERGENCY MEDICINE

## 2019-05-06 PROCEDURE — 82140 ASSAY OF AMMONIA: CPT | Performed by: EMERGENCY MEDICINE

## 2019-05-06 PROCEDURE — 85025 COMPLETE CBC W/AUTO DIFF WBC: CPT | Performed by: EMERGENCY MEDICINE

## 2019-05-06 PROCEDURE — 63710000001 INSULIN REGULAR HUMAN PER 5 UNITS: Performed by: EMERGENCY MEDICINE

## 2019-05-06 PROCEDURE — 99285 EMERGENCY DEPT VISIT HI MDM: CPT

## 2019-05-06 PROCEDURE — 87040 BLOOD CULTURE FOR BACTERIA: CPT | Performed by: EMERGENCY MEDICINE

## 2019-05-06 PROCEDURE — 36600 WITHDRAWAL OF ARTERIAL BLOOD: CPT

## 2019-05-06 RX ORDER — SODIUM CHLORIDE 0.9 % (FLUSH) 0.9 %
10 SYRINGE (ML) INJECTION AS NEEDED
Status: DISCONTINUED | OUTPATIENT
Start: 2019-05-06 | End: 2019-05-16 | Stop reason: HOSPADM

## 2019-05-06 RX ORDER — DEXTROSE MONOHYDRATE 25 G/50ML
25 INJECTION, SOLUTION INTRAVENOUS ONCE
Status: COMPLETED | OUTPATIENT
Start: 2019-05-06 | End: 2019-05-06

## 2019-05-06 RX ORDER — CALCIUM GLUCONATE 94 MG/ML
1 INJECTION, SOLUTION INTRAVENOUS ONCE
Status: COMPLETED | OUTPATIENT
Start: 2019-05-06 | End: 2019-05-06

## 2019-05-06 RX ADMIN — INSULIN HUMAN 10 UNITS: 100 INJECTION, SOLUTION PARENTERAL at 22:47

## 2019-05-06 RX ADMIN — Medication 50 MEQ: at 22:52

## 2019-05-06 RX ADMIN — CEFTRIAXONE SODIUM 1 G: 1 INJECTION, POWDER, FOR SOLUTION INTRAMUSCULAR; INTRAVENOUS at 22:55

## 2019-05-06 RX ADMIN — DEXTROSE MONOHYDRATE 25 G: 25 INJECTION, SOLUTION INTRAVENOUS at 22:47

## 2019-05-06 RX ADMIN — CALCIUM GLUCONATE 1 G: 98 INJECTION, SOLUTION INTRAVENOUS at 22:46

## 2019-05-06 RX ADMIN — SODIUM CHLORIDE, POTASSIUM CHLORIDE, SODIUM LACTATE AND CALCIUM CHLORIDE 1713 ML: 600; 310; 30; 20 INJECTION, SOLUTION INTRAVENOUS at 21:34

## 2019-05-07 ENCOUNTER — APPOINTMENT (OUTPATIENT)
Dept: ULTRASOUND IMAGING | Facility: HOSPITAL | Age: 79
End: 2019-05-07

## 2019-05-07 LAB
ALBUMIN SERPL-MCNC: 2.7 G/DL (ref 3.5–5)
ALBUMIN SERPL-MCNC: 2.9 G/DL (ref 3.5–5)
ALBUMIN/GLOB SERPL: 1 G/DL (ref 1.1–2.5)
ALBUMIN/GLOB SERPL: 1 G/DL (ref 1.1–2.5)
ALP SERPL-CCNC: 103 U/L (ref 24–120)
ALP SERPL-CCNC: 104 U/L (ref 24–120)
ALT SERPL W P-5'-P-CCNC: 15 U/L (ref 0–54)
ALT SERPL W P-5'-P-CCNC: 19 U/L (ref 0–54)
ANION GAP SERPL CALCULATED.3IONS-SCNC: 19 MMOL/L (ref 4–13)
ANION GAP SERPL CALCULATED.3IONS-SCNC: 19 MMOL/L (ref 4–13)
ARTERIAL PATENCY WRIST A: POSITIVE
AST SERPL-CCNC: 177 U/L (ref 7–45)
AST SERPL-CCNC: 229 U/L (ref 7–45)
ATMOSPHERIC PRESS: 752 MMHG
BACTERIA UR QL AUTO: ABNORMAL /HPF
BASE EXCESS BLDA CALC-SCNC: -18 MMOL/L (ref 0–2)
BDY SITE: ABNORMAL
BILIRUB SERPL-MCNC: 0.4 MG/DL (ref 0.1–1)
BILIRUB SERPL-MCNC: 0.6 MG/DL (ref 0.1–1)
BILIRUB UR QL STRIP: NEGATIVE
BODY TEMPERATURE: 37 C
BUN BLD-MCNC: 104 MG/DL (ref 5–21)
BUN BLD-MCNC: 105 MG/DL (ref 5–21)
BUN/CREAT SERPL: 19.8 (ref 7–25)
BUN/CREAT SERPL: 19.8 (ref 7–25)
CALCIUM SPEC-SCNC: 6.2 MG/DL (ref 8.4–10.4)
CALCIUM SPEC-SCNC: 6.5 MG/DL (ref 8.4–10.4)
CHLORIDE SERPL-SCNC: 105 MMOL/L (ref 98–110)
CHLORIDE SERPL-SCNC: 107 MMOL/L (ref 98–110)
CK SERPL-CCNC: ABNORMAL U/L (ref 0–203)
CLARITY UR: ABNORMAL
CO2 SERPL-SCNC: 8 MMOL/L (ref 24–31)
CO2 SERPL-SCNC: 9 MMOL/L (ref 24–31)
COLOR UR: ABNORMAL
CREAT BLD-MCNC: 5.24 MG/DL (ref 0.5–1.4)
CREAT BLD-MCNC: 5.31 MG/DL (ref 0.5–1.4)
CREAT UR-MCNC: 72.6 MG/DL
GAS FLOW AIRWAY: 2 LPM
GFR SERPL CREATININE-BSD FRML MDRD: 8 ML/MIN/1.73
GFR SERPL CREATININE-BSD FRML MDRD: 8 ML/MIN/1.73
GFR SERPL CREATININE-BSD FRML MDRD: ABNORMAL ML/MIN/1.73
GFR SERPL CREATININE-BSD FRML MDRD: ABNORMAL ML/MIN/1.73
GLOBULIN UR ELPH-MCNC: 2.7 GM/DL
GLOBULIN UR ELPH-MCNC: 2.9 GM/DL
GLUCOSE BLD-MCNC: 101 MG/DL (ref 70–100)
GLUCOSE BLD-MCNC: 167 MG/DL (ref 70–100)
GLUCOSE UR STRIP-MCNC: NEGATIVE MG/DL
HCO3 BLDA-SCNC: 9.4 MMOL/L (ref 20–26)
HGB UR QL STRIP.AUTO: ABNORMAL
HYALINE CASTS UR QL AUTO: ABNORMAL /LPF
KETONES UR QL STRIP: ABNORMAL
L PNEUMO1 AG UR QL IA: NEGATIVE
LEUKOCYTE ESTERASE UR QL STRIP.AUTO: ABNORMAL
Lab: ABNORMAL
Lab: ABNORMAL
MODALITY: ABNORMAL
NITRITE UR QL STRIP: NEGATIVE
NOTIFIED BY: ABNORMAL
NOTIFIED WHO: ABNORMAL
OSMOLALITY UR: 336 MOSM/KG (ref 601–850)
PCO2 BLDA: 26.8 MM HG (ref 35–45)
PH BLDA: 7.15 PH UNITS (ref 7.35–7.45)
PH UR STRIP.AUTO: <=5 [PH] (ref 5–8)
PHOSPHATE SERPL-MCNC: 7.4 MG/DL (ref 2.5–4.5)
PO2 BLDA: 124 MM HG (ref 83–108)
POTASSIUM BLD-SCNC: 5 MMOL/L (ref 3.5–5.3)
POTASSIUM BLD-SCNC: 5.3 MMOL/L (ref 3.5–5.3)
PROT SERPL-MCNC: 5.4 G/DL (ref 6.3–8.7)
PROT SERPL-MCNC: 5.8 G/DL (ref 6.3–8.7)
PROT UR QL STRIP: ABNORMAL
PTH-INTACT SERPL-MCNC: 497.1 PG/ML (ref 7.5–53.5)
RBC # UR: ABNORMAL /HPF
REF LAB TEST METHOD: ABNORMAL
S PNEUM AG SPEC QL LA: NEGATIVE
SAO2 % BLDCOA: 97.6 % (ref 94–99)
SODIUM BLD-SCNC: 133 MMOL/L (ref 135–145)
SODIUM BLD-SCNC: 134 MMOL/L (ref 135–145)
SODIUM UR-SCNC: 79 MMOL/L (ref 30–90)
SP GR UR STRIP: 1.01 (ref 1–1.03)
SQUAMOUS #/AREA URNS HPF: ABNORMAL /HPF
TROPONIN I SERPL-MCNC: 0.03 NG/ML (ref 0–0.03)
URATE SERPL-MCNC: 9 MG/DL (ref 2.7–7.5)
URINE MYOGLOBIN, QUALITATIVE: POSITIVE
UROBILINOGEN UR QL STRIP: ABNORMAL
VENTILATOR MODE: ABNORMAL
WBC UR QL AUTO: ABNORMAL /HPF

## 2019-05-07 PROCEDURE — 82803 BLOOD GASES ANY COMBINATION: CPT

## 2019-05-07 PROCEDURE — 83874 ASSAY OF MYOGLOBIN: CPT | Performed by: NURSE PRACTITIONER

## 2019-05-07 PROCEDURE — 83970 ASSAY OF PARATHORMONE: CPT | Performed by: NURSE PRACTITIONER

## 2019-05-07 PROCEDURE — 97163 PT EVAL HIGH COMPLEX 45 MIN: CPT

## 2019-05-07 PROCEDURE — 93010 ELECTROCARDIOGRAM REPORT: CPT | Performed by: INTERNAL MEDICINE

## 2019-05-07 PROCEDURE — 84550 ASSAY OF BLOOD/URIC ACID: CPT | Performed by: INTERNAL MEDICINE

## 2019-05-07 PROCEDURE — 36410 VNPNXR 3YR/> PHY/QHP DX/THER: CPT

## 2019-05-07 PROCEDURE — 81001 URINALYSIS AUTO W/SCOPE: CPT | Performed by: INTERNAL MEDICINE

## 2019-05-07 PROCEDURE — 92610 EVALUATE SWALLOWING FUNCTION: CPT

## 2019-05-07 PROCEDURE — C1751 CATH, INF, PER/CENT/MIDLINE: HCPCS

## 2019-05-07 PROCEDURE — 93005 ELECTROCARDIOGRAM TRACING: CPT | Performed by: EMERGENCY MEDICINE

## 2019-05-07 PROCEDURE — 94799 UNLISTED PULMONARY SVC/PX: CPT

## 2019-05-07 PROCEDURE — 82550 ASSAY OF CK (CPK): CPT | Performed by: NURSE PRACTITIONER

## 2019-05-07 PROCEDURE — 84100 ASSAY OF PHOSPHORUS: CPT | Performed by: NURSE PRACTITIONER

## 2019-05-07 PROCEDURE — 97167 OT EVAL HIGH COMPLEX 60 MIN: CPT

## 2019-05-07 PROCEDURE — 25010000002 CEFTRIAXONE PER 250 MG: Performed by: NURSE PRACTITIONER

## 2019-05-07 PROCEDURE — 76775 US EXAM ABDO BACK WALL LIM: CPT

## 2019-05-07 PROCEDURE — 82570 ASSAY OF URINE CREATININE: CPT | Performed by: NURSE PRACTITIONER

## 2019-05-07 PROCEDURE — 84300 ASSAY OF URINE SODIUM: CPT | Performed by: NURSE PRACTITIONER

## 2019-05-07 PROCEDURE — 94760 N-INVAS EAR/PLS OXIMETRY 1: CPT

## 2019-05-07 PROCEDURE — 83935 ASSAY OF URINE OSMOLALITY: CPT | Performed by: INTERNAL MEDICINE

## 2019-05-07 PROCEDURE — 80053 COMPREHEN METABOLIC PANEL: CPT | Performed by: INTERNAL MEDICINE

## 2019-05-07 PROCEDURE — 87899 AGENT NOS ASSAY W/OPTIC: CPT | Performed by: INTERNAL MEDICINE

## 2019-05-07 PROCEDURE — 84484 ASSAY OF TROPONIN QUANT: CPT | Performed by: EMERGENCY MEDICINE

## 2019-05-07 PROCEDURE — 36600 WITHDRAWAL OF ARTERIAL BLOOD: CPT

## 2019-05-07 RX ORDER — SODIUM CHLORIDE 9 MG/ML
150 INJECTION, SOLUTION INTRAVENOUS CONTINUOUS
Status: DISCONTINUED | OUTPATIENT
Start: 2019-05-07 | End: 2019-05-07

## 2019-05-07 RX ORDER — CALCIUM CARBONATE 500(1250)
1250 TABLET ORAL DAILY
Status: DISCONTINUED | OUTPATIENT
Start: 2019-05-07 | End: 2019-05-16 | Stop reason: HOSPADM

## 2019-05-07 RX ORDER — ACETAMINOPHEN 325 MG/1
650 TABLET ORAL EVERY 4 HOURS PRN
COMMUNITY

## 2019-05-07 RX ORDER — MEPERIDINE HYDROCHLORIDE 25 MG/ML
25 INJECTION INTRAMUSCULAR; INTRAVENOUS; SUBCUTANEOUS EVERY 6 HOURS PRN
Status: ACTIVE | OUTPATIENT
Start: 2019-05-07 | End: 2019-05-10

## 2019-05-07 RX ORDER — TRAMADOL HYDROCHLORIDE 50 MG/1
50 TABLET ORAL EVERY 6 HOURS PRN
Status: ON HOLD | COMMUNITY
End: 2019-05-07

## 2019-05-07 RX ORDER — ACETAMINOPHEN 325 MG/1
650 TABLET ORAL EVERY 4 HOURS PRN
Status: DISCONTINUED | OUTPATIENT
Start: 2019-05-07 | End: 2019-05-16 | Stop reason: HOSPADM

## 2019-05-07 RX ORDER — LORATADINE 10 MG/1
CAPSULE, LIQUID FILLED ORAL
Status: ON HOLD | COMMUNITY
End: 2019-05-07

## 2019-05-07 RX ORDER — IBUPROFEN 600 MG/1
600 TABLET ORAL EVERY 6 HOURS PRN
Status: ON HOLD | COMMUNITY
End: 2019-05-07

## 2019-05-07 RX ORDER — CITALOPRAM 10 MG/1
10 TABLET ORAL NIGHTLY
Status: DISCONTINUED | OUTPATIENT
Start: 2019-05-07 | End: 2019-05-16 | Stop reason: HOSPADM

## 2019-05-07 RX ORDER — LANSOPRAZOLE 30 MG/1
30 CAPSULE, DELAYED RELEASE ORAL
Status: DISCONTINUED | OUTPATIENT
Start: 2019-05-07 | End: 2019-05-07 | Stop reason: CLARIF

## 2019-05-07 RX ORDER — SODIUM CHLORIDE 0.9 % (FLUSH) 0.9 %
3 SYRINGE (ML) INJECTION EVERY 12 HOURS SCHEDULED
Status: DISCONTINUED | OUTPATIENT
Start: 2019-05-07 | End: 2019-05-16 | Stop reason: HOSPADM

## 2019-05-07 RX ORDER — PANTOPRAZOLE SODIUM 20 MG/1
20 TABLET, DELAYED RELEASE ORAL
Status: DISCONTINUED | OUTPATIENT
Start: 2019-05-07 | End: 2019-05-08

## 2019-05-07 RX ORDER — HYDROCODONE BITARTRATE AND ACETAMINOPHEN 7.5; 325 MG/1; MG/1
1 TABLET ORAL EVERY 4 HOURS PRN
Status: DISCONTINUED | OUTPATIENT
Start: 2019-05-07 | End: 2019-05-15

## 2019-05-07 RX ORDER — ALPRAZOLAM 0.5 MG/1
0.5 TABLET ORAL NIGHTLY
Status: DISCONTINUED | OUTPATIENT
Start: 2019-05-07 | End: 2019-05-13

## 2019-05-07 RX ORDER — SODIUM CHLORIDE 0.9 % (FLUSH) 0.9 %
3-10 SYRINGE (ML) INJECTION AS NEEDED
Status: DISCONTINUED | OUTPATIENT
Start: 2019-05-07 | End: 2019-05-16 | Stop reason: HOSPADM

## 2019-05-07 RX ORDER — UREA 10 %
100 LOTION (ML) TOPICAL DAILY
Status: DISCONTINUED | OUTPATIENT
Start: 2019-05-07 | End: 2019-05-16 | Stop reason: HOSPADM

## 2019-05-07 RX ORDER — LIDOCAINE HYDROCHLORIDE 10 MG/ML
1 INJECTION, SOLUTION EPIDURAL; INFILTRATION; INTRACAUDAL; PERINEURAL ONCE
Status: COMPLETED | OUTPATIENT
Start: 2019-05-07 | End: 2019-05-07

## 2019-05-07 RX ORDER — ACETAMINOPHEN 325 MG/1
650 TABLET ORAL EVERY 6 HOURS PRN
Status: DISCONTINUED | OUTPATIENT
Start: 2019-05-07 | End: 2019-05-14 | Stop reason: SDUPTHER

## 2019-05-07 RX ADMIN — CEFTRIAXONE SODIUM 1 G: 1 INJECTION, POWDER, FOR SOLUTION INTRAMUSCULAR; INTRAVENOUS at 17:40

## 2019-05-07 RX ADMIN — SODIUM BICARBONATE: 84 INJECTION, SOLUTION INTRAVENOUS at 12:33

## 2019-05-07 RX ADMIN — SODIUM CHLORIDE 100 ML/HR: 9 INJECTION, SOLUTION INTRAVENOUS at 04:23

## 2019-05-07 RX ADMIN — SODIUM BICARBONATE: 84 INJECTION, SOLUTION INTRAVENOUS at 22:36

## 2019-05-07 RX ADMIN — SODIUM CHLORIDE 150 ML/HR: 9 INJECTION, SOLUTION INTRAVENOUS at 10:08

## 2019-05-07 RX ADMIN — NOREPINEPHRINE BITARTRATE 0.02 MCG/KG/MIN: 1 INJECTION INTRAVENOUS at 10:08

## 2019-05-07 RX ADMIN — NOREPINEPHRINE BITARTRATE 0.02 MCG/KG/MIN: 1 INJECTION INTRAVENOUS at 04:50

## 2019-05-07 RX ADMIN — SODIUM CHLORIDE 1000 ML: 9 INJECTION, SOLUTION INTRAVENOUS at 01:21

## 2019-05-07 RX ADMIN — LIDOCAINE HYDROCHLORIDE 1 ML: 10 INJECTION, SOLUTION EPIDURAL; INFILTRATION; INTRACAUDAL; PERINEURAL at 10:03

## 2019-05-07 NOTE — NURSING NOTE
Spoke with COLTON Aviles.  Patient is ok for a midline.  Appears both breasts have been reconstructed, however, all records say left breast mastectomy.  Still ok to proceed with midline.

## 2019-05-07 NOTE — THERAPY DISCHARGE NOTE
Acute Care - Occupational Therapy Initial Eval/Discharge  [facility]     Patient Name: Susy Ko  : 1940  MRN: [id number]  Today's Date: 2019  Onset of Illness/Injury or Date of Surgery: 19  Date of Referral to OT: 19  Referring Physician: COLTON Kohler      Admit Date: 2019       ICD-10-CM ICD-9-CM   1. Severe sepsis (CMS/HCC) A41.9 038.9    R65.20 995.92   2. Urinary tract infection, acute N39.0 599.0   3. Hyperkalemia E87.5 276.7   4. Acute kidney injury (CMS/HCC) N17.9 584.9   5. Altered mental status, unspecified altered mental status type R41.82 780.97   6. Other dysphagia R13.19 787.29     Patient Active Problem List   Diagnosis   • Normal pressure hydrocephalus   • Non morbid obesity due to excess calories   • Tobacco non-user   • Tremor of right hand   • Abnormality of gait   • Parastomal hernia   • Hernia   • Gait abnormality   • Severe sepsis (CMS/HCC)     Past Medical History:   Diagnosis Date   • Anxiety    • Breast cancer (CMS/HCC)      LEFT WITH RECONSTRUCTION   • Colostomy in place (CMS/HCC)    • Dementia    • Depression    • Edema     LOWER FEET   • Hydrocephalus     NORMAL PRESSURE   • Hypertension    • Incontinence    • Nausea    • Parastomal hernia    • Parkinson disease (CMS/HCC)    • Tremor     RIGHT HAND   • Unsteady gait      Past Surgical History:   Procedure Laterality Date   • ABDOMINAL SURGERY     • APPENDECTOMY     • BREAST RECONSTRUCTION Left     LEFT   • CHOLECYSTECTOMY     • COLON SURGERY     • COLONOSCOPY     • COLOSTOMY     • HYSTERECTOMY     • MASTECTOMY Left    • PARASTOMAL HERNIA REPAIR N/A 3/17/2017    Procedure: REPAIR PARASTOMAL HERNIA;  Surgeon: Princess Walters MD;  Location: [facility];  Service:           OT ASSESSMENT FLOWSHEET (last 12 hours)      Occupational Therapy Evaluation     Row Name 19 1434                   OT Evaluation Time/Intention    Document Type  evaluation  -MW        Mode of Treatment  occupational therapy   -MW           General Information    Patient Profile Reviewed?  yes  -MW        Onset of Illness/Injury or Date of Surgery  05/07/19  -MW        Referring Physician  COLTON Kohler  -MW        Patient Observations  lethargic  -MW        Patient/Family Observations  daughter present, hx gathered per daughter  -MW        General Observations of Patient  asleep on arrival, remains lethargic throughout eval  -MW        Prior Level of Function  independent:;feeding;max assist:;grooming;dressing;bathing;transfer per daughter's report  -MW        Pertinent History of Current Functional Problem  Abdominal pain, decreased appetitie/intake, decreased ostomy output. Dx: Sepsis, E. coli UTI, acute renal failure, rhabdomyolysis, hypotension. hyperkalemia, metabolic acidosis, uremia. 5/7 H&H: 9.7/29.8.   -MW        Existing Precautions/Restrictions  fall  -MW        Limitations/Impairments  safety/cognitive  -MW        Risks Reviewed  patient and family:;LOB;nausea/vomiting;dizziness;increased discomfort;change in vital signs;increased drainage;lines disloged  -MW        Benefits Reviewed  patient and family:;improve function;increase independence;increase strength;increase balance;decrease pain;decrease risk of DVT;improve skin integrity;increase knowledge  -MW        Barriers to Rehab  previous functional deficit;cognitive status  -MW           Relationship/Environment    Lives With  facility resident  -MW        Name(s) of Who Lives With Patient  [place]  -MW           Resource/Environmental Concerns    Current Living Arrangements  extended care facility  -           Cognitive Assessment/Interventions    Additional Documentation  Cognitive Assessment/Intervention (Group)  -           Cognitive Assessment/Intervention- PT/OT    Affect/Mental Status (Cognitive)  low arousal/lethargic  -MW        Orientation Status (Cognition)  oriented to;person  -MW        Follows Commands (Cognition)  follows one step commands;0-24%  accuracy  -MW        Safety Deficit (Cognitive)  ability to follow commands  -        Personal Safety Interventions  muscle strengthening facilitated;supervised activity  -           Safety Issues, Functional Mobility    Safety Issues Affecting Function (Mobility)  ability to follow commands  -           Bed Mobility Assessment/Treatment    Comment (Bed Mobility)  pt winces and groans with any movement, deferred bed mobility for pt comfort  -           ADL Assessment/Intervention    BADL Assessment/Intervention  grooming  -           Grooming Assessment/Training    [place] Level (Grooming)  wash face, hands;dependent (less than 25% patient effort)  -        Grooming Position  sitting up in bed  -        Comment (Grooming)  unable to participate in any part of task, unable to grasp and hold washcloth  -           BADL Safety/Performance    Impairments, BADL Safety/Performance  cognition;grasp/prehension;muscle tone abnormality;strength;range of motion  -           General ROM    GENERAL ROM COMMENTS  PROM impaired at all BUE joints >75%  -           MMT (Manual Muscle Testing)    General MMT Comments  1/5 obs in BUE  -MW           Positioning and Restraints    Pre-Treatment Position  in bed  -MW        Post Treatment Position  bed  -MW        In Bed  fowlers;call light within reach;encouraged to call for assist;patient within staff view;with family/caregiver;side rails up x3;pillow between legs pillows surrounding and under pt   -MW           Pain Assessment    Additional Documentation  Pain Scale: FACES Pre/Post-Treatment (Group)  -           Pain Scale: FACES Pre/Post-Treatment    Pain: FACES Scale, Pretreatment  4-->hurts little more  -MW        Pain: FACES Scale, Post-Treatment  4-->hurts little more  -MW        Pre/Post Treatment Pain Comment  pain with movement  -           Wound 05/07/19 0532 Right heel pressure injury    Wound - [facility] Date first assessed: 05/07/19  [facility]  Time first assessed: 0532  - Side: Right  - Location: heel  - Type: pressure injury  - Stage, Pressure Injury: Stage 1  -       Plan of Care Review    Plan of Care Reviewed With  patient;daughter  -           Clinical Impression (OT)    Date of Referral to OT  05/07/19  -MW        OT Diagnosis  decreased ADL  -MW        Prognosis (OT Eval)  poor  -MW        Patient/Family Goals Statement (OT Eval)  family wishes to maintain pt comfort and address medical needs at this time  -MW        Criteria for Skilled Therapeutic Interventions Met (OT Eval)  no;no significant expected improvement in functional status  -MW        Therapy Frequency (OT Eval)  evaluation only  -MW        Care Plan Review (OT)  evaluation/treatment results reviewed;care plan/treatment goals reviewed;risks/benefits reviewed;current/potential barriers reviewed;patient/other agree to care plan  -MW        Anticipated Discharge Disposition (OT)  extended care facility  -MW           Vital Signs    Pre Systolic BP Rehab  116  -MW        Pre Treatment Diastolic BP  43  -MW        Post Systolic BP Rehab  124  -MW        Post Treatment Diastolic BP  49  -MW        Pretreatment Heart Rate (beats/min)  80  -MW        Pre SpO2 (%)  95  -MW        O2 Delivery Pre Treatment  room air  -MW        Pre Patient Position  Supine  -MW        Post Patient Position  Supine  -MW          User Key  (r) = Recorded By, (t) = Taken By, (c) = Cosigned By    Initials Name Effective Dates     Haase, Mallory L, RN 03/01/17 -     Fidelia Mendoza OTR/L 08/28/18 -           Occupational Therapy Education     Title: PT OT SLP Therapies (Done)     Topic: Occupational Therapy (Done)     Point: ADL training (Done)     Description: Instruct learner(s) on proper safety adaptation and remediation techniques during self care or transfers.   Instruct in proper use of assistive devices.    Learning Progress Summary           Patient Acceptance, E, VU by LEWIS at 5/7/2019   3:18 PM    Comment:  OT POC   Family Acceptance, E, VU by LEWIS at 5/7/2019  3:18 PM    Comment:  OT POC                               User Key     Initials Effective Dates Name Provider Type Discipline    LEWIS 08/28/18 -  Fidelia Gonzalez, OTR/L Occupational Therapist OT                OT Recommendation and Plan  Outcome Summary/Treatment Plan (OT)  Anticipated Discharge Disposition (OT): extended care facility  Therapy Frequency (OT Eval): evaluation only  Plan of Care Review  Plan of Care Reviewed With: patient  Plan of Care Reviewed With: patient  Outcome Summary: OT eval completed. Pt lethargic throughout eval, able to state her name and birthday but closes her eyes after responses. Trace movement obs in BUE. Unable to grasp washcloth when placed in hands or initiate grooming task. Spoke with daughter in room with plan to hold therapy services at this time to address medical needs. OT to sign off, please reconsult if pt becomes more medically appropriate.     Rehab Goal Summary     Row Name 05/07/19 0900             Swallow Goals (SLP)    Oral Nutrition/Hydration Goal Selection (SLP)  oral nutrition/hydration, SLP goal 1  -CS (r) SH (t) CS (c)      Lingual Strengthening Goal Selection (SLP)  lingual strengthening, SLP goal 1  -CS (r) SH (t) CS (c)      Pharyngeal Strengthening Exercise Goal Selection (SLP)  pharyngeal strengthening exercise, SLP goal 1  -CS (r) SH (t) CS (c)      Additional Documentation  lingual strengthening goal selection (SLP);pharyngeal strengthening exercise goal selection (SLP)  -CS (r) SH (t) CS (c)         Oral Nutrition/Hydration Goal 1 (SLP)    Oral Nutrition/Hydration Goal 1, SLP  Patient will tolerate LRD with no s/s aspiration.  -CS (r) SH (t) CS (c)      Time Frame (Oral Nutrition/Hydration Goal 1, SLP)  by discharge  -CS (r) SH (t) CS (c)      Barriers (Oral Nutrition/Hydration Goal 1, SLP)  n/a  -CS (r) SH (t) CS (c)      Progress/Outcomes (Oral Nutrition/Hydration Goal 1, SLP)   goal ongoing  -CS (r) SH (t) CS (c)         Lingual Strengthening Goal 1 (SLP)    Activity (Lingual Strengthening Goal 1, SLP)  increase lingual tone/sensation/control/coordination/movement  -CS (r) SH (t) CS (c)      Increase Lingual Tone/Sensation/Control/Coordination/Movement  lingual movement exercises  -CS (r) SH (t) CS (c)      Stevens/Accuracy (Lingual Strengthening Goal 1, SLP)  independently (over 90% accuracy)  -CS (r) SH (t) CS (c)      Time Frame (Lingual Strengthening Goal 1, SLP)  by discharge  -CS (r) SH (t) CS (c)      Barriers (Lingual Strengthening Goal 1, SLP)  n/a  -CS (r) SH (t) CS (c)      Progress/Outcomes (Lingual Strengthening Goal 1, SLP)  goal ongoing  -CS (r) SH (t) CS (c)         Pharyngeal Strengthening Exercise Goal 1 (SLP)    Activity (Pharyngeal Strengthening Goal 1, SLP)  increase timing  -CS (r) SH (t) CS (c)      Increase Timing  gustatory stimulation (sour/cold);prepping - 3 second prep or suck swallow or 3-step swallow;hard effortful swallow  -CS (r) SH (t) CS (c)      Stevens/Accuracy (Pharyngeal Strengthening Goal 1, SLP)  independently (over 90% accuracy)  -CS (r) SH (t) CS (c)      Time Frame (Pharyngeal Strengthening Goal 1, SLP)  by discharge  -CS (r) SH (t) CS (c)      Barriers (Pharyngeal Strengthening Goal 1, SLP)  n/a  -CS (r) SH (t) CS (c)      Progress/Outcomes (Pharyngeal Strengthening Goal 1, SLP)  goal ongoing  -CS (r) SH (t) CS (c)        User Key  (r) = Recorded By, (t) = Taken By, (c) = Cosigned By    Initials Name Provider Type Discipline    Benjamin Contreras MS CCC-SLP Speech and Language Pathologist SLP    Michelle Patterson, Speech Therapy Student Speech Therapy Student SLP          Outcome Measures     Row Name 05/07/19 1500             How much help from another is currently needed...    Putting on and taking off regular lower body clothing?  1  -MW      Bathing (including washing, rinsing, and drying)  1  -MW      Toileting (which includes using  toilet bed pan or urinal)  1  -MW      Putting on and taking off regular upper body clothing  1  -MW      Taking care of personal grooming (such as brushing teeth)  1  -MW      Eating meals  1  -MW      Score  6  -MW         Functional Assessment    Outcome Measure Options  AM-PAC 6 Clicks Daily Activity (OT)  -MW        User Key  (r) = Recorded By, (t) = Taken By, (c) = Cosigned By    Initials Name Provider Type     Fidelia Gonzalez, OTR/L Occupational Therapist          Time Calculation:   Time Calculation- OT     Row Name 05/07/19 1519             Time Calculation- OT    OT Start Time  1432 +10 min chart review  -MW      OT Stop Time  1504  -MW      OT Time Calculation (min)  32 min  -MW      OT Received On  05/07/19  -        User Key  (r) = Recorded By, (t) = Taken By, (c) = Cosigned By    Initials Name Provider Type     Fidelia Gonzalez, OTR/L Occupational Therapist        Therapy Suggested Charges     Code   Minutes Charges    None           Therapy Charges for Today     Code Description Service Date Service Provider Modifiers Qty    [id number]  OT EVAL HIGH COMPLEXITY 3 5/7/2019 Fidelia Gonzalez OTR/L GO 1               OT Discharge Summary  Anticipated Discharge Disposition (OT): extended care facility  Reason for Discharge: At baseline function, Unable to participate  Outcomes Achieved: Unable to tolerate or actively participate in therapy  Discharge Destination: Extended care facility - LTC    GARY Saldaña/LILLIAN  5/7/2019

## 2019-05-07 NOTE — CONSULTS
Nephrology ([facility]) Consult Note      Patient:  Susy Ko  YOB: 1940  Date of Service: 5/7/2019  MRN: [id number]   Acct: [id number]   Primary Care Physician: Monster Zuniga MD  Advance Directive:   Code Status and Medical Interventions:   Ordered at: 05/07/19 1110     Level Of Support Discussed With:    Patient     Code Status:    CPR     Medical Interventions (Level of Support Prior to Arrest):    Full     Admit Date: 5/6/2019       Hospital Day: 1  Referring Provider: Monster Zuniga MD      Patient Seen, Chart, Consults, Notes, Labs, Radiology studies reviewed.    Chief complaint: Abnormal labs    Subjective:  Susy Ko is a 79 y.o. female  whom we were consulted for acute kidney injury/metabolic acidosis.  All the  information is taken from the patient's family as patient is demented.  Apparently patient is a nursing home resident due to advanced dementia.  Patient was complaining of abdominal pain and was sent to the emergency room where she was found to have acute kidney injury, severe metabolic acidosis, UTI, sepsis and septic shock. She is admitted to ICU. She is currently on IV vasopressors/IV fluid and antibiotics.  CT scan of the abdomen did not show any evidence of intestinal obstruction.  She has history of colostomy putting out stool.  Nephrology is consulted for evaluation and treatment of acute kidney injury and metabolic acidosis.    Allergies:  Morphine and related and Adhesive tape    Home Meds:  Medications Prior to Admission   Medication Sig Dispense Refill Last Dose   • acetaminophen (TYLENOL) 325 MG tablet Take 650 mg by mouth Every 6 (Six) Hours As Needed for Mild Pain .      • carbidopa-levodopa (SINEMET)  MG per tablet Take 1 tablet by mouth 3 (Three) Times a Day.      • citalopram (CeleXA) 10 MG tablet Take 10 mg by mouth Every Night.   3/16/2017 at 1800   • Omeprazole Magnesium (PRILOSEC OTC PO) Take  by  mouth.      • tiotropium bromide-olodaterol (STIOLTO RESPIMAT) 2.5-2.5 MCG/ACT aerosol solution inhaler Inhale Daily.      • vitamin B-12 (CYANOCOBALAMIN) 100 MCG tablet Take 100 mcg by mouth Daily.   3/15/2017 at 0600   • ALPRAZolam (XANAX) 0.5 MG tablet Take 0.5 mg by mouth Every Night.   3/16/2017 at 1800   • HYDROcodone-acetaminophen (NORCO) 7.5-325 MG per tablet Take 1 tablet by mouth Every 4 (Four) Hours As Needed for Moderate Pain (4-6) for up to 30 doses. 30 tablet 0        Medicines:  Current Facility-Administered Medications   Medication Dose Route Frequency Provider Last Rate Last Dose   • acetaminophen (TYLENOL) tablet 650 mg  650 mg Oral Q6H PRN Monster Zuniga MD       • acetaminophen (TYLENOL) tablet 650 mg  650 mg Oral Q4H PRN Monster Zuniga MD       • ALPRAZolam (XANAX) tablet 0.5 mg  0.5 mg Oral Nightly Monster Zuniga MD       • carbidopa-levodopa (SINEMET)  MG per tablet 1 tablet  1 tablet Oral TID Monster Zuniga MD       • cefTRIAXone (ROCEPHIN) 1 g/100 mL 0.9% NS (MBP)  1 g Intravenous Q24H Traci Kohler APRN       • citalopram (CeleXA) tablet 10 mg  10 mg Oral Nightly Monster Zuniga MD       • cyancobalamin (VITAMIN B-12) tablet 100 mcg  100 mcg Oral Daily Monster Zuniga MD       • HYDROcodone-acetaminophen (NORCO) 7.5-325 MG per tablet 1 tablet  1 tablet Oral Q4H PRN Monster Zuniga MD       • norepinephrine (LEVOPHED) 8,000 mcg in sodium chloride 0.9 % 250 mL (32 mcg/mL) infusion  0.02-0.3 mcg/kg/min Intravenous Titrated Monster Zuniga MD 2.15 mL/hr at 05/07/19 1008 0.02 mcg/kg/min at 05/07/19 1008   • pantoprazole (PROTONIX) EC tablet 20 mg  20 mg Oral QAM AC Monster Zuniga MD       • sodium chloride 0.9 % flush 10 mL  10 mL Intravenous PRN Prosper Jiang DO       • sodium chloride 0.9 % flush 3 mL  3 mL Intravenous Q12H Monster Zuniga MD       • sodium chloride 0.9 % flush 3-10 mL  3-10 mL  "Intravenous PRN Montser Zuniga MD       • sodium chloride 0.9 % infusion  150 mL/hr Intravenous Continuous Edita Traci ZakiyaCOLTON 150 mL/hr at 05/07/19 1008 150 mL/hr at 05/07/19 1008       Past Medical History:  Past Medical History:   Diagnosis Date   • Anxiety    • Breast cancer (CMS/HCC)      LEFT WITH RECONSTRUCTION   • Colostomy in place (CMS/HCC)    • Dementia    • Depression    • Edema     LOWER FEET   • Hydrocephalus     NORMAL PRESSURE   • Hypertension    • Incontinence    • Nausea    • Parastomal hernia    • Parkinson disease (CMS/HCC)    • Tremor     RIGHT HAND   • Unsteady gait        Past Surgical History:  Past Surgical History:   Procedure Laterality Date   • ABDOMINAL SURGERY     • APPENDECTOMY     • BREAST RECONSTRUCTION Left     LEFT   • CHOLECYSTECTOMY     • COLON SURGERY     • COLONOSCOPY     • COLOSTOMY     • HYSTERECTOMY     • MASTECTOMY Left    • PARASTOMAL HERNIA REPAIR N/A 3/17/2017    Procedure: REPAIR PARASTOMAL HERNIA;  Surgeon: Princess Walters MD;  Location: [facility];  Service:        Family History  History reviewed. No pertinent family history.    Social History  Social History     Socioeconomic History   • Marital status:      Spouse name: Not on file   • Number of children: Not on file   • Years of education: Not on file   • Highest education level: Not on file   Tobacco Use   • Smoking status: Former Smoker   • Smokeless tobacco: Never Used   Substance and Sexual Activity   • Alcohol use: No   • Drug use: No   • Sexual activity: Defer         Review of Systems:  Unable to obtain due to dementia.    Objective:  /58   Pulse 77   Temp 98.5 °F (36.9 °C) (Axillary)   Resp 18   Ht 160 cm (63\")   Wt 57.4 kg (126 lb 9.6 oz)   SpO2 96%   BMI 22.43 kg/m²     Intake/Output Summary (Last 24 hours) at 5/7/2019 1140  Last data filed at 5/7/2019 0845  Gross per 24 hour   Intake 1390.7 ml   Output 450 ml   Net 940.7 ml     General: awake/alert   HEENT: " Normocephalic a traumatic head  Neck: Supple with no JVD or carotid bruits.  Chest:  clear to auscultation bilaterally without respiratory distress  CVS: regular rate and rhythm  Abdominal: Abdominal tenderness/mild distention/colostomy  Extremities: no cyanosis or edema  Skin: warm and dry without rash      Labs:    Results from last 7 days   Lab Units 05/06/19  2104   WBC 10*3/mm3 17.75*   HEMOGLOBIN g/dL 9.7*   HEMATOCRIT % 29.8*   PLATELETS 10*3/mm3 198       Results from last 7 days   Lab Units 05/07/19  0406 05/07/19  0025 05/06/19  2104   SODIUM mmol/L 134* 133* 132*   POTASSIUM mmol/L 5.3 5.0 6.8*   CHLORIDE mmol/L 107 105 103   CO2 mmol/L 8.0* 9.0* <5.0*   BUN mg/dL 104* 105* 115*   CREATININE mg/dL 5.24* 5.31* 6.33*   CALCIUM mg/dL 6.2* 6.5* 6.5*   BILIRUBIN mg/dL 0.4 0.6 0.6   ALK PHOS U/L 103 104 120   ALT (SGPT) U/L 19 15 16   AST (SGOT) U/L 229* 177* 186*   GLUCOSE mg/dL 101* 167* 131*         Radiology:   Imaging Results (last 24 hours)     Procedure Component Value Units Date/Time    US Renal Bilateral [[id number]] Collected:  05/07/19 1124     Updated:  05/07/19 1129    Narrative:       EXAMINATION:  US RENAL BILATERAL-  5/7/2019 10:11 AM CDT     HISTORY: acute renal failure; A41.9-Sepsis, unspecified organism;  R65.20-Severe sepsis without septic shock; N39.0-Urinary tract  infection, site not specified; E87.5-Hyperkalemia; N17.9-Acute kidney  failure, unspecified; R41.82-Altered mental status, unspecified;  R13.19-Other dysphagia      COMPARISON: 05/10/2014 renal ultrasound. 05/06/2019 abdomen and pelvis  CT     TECHNIQUE:      Bilateral renal ultrasound was performed.     FINDINGS:      The right kidney measures 10.1 cm in [id number]-[id number] length.     The left kidney measures 11 cm in gsgl-lk-[id number] length.     There is mild cortical thinning and pelvic lipomatosis compatible with  age.     Normal echogenicity of renal cortex is observed without renal masses.     There are no perinephric  abnormalities.     There is no pelvocaliectasis or obstructive uropathy changes.     The urinary bladder is decompressed with a Peralta catheter.       Impression:       1. Negative bilateral renal ultrasound.  This report was finalized on 05/07/2019 11:26 by Dr. Iban Queen MD.    CT Abdomen Pelvis Without Contrast [[id number]] Collected:  05/06/19 2236     Updated:  05/06/19 2242    Narrative:       EXAMINATION: CT ABDOMEN PELVIS WO CONTRAST-      5/6/2019 10:17 PM CDT     HISTORY: pain; r/o obstruction; A41.9-Sepsis, unspecified organism;  R65.20-Severe sepsis without septic shock; N39.0-Urinary tract  infection, site not specified; E87.5-Hyperkalemia; N17.9-Acute kidney  failure, unspecified; R41.82-Altered mental status, unspecified     In order to have a CT radiation dose as low as reasonably achievable  Automated Exposure Control was utilized for adjustment of the mA and/or  KV according to patient size.     DLP in mGycm= 343.     Noncontrast abdomen/pelvis CT.     Comparison is made with 04/12/2016.     Left lower quadrant ostomy with nonobstructing parastomal hernia.     No significant bowel dilation.     Mild urinary bladder distention.     Mild dilation of the renal collecting systems and ureters is probably  based on vesicoureteral reflux. There is no obstructing stone.     Normal heart size.  No acute abnormality at the lung bases.     Cholecystectomy clips.  Normal noncontrast appearance of the liver and spleen.  The pancreas is atrophic.     Aortic calcification with no aneurysm.     Summary:  1. Moderate fecal material throughout the colon.  2. Nonobstructing parastomal hernia.  3. Mild dilation of the renal collecting systems and ureters compatible  with vesicoureteral reflux.  4. No evidence of bowel obstruction, mass or abscess.                                   This report was finalized on 05/06/2019 22:39 by Dr. Raul Ricci MD.    CT Head Without Contrast [[id number]] Collected:  05/06/19 2234      Updated:  05/06/19 2238    Narrative:       EXAMINATION: CT HEAD WO CONTRAST-      5/6/2019 10:17 PM CDT     HISTORY: AMS; A41.9-Sepsis, unspecified organism; R65.20-Severe sepsis  without septic shock; N39.0-Urinary tract infection, site not specified;  E87.5-Hyperkalemia; N17.9-Acute kidney failure, unspecified;  R41.82-Altered mental status, unspecified     In order to have a CT radiation dose as low as reasonably achievable  Automated Exposure Control was utilized for adjustment of the mA and/or  KV according to patient size.     DLP in mGycm= 1214.     Noncontrast head CT compared with 01/17/2017.     Axial, sagittal, and coronal noncontrast CT imaging of the head.     The visualized paranasal sinuses are clear.     The brain and ventricles have an age appropriate appearance.  Moderate atrophy and small vessel disease.  There is no hemorrhage or mass-effect.   No acute infarction is seen.     No calvarial abnormality.       Impression:       1. No acute intracranial abnormality is seen.                                         This report was finalized on 05/06/2019 22:35 by Dr. Raul Ricci MD.    XR Chest 1 View [[id number]] Collected:  05/06/19 2216     Updated:  05/06/19 2219    Narrative:       EXAMINATION: XR CHEST 1 VW-     5/6/2019 10:11 PM CDT     HISTORY: Altered mental status; r/o pneumonia.     One view chest x-ray compared with 01/17/2017.     Heart size is normal.  The mediastinum is within normal limits.      The lungs are normally expanded with no pneumonia or pneumothorax.  Chronic lung changes.  No congestive failure changes.                                                                       Impression:       1. No acute disease.           This report was finalized on 05/06/2019 22:16 by Dr. Raul Ricci MD.          Culture:  No components found for: WOUNDCUL, 3  No components found for: CSFCUL, 3  No components found for: BC, 3  No components found for: URINECUL, 3      Assessment    1.  Acute kidney injury/ATN/anuric.  2.  Severe metabolic acidosis.  3.  Septic shock.  4.  Urinary tract infection.  5.  Hypocalcemia.  6.  Advanced dementia.  7.  History of colostomy.  8.  Rhabdomyolysis.    Plan:  1.  IV fluid with sodium bicarbonate  2.  Long discussion with the family explaining poor prognosis.  3.  Family agreed to DNR status and no dialysis.  4.  Continue IV antibiotics.      Thank you for the consult, we appreciate the opportunity to provide care to your patients.  Feel free to contact me if I can be of any further assistance.      Ramsey Weldon MD  5/7/2019  11:40 AM

## 2019-05-07 NOTE — THERAPY EVALUATION
Acute Care - Speech Language Pathology   Swallow Initial Evaluation [facility]     Patient Name: Susy Ko  : 1940  MRN: [id number]  Today's Date: 2019               Admit Date: 2019    Patient was seen upright in bed for clinical bedside swallow evaluation. ST aroused patient utilizing cold bolus stimulation, tactile, and verbal cues. Patient was lethargic throughout the evaluation, but would respond to her name. Patient did not follow commands during the oral mechanism examination. Patient was given ice chip x1. She accepted chip off spoon and chewed it before initiating swallow. 1x trial of pudding-thick liquid was given. Patient took small amount off spoon and held in mouth before initiating swallow. Trials were stopped as arousal state did not improve. Patient to remain NPO at this time. Meds to be given via alternative route. Oral care to be given daily. ST to be notified when patient is able to particiate/follow commands.  Michelle Chacon, Speech Therapy Student 2019 12:47 PM    Visit Dx:     ICD-10-CM ICD-9-CM   1. Severe sepsis (CMS/HCC) A41.9 038.9    R65.20 995.92   2. Urinary tract infection, acute N39.0 599.0   3. Hyperkalemia E87.5 276.7   4. Acute kidney injury (CMS/HCC) N17.9 584.9   5. Altered mental status, unspecified altered mental status type R41.82 780.97   6. Other dysphagia R13.19 787.29     Patient Active Problem List   Diagnosis   • Normal pressure hydrocephalus   • Non morbid obesity due to excess calories   • Tobacco non-user   • Tremor of right hand   • Abnormality of gait   • Parastomal hernia   • Hernia   • Gait abnormality   • Severe sepsis (CMS/HCC)     Past Medical History:   Diagnosis Date   • Anxiety    • Breast cancer (CMS/HCC)      LEFT WITH RECONSTRUCTION   • Colostomy in place (CMS/HCC)    • Dementia    • Depression    • Edema     LOWER FEET   • Hydrocephalus     NORMAL PRESSURE   • Hypertension    • Incontinence    • Nausea    • Parastomal hernia     • Parkinson disease (CMS/HCC)    • Tremor     RIGHT HAND   • Unsteady gait      Past Surgical History:   Procedure Laterality Date   • ABDOMINAL SURGERY     • APPENDECTOMY     • BREAST RECONSTRUCTION Left     LEFT   • CHOLECYSTECTOMY     • COLON SURGERY     • COLONOSCOPY     • COLOSTOMY     • HYSTERECTOMY     • MASTECTOMY Left    • PARASTOMAL HERNIA REPAIR N/A 3/17/2017    Procedure: REPAIR PARASTOMAL HERNIA;  Surgeon: Princess Walters MD;  Location: [facility] OR;  Service:         SWALLOW EVALUATION (last 72 hours)      SLP Adult Swallow Evaluation     Row Name 05/07/19 0900                   Rehab Evaluation    Document Type  evaluation  (Pended)   -        Subjective Information  no complaints  (Pended)   -        Patient Observations  alert;cooperative;agree to therapy  (Pended)   -        Patient/Family Observations  no family present at bedside  (Pended)   -        Patient Effort  poor  (Pended)   -           General Information    Patient Profile Reviewed  yes  (Pended)   -        Pertinent History Of Current Problem  severe sepsis  (Pended)   -        Current Method of Nutrition  NPO  (Pended)   -        Precautions/Limitations, Vision  WFL  (Pended)   -        Precautions/Limitations, Hearing  WFL  (Pended)   -        Prior Level of Function-Communication  WFL  (Pended)   -        Prior Level of Function-Swallowing  no diet consistency restrictions  (Pended)   -        Plans/Goals Discussed with  patient  (Pended)   -        Barriers to Rehab  none identified  (Pended)   -        Patient's Goals for Discharge  return to all previous roles/activities  (Pended)   -        Family Goals for Discharge  patient able to return to all previous activities/roles  (Pended)   -           Pain Assessment    Additional Documentation  Pain Scale: FACES Pre/Post-Treatment (Group)  (Pended)   -           Pain Scale: FACES Pre/Post-Treatment    Pain: FACES Scale, Pretreatment  2-->hurts little  bit  (Pended)   -        Pain: FACES Scale, Post-Treatment  2-->hurts little bit  (Pended)   -           Oral Motor and Function    Dentition Assessment  natural, present and adequate  (Pended)   -        Secretion Management  WNL/WFL  (Pended)   -        Mucosal Quality  dry  (Pended)   -        Volitional Swallow  unable to elicit  (Pended)   -        Volitional Cough  unable to elicit  (Pended)   -           Oral Musculature and Cranial Nerve Assessment    Oral Motor General Assessment  unable to assess  (Pended)   -           Clinical Swallow Eval    Oral Prep Phase  impaired  (Pended)   -        Oral Transit  impaired  (Pended)   -        Oral Residue  impaired  (Pended)   -        Pharyngeal Phase  suspected pharyngeal impairment  (Pended)   -        Esophageal Phase  unremarkable  (Pended)   -        Clinical Swallow Evaluation Summary  Patient was seen upright in bed for clinical bedside swallow evaluation. ST aroused patient utilizing cold bolus stimulation, tactile, and verbal cues. Patient was lethargic throughout the evaluation, but would respond to her name. Patient did not follow commands during the oral mechanism examination. Patient was given ice chip x1. She accepted chip off spoon and chewed it before initiating swallow. 1x trial of pudding-thick liquid was given. Patient took small amount off spoon and held in mouth before initiating swallow. Trials were stopped as arousal state did not improve. Patient to remain NPO at this time. Meds to be given via alternative route. Oral care to be given daily. ST to be notified when patient is able to particiate/follow commands.   (Pended)   -           Oral Prep Concerns    Oral Prep Concerns  increased prep time  (Pended)   -        Increased Prep Time  pudding  (Pended)   -           Oral Transit Concerns    Oral Transit Concerns  increased oral transit time  (Pended)   -        Increased Oral Transit Time  pudding   (Pended)   -           Oral Residue Concerns    Oral Residue Concerns  diffuse residue throughout oral cavity  (Pended)   -        Diffuse Residue Throughout Oral Cavity  pudding  (Pended)   -           Pharyngeal Phase Concerns    Pharyngeal Phase Concerns  other (see comments)  (Pended)  unable to safely rule out pharyngeal dysfunction  -           Clinical Impression    SLP Swallowing Diagnosis  moderate;suspected pharyngeal dysfunction  (Pended)   -        Functional Impact  risk of aspiration/pneumonia  (Pended)   -        Rehab Potential/Prognosis, Swallowing  adequate, monitor progress closely  (Pended)   -        Swallow Criteria for Skilled Therapeutic Interventions Met  other (see comments)  (Pended)  ST to be notified when patient is alert  -           Recommendations    Therapy Frequency (Swallow)  at least;3 days per week  (Pended)   -        Predicted Duration Therapy Intervention (Days)  until discharge  (Pended)   -        SLP Diet Recommendation  NPO  (Pended)   -        Recommended Precautions and Strategies  small bites of food and sips of liquid;upright posture during/after eating  (Pended)   -        SLP Rec. for Method of Medication Administration  meds via alternate route  (Pended)   -        Monitor for Signs of Aspiration  notify SLP if any concerns  (Pended)   -        Anticipated Dischage Disposition  unknown  (Pended)   -           Swallow Goals (SLP)    Oral Nutrition/Hydration Goal Selection (SLP)  oral nutrition/hydration, SLP goal 1  (Pended)   -        Lingual Strengthening Goal Selection (SLP)  lingual strengthening, SLP goal 1  (Pended)   -        Pharyngeal Strengthening Exercise Goal Selection (SLP)  pharyngeal strengthening exercise, SLP goal 1  (Pended)   -        Additional Documentation  lingual strengthening goal selection (SLP);pharyngeal strengthening exercise goal selection (SLP)  (Pended)   -           Oral Nutrition/Hydration Goal 1  (SLP)    Oral Nutrition/Hydration Goal 1, SLP  Patient will tolerate LRD with no s/s aspiration.  (Pended)   -        Time Frame (Oral Nutrition/Hydration Goal 1, SLP)  by discharge  (Pended)   -        Barriers (Oral Nutrition/Hydration Goal 1, SLP)  n/a  (Pended)   -        Progress/Outcomes (Oral Nutrition/Hydration Goal 1, SLP)  goal ongoing  (Pended)   -           Lingual Strengthening Goal 1 (SLP)    Activity (Lingual Strengthening Goal 1, SLP)  increase lingual tone/sensation/control/coordination/movement  (Pended)   -        Increase Lingual Tone/Sensation/Control/Coordination/Movement  lingual movement exercises  (Pended)   -        [place]/Accuracy (Lingual Strengthening Goal 1, SLP)  independently (over 90% accuracy)  (Pended)   -        Time Frame (Lingual Strengthening Goal 1, SLP)  by discharge  (Pended)   -        Barriers (Lingual Strengthening Goal 1, SLP)  n/a  (Pended)   -        Progress/Outcomes (Lingual Strengthening Goal 1, SLP)  goal ongoing  (Pended)   -           Pharyngeal Strengthening Exercise Goal 1 (SLP)    Activity (Pharyngeal Strengthening Goal 1, SLP)  increase timing  (Pended)   -        Increase Timing  gustatory stimulation (sour/cold);prepping - 3 second prep or suck swallow or 3-step swallow;hard effortful swallow  (Pended)   -        [place]/Accuracy (Pharyngeal Strengthening Goal 1, SLP)  independently (over 90% accuracy)  (Pended)   -        Time Frame (Pharyngeal Strengthening Goal 1, SLP)  by discharge  (Pended)   -        Barriers (Pharyngeal Strengthening Goal 1, SLP)  n/a  (Pended)   -        Progress/Outcomes (Pharyngeal Strengthening Goal 1, SLP)  goal ongoing  (Pended)   -          User Key  (r) = Recorded By, (t) = Taken By, (c) = Cosigned By    Initials Name Effective Dates     Michelle Chacon, Speech Therapy Student 02/27/19 -           EDUCATION  The patient has been educated in the following areas:   Dysphagia  (Swallowing Impairment) NPO rationale.    SLP Recommendation and Plan  SLP Swallowing Diagnosis: (P) moderate, suspected pharyngeal dysfunction  SLP Diet Recommendation: (P) NPO  Recommended Precautions and Strategies: (P) small bites of food and sips of liquid, upright posture during/after eating  SLP Rec. for Method of Medication Administration: (P) meds via alternate route     Monitor for Signs of Aspiration: (P) notify SLP if any concerns     Swallow Criteria for Skilled Therapeutic Interventions Met: (P) other (see comments)(ST to be notified when patient is alert)  Anticipated Dischage Disposition: (P) unknown  Rehab Potential/Prognosis, Swallowing: (P) adequate, monitor progress closely  Therapy Frequency (Swallow): (P) at least, 3 days per week  Predicted Duration Therapy Intervention (Days): (P) until discharge       Plan of Care Reviewed With: (P) patient  Plan of Care Review  Plan of Care Reviewed With: (P) patient  Progress: (P) no change  Outcome Summary: (P) Patient was seen upright in bed for clinical bedside swallow evaluation. ST aroused patient utilizing cold bolus stimulation, tactile, and verbal cues. Patient was lethargic throughout the evaluation, but would respond to her name. Patient did not follow commands during the oral mechanism examination. Patient was given ice chip x1. She accepted chip off spoon and chewed it before initiating swallow. 1x trial of pudding-thick liquid was given. Patient took small amount off spoon and held in mouth before initiating swallow. Trials were stopped as arousal state did not improve. Patient to remain NPO at this time. ST to be notified when patient is able to particiate/follow commands.    SLP GOALS     Row Name 05/07/19 0900             Oral Nutrition/Hydration Goal 1 (SLP)    Oral Nutrition/Hydration Goal 1, SLP  Patient will tolerate LRD with no s/s aspiration.  (Pended)   -SH      Time Frame (Oral Nutrition/Hydration Goal 1, SLP)  by discharge   (Pended)   -      Barriers (Oral Nutrition/Hydration Goal 1, SLP)  n/a  (Pended)   -      Progress/Outcomes (Oral Nutrition/Hydration Goal 1, SLP)  goal ongoing  (Pended)   -         Lingual Strengthening Goal 1 (SLP)    Activity (Lingual Strengthening Goal 1, SLP)  increase lingual tone/sensation/control/coordination/movement  (Pended)   -      Increase Lingual Tone/Sensation/Control/Coordination/Movement  lingual movement exercises  (Pended)   -      [place]/Accuracy (Lingual Strengthening Goal 1, SLP)  independently (over 90% accuracy)  (Pended)   -      Time Frame (Lingual Strengthening Goal 1, SLP)  by discharge  (Pended)   -      Barriers (Lingual Strengthening Goal 1, SLP)  n/a  (Pended)   -      Progress/Outcomes (Lingual Strengthening Goal 1, SLP)  goal ongoing  (Pended)   -         Pharyngeal Strengthening Exercise Goal 1 (SLP)    Activity (Pharyngeal Strengthening Goal 1, SLP)  increase timing  (Pended)   -      Increase Timing  gustatory stimulation (sour/cold);prepping - 3 second prep or suck swallow or 3-step swallow;hard effortful swallow  (Pended)   -      [place]/Accuracy (Pharyngeal Strengthening Goal 1, SLP)  independently (over 90% accuracy)  (Pended)   -      Time Frame (Pharyngeal Strengthening Goal 1, SLP)  by discharge  (Pended)   -      Barriers (Pharyngeal Strengthening Goal 1, SLP)  n/a  (Pended)   -      Progress/Outcomes (Pharyngeal Strengthening Goal 1, SLP)  goal ongoing  (Pended)   -        User Key  (r) = Recorded By, (t) = Taken By, (c) = Cosigned By    Initials Name Provider Type     Michelle Chacon, Speech Therapy Student Speech Therapy Student             Time Calculation:   Time Calculation- SLP     Row Name 05/07/19 1018             Time Calculation- [facility]    SLP Start Time  0900  (Pended)   -      SLP Stop Time  1000  (Pended)   -      SLP Time Calculation (min)  60 min  (Pended)   -      SLP Received On  05/07/19  (Pended)   -       SLP Goal Re-Cert Due Date  05/17/19  (Pended)   -        User Key  (r) = Recorded By, (t) = Taken By, (c) = Cosigned By    Initials Name Provider Type    Michelle Patterson Speech Therapy Student Speech Therapy Student          Therapy Charges for Today     Code Description Service Date Service Provider Modifiers Qty    [id number] HC ST EVAL ORAL PHARYNG SWALLOW 4 5/7/2019 Michelle Chacon, Speech Therapy Student GN 1               Michelle Chacon Speech Therapy Student  5/7/2019

## 2019-05-07 NOTE — ED PROVIDER NOTES
"Subjective   This is a 79-year-old female who has been brought to the emergency department by EMS at family's request.  She resides at [place] and per daughter has been declining for the last approximately 5 days.  She has been complaining of abdominal pain, has had a very poor appetite, and has had minimal stool out of her colostomy.  She has had a colostomy for approximately 5 years.  It was placed because of \"scar tissue\".  Daughter does not know if she is ever had a bowel obstruction in the past.  She has not been vomiting.  No fever.  No apparent breathing changes or complaints of chest pain.  She has had declining mentation and confusion for the last several days as well.  Family has not noticed any specific drooping of the face or loss of speech.  She has not had any specific unilateral weakness.  No reports of fevers.  No falls.  Patient is unable to fully participate in a review of systems.  Additional information not available at this time.            Review of Systems   Unable to perform ROS: Mental status change       Past Medical History:   Diagnosis Date   • Anxiety    • Breast cancer (CMS/HCC)      LEFT WITH RECONSTRUCTION   • Colostomy in place (CMS/HCC)    • Dementia    • Depression    • Edema     LOWER FEET   • Hydrocephalus     NORMAL PRESSURE   • Hypertension    • Incontinence    • Nausea    • Parastomal hernia    • Parkinson disease (CMS/HCC)    • Tremor     RIGHT HAND   • Unsteady gait        Allergies   Allergen Reactions   • Morphine And Related Mental Status Change   • Adhesive Tape Rash       Past Surgical History:   Procedure Laterality Date   • ABDOMINAL SURGERY     • APPENDECTOMY     • BREAST RECONSTRUCTION Left     LEFT   • CHOLECYSTECTOMY     • COLON SURGERY     • COLONOSCOPY     • COLOSTOMY     • HYSTERECTOMY     • MASTECTOMY Left    • PARASTOMAL HERNIA REPAIR N/A 3/17/2017    Procedure: REPAIR PARASTOMAL HERNIA;  Surgeon: Princess Walters MD;  Location: [facility] OR;  Service:  "       No family history on file.    Social History     Socioeconomic History   • Marital status:      Spouse name: Not on file   • Number of children: Not on file   • Years of education: Not on file   • Highest education level: Not on file   Tobacco Use   • Smoking status: Former Smoker   • Smokeless tobacco: Never Used   Substance and Sexual Activity   • Alcohol use: No   • Drug use: No   • Sexual activity: Defer           Objective   Physical Exam   Constitutional:   Somewhat interactive.  Appears fatigued.   Eyes: EOM are normal. Pupils are equal, round, and reactive to light.   Neck: Normal range of motion. Neck supple. No JVD present. No tracheal deviation present.   Cardiovascular: Normal rate, regular rhythm and normal heart sounds.   Pulmonary/Chest: Effort normal and breath sounds normal. No respiratory distress. She has no wheezes. She exhibits no tenderness.   Abdominal: She exhibits distension. There is tenderness. There is guarding.   LLQ ostomy with minimal gas and liquid stool in the bag.  Stoma itself is pink and appears healthy.  Patient has diffuse discomfort and mild guarding.  Minimal bowel sounds.   Musculoskeletal: She exhibits no edema or deformity.   Neurological:   Muscle weakness without obvious unilateral deficit.  Reactive to stimulus in all extremities.  Patient appears somnolent but is interactive.  GCS 14 losing 1 point for speech.  She does answer her name but answers few additional questions.  Patient is disoriented   Skin: Skin is warm and dry. Capillary refill takes less than 2 seconds. She is not diaphoretic.   Psychiatric: She has a normal mood and affect. Her behavior is normal.   Nursing note and vitals reviewed.      Procedures           ED Course  ED Course as of May 07 1525   Mon May 06, 2019   3450 Patient to be admitted to Dr. Zuniga service.  Patient has severe sepsis given 2 L IV fluid managed initially by Dr. Jiang.  Patient given broad-spectrum antibiotics.   Patient likely with urosepsis.  CT of the pelvis unremarkable.  [AP]      ED Course User Index  [AP] Kareem Meier MD      EMS run report reviewed    EKG at 2135 shows a sinus rhythm with a rate of 64 bpm.  First-degree AV block.  Indeterminate axis.  R wave progression is maintained.  Suggested T wave inversions in aVL.  No ST elevation or evidence for acute ischemia/infarction.    Labs drawn  Sepsis fluid bolus ordered  Urinalysis appears visually purulent so Rocephin has been ordered    Calcium, bicarb, insulin, and glucose ordered for hyperkalemia  Patient sent for imaging  Daughter updated on available results    Additional labs, fluids, antibiotic initiation, reassessment, and disposition are pending at shift change            MDM  Number of Diagnoses or Management Options  Acute kidney injury (CMS/HCC): new and requires workup  Altered mental status, unspecified altered mental status type: new and requires workup  Hyperkalemia: new and requires workup  Severe sepsis (CMS/HCC): new and requires workup  Urinary tract infection, acute: new and requires workup     Amount and/or Complexity of Data Reviewed  Clinical lab tests: ordered and reviewed  Decide to obtain previous medical records or to obtain history from someone other than the patient: yes  Obtain history from someone other than the patient: yes  Review and summarize past medical records: yes  Discuss the patient with other providers: yes  Independent visualization of images, tracings, or specimens: yes    Risk of Complications, Morbidity, and/or Mortality  Presenting problems: moderate  Diagnostic procedures: moderate  Management options: moderate    Critical Care  Total time providing critical care: < 30 minutes    Patient Progress  Patient progress: stable    ABG showing a severe metabolic acidosis  Labs also show concern for hyperkalemia  Calcium, bicarb, insulin, and glucose ordered    Additional labs, fluids, antibiotic initiation, reassessment,  and disposition are pending at shift change      Final diagnoses:   Severe sepsis (CMS/[facility])   Urinary tract infection, acute   Hyperkalemia   Acute kidney injury (CMS/[facility])   Altered mental status, unspecified altered mental status type       Please see Dr. Meier's addendum       Prosper Jiang, DO  05/07/19 1522

## 2019-05-07 NOTE — PLAN OF CARE
Problem: Patient Care Overview  Goal: Plan of Care Review  Outcome: Ongoing (interventions implemented as appropriate)   05/07/19 1516   Coping/Psychosocial   Plan of Care Reviewed With patient   Plan of Care Review   Progress no change   OTHER   Outcome Summary OT eval completed. Pt lethargic throughout eval, able to state her name and birthday but closes her eyes after responses. Trace movement obs in BUE. Unable to grasp washcloth when placed in hands or initiate grooming task. Spoke with daughter in room with plan to hold therapy services at this time to address medical needs. OT to sign off, please reconsult if pt becomes more medically appropriate.

## 2019-05-07 NOTE — PLAN OF CARE
Problem: Fall Risk (Adult)  Goal: Identify Related Risk Factors and Signs and Symptoms  Outcome: Ongoing (interventions implemented as appropriate)    Goal: Absence of Fall  Outcome: Ongoing (interventions implemented as appropriate)      Problem: Patient Care Overview  Goal: Plan of Care Review  Outcome: Ongoing (interventions implemented as appropriate)   05/07/19 0643   Coping/Psychosocial   Plan of Care Reviewed With patient;daughter   Plan of Care Review   Progress improving     Goal: Individualization and Mutuality  Outcome: Ongoing (interventions implemented as appropriate)    Goal: Discharge Needs Assessment  Outcome: Ongoing (interventions implemented as appropriate)    Goal: Interprofessional Rounds/Family Conf  Outcome: Ongoing (interventions implemented as appropriate)      Problem: Skin Injury Risk (Adult)  Goal: Identify Related Risk Factors and Signs and Symptoms  Outcome: Ongoing (interventions implemented as appropriate)    Goal: Skin Health and Integrity  Outcome: Ongoing (interventions implemented as appropriate)      Problem: Confusion, Chronic (Adult)  Goal: Identify Related Risk Factors and Signs and Symptoms  Outcome: Ongoing (interventions implemented as appropriate)    Goal: Cognitive/Functional Impairments Minimized  Outcome: Ongoing (interventions implemented as appropriate)    Goal: Free from Harm/Injuries  Outcome: Ongoing (interventions implemented as appropriate)      Problem: Infection, Risk/Actual (Adult)  Goal: Identify Related Risk Factors and Signs and Symptoms  Outcome: Ongoing (interventions implemented as appropriate)    Goal: Infection Prevention/Resolution  Outcome: Ongoing (interventions implemented as appropriate)

## 2019-05-07 NOTE — ED NOTES
On arrival pt is pale in color and lethargic. Daughter at bedside states pt has not been eating or drinking like normal. Pt blood sugar is with in normal limits. IV was established and isotonic fluids were started due to low BP       Ramon Smith RN  05/06/19 2913

## 2019-05-07 NOTE — NURSING NOTE
I contacted Dr. Zuniga's office regarding continuous hypotension. I spoke with Traci, and will start a 1 L bolus of normal saline to run over 2 hours.

## 2019-05-07 NOTE — PLAN OF CARE
Problem: Patient Care Overview  Goal: Plan of Care Review  Outcome: Ongoing (interventions implemented as appropriate)   05/07/19 1018   Coping/Psychosocial   Plan of Care Reviewed With patient   Plan of Care Review   Progress no change   OTHER   Outcome Summary Patient was seen upright in bed for clinical bedside swallow evaluation. ST aroused patient utilizing cold bolus stimulation, tactile, and verbal cues. Patient was lethargic throughout the evaluation, but would respond to her name. Patient did not follow commands during the oral mechanism examination. Patient was given ice chip x1. She accepted chip off spoon and chewed it before initiating swallow. 1x trial of pudding-thick liquid was given. Patient took small amount off spoon and held in mouth before initiating swallow. Trials were stopped as arousal state did not improve. Patient to remain NPO at this time. Meds to be given via alternative route. Oral care to be given daily. ST to be notified when patient is able to particiate/follow commands.

## 2019-05-07 NOTE — THERAPY DISCHARGE NOTE
Acute Care - Physical Therapy Initial Eval/Discharge  [facility]     Patient Name: Susy Ko  : 1940  MRN: [id number]  Today's Date: 2019   Onset of Illness/Injury or Date of Surgery: 19  Date of Referral to PT: 19  Referring Physician: COLTON Kohler      Admit Date: 2019    Visit Dx:    ICD-10-CM ICD-9-CM   1. Severe sepsis (CMS/HCC) A41.9 038.9    R65.20 995.92   2. Urinary tract infection, acute N39.0 599.0   3. Hyperkalemia E87.5 276.7   4. Acute kidney injury (CMS/HCC) N17.9 584.9   5. Altered mental status, unspecified altered mental status type R41.82 780.97   6. Other dysphagia R13.19 787.29     Patient Active Problem List   Diagnosis   • Normal pressure hydrocephalus   • Non morbid obesity due to excess calories   • Tobacco non-user   • Tremor of right hand   • Abnormality of gait   • Parastomal hernia   • Hernia   • Gait abnormality   • Severe sepsis (CMS/HCC)     Past Medical History:   Diagnosis Date   • Anxiety    • Breast cancer (CMS/HCC)      LEFT WITH RECONSTRUCTION   • Colostomy in place (CMS/HCC)    • Dementia    • Depression    • Edema     LOWER FEET   • Hydrocephalus     NORMAL PRESSURE   • Hypertension    • Incontinence    • Nausea    • Parastomal hernia    • Parkinson disease (CMS/HCC)    • Tremor     RIGHT HAND   • Unsteady gait      Past Surgical History:   Procedure Laterality Date   • ABDOMINAL SURGERY     • APPENDECTOMY     • BREAST RECONSTRUCTION Left     LEFT   • CHOLECYSTECTOMY     • COLON SURGERY     • COLONOSCOPY     • COLOSTOMY     • HYSTERECTOMY     • MASTECTOMY Left    • PARASTOMAL HERNIA REPAIR N/A 3/17/2017    Procedure: REPAIR PARASTOMAL HERNIA;  Surgeon: Princess Walters MD;  Location: [facility];  Service:           PT ASSESSMENT (last 12 hours)      Physical Therapy Evaluation     Row Name 19 1357          PT Evaluation Time/Intention    Subjective Information  complains of;pain  -FE     Document Type  discharge evaluation/summary   -FE     Mode of Treatment  physical therapy  -FE     Comment  Moans in discomfort with attempts to move B LE.  -FE     Row Name 05/07/19 1357          General Information    Patient Profile Reviewed?  yes  -FE     Onset of Illness/Injury or Date of Surgery  05/06/19  -FE     Referring Physician  Dr Zuniga  -FE     Patient Observations  cooperative;agree to therapy;lethargic  -FE     Patient/Family Observations  daughter present and assists with history and agreeable to PT/OT eval.   -FE     General Observations of Patient  Fowlers, asleep upon arrival  -FE     Prior Level of Function  independent:;feeding;dependent:;bed mobility;transfer;w/c or scooter  -FE     Equipment Currently Used at Home  -- NH has DME  -FE     Pertinent History of Current Functional Problem  Abdominal pain, decreased appetitie/intake, decreased ostomy output. Dx: Sepsis, E. coli UTI, acute renal failure, rhabdomyolysis, hypotension. hyperkalemia, metabolic acidosis, uremia. 5/7 H&H: 9.7/29.8.   -FE     Existing Precautions/Restrictions  fall  -FE     Limitations/Impairments  safety/cognitive  -FE     Risks Reviewed  patient and family:;LOB;nausea/vomiting;dizziness;increased discomfort;change in vital signs;lines disloged  -FE     Benefits Reviewed  patient and family:;improve function;increase independence;increase strength;increase balance;decrease pain;increase knowledge;improve skin integrity  -FE     Barriers to Rehab  medically complex;previous functional deficit;cognitive status;physical barrier;contractures  -FE     Row Name 05/07/19 135          Relationship/Environment    Lives With  facility resident  -FE     Name(s) of Who Lives With Patient  Jeff Rayo  -FE     Row Name 05/07/19 1353          Resource/Environmental Concerns    Current Living Arrangements  extended care facility  -FE     Row Name 05/07/19 1358          Cognitive Assessment/Interventions    Additional Documentation  Cognitive Assessment/Intervention (Group)  -FE      [facility] Name 05/07/19 0817          Cognitive Assessment/Intervention- PT/OT    Affect/Mental Status (Cognitive)  confused;low arousal/lethargic  -FE     Orientation Status (Cognition)  oriented to;person  -FE     Follows Commands (Cognition)  does not follow one step commands  -FE     Safety Deficit (Cognitive)  ability to follow commands  -FE     Personal Safety Interventions  fall prevention program maintained;nonskid shoes/slippers when out of bed  -FE     Row Name 05/07/19 1357          Safety Issues, Functional Mobility    Safety Issues Affecting Function (Mobility)  ability to follow commands  -FE     Impairments Affecting Function (Mobility)  balance;cognition;strength;range of motion (ROM);pain;muscle tone abnormal  -FE     [facility] Name 05/07/19 1357          Bed Mobility Assessment/Treatment    Comment (Bed Mobility)  pt winces and groans with any movement, deferred bed mobility for pt comfort  -FE     Row Name 05/07/19 7027          General ROM    GENERAL ROM COMMENTS  B ankle PROM WFL, B knee PROM impaired 95% ( B knee near contractured at ~ 90 degrees flexion), B hip PROM impaired 75%  -FE     Row Name 05/07/19 8047          MMT (Manual Muscle Testing)    General MMT Comments  0/5 in B LE, no movement observed  -FE     [facility] Name 05/07/19 3817          Motor Assessment/Intervention    Muscle Tone Assessment  Bilateral Lower Extremities  -FE     Bilateral Lower Extremities Muscle Tone Assessment  -- B knee contractures at near 90 degrees flexion  -FE     Additional Documentation  Muscle Tone Assessment (Row)  -FE     Row Name 05/07/19 6077          Pain Assessment    Additional Documentation  Pain Scale: FACES Pre/Post-Treatment (Group)  -FE     Row Name 05/07/19 6804          Pain Scale: Numbers Pre/Post-Treatment    Pain Location - Side  Bilateral  -FE     Pain Location - Orientation  lower  -FE     Pain Location  extremity  -FE     Pain Intervention(s)  Repositioned  -FE     Row Name 05/07/19 8208           Pain Scale: FACES Pre/Post-Treatment    Pain: FACES Scale, Pretreatment  4-->hurts little more  -FE     Pain: FACES Scale, Post-Treatment  4-->hurts little more  -FE     Pre/Post Treatment Pain Comment  pain with movement  -FE     Row Name 05/07/19 1357          Health Promotion    Additional Documentation  Coping (Group);Plan of Care Review (Group)  -FE     Row Name             Wound 05/07/19 0532 Right heel pressure injury    Wound - Properties Group Date first assessed: 05/07/19  -MH Time first assessed: 0532  -MH Side: Right  -MH Location: heel  -MH Type: pressure injury  -MH Stage, Pressure Injury: Stage 1  -MH    Row Name 05/07/19 1357          Coping    Observed Emotional State  calm  -FE     Row Name 05/07/19 1356          Plan of Care Review    Plan of Care Reviewed With  patient;daughter  PAT Woody Name 05/07/19 2791          Physical Therapy Clinical Impression    Date of Referral to PT  05/07/19  -FE     Criteria for Skilled Interventions Met (PT Clinical Impression)  no;patient/family refuse skilled intervention at this time  -FE     Care Plan Review (PT)  evaluation/treatment results reviewed;patient/other agree to care plan  -FE     Care Plan Review, Other Participant (PT Clinical Impression)  daughter  PAT Woody Name 05/07/19 1352          Vital Signs    Pre Systolic BP Rehab  116  -FE     Pre Treatment Diastolic BP  43  -FE     Post Systolic BP Rehab  124  -FE     Post Treatment Diastolic BP  49  -FE     Pretreatment Heart Rate (beats/min)  80  -FE     Pre SpO2 (%)  95  -FE     O2 Delivery Pre Treatment  room air  -FE     Pre Patient Position  Supine  -FE     Intra Patient Position  Supine  -FE     Post Patient Position  Supine  -FE     Row Name 05/07/19 5668          Positioning and Restraints    Pre-Treatment Position  in bed  -FE     Post Treatment Position  bed  -FE     In Bed  fowlers;call light within reach;encouraged to call for assist;patient within staff view;with  family/caregiver;notified nsg  -FE     Row Name 19 1357          Physical Therapy Discharge Summary    Additional Documentation  Discharge Summary, PT Eval (Group)  -FE     Row Name 19 5114          Discharge Summary, PT Eval    Reason for Discharge (PT Discharge Summary)  patient/family request discontinuation of services  -FE     Outcomes Achieved Upon Discharge (PT Discharge Summary)  evaluation only  -FE     Row Name 19 1357          Living Environment    Home Accessibility  wheelchair accessible  -FE       User Key  (r) = Recorded By, (t) = Taken By, (c) = Cosigned By    Initials Name Provider Type    Braulio Chaidez, PT DPT Physical Therapist    MH Haase, Mallory L, RN Registered Nurse          Physical Therapy Education     Title: PT OT SLP Therapies (Done)     Topic: Physical Therapy (Resolved)     Point: Mobility training (Resolved)     Learning Progress Summary           Family Acceptance, E, VU by FE at 2019  1:57 PM    Comment:  Educated edsoner on benefits of activity, 1 time PT eval at this time                               User Key     Initials Effective Dates Name Provider Type Discipline    FE 16 -  Braulio Fulton, PT DPT Physical Therapist PT                PT Recommendation and Plan  Anticipated Discharge Disposition (PT): unknown  Therapy Frequency (PT Clinical Impression): evaluation only  Outcome Summary/Treatment Plan (PT)  Anticipated Discharge Disposition (PT): unknown  Reason for Discharge (PT Discharge Summary): patient/family request discontinuation of services  Plan of Care Reviewed With: patient, daughter  Outcome Summary: PT eval completed. She was oriented to person and  only. She would moan in discomfort with attempted movement to her extremities. B knee are contracted near 90 degress flexion. Spoke with daughter in room with plan to hold therapy services at this time to address medical needs (The patient is also unable to participate with  therapy and cannot tolerate movement to extremities at this time). PT to sign off, please reconsult if pt becomes medically appropriate.     Outcome Measures     Row Name 05/07/19 1500 05/07/19 1357          How much help from another person do you currently need...    Turning from your back to your side while in flat bed without using bedrails?  --  1  -FE     Moving from lying on back to sitting on the side of a flat bed without bedrails?  --  1  -FE     Moving to and from a bed to a chair (including a wheelchair)?  --  1  -FE     Standing up from a chair using your arms (e.g., wheelchair, bedside chair)?  --  1  -FE     Climbing 3-5 steps with a railing?  --  1  -FE     To walk in hospital room?  --  1  -FE     AM-PAC 6 Clicks Score  --  6  -FE        How much help from another is currently needed...    Putting on and taking off regular lower body clothing?  1  -MW  --     Bathing (including washing, rinsing, and drying)  1  -MW  --     Toileting (which includes using toilet bed pan or urinal)  1  -MW  --     Putting on and taking off regular upper body clothing  1  -MW  --     Taking care of personal grooming (such as brushing teeth)  1  -MW  --     Eating meals  1  -MW  --     Score  6  -MW  --        Functional Assessment    Outcome Measure Options  AM-PAC 6 Clicks Daily Activity (OT)  -MW  AM-PAC 6 Clicks Basic Mobility (PT)  -FE       User Key  (r) = Recorded By, (t) = Taken By, (c) = Cosigned By    Initials Name Provider Type    Braulio Chaidez, PT DPT Physical Therapist    Fidelia Mendoza, OTR/L Occupational Therapist           Time Calculation:   PT Charges     Row Name 05/07/19 1535             Time Calculation    Start Time  1357  -FE      Stop Time  1500  -FE      Time Calculation (min)  63 min  -FE      PT Received On  05/07/19  -FE        User Key  (r) = Recorded By, (t) = Taken By, (c) = Cosigned By    Initials Name Provider Type    Braulio Chaidez, PT DPT Physical Therapist         Therapy Charges for Today     Code Description Service Date Service Provider Modifiers Qty    53147789127 HC PT EVAL HIGH COMPLEXITY 4 5/7/2019 Braulio Fulton, PT DPT GP 1          PT G-Codes  Outcome Measure Options: AM-PAC 6 Clicks Daily Activity (OT)  AM-PAC 6 Clicks Score: 6  Score: 6    PT Discharge Summary  Anticipated Discharge Disposition (PT): unknown    Braulio Fulton, PT DPT  5/7/2019

## 2019-05-07 NOTE — PLAN OF CARE
Problem: Patient Care Overview  Goal: Plan of Care Review  Outcome: Ongoing (interventions implemented as appropriate)   19 0868   Coping/Psychosocial   Plan of Care Reviewed With patient;daughter   OTHER   Outcome Summary PT eval completed. She was oriented to person and  only. She would moan in discomfort with attempted movement to her extremities. B knee are contracted near 90 degress flexion. Spoke with daughter in room with plan to hold therapy services at this time to address medical needs (The patient is also unable to participate with therapy and cannot tolerate movement to extremities at this time). PT to sign off, please reconsult if pt becomes medically appropriate.

## 2019-05-07 NOTE — PLAN OF CARE
Problem: Fall Risk (Adult)  Goal: Identify Related Risk Factors and Signs and Symptoms  Outcome: Ongoing (interventions implemented as appropriate)    Goal: Absence of Fall  Outcome: Ongoing (interventions implemented as appropriate)      Problem: Patient Care Overview  Goal: Plan of Care Review  Outcome: Ongoing (interventions implemented as appropriate)    Goal: Individualization and Mutuality  Outcome: Ongoing (interventions implemented as appropriate)    Goal: Discharge Needs Assessment  Outcome: Ongoing (interventions implemented as appropriate)    Goal: Interprofessional Rounds/Family Conf  Outcome: Ongoing (interventions implemented as appropriate)      Problem: Skin Injury Risk (Adult)  Goal: Identify Related Risk Factors and Signs and Symptoms  Outcome: Ongoing (interventions implemented as appropriate)    Goal: Skin Health and Integrity  Outcome: Ongoing (interventions implemented as appropriate)      Problem: Confusion, Chronic (Adult)  Goal: Identify Related Risk Factors and Signs and Symptoms  Outcome: Ongoing (interventions implemented as appropriate)    Goal: Cognitive/Functional Impairments Minimized  Outcome: Ongoing (interventions implemented as appropriate)    Goal: Free from Harm/Injuries  Outcome: Ongoing (interventions implemented as appropriate)      Problem: Infection, Risk/Actual (Adult)  Goal: Identify Related Risk Factors and Signs and Symptoms  Outcome: Ongoing (interventions implemented as appropriate)    Goal: Infection Prevention/Resolution  Outcome: Ongoing (interventions implemented as appropriate)      Problem: Wound (Includes Pressure Injury) (Adult)  Goal: Signs and Symptoms of Listed Potential Problems Will be Absent, Minimized or Managed (Wound)  Outcome: Ongoing (interventions implemented as appropriate)

## 2019-05-07 NOTE — H&P
[facility]  ARIES Abdalla M.D.  COLTON Hunter        Internal Medicine History and Physical      Name: Susy Ko  MRN: [id number]     Acct: [id number]  Room: [facility]    Admit Date: 5/6/2019  PCP: Monster Zuniga MD    Chief Complaint:     Chief Complaint   Patient presents with   • Abnormal KUB       History Obtained From:     chart review and the patient    History of Present Illness:      Susy Ko is a  79 y.o.  female who presents with   Chief Complaint   Patient presents with   • Abnormal KUB   The patient had reportedly been in her usual state of health at the SN[facility] where she resides when over the past several days, her family noticed a sharp decline. The patient began complaining of abdominal pain with decreased appetite/intake and decreased ostomy output. When she continued to decline, she was sent to ER for evaluation and treatment. She was found to have acute renal failure with uremia, hyperkalemia, metabolic acidosis, hypotension, rhabdomyolysis and a UTI. She was treated for hyperkalemia with cocktail of calcium, insulin, d50 and kayexalate. She has been aggressively hydrated with IV fluids. Urine culture positive for e. Coli and she is currently tolerating Rocephin. She has required pressor support with levophed despite aggressive fluid resuscitation efforts. Workup regarding the complaints of abdominal pain negative thus far - CT negative for obstruction and patient passing stool through ostomy. The patient was admitted to our service for further evaluation and treatment. She has had a midline placed and is currently undergoing renal ultrasound. The patient appears to be at her neurologic baseline with intermittent confusion. She continued to complain of abdominal pain which is made worse with any palpation or manipulation. Nephrology has been consulted for further evaluation and treatment.     Past Medical History:     Past  Medical History:   Diagnosis Date   • Anxiety    • Breast cancer (CMS/HCC)      LEFT WITH RECONSTRUCTION   • Colostomy in place (CMS/HCC)    • Dementia    • Depression    • Edema     LOWER FEET   • Hydrocephalus     NORMAL PRESSURE   • Hypertension    • Incontinence    • Nausea    • Parastomal hernia    • Parkinson disease (CMS/HCC)    • Tremor     RIGHT HAND   • Unsteady gait         Past Surgical History:     Past Surgical History:   Procedure Laterality Date   • ABDOMINAL SURGERY     • APPENDECTOMY     • BREAST RECONSTRUCTION Left     LEFT   • CHOLECYSTECTOMY     • COLON SURGERY     • COLONOSCOPY     • COLOSTOMY     • HYSTERECTOMY     • MASTECTOMY Left    • PARASTOMAL HERNIA REPAIR N/A 3/17/2017    Procedure: REPAIR PARASTOMAL HERNIA;  Surgeon: Princess Walters MD;  Location: [facility] OR;  Service:         Medications Prior to Admission:       Prior to Admission medications    Medication Sig Start Date End Date Taking? Authorizing Provider   acetaminophen (TYLENOL) 325 MG tablet Take 650 mg by mouth Every 6 (Six) Hours As Needed for Mild Pain .   Yes Noah Aguilar MD   amLODIPine (NORVASC) 5 MG tablet Take 5 mg by mouth Daily.   Yes Noah Aguilar MD   carbidopa-levodopa (SINEMET)  MG per tablet Take 1 tablet by mouth 3 (Three) Times a Day.   Yes Noah Aguilar MD   citalopram (CeleXA) 10 MG tablet Take 10 mg by mouth Every Night.   Yes Noah Aguilar MD   furosemide (LASIX) 20 MG tablet Take 20 mg by mouth Every Other Day.   Yes Noah Aguilar MD   ibuprofen (ADVIL,MOTRIN) 600 MG tablet Take 600 mg by mouth Every 6 (Six) Hours As Needed for Mild Pain .   Yes Noah Aguilar MD   Loratadine (CLARITIN) 10 MG capsule Take  by mouth.   Yes ProviderNoah MD   losartan (COZAAR) 50 MG tablet Take 50 mg by mouth Daily.   Yes ProviderNoah MD   Menthol, Topical Analgesic, (BIOFREEZE) 4 % gel Apply  topically.   Yes Noah Aguilar MD   metoprolol  succinate XL (TOPROL-XL) 25 MG 24 hr tablet Take 25 mg by mouth Daily.   Yes Noah Aguilar MD   Omeprazole Magnesium (PRILOSEC OTC PO) Take  by mouth.   Yes Noah Aguilar MD   tiotropium bromide-olodaterol (STIOLTO RESPIMAT) 2.5-2.5 MCG/ACT aerosol solution inhaler Inhale Daily.   Yes Noah Aguilar MD   traMADol (ULTRAM) 50 MG tablet Take 50 mg by mouth Every 6 (Six) Hours As Needed for Moderate Pain .   Yes Noah Aguilar MD   tuberculin (TUBERSOL) 5 UNIT/0.1ML injection Inject  into the appropriate area of the skin as directed by provider 1 (One) Time.   Yes Noah Aguilar MD   vitamin B-12 (CYANOCOBALAMIN) 100 MCG tablet Take 100 mcg by mouth Daily.   Yes Noah Aguilar MD   ALPRAZolam (XANAX) 0.5 MG tablet Take 0.5 mg by mouth Every Night.    Noah Aguilar MD   HYDROcodone-acetaminophen (NORCO) 7.5-325 MG per tablet Take 1 tablet by mouth Every 4 (Four) Hours As Needed for Moderate Pain (4-6) for up to 30 doses. 3/22/17   Princess Walters MD   multivitamin-minerals (THERA-M) tablet tablet Take 1 tablet by mouth Daily.    Noah Aguilar MD   oxybutynin XL (DITROPAN-XL) 5 MG 24 hr tablet Take 5 mg by mouth Daily.    Noah Aguilar MD        Allergies:       Morphine and related and Adhesive tape    Social History:     Tobacco:    reports that she has quit smoking. She has never used smokeless tobacco.  Alcohol:      reports that she does not drink alcohol.  Drug Use:  reports that she does not use drugs.    Family History:     History reviewed. No pertinent family history.    Review of Systems:     Review of Systems   Constitution: Positive for weakness and malaise/fatigue. Negative for chills, decreased appetite, weight gain and weight loss.   HENT: Negative for congestion, ear discharge, hoarse voice and tinnitus.    Eyes: Negative for blurred vision, discharge, visual disturbance and visual halos.   Cardiovascular: Negative for chest pain,  "claudication, dyspnea on exertion, irregular heartbeat, leg swelling, orthopnea and paroxysmal nocturnal dyspnea.   Respiratory: Negative for cough, shortness of breath, sputum production and wheezing.    Endocrine: Negative for cold intolerance, heat intolerance and polyuria.   Hematologic/Lymphatic: Negative for adenopathy. Does not bruise/bleed easily.   Skin: Negative for dry skin, itching and suspicious lesions.   Musculoskeletal: Positive for joint pain, myalgias and stiffness. Negative for arthritis, back pain, falls and muscle weakness.   Gastrointestinal: Positive for abdominal pain. Negative for constipation, diarrhea, dysphagia and hematemesis.   Genitourinary: Negative for bladder incontinence, dysuria and frequency.   Neurological: Negative for aphonia, disturbances in coordination and dizziness.   Psychiatric/Behavioral: Negative for altered mental status, depression, memory loss and substance abuse. The patient does not have insomnia and is not nervous/anxious.        Code Status:    There are no questions and answers to display.       Physical Exam:     Vitals:  /79   Pulse 72   Temp 96.1 °F (35.6 °C) (Rectal)   Resp 19   Ht 160 cm (63\")   Wt 57.4 kg (126 lb 9.6 oz)   SpO2 95%   BMI 22.43 kg/m²   Temp (24hrs), Av.2 °F (35.7 °C), Min:94.7 °F (34.8 °C), Max:97.9 °F (36.6 °C)    Physical Exam   Constitutional: She appears well-developed and well-nourished.   HENT:   Head: Normocephalic and atraumatic.   Nose: Nose normal.   Mouth/Throat: Oropharynx is clear and moist.   Eyes: Conjunctivae and EOM are normal. Pupils are equal, round, and reactive to light.   Neck: Normal range of motion. Neck supple.   Cardiovascular: Normal rate, regular rhythm, normal heart sounds and intact distal pulses.   Pulmonary/Chest: Effort normal and breath sounds normal.   Abdominal: Soft. Bowel sounds are normal. There is tenderness.   Left side colostomy with brown stool - stoma pink and appliance intact "   Musculoskeletal:   Generalized weakness   Neurological: She is alert.   Intermittent confusion   Skin: Skin is warm and dry.   Psychiatric: She has a normal mood and affect. Her behavior is normal.   Anxious at times   Nursing note and vitals reviewed.        Data:     Lab Results (last 7 days)     Procedure Component Value Units Date/Time    PTH, Intact [[id number]] Collected:  05/07/19 1019    Specimen:  Blood Updated:  05/07/19 1040    Phosphorus [[id number]] Collected:  05/07/19 1019    Specimen:  Blood Updated:  05/07/19 1040    CK [[id number]] Collected:  05/07/19 1019    Specimen:  Blood Updated:  05/07/19 1040    Myoglobin Screen, Urine - Urine, Catheter [[id number]] Collected:  05/07/19 1020    Specimen:  Urine, Catheter Updated:  05/07/19 1033    Creatinine, Urine, Random - Urine, Catheter [[id number]] Collected:  05/07/19 1020    Specimen:  Urine, Catheter Updated:  05/07/19 1033    Sodium, Urine, Random - Urine, Catheter [[id number]] Collected:  05/07/19 1020    Specimen:  Urine, Catheter Updated:  05/07/19 1033    Urine Culture - Urine, Urine, Clean Catch [[id number]]  (Abnormal) Collected:  05/06/19 2145    Specimen:  Urine, Clean Catch Updated:  05/07/19 0713     Urine Culture >100,000 CFU/mL Escherichia coli    Legionella Antigen, Urine - Urine, Urine, Clean Catch [[id number]]  (Normal) Collected:  05/07/19 0520    Specimen:  Urine, Clean Catch Updated:  05/07/19 0604     LEGIONELLA ANTIGEN, URINE Negative    S. Pneumo Ag Urine or CSF - Urine, Urine, Clean Catch [[id number]]  (Normal) Collected:  05/07/19 0520    Specimen:  Urine, Clean Catch Updated:  05/07/19 0604     Strep Pneumo Ag Negative    Urinalysis, Microscopic Only - Urine, Catheter [[id number]]  (Abnormal) Collected:  05/07/19 0519    Specimen:  Urine, Catheter Updated:  05/07/19 0550     RBC, UA       Unable to determine due to loaded field     /HPF     WBC, UA Too Numerous to Count /HPF      Bacteria, UA 4+ /HPF      Squamous Epithelial Cells,  UA 0-2 /HPF      Hyaline Casts, UA None Seen /LPF      Methodology Automated Microscopy    Urinalysis With Culture If Indicated - Urine, Catheter [[id number]]  (Abnormal) Collected:  05/07/19 0519    Specimen:  Urine, Catheter Updated:  05/07/19 0550     Color, UA Dark Yellow     Appearance, UA Turbid     pH, UA <=5.0     Specific Gravity, UA 1.014     Glucose, UA Negative     Ketones, UA Trace     Bilirubin, UA Negative     Blood, UA Large (3+)     Protein, UA >=300 mg/dL (3+)     Leuk Esterase, UA Large (3+)     Nitrite, UA Negative     Urobilinogen, UA 0.2 E.U./dL    Comprehensive Metabolic Panel [[id number]]  (Abnormal) Collected:  05/07/19 0406    Specimen:  Blood Updated:  05/07/19 0445     Glucose 101 mg/dL       mg/dL      Creatinine 5.24 mg/dL      Sodium 134 mmol/L      Potassium 5.3 mmol/L      Chloride 107 mmol/L      CO2 8.0 mmol/L      Calcium 6.2 mg/dL      Total Protein 5.4 g/dL      Albumin 2.70 g/dL      ALT (SGPT) 19 U/L      AST (SGOT) 229 U/L      Alkaline Phosphatase 103 U/L      Total Bilirubin 0.4 mg/dL      eGFR Non African Amer 8 mL/min/1.73      Comment: <15 Indicative of kidney failure.        eGFR   Amer -- mL/min/1.73      Comment: <15 Indicative of kidney failure.        Globulin 2.7 gm/dL      A/G Ratio 1.0 g/dL      BUN/Creatinine Ratio 19.8     Anion Gap 19.0 mmol/L     Narrative:       GFR Normal >60  Chronic Kidney Disease <60  Kidney Failure <15    Blood Gas, Arterial [[id number]]  (Abnormal) Collected:  05/07/19 0330    Specimen:  Arterial Blood Updated:  05/07/19 0346     Site Right Radial     Rashad's Test Positive     pH, Arterial 7.152 pH units      Comment: 85 Value below critical limit        pCO2, Arterial 26.8 mm Hg      Comment: 84 Value below reference range        pO2, Arterial 124.0 mm Hg      Comment: 83 Value above reference range        HCO3, Arterial 9.4 mmol/L      Comment: 84 Value below reference range        Base Excess, Arterial -18.0 mmol/L       Comment: 84 Value below reference range        O2 Saturation, Arterial 97.6 %      Temperature 37.0 C      Barometric Pressure for Blood Gas 752 mmHg      Modality Nasal Cannula     Flow Rate 2.0 lpm      Ventilator Mode NA     Notified Who LETY DICKEY RN     Notified By [id number]     Notified Time 05/07/2019 03:45     Collected by [id number]     Comment: Meter: [id number]     :  [id number]       Comprehensive Metabolic Panel [[id number]]  (Abnormal) Collected:  05/07/19 0025    Specimen:  Blood Updated:  05/07/19 0340     Glucose 167 mg/dL       mg/dL      Creatinine 5.31 mg/dL      Sodium 133 mmol/L      Potassium 5.0 mmol/L      Chloride 105 mmol/L      CO2 9.0 mmol/L      Calcium 6.5 mg/dL      Total Protein 5.8 g/dL      Albumin 2.90 g/dL      ALT (SGPT) 15 U/L      AST (SGOT) 177 U/L      Alkaline Phosphatase 104 U/L      Total Bilirubin 0.6 mg/dL      eGFR Non African Amer 8 mL/min/1.73      Comment: <15 Indicative of kidney failure.        eGFR   Amer -- mL/min/1.73      Comment: <15 Indicative of kidney failure.        Globulin 2.9 gm/dL      A/G Ratio 1.0 g/dL      BUN/Creatinine Ratio 19.8     Anion Gap 19.0 mmol/L     Narrative:       GFR Normal >60  Chronic Kidney Disease <60  Kidney Failure <15    Troponin [[id number]]  (Normal) Collected:  05/07/19 0025    Specimen:  Blood Updated:  05/07/19 0056     Troponin I 0.034 ng/mL     Ammonia [[id number]]  (Abnormal) Collected:  05/06/19 2303    Specimen:  Blood Updated:  05/06/19 2344     Ammonia <9 umol/L     Blood Culture - Blood, Wrist, Right [[id number]] Collected:  05/06/19 2145    Specimen:  Blood from Wrist, Right Updated:  05/06/19 2300    CK [[id number]]  (Abnormal) Collected:  05/06/19 2104    Specimen:  Blood Updated:  05/06/19 2242     Creatine Kinase 13,144 U/L     Myoglobin, Serum [[id number]]  (Abnormal) Collected:  05/06/19 2104    Specimen:  Blood Updated:  05/06/19 2230     Myoglobin >40,000.0 ng/mL     Lactic Acid, Plasma  [[id number]]  (Normal) Collected:  05/06/19 2140    Specimen:  Blood Updated:  05/06/19 2222     Lactate 1.4 mmol/L     [place] Draw [[id number]] Collected:  05/06/19 2104    Specimen:  Blood Updated:  05/06/19 2215    Narrative:       The following orders were created for panel order [place] Draw.  Procedure                               Abnormality         Status                     ---------                               -----------         ------                     Light Blue Top[[id number]]                                    Final result               Green Top (Gel)[[id number]]                                   Final result               Lavender Top[[id number]]                                      Final result               Red Top[[id number]]                                           Final result                 Please view results for these tests on the individual orders.    Light Blue Top [[id number]] Collected:  05/06/19 2105    Specimen:  Blood Updated:  05/06/19 2215     Extra Tube hold for add-on     Comment: Auto resulted       Green Top (Gel) [[id number]] Collected:  05/06/19 2104    Specimen:  Blood Updated:  05/06/19 2215     Extra Tube Hold for add-ons.     Comment: Auto resulted.       Lavender Top [[id number]] Collected:  05/06/19 2104    Specimen:  Blood Updated:  05/06/19 2215     Extra Tube hold for add-on     Comment: Auto resulted       Red Top [[id number]] Collected:  05/06/19 2104    Specimen:  Blood Updated:  05/06/19 2215     Extra Tube Hold for add-ons.     Comment: Auto resulted.       TSH [[id number]]  (Normal) Collected:  05/06/19 2104    Specimen:  Blood Updated:  05/06/19 2212     TSH 4.060 mIU/mL     Comprehensive Metabolic Panel [[id number]]  (Abnormal) Collected:  05/06/19 2104    Specimen:  Blood Updated:  05/06/19 2210     Glucose 131 mg/dL       mg/dL      Creatinine 6.33 mg/dL      Sodium 132 mmol/L      Potassium 6.8 mmol/L      Chloride 103 mmol/L      CO2 <5.0 mmol/L      Calcium 6.5  mg/dL      Total Protein 6.2 g/dL      Albumin 3.40 g/dL      ALT (SGPT) 16 U/L      AST (SGOT) 186 U/L      Alkaline Phosphatase 120 U/L      Total Bilirubin 0.6 mg/dL      eGFR Non African Amer 6 mL/min/1.73      Comment: <15 Indicative of kidney failure.        eGFR   Amer -- mL/min/1.73      Comment: <15 Indicative of kidney failure.        Globulin 2.8 gm/dL      A/G Ratio 1.2 g/dL      BUN/Creatinine Ratio 18.2     Anion Gap -- mmol/L      Comment: Unable to calculate Anion Gap.       Narrative:       GFR Normal >60  Chronic Kidney Disease <60  Kidney Failure <15    CBC & Differential [[id number]] Collected:  05/06/19 2104    Specimen:  Blood Updated:  05/06/19 2209    Narrative:       The following orders were created for panel order CBC & Differential.  Procedure                               Abnormality         Status                     ---------                               -----------         ------                     CBC Auto Differential[[id number]]        Abnormal            Final result                 Please view results for these tests on the individual orders.    CBC Auto Differential [[id number]]  (Abnormal) Collected:  05/06/19 2104    Specimen:  Blood Updated:  05/06/19 2209     WBC 17.75 10*3/mm3      RBC 3.78 10*6/mm3      Hemoglobin 9.7 g/dL      Hematocrit 29.8 %      MCV 78.8 fL      MCH 25.7 pg      MCHC 32.6 g/dL      RDW 18.1 %      RDW-SD 52.0 fl      MPV 11.2 fL      Platelets 198 10*3/mm3      Neutrophil % 92.3 %      Lymphocyte % 3.5 %      Monocyte % 2.9 %      Eosinophil % 0.0 %      Basophil % 0.2 %      Immature Grans % 1.1 %      Neutrophils, Absolute 16.40 10*3/mm3      Lymphocytes, Absolute 0.62 10*3/mm3      Monocytes, Absolute 0.51 10*3/mm3      Eosinophils, Absolute 0.00 10*3/mm3      Basophils, Absolute 0.03 10*3/mm3      Immature Grans, Absolute 0.19 10*3/mm3      nRBC 0.0 /100 WBC     Urinalysis With Culture If Indicated - Urine, Clean Catch [[id number]]   (Abnormal) Collected:  05/06/19 2145    Specimen:  Urine, Clean Catch Updated:  05/06/19 2208     Color, UA Yellow     Appearance, UA Turbid     pH, UA 5.5     Specific Gravity, UA 1.025     Glucose, UA Negative     Ketones, UA Trace     Bilirubin, UA Negative     Blood, UA Large (3+)     Protein, UA >=300 mg/dL (3+)     Leuk Esterase, UA Large (3+)     Nitrite, UA Negative     Urobilinogen, UA 0.2 E.U./dL    Narrative:       Dipstick results may be inaccurate due to color interference.    Urinalysis, Microscopic Only - Urine, Clean Catch [[id number]]  (Abnormal) Collected:  05/06/19 2145    Specimen:  Urine, Clean Catch Updated:  05/06/19 2208     RBC, UA       Unable to determine due to loaded field     /HPF     WBC, UA Too Numerous to Count /HPF      Bacteria, UA 4+ /HPF      Squamous Epithelial Cells, UA       Unable to determine due to loaded field     /HPF     Hyaline Casts, UA       Unable to determine due to loaded field     /LPF     Methodology Manual Light Microscopy    Blood Gas, Arterial [[id number]]  (Abnormal) Collected:  05/06/19 2200    Specimen:  Arterial Blood Updated:  05/06/19 2202     Site Right Radial     Rashad's Test Positive     pH, Arterial 7.097 pH units      Comment: 85 Value below critical limit        pCO2, Arterial 21.2 mm Hg      Comment: 84 Value below reference range        pO2, Arterial 92.1 mm Hg      HCO3, Arterial 6.5 mmol/L      Comment: 84 Value below reference range        Base Excess, Arterial -21.4 mmol/L      Comment: 84 Value below reference range        O2 Saturation, Arterial 95.3 %      Temperature 37.0 C      Barometric Pressure for Blood Gas 752 mmHg      Modality Room Air     Ventilator Mode NA     Notified Who DR. HERNANDEZ     Notified By [id number]     Notified Time 05/06/2019 22:07     Collected by [id number]     Comment: Meter: [id number]     :  [id number]       Magnesium [[id number]]  (Abnormal) Collected:  05/06/19 2104    Specimen:  Blood Updated:  05/06/19 2201      Magnesium 2.6 mg/dL     T4, Free [[id number]]  (Normal) Collected:  05/06/19 2104    Specimen:  Blood Updated:  05/06/19 2158     Free T4 1.26 ng/dL     Troponin [[id number]]  (Abnormal) Collected:  05/06/19 2104    Specimen:  Blood Updated:  05/06/19 2153     Troponin I 0.040 ng/mL     Blood Culture - Blood, Arm, Left [[id number]] Collected:  05/06/19 2138    Specimen:  Blood from Arm, Left Updated:  05/06/19 2151    Lipase [[id number]]  (Normal) Collected:  05/06/19 2104    Specimen:  Blood Updated:  05/06/19 2143     Lipase 128 U/L     Amylase [[id number]]  (Normal) Collected:  05/06/19 2104    Specimen:  Blood Updated:  05/06/19 2143     Amylase 74 U/L     POC Glucose Once [[id number]]  (Abnormal) Collected:  05/06/19 2050    Specimen:  Blood Updated:  05/06/19 2102     Glucose 146 mg/dL      Comment: : [id number] Clement Huerta ID: [id number]           Ct Abdomen Pelvis Without Contrast    Result Date: 5/6/2019  Narrative: EXAMINATION: CT ABDOMEN PELVIS WO CONTRAST-   5/6/2019 10:17 PM CDT  HISTORY: pain; r/o obstruction; A41.9-Sepsis, unspecified organism; R65.20-Severe sepsis without septic shock; N39.0-Urinary tract infection, site not specified; E87.5-Hyperkalemia; N17.9-Acute kidney failure, unspecified; R41.82-Altered mental status, unspecified  In order to have a CT radiation dose as low as reasonably achievable Automated Exposure Control was utilized for adjustment of the mA and/or KV according to patient size.  DLP in mGycm= 343.  Noncontrast abdomen/pelvis CT.  Comparison is made with 04/12/2016.  Left lower quadrant ostomy with nonobstructing parastomal hernia.  No significant bowel dilation.  Mild urinary bladder distention.  Mild dilation of the renal collecting systems and ureters is probably based on vesicoureteral reflux. There is no obstructing stone.  Normal heart size. No acute abnormality at the lung bases.  Cholecystectomy clips. Normal noncontrast appearance of the liver and  spleen. The pancreas is atrophic.  Aortic calcification with no aneurysm.  Summary: 1. Moderate fecal material throughout the colon. 2. Nonobstructing parastomal hernia. 3. Mild dilation of the renal collecting systems and ureters compatible with vesicoureteral reflux. 4. No evidence of bowel obstruction, mass or abscess.            This report was finalized on 05/06/2019 22:39 by Dr. Raul Ricci MD.    Ct Head Without Contrast    Result Date: 5/6/2019  Narrative: EXAMINATION: CT HEAD WO CONTRAST-   5/6/2019 10:17 PM CDT  HISTORY: AMS; A41.9-Sepsis, unspecified organism; R65.20-Severe sepsis without septic shock; N39.0-Urinary tract infection, site not specified; E87.5-Hyperkalemia; N17.9-Acute kidney failure, unspecified; R41.82-Altered mental status, unspecified  In order to have a CT radiation dose as low as reasonably achievable Automated Exposure Control was utilized for adjustment of the mA and/or KV according to patient size.  DLP in mGycm= 1214.  Noncontrast head CT compared with 01/17/2017.  Axial, sagittal, and coronal noncontrast CT imaging of the head.  The visualized paranasal sinuses are clear.  The brain and ventricles have an age appropriate appearance. Moderate atrophy and small vessel disease. There is no hemorrhage or mass-effect. No acute infarction is seen.  No calvarial abnormality.      Impression: 1. No acute intracranial abnormality is seen.              This report was finalized on 05/06/2019 22:35 by Dr. Raul Ricci MD.    Xr Chest 1 View    Result Date: 5/6/2019  Narrative: EXAMINATION: XR CHEST 1 VW-  5/6/2019 10:11 PM CDT  HISTORY: Altered mental status; r/o pneumonia.  One view chest x-ray compared with 01/17/2017.  Heart size is normal. The mediastinum is within normal limits.  The lungs are normally expanded with no pneumonia or pneumothorax. Chronic lung changes. No congestive failure changes.                                                                      Impression: 1. No  acute disease.    This report was finalized on 05/06/2019 22:16 by Dr. Raul Ricci MD.        Assesment:           Severe sepsis (CMS/HCC)    Past Medical History:   Diagnosis Date   • Anxiety    • Breast cancer (CMS/HCC)      LEFT WITH RECONSTRUCTION   • Colostomy in place (CMS/HCC)    • Dementia    • Depression    • Edema     LOWER FEET   • Hydrocephalus     NORMAL PRESSURE   • Hypertension    • Incontinence    • Nausea    • Parastomal hernia    • Parkinson disease (CMS/HCC)    • Tremor     RIGHT HAND   • Unsteady gait        Plan:     1. Sepsis  2. E. Coli UTI - present on admission  3. Acute renal failure  4. Rhabdomyolysis  5. Hypotension  6. Dementia  7. Abdominal pain  8. Metabolic acidosis  9. Hyperkalemia  10. Uremia  11. Anxiety    Continue current treatment. Monitor counts. Increase activity. Continue antibiotics pending final urine culture and sensitivity. Aggressive hydration with IV fluids. Monitor ck and renal function. Renal US today. Consult nephrology. Pressor support prn. Resume home medications.     Electronically signed by COLTON Ross on 5/7/2019 at 10:55 AM     Copy sent to Monster Ward MD  I have discussed the care of Susy Ko, including pertinent history and exam findings, with the nurse practitioner.  I agree with the above plan of care.      Electronically signed by Monster Zuniga MD on 5/7/2019 at 5:55 PM

## 2019-05-08 ENCOUNTER — APPOINTMENT (OUTPATIENT)
Dept: GENERAL RADIOLOGY | Facility: HOSPITAL | Age: 79
End: 2019-05-08

## 2019-05-08 LAB
ALBUMIN SERPL-MCNC: 2.7 G/DL (ref 3.5–5)
ALBUMIN/GLOB SERPL: 1 G/DL (ref 1.1–2.5)
ALP SERPL-CCNC: 97 U/L (ref 24–120)
ALT SERPL W P-5'-P-CCNC: 26 U/L (ref 0–54)
ANION GAP SERPL CALCULATED.3IONS-SCNC: 13 MMOL/L (ref 4–13)
ANION GAP SERPL CALCULATED.3IONS-SCNC: 15 MMOL/L (ref 4–13)
ANISOCYTOSIS BLD QL: ABNORMAL
AST SERPL-CCNC: 240 U/L (ref 7–45)
BACTERIA SPEC AEROBE CULT: ABNORMAL
BACTERIA SPEC AEROBE CULT: ABNORMAL
BILIRUB SERPL-MCNC: 0.5 MG/DL (ref 0.1–1)
BUN BLD-MCNC: 108 MG/DL (ref 5–21)
BUN BLD-MCNC: 117 MG/DL (ref 5–21)
BUN/CREAT SERPL: 20.4 (ref 7–25)
BUN/CREAT SERPL: 24.3 (ref 7–25)
CALCIUM SPEC-SCNC: 5.4 MG/DL (ref 8.4–10.4)
CALCIUM SPEC-SCNC: 6.8 MG/DL (ref 8.4–10.4)
CHLORIDE SERPL-SCNC: 104 MMOL/L (ref 98–110)
CHLORIDE SERPL-SCNC: 96 MMOL/L (ref 98–110)
CK SERPL-CCNC: ABNORMAL U/L (ref 0–203)
CO2 SERPL-SCNC: 16 MMOL/L (ref 24–31)
CO2 SERPL-SCNC: 26 MMOL/L (ref 24–31)
CREAT BLD-MCNC: 4.44 MG/DL (ref 0.5–1.4)
CREAT BLD-MCNC: 5.73 MG/DL (ref 0.5–1.4)
DEPRECATED RDW RBC AUTO: 48.9 FL (ref 40–54)
EOSINOPHIL # BLD MANUAL: 0.31 10*3/MM3 (ref 0–0.7)
EOSINOPHIL NFR BLD MANUAL: 3 % (ref 0–4)
ERYTHROCYTE [DISTWIDTH] IN BLOOD BY AUTOMATED COUNT: 18 % (ref 12–15)
GFR SERPL CREATININE-BSD FRML MDRD: 10 ML/MIN/1.73
GFR SERPL CREATININE-BSD FRML MDRD: 7 ML/MIN/1.73
GFR SERPL CREATININE-BSD FRML MDRD: ABNORMAL ML/MIN/1.73
GFR SERPL CREATININE-BSD FRML MDRD: ABNORMAL ML/MIN/1.73
GLOBULIN UR ELPH-MCNC: 2.6 GM/DL
GLUCOSE BLD-MCNC: 131 MG/DL (ref 70–100)
GLUCOSE BLD-MCNC: 150 MG/DL (ref 70–100)
HCT VFR BLD AUTO: 26 % (ref 37–47)
HGB BLD-MCNC: 8.7 G/DL (ref 12–16)
HYPOCHROMIA BLD QL: ABNORMAL
LYMPHOCYTES # BLD MANUAL: 0.52 10*3/MM3 (ref 0.72–4.86)
LYMPHOCYTES NFR BLD MANUAL: 5 % (ref 15–45)
MCH RBC QN AUTO: 25.3 PG (ref 28–32)
MCHC RBC AUTO-ENTMCNC: 33.5 G/DL (ref 33–36)
MCV RBC AUTO: 75.6 FL (ref 82–98)
MICROCYTES BLD QL: ABNORMAL
NEUTROPHILS # BLD AUTO: 9.62 10*3/MM3 (ref 1.87–8.4)
NEUTROPHILS NFR BLD MANUAL: 87 % (ref 39–78)
NEUTS BAND NFR BLD MANUAL: 5 % (ref 0–10)
NRBC BLD AUTO-RTO: 0 /100 WBC (ref 0–0.2)
PLATELET # BLD AUTO: 151 10*3/MM3 (ref 130–400)
PMV BLD AUTO: 10.8 FL (ref 6–12)
POTASSIUM BLD-SCNC: 3.1 MMOL/L (ref 3.5–5.3)
POTASSIUM BLD-SCNC: 4 MMOL/L (ref 3.5–5.3)
PROT SERPL-MCNC: 5.3 G/DL (ref 6.3–8.7)
RBC # BLD AUTO: 3.44 10*6/MM3 (ref 4.2–5.4)
SMALL PLATELETS BLD QL SMEAR: ADEQUATE
SODIUM BLD-SCNC: 135 MMOL/L (ref 135–145)
SODIUM BLD-SCNC: 135 MMOL/L (ref 135–145)
WBC MORPH BLD: NORMAL
WBC NRBC COR # BLD: 10.46 10*3/MM3 (ref 4.8–10.8)

## 2019-05-08 PROCEDURE — 80053 COMPREHEN METABOLIC PANEL: CPT | Performed by: INTERNAL MEDICINE

## 2019-05-08 PROCEDURE — 74018 RADEX ABDOMEN 1 VIEW: CPT

## 2019-05-08 PROCEDURE — 85025 COMPLETE CBC W/AUTO DIFF WBC: CPT | Performed by: INTERNAL MEDICINE

## 2019-05-08 PROCEDURE — 94799 UNLISTED PULMONARY SVC/PX: CPT

## 2019-05-08 PROCEDURE — 92526 ORAL FUNCTION THERAPY: CPT

## 2019-05-08 PROCEDURE — 82550 ASSAY OF CK (CPK): CPT | Performed by: NURSE PRACTITIONER

## 2019-05-08 PROCEDURE — 25010000002 CEFTRIAXONE PER 250 MG: Performed by: NURSE PRACTITIONER

## 2019-05-08 PROCEDURE — 25010000002 IRON SUCROSE PER 1 MG: Performed by: INTERNAL MEDICINE

## 2019-05-08 PROCEDURE — 25010000002 CALCIUM GLUCONATE PER 10 ML: Performed by: INTERNAL MEDICINE

## 2019-05-08 PROCEDURE — 25010000002 VANCOMYCIN 10 G RECONSTITUTED SOLUTION: Performed by: INTERNAL MEDICINE

## 2019-05-08 PROCEDURE — 85007 BL SMEAR W/DIFF WBC COUNT: CPT | Performed by: INTERNAL MEDICINE

## 2019-05-08 RX ORDER — CALCITRIOL 0.25 UG/1
0.25 CAPSULE, LIQUID FILLED ORAL DAILY
Status: DISCONTINUED | OUTPATIENT
Start: 2019-05-08 | End: 2019-05-08

## 2019-05-08 RX ORDER — FAMOTIDINE 10 MG/ML
20 INJECTION, SOLUTION INTRAVENOUS DAILY
Status: DISCONTINUED | OUTPATIENT
Start: 2019-05-08 | End: 2019-05-10

## 2019-05-08 RX ORDER — CALCITRIOL 1 UG/ML
0.25 SOLUTION ORAL DAILY
Status: DISCONTINUED | OUTPATIENT
Start: 2019-05-08 | End: 2019-05-16 | Stop reason: HOSPADM

## 2019-05-08 RX ORDER — VANCOMYCIN HYDROCHLORIDE 1 G/200ML
1000 INJECTION, SOLUTION INTRAVENOUS EVERY 12 HOURS
Status: DISCONTINUED | OUTPATIENT
Start: 2019-05-08 | End: 2019-05-08 | Stop reason: DRUGHIGH

## 2019-05-08 RX ADMIN — CARBIDOPA AND LEVODOPA 1 TABLET: 25; 100 TABLET ORAL at 18:43

## 2019-05-08 RX ADMIN — SODIUM CHLORIDE, PRESERVATIVE FREE 3 ML: 5 INJECTION INTRAVENOUS at 20:28

## 2019-05-08 RX ADMIN — CEFTRIAXONE SODIUM 1 G: 1 INJECTION, POWDER, FOR SOLUTION INTRAMUSCULAR; INTRAVENOUS at 18:13

## 2019-05-08 RX ADMIN — IRON SUCROSE 200 MG: 20 INJECTION, SOLUTION INTRAVENOUS at 14:49

## 2019-05-08 RX ADMIN — SODIUM BICARBONATE: 84 INJECTION, SOLUTION INTRAVENOUS at 14:06

## 2019-05-08 RX ADMIN — SODIUM BICARBONATE: 84 INJECTION, SOLUTION INTRAVENOUS at 05:41

## 2019-05-08 RX ADMIN — CALCITRIOL 0.25 MCG: 1 SOLUTION ORAL at 18:53

## 2019-05-08 RX ADMIN — CARBIDOPA AND LEVODOPA 1 TABLET: 25; 100 TABLET ORAL at 20:27

## 2019-05-08 RX ADMIN — ALPRAZOLAM 0.5 MG: 0.5 TABLET ORAL at 20:27

## 2019-05-08 RX ADMIN — CALCIUM GLUCONATE 6 G: 98 INJECTION, SOLUTION INTRAVENOUS at 06:47

## 2019-05-08 RX ADMIN — VANCOMYCIN HYDROCHLORIDE 1250 MG: 10 INJECTION, POWDER, LYOPHILIZED, FOR SOLUTION INTRAVENOUS at 04:51

## 2019-05-08 RX ADMIN — FAMOTIDINE 20 MG: 10 INJECTION INTRAVENOUS at 10:29

## 2019-05-08 RX ADMIN — CALCIUM GLUCONATE 1 G: 98 INJECTION, SOLUTION INTRAVENOUS at 04:51

## 2019-05-08 RX ADMIN — SODIUM BICARBONATE: 84 INJECTION, SOLUTION INTRAVENOUS at 22:37

## 2019-05-08 RX ADMIN — CITALOPRAM 10 MG: 10 TABLET, FILM COATED ORAL at 20:27

## 2019-05-08 NOTE — NURSING NOTE
05/08/19 1115   Wound 05/08/19 1115 coccyx pressure injury;blisters   Date first assessed/Time first assessed: 05/08/19 1115   Location: coccyx  Type: pressure injury;blisters  Stage, Pressure Injury: deep tissue injury   Dressing Appearance open to air   Closure None   Base purple   Periwound redness;non-blanchable;moist   Periwound Temperature warm   Periwound Skin Turgor soft   Edges irregular   Drainage Characteristics/Odor serous   Drainage Amount scant   Care, Wound barrier applied   Dressing Care, Wound open to air   Periwound Care, Wound barrier ointment applied   Wound 05/07/19 0532 Right heel pressure injury   Date first assessed/Time first assessed: 05/07/19 0532   Present On Admission : yes;picture taken  Side: Right  Location: heel  Type: pressure injury  Stage, Pressure Injury: deep tissue injury   Dressing Appearance open to air   Base purple   Periwound dry;non-blanchable   Periwound Temperature warm   Periwound Skin Turgor soft   Edges irregular   Drainage Amount none   Dressing Care, Wound dressing applied;silicone     Patient presents with 2 DTI pressure injuries, one on heel and one on coccyx. Applied barrier cream and offloaded.  Suggest use of heel protector boots, comfort glide repositioning mat and wedges.  Use Hydrogaurd on coccyx wound and silicone border dressing on heel.  Turn at least q2h.  No other needs at this time.  Will continue to follow.

## 2019-05-08 NOTE — PROGRESS NOTES
[facility]  Hieu Zuniga M.D.  JOCE Zuniga M.D.  COLTON Hunter      Internal Medicine Progress Note    5/8/2019   9:57 AM    Name:  Susy Ko  MRN:    [id number]     Acct:     [id number]   Room:  [address] Day: 2     Admit Date: 5/6/2019  8:39 PM  PCP: Monster Zuniga MD    Subjective:     C/C:   Chief Complaint   Patient presents with   • Abnormal KUB       Interval History: Status:  stayed the same. Resting in bed. Daughter at bedside. Daughter and staff report patient has been quite lethargic since yesterday. Renal function slightly worse. CK increased. Currently off levophed and blood pressure stable. Still with decreased output from ostomy. Failed SLP eval and remains NPO. Now DNR and family declining HD. Now with positive blood cultures.     Review of Systems   Unable to perform ROS: mental status change     Medications:     Allergies:   Allergies   Allergen Reactions   • Morphine And Related Mental Status Change   • Adhesive Tape Rash       Current Meds:   Current Facility-Administered Medications:   •  acetaminophen (TYLENOL) tablet 650 mg, 650 mg, Oral, Q6H PRN, Monster Zuniga MD  •  acetaminophen (TYLENOL) tablet 650 mg, 650 mg, Oral, Q4H PRN, Monster Zuniga MD  •  ALPRAZolam (XANAX) tablet 0.5 mg, 0.5 mg, Oral, Nightly, Monster Zuniga MD  •  calcium carbonate (oyster shell) tablet 1,250 mg, 1,250 mg, Oral, Daily, Ramsey Weldon MD  •  calcium gluconate 1 g in sodium chloride 0.9 % 100 mL IVPB, 1 g, Intravenous, PRN, Last Rate: 100 mL/hr at 05/08/19 0451, 1 g at 05/08/19 0451 **AND** calcium gluconate 6 g in sodium chloride 0.9 % 500 mL IVPB, 6 g, Intravenous, PRN, Last Rate: 83.3 mL/hr at 05/08/19 0647, 6 g at 05/08/19 0647 **AND** Calcium, , , PRN, Monster Zuniga MD  •  carbidopa-levodopa (SINEMET)  MG per tablet 1 tablet, 1 tablet, Oral, TID, Monster Zuniga MD  •  cefTRIAXone (ROCEPHIN) 1 g/100 mL 0.9% NS (MBP), 1  g, Intravenous, Q24H, Traci Kohler APRN, 1 g at 05/07/19 1740  •  citalopram (CeleXA) tablet 10 mg, 10 mg, Oral, Nightly, Monster Zuniga MD  •  cyancobalamin (VITAMIN B-12) tablet 100 mcg, 100 mcg, Oral, Daily, Monster Zuniga MD  •  dextrose 5 % 850 mL with sodium bicarbonate 8.4 % 150 mEq infusion, , Intravenous, Continuous, Ramsey Weldon MD, Last Rate: 150 mL/hr at 05/08/19 0541  •  famotidine (PEPCID) injection 20 mg, 20 mg, Intravenous, Daily, Monster Zuniga MD  •  HYDROcodone-acetaminophen (NORCO) 7.5-325 MG per tablet 1 tablet, 1 tablet, Oral, Q4H PRN, Monster Zuniga MD  •  meperidine (DEMEROL) injection 25 mg, 25 mg, Intravenous, Q6H PRN, Traci Kohler APRN  •  norepinephrine (LEVOPHED) 8,000 mcg in sodium chloride 0.9 % 250 mL (32 mcg/mL) infusion, 0.02-0.3 mcg/kg/min, Intravenous, Titrated, Monster Zuniga MD, Stopped at 05/08/19 0818  •  [COMPLETED] Insert peripheral IV, , , Once **AND** sodium chloride 0.9 % flush 10 mL, 10 mL, Intravenous, PRN, Prosper Jiang,   •  sodium chloride 0.9 % flush 3 mL, 3 mL, Intravenous, Q12H, Monster Zuniga MD  •  sodium chloride 0.9 % flush 3-10 mL, 3-10 mL, Intravenous, PRN, Monster Zuniga MD  •  [COMPLETED] vancomycin 1250 mg/250 mL 0.9% NS IVPB ([facility]), 20 mg/kg, Intravenous, Once, 1,250 mg at 05/08/19 0451 **FOLLOWED BY** [START ON 5/10/2019] vancomycin 500 mg/100 mL 0.9% NS IVPB ([facility]), 500 mg, Intravenous, Q48H, Monster Zuniga MD    Data:     Code Status:    Code Status and Medical Interventions:   Ordered at: 05/07/19 1110     Level Of Support Discussed With:    Patient     Code Status:    CPR     Medical Interventions (Level of Support Prior to Arrest):    Full       History reviewed. No pertinent family history.    Social History     Socioeconomic History   • Marital status:      Spouse name: Not on file   • Number of children: Not on file   • Years of education: Not on file  "  • Highest education level: Not on file   Tobacco Use   • Smoking status: Former Smoker   • Smokeless tobacco: Never Used   Substance and Sexual Activity   • Alcohol use: No   • Drug use: No   • Sexual activity: Defer       Vitals:  /48   Pulse 88   Temp 98.8 °F (37.1 °C) (Axillary)   Resp 15   Ht 160 cm (63\")   Wt 60.3 kg (133 lb)   SpO2 93%   BMI 23.56 kg/m²   Temp (24hrs), Av.8 °F (37.1 °C), Min:98 °F (36.7 °C), Max:99.6 °F (37.6 °C)        I/O (24Hr):    Intake/Output Summary (Last 24 hours) at 2019 0957  Last data filed at 2019 0800  Gross per 24 hour   Intake 3474.29 ml   Output 605 ml   Net 2869.29 ml       Labs and imaging:      Recent Results (from the past 12 hour(s))   CBC Auto Differential    Collection Time: 19  1:31 AM   Result Value Ref Range    WBC 10.46 4.80 - 10.80 10*3/mm3    RBC 3.44 (L) 4.20 - 5.40 10*6/mm3    Hemoglobin 8.7 (L) 12.0 - 16.0 g/dL    Hematocrit 26.0 (L) 37.0 - 47.0 %    MCV 75.6 (L) 82.0 - 98.0 fL    MCH 25.3 (L) 28.0 - 32.0 pg    MCHC 33.5 33.0 - 36.0 g/dL    RDW 18.0 (H) 12.0 - 15.0 %    RDW-SD 48.9 40.0 - 54.0 fl    MPV 10.8 6.0 - 12.0 fL    Platelets 151 130 - 400 10*3/mm3    nRBC 0.0 0.0 - 0.2 /100 WBC   Manual Differential    Collection Time: 19  1:31 AM   Result Value Ref Range    Neutrophil % 87.0 (H) 39.0 - 78.0 %    Lymphocyte % 5.0 (L) 15.0 - 45.0 %    Eosinophil % 3.0 0.0 - 4.0 %    Bands %  5.0 0.0 - 10.0 %    Neutrophils Absolute 9.62 (H) 1.87 - 8.40 10*3/mm3    Lymphocytes Absolute 0.52 (L) 0.72 - 4.86 10*3/mm3    Eosinophils Absolute 0.31 0.00 - 0.70 10*3/mm3    Anisocytosis Mod/2+ None Seen    Hypochromia Mod/2+ None Seen    Microcytes Large/3+ None Seen    WBC Morphology Normal Normal    Platelet Estimate Adequate Normal   CK    Collection Time: 19  2:54 AM   Result Value Ref Range    Creatine Kinase 19,560 (H) 0 - 203 U/L   Comprehensive Metabolic Panel    Collection Time: 19  2:54 AM   Result Value Ref Range    " Glucose 150 (H) 70 - 100 mg/dL     (H) 5 - 21 mg/dL    Creatinine 5.73 (H) 0.50 - 1.40 mg/dL    Sodium 135 135 - 145 mmol/L    Potassium 4.0 3.5 - 5.3 mmol/L    Chloride 104 98 - 110 mmol/L    CO2 16.0 (L) 24.0 - 31.0 mmol/L    Calcium 5.4 (C) 8.4 - 10.4 mg/dL    Total Protein 5.3 (L) 6.3 - 8.7 g/dL    Albumin 2.70 (L) 3.50 - 5.00 g/dL    ALT (SGPT) 26 0 - 54 U/L    AST (SGOT) 240 (H) 7 - 45 U/L    Alkaline Phosphatase 97 24 - 120 U/L    Total Bilirubin 0.5 0.1 - 1.0 mg/dL    eGFR Non African Amer 7 (L) >60 mL/min/1.73    eGFR  African Amer  >60 mL/min/1.73    Globulin 2.6 gm/dL    A/G Ratio 1.0 (L) 1.1 - 2.5 g/dL    BUN/Creatinine Ratio 20.4 7.0 - 25.0    Anion Gap 15.0 (H) 4.0 - 13.0 mmol/L     Physical Examination:        Physical Exam   Constitutional: She appears well-developed and well-nourished. She appears lethargic.   HENT:   Head: Normocephalic and atraumatic.   Nose: Nose normal.   Mouth/Throat: Oropharynx is clear and moist.   Eyes: Conjunctivae and EOM are normal. Pupils are equal, round, and reactive to light.   Neck: Normal range of motion. Neck supple.   Cardiovascular: Normal rate, regular rhythm, normal heart sounds and intact distal pulses.   Pulmonary/Chest: Effort normal and breath sounds normal.   Abdominal: Soft. Bowel sounds are normal.   Ostomy - stoma pink and appliance intact   Musculoskeletal:   Generalized weakness  Contractures to BLE   Neurological: She appears lethargic.   Skin: Skin is warm and dry.   DTI to right heel  Blisters and excoriation to coccyx   Nursing note and vitals reviewed.      Assessment:            Severe sepsis (CMS/HCC)    Past Medical History:   Diagnosis Date   • Anxiety    • Breast cancer (CMS/HCC)      LEFT WITH RECONSTRUCTION   • Colostomy in place (CMS/HCC)    • Dementia    • Depression    • Edema     LOWER FEET   • Hydrocephalus     NORMAL PRESSURE   • Hypertension    • Incontinence    • Nausea    • Parastomal hernia    • Parkinson disease (CMS/HCC)     • Tremor     RIGHT HAND   • Unsteady gait         Plan:        1. Sepsis  2. E. Coli UTI - present on admission  3. Acute renal failure  4. Rhabdomyolysis  5. Hypotension  6. Dementia  7. Abdominal pain  8. Metabolic acidosis  9. Hyperkalemia  10. Uremia  11. Anxiety    Continue current treatment. Monitor counts. Increase activity. Labs in am. Continue antibiotics. Continue fluid resuscitation. DNR per family wishes. KUB today- if no obstruction, insert dobhoff and begin supplemental tube feedings. Aggressive therapies.       Electronically signed by COLTON Ross on 5/8/2019 at 9:57 AM     I have discussed the care of Susy Ko, including pertinent history and exam findings, with the nurse practitioner.    I have seen and examined the patient and the key elements of all parts of the encounter have been performed by me.  I agree with the assessment, plan and orders as documented by Traci SEGURA, after I modified the exam findings and the plan of treatments and the final version is my approved version of the assessment.        Electronically signed by Monster Zuniga MD on 5/8/2019 at 12:23 PM

## 2019-05-08 NOTE — PLAN OF CARE
Problem: Patient Care Overview  Goal: Plan of Care Review  Outcome: Ongoing (interventions implemented as appropriate)   05/08/19 1104   Coping/Psychosocial   Plan of Care Reviewed With patient   Plan of Care Review   Progress no change   OTHER   Outcome Summary SLP treatment complete. Pt remains fatigued but was able to remain alert throughout session with min-mod verbal stimulation. Oral care was complete via damp toothette and she was presented with trials of pureed, honey thick, nectar thick, and thin consistencies. Somewhat prolonged yet functional mastication noted secondary to weakness and fatigued. Lingual and labial function is noted to be improved from yesterday. Swallow initiation and laryngeal movment is also improving but remains decreased. No overt s/s of aspiration noted wit pureed and honey thick consistencies. Immediate weak throat clear noted 2x with nectar thick and thin consistencies. Vocal change noted with thin liquids. Pt ok to begin a pureed diet with honey thick liquids requiring 1:1 assisstance with feedings when cleared by MD for intake. Meds to be administered crushed in pudding/applesauce. Ok for ice chips or sips of water via spoon 30 minutes following any other PO intake. SLP will continue to follow and treat.

## 2019-05-08 NOTE — PROGRESS NOTES
Nephrology ([facility]) Progress Note      Patient:  Susy Ko  YOB: 1940  Date of Service: 5/8/2019  MRN: [id number]   Acct: [id number]   Primary Care Physician: Monster Zuniga MD  Advance Directive:   Code Status and Medical Interventions:   Ordered at: 05/07/19 1110     Level Of Support Discussed With:    Patient     Code Status:    CPR     Medical Interventions (Level of Support Prior to Arrest):    Full     Admit Date: 5/6/2019       Hospital Day: 2  Referring Provider: Monster Zuniga MD      Patient personally seen and examined.  Complete chart including Consults, Notes, Operative Reports, Labs, Cardiology, and Radiology studies reviewed as able.        Subjective:  Susy Ko is a 79 y.o. female  whom we were consulted for acute kidney injury, metabolic acidosis.  History of hypertension, Parkinson's, and has a colostomy.  Patient resides in local nursing home secondary to advanced dementia. Had been complaining of abdominal pain so was sent to emergency department for evaluation.  Found to have acute kidney injury, severe metabolic acidosis, UTI with sepsis.  Patient required Levophed for blood pressure support. Dr Weldon discussed at length with family and they have decided not to pursue any dialysis.    Today is somnolent; wakes briefly to voice and will respond appropriately to some questions; falls right back asleep.  Levophed has been weaned off overnight.  Urine output nonoliguric      Allergies:  Morphine and related and Adhesive tape    Home Meds:  Medications Prior to Admission   Medication Sig Dispense Refill Last Dose   • acetaminophen (TYLENOL) 325 MG tablet Take 650 mg by mouth Every 6 (Six) Hours As Needed for Mild Pain .      • carbidopa-levodopa (SINEMET)  MG per tablet Take 1 tablet by mouth 3 (Three) Times a Day.      • citalopram (CeleXA) 10 MG tablet Take 10 mg by mouth Every Night.   3/16/2017 at 1800   •  Omeprazole Magnesium (PRILOSEC OTC PO) Take  by mouth.      • tiotropium bromide-olodaterol (STIOLTO RESPIMAT) 2.5-2.5 MCG/ACT aerosol solution inhaler Inhale Daily.      • vitamin B-12 (CYANOCOBALAMIN) 100 MCG tablet Take 100 mcg by mouth Daily.   3/15/2017 at 0600   • ALPRAZolam (XANAX) 0.5 MG tablet Take 0.5 mg by mouth Every Night.   3/16/2017 at 1800   • HYDROcodone-acetaminophen (NORCO) 7.5-325 MG per tablet Take 1 tablet by mouth Every 4 (Four) Hours As Needed for Moderate Pain (4-6) for up to 30 doses. 30 tablet 0        Medicines:  Current Facility-Administered Medications   Medication Dose Route Frequency Provider Last Rate Last Dose   • acetaminophen (TYLENOL) tablet 650 mg  650 mg Oral Q6H PRN Monster Zuniga MD       • acetaminophen (TYLENOL) tablet 650 mg  650 mg Oral Q4H PRN Monster Zuniag MD       • ALPRAZolam (XANAX) tablet 0.5 mg  0.5 mg Oral Nightly Monster Zuniga MD       • calcium carbonate (oyster shell) tablet 1,250 mg  1,250 mg Oral Daily Ramsey Weldon MD       • calcium gluconate 1 g in sodium chloride 0.9 % 100 mL IVPB  1 g Intravenous PRN Monster Zuniga  mL/hr at 05/08/19 0451 1 g at 05/08/19 0451    And   • calcium gluconate 6 g in sodium chloride 0.9 % 500 mL IVPB  6 g Intravenous PRN Monster Zuniga MD 83.3 mL/hr at 05/08/19 0647 6 g at 05/08/19 0647   • carbidopa-levodopa (SINEMET)  MG per tablet 1 tablet  1 tablet Oral TID Monster Zuniga MD       • cefTRIAXone (ROCEPHIN) 1 g/100 mL 0.9% NS (MBP)  1 g Intravenous Q24H Traci Kohler APRN   1 g at 05/07/19 1740   • citalopram (CeleXA) tablet 10 mg  10 mg Oral Nightly Monster Zuniga MD       • cyancobalamin (VITAMIN B-12) tablet 100 mcg  100 mcg Oral Daily Monster Zuniga MD       • dextrose 5 % 850 mL with sodium bicarbonate 8.4 % 150 mEq infusion   Intravenous Continuous Ramsey Weldon  mL/hr at 05/08/19 0541     • HYDROcodone-acetaminophen (NORCO)  7.5-325 MG per tablet 1 tablet  1 tablet Oral Q4H PRN Monster Zuniga MD       • meperidine (DEMEROL) injection 25 mg  25 mg Intravenous Q6H PRN Traci Kohler APRN       • norepinephrine (LEVOPHED) 8,000 mcg in sodium chloride 0.9 % 250 mL (32 mcg/mL) infusion  0.02-0.3 mcg/kg/min Intravenous Titrated Monster Zuniga MD 2.15 mL/hr at 05/08/19 0324 0.02 mcg/kg/min at 05/08/19 0324   • pantoprazole (PROTONIX) EC tablet 20 mg  20 mg Oral QAM AC Monster Zuniga MD       • sodium chloride 0.9 % flush 10 mL  10 mL Intravenous PRN Prosper Jiang DO       • sodium chloride 0.9 % flush 3 mL  3 mL Intravenous Q12H Monster Zuniga MD       • sodium chloride 0.9 % flush 3-10 mL  3-10 mL Intravenous PRN Monster Zuniga MD       • [START ON 5/10/2019] vancomycin 500 mg/100 mL 0.9% NS IVPB ([facility])  500 mg Intravenous Q48H Monster Zuniga MD           Past Medical History:  Past Medical History:   Diagnosis Date   • Anxiety    • Breast cancer (CMS/HCC)      LEFT WITH RECONSTRUCTION   • Colostomy in place (CMS/HCC)    • Dementia    • Depression    • Edema     LOWER FEET   • Hydrocephalus     NORMAL PRESSURE   • Hypertension    • Incontinence    • Nausea    • Parastomal hernia    • Parkinson disease (CMS/HCC)    • Tremor     RIGHT HAND   • Unsteady gait        Past Surgical History:  Past Surgical History:   Procedure Laterality Date   • ABDOMINAL SURGERY     • APPENDECTOMY     • BREAST RECONSTRUCTION Left     LEFT   • CHOLECYSTECTOMY     • COLON SURGERY     • COLONOSCOPY     • COLOSTOMY     • HYSTERECTOMY     • MASTECTOMY Left    • PARASTOMAL HERNIA REPAIR N/A 3/17/2017    Procedure: REPAIR PARASTOMAL HERNIA;  Surgeon: Princess Walters MD;  Location: [facility] OR;  Service:        Family History  History reviewed. No pertinent family history.    Social History  Social History     Socioeconomic History   • Marital status:      Spouse name: Not on file   • Number of children: Not  on file   • Years of education: Not on file   • Highest education level: Not on file   Tobacco Use   • Smoking status: Former Smoker   • Smokeless tobacco: Never Used   Substance and Sexual Activity   • Alcohol use: No   • Drug use: No   • Sexual activity: Defer         Review of Systems:  Unable to obtain due to altered mental status    Objective:  Patient Vitals for the past 24 hrs:   BP Temp Temp src Pulse Resp SpO2 Weight   05/08/19 0832 138/48 98.8 °F (37.1 °C) Axillary 88 -- 93 % --   05/08/19 0817 141/55 -- -- 88 -- 95 % --   05/08/19 0802 124/48 -- -- 87 -- 93 % --   05/08/19 0747 118/48 -- -- 87 -- 92 % --   05/08/19 0732 100/53 -- -- 88 -- 93 % --   05/08/19 0717 130/66 -- -- 88 -- 92 % --   05/08/19 0702 127/50 -- -- 86 -- 92 % --   05/08/19 0632 120/52 -- -- 87 -- 92 % --   05/08/19 0617 115/44 -- -- 87 -- 93 % --   05/08/19 0601 127/56 -- -- 88 -- 93 % --   05/08/19 0546 116/54 -- -- 88 -- 93 % --   05/08/19 0531 127/57 -- -- 88 -- 92 % --   05/08/19 0517 117/52 -- -- 89 -- 91 % --   05/08/19 0502 144/55 -- -- 85 -- 90 % --   05/08/19 0446 130/56 -- -- 86 -- 93 % --   05/08/19 0431 122/60 -- -- 87 -- 93 % --   05/08/19 0417 109/48 -- -- 87 -- 94 % --   05/08/19 0402 111/47 99.5 °F (37.5 °C) Axillary 85 -- 94 % --   05/08/19 0346 127/46 -- -- 86 -- 94 % --   05/08/19 0331 114/46 -- -- 86 -- 93 % --   05/08/19 0324 103/41 -- -- 85 -- 94 % 60.3 kg (133 lb)   05/08/19 0317 106/49 -- -- 86 -- 94 % --   05/08/19 0302 102/46 -- -- 87 -- 94 % --   05/08/19 0247 111/46 -- -- 87 -- 94 % --   05/08/19 0232 115/46 -- -- 88 -- 94 % --   05/08/19 0217 134/49 -- -- 88 -- 94 % --   05/08/19 0203 146/65 -- -- 89 -- 94 % --   05/08/19 0116 113/49 -- -- 80 15 94 % --   05/08/19 0046 117/49 -- -- 81 15 94 % --   05/08/19 0032 103/50 -- -- 83 17 94 % --   05/08/19 0017 117/49 -- -- 83 15 93 % --   05/08/19 0001 128/52 99.6 °F (37.6 °C) Axillary 82 15 94 % --   05/07/19 2346 115/49 -- -- 82 15 94 % --   05/07/19 2331 122/55  -- -- 83 16 95 % --   05/07/19 2316 107/47 -- -- 82 15 94 % --   05/07/19 2301 115/56 -- -- 81 15 94 % --   05/07/19 2246 108/51 -- -- 82 16 94 % --   05/07/19 2231 104/48 -- -- 82 15 95 % --   05/07/19 2216 108/48 -- -- 82 17 96 % --   05/07/19 2200 110/48 -- -- 82 16 94 % --   05/07/19 2149 104/51 -- -- 82 16 94 % --   05/07/19 2146 104/51 -- -- 83 -- 95 % --   05/07/19 2131 114/51 -- -- 83 16 94 % --   05/07/19 2116 114/49 -- -- 82 16 96 % --   05/07/19 2102 102/50 -- -- 81 16 94 % --   05/07/19 2046 119/52 -- -- 83 18 96 % --   05/07/19 2031 110/49 -- -- 82 17 94 % --   05/07/19 2017 119/53 -- -- 82 15 95 % --   05/07/19 2002 111/49 98.2 °F (36.8 °C) Oral 81 16 95 % --   05/07/19 1946 110/51 -- -- 82 -- 95 % --   05/07/19 1931 103/51 -- -- 81 -- 94 % --   05/07/19 1916 108/50 -- -- 81 -- 93 % --   05/07/19 1900 96/49 -- -- 81 -- 94 % --   05/07/19 1831 120/53 -- -- 83 -- 95 % --   05/07/19 1817 111/51 -- -- 81 -- 96 % --   05/07/19 1802 135/53 -- -- 80 13 94 % --   05/07/19 1748 148/65 -- -- 82 -- 93 % --   05/07/19 1732 113/52 -- -- 80 -- 98 % --   05/07/19 1716 109/45 -- -- 80 -- 95 % --   05/07/19 1702 115/50 -- -- 79 16 94 % --   05/07/19 1647 117/45 -- -- 79 -- 96 % --   05/07/19 1632 116/70 -- -- 80 -- 95 % --   05/07/19 1617 125/55 -- -- 81 -- 93 % --   05/07/19 1600 122/53 98.4 °F (36.9 °C) Axillary 81 20 97 % --   05/07/19 1547 111/46 -- -- 80 -- 95 % --   05/07/19 1532 117/47 -- -- 80 -- 94 % --   05/07/19 1517 121/44 -- -- 79 -- 96 % --   05/07/19 1501 124/49 -- -- 80 15 95 % --   05/07/19 1447 99/54 -- -- 80 -- 97 % --   05/07/19 1431 116/43 -- -- 79 -- 95 % --   05/07/19 1417 109/49 -- -- 79 -- 95 % --   05/07/19 1402 110/45 -- -- 79 16 95 % --   05/07/19 1346 116/46 -- -- 80 -- 96 % --   05/07/19 1331 113/45 -- -- 79 -- 95 % --   05/07/19 1316 99/47 -- -- 78 -- 95 % --   05/07/19 1301 107/40 -- -- 78 18 93 % --   05/07/19 1247 103/41 -- -- 78 -- 96 % --   05/07/19 1231 103/41 -- -- 78 -- 96 % --    05/07/19 1217 109/49 -- -- 79 -- 95 % --   05/07/19 1201 107/44 98 °F (36.7 °C) Axillary 77 20 96 % --   05/07/19 1147 104/50 -- -- 77 -- 96 % --   05/07/19 1131 109/58 -- -- 77 -- 96 % --   05/07/19 1116 106/43 -- -- 77 -- 95 % --   05/07/19 1100 94/64 -- -- 76 18 96 % --   05/07/19 1046 106/46 -- -- 77 -- 95 % --   05/07/19 1031 98/41 -- -- 75 -- 95 % --   05/07/19 1017 (!) 84/39 -- -- 76 -- 95 % --   05/07/19 1007 (!) 87/41 -- -- 76 17 95 % --   05/07/19 0949 (!) 92/30 -- -- 76 -- 93 % --   05/07/19 0931 91/42 -- -- 76 -- 95 % --       Intake/Output Summary (Last 24 hours) at 5/8/2019 0924  Last data filed at 5/8/2019 0800  Gross per 24 hour   Intake 3474.29 ml   Output 605 ml   Net 2869.29 ml     General: somnolent   Neck: supple, no JVD  Chest:  clear to auscultation bilaterally without respiratory distress  CVS: regular rate and rhythm  Abdominal: diffusely tender, +BS, colostomy bag in place  Extremities: no cyanosis or edema  Skin: warm and dry without rash  Neuro: no focal motor deficits    Labs:  Results from last 7 days   Lab Units 05/08/19  0131 05/06/19  2104   WBC 10*3/mm3 10.46 17.75*   HEMOGLOBIN g/dL 8.7* 9.7*   HEMATOCRIT % 26.0* 29.8*   PLATELETS 10*3/mm3 151 198         Results from last 7 days   Lab Units 05/08/19  0254 05/07/19  0406 05/07/19  0025   SODIUM mmol/L 135 134* 133*   POTASSIUM mmol/L 4.0 5.3 5.0   CHLORIDE mmol/L 104 107 105   CO2 mmol/L 16.0* 8.0* 9.0*   BUN mg/dL 117* 104* 105*   CREATININE mg/dL 5.73* 5.24* 5.31*   CALCIUM mg/dL 5.4* 6.2* 6.5*   BILIRUBIN mg/dL 0.5 0.4 0.6   ALK PHOS U/L 97 103 104   ALT (SGPT) U/L 26 19 15   AST (SGOT) U/L 240* 229* 177*   GLUCOSE mg/dL 150* 101* 167*       Radiology:   Imaging Results (last 72 hours)     Procedure Component Value Units Date/Time    US Renal Bilateral [[id number]] Collected:  05/07/19 1124     Updated:  05/07/19 1129    Narrative:       EXAMINATION:  US RENAL BILATERAL-  5/7/2019 10:11 AM CDT     HISTORY: acute renal failure;  A41.9-Sepsis, unspecified organism;  R65.20-Severe sepsis without septic shock; N39.0-Urinary tract  infection, site not specified; E87.5-Hyperkalemia; N17.9-Acute kidney  failure, unspecified; R41.82-Altered mental status, unspecified;  R13.19-Other dysphagia      COMPARISON: 05/10/2014 renal ultrasound. 05/06/2019 abdomen and pelvis  CT     TECHNIQUE:      Bilateral renal ultrasound was performed.     FINDINGS:      The right kidney measures 10.1 cm in wdda-ec-gllw length.     The left kidney measures 11 cm in xsvh-mv-xrho length.     There is mild cortical thinning and pelvic lipomatosis compatible with  age.     Normal echogenicity of renal cortex is observed without renal masses.     There are no perinephric abnormalities.     There is no pelvocaliectasis or obstructive uropathy changes.     The urinary bladder is decompressed with a Peralta catheter.       Impression:       1. Negative bilateral renal ultrasound.  This report was finalized on 05/07/2019 11:26 by Dr. Iban Queen MD.    CT Abdomen Pelvis Without Contrast [[id number]] Collected:  05/06/19 2236     Updated:  05/06/19 2242    Narrative:       EXAMINATION: CT ABDOMEN PELVIS WO CONTRAST-      5/6/2019 10:17 PM CDT     HISTORY: pain; r/o obstruction; A41.9-Sepsis, unspecified organism;  R65.20-Severe sepsis without septic shock; N39.0-Urinary tract  infection, site not specified; E87.5-Hyperkalemia; N17.9-Acute kidney  failure, unspecified; R41.82-Altered mental status, unspecified     In order to have a CT radiation dose as low as reasonably achievable  Automated Exposure Control was utilized for adjustment of the mA and/or  KV according to patient size.     DLP in mGycm= 343.     Noncontrast abdomen/pelvis CT.     Comparison is made with 04/12/2016.     Left lower quadrant ostomy with nonobstructing parastomal hernia.     No significant bowel dilation.     Mild urinary bladder distention.     Mild dilation of the renal collecting systems and ureters  is probably  based on vesicoureteral reflux. There is no obstructing stone.     Normal heart size.  No acute abnormality at the lung bases.     Cholecystectomy clips.  Normal noncontrast appearance of the liver and spleen.  The pancreas is atrophic.     Aortic calcification with no aneurysm.     Summary:  1. Moderate fecal material throughout the colon.  2. Nonobstructing parastomal hernia.  3. Mild dilation of the renal collecting systems and ureters compatible  with vesicoureteral reflux.  4. No evidence of bowel obstruction, mass or abscess.                                   This report was finalized on 05/06/2019 22:39 by Dr. Raul Ricci MD.    CT Head Without Contrast [[id number]] Collected:  05/06/19 2234     Updated:  05/06/19 2238    Narrative:       EXAMINATION: CT HEAD WO CONTRAST-      5/6/2019 10:17 PM CDT     HISTORY: AMS; A41.9-Sepsis, unspecified organism; R65.20-Severe sepsis  without septic shock; N39.0-Urinary tract infection, site not specified;  E87.5-Hyperkalemia; N17.9-Acute kidney failure, unspecified;  R41.82-Altered mental status, unspecified     In order to have a CT radiation dose as low as reasonably achievable  Automated Exposure Control was utilized for adjustment of the mA and/or  KV according to patient size.     DLP in mGycm= 1214.     Noncontrast head CT compared with 01/17/2017.     Axial, sagittal, and coronal noncontrast CT imaging of the head.     The visualized paranasal sinuses are clear.     The brain and ventricles have an age appropriate appearance.  Moderate atrophy and small vessel disease.  There is no hemorrhage or mass-effect.   No acute infarction is seen.     No calvarial abnormality.       Impression:       1. No acute intracranial abnormality is seen.                                         This report was finalized on 05/06/2019 22:35 by Dr. Raul Ricci MD.    XR Chest 1 View [[id number]] Collected:  05/06/19 2216     Updated:  05/06/19 2219    Narrative:        EXAMINATION: XR CHEST 1 VW-     5/6/2019 10:11 PM CDT     HISTORY: Altered mental status; r/o pneumonia.     One view chest x-ray compared with 01/17/2017.     Heart size is normal.  The mediastinum is within normal limits.      The lungs are normally expanded with no pneumonia or pneumothorax.  Chronic lung changes.  No congestive failure changes.                                                                       Impression:       1. No acute disease.           This report was finalized on 05/06/2019 22:16 by Dr. Raul Ricci MD.          Culture:  Blood Culture   Date Value Ref Range Status   05/06/2019 No growth at 24 hours  Preliminary   05/06/2019 Gram Positive Rods (A)  Preliminary   05/06/2019 Gram Positive Cocci (A)  Preliminary     Urine Culture   Date Value Ref Range Status   05/06/2019 >100,000 CFU/mL Escherichia coli (A)  Final   05/06/2019 70,000-80,000 CFU/mL Mixed Gram Positive Alison (A)  Final     Comment:     Probable Contaminant           Assessment   1.  Acute kidney injury due to ATN--worse today  2.  Septic shock  3.  Urinary tract infection  4.  Metabolic acidosis  5.  Rhabdomyolysis  6.  Hypocalcemia  7.  Advanced dementia  8.  History of colostomy    Plan:  1.  Continue bicarbonate IV fluids  2.  Discussed with family at bedside.  Explained that while improving urine output is a positive sign; her renal function continues to decline and overall prognosis remains extremely poor.  3.  Monitor labs      Cirilo Ross, COLTON  5/8/2019  9:24 AM

## 2019-05-08 NOTE — PROGRESS NOTES
Discharge Planning Assessment  [facility]     Patient Name: Susy Ko  MRN: [id number]  Today's Date: 5/8/2019    Admit Date: 5/6/2019    Discharge Needs Assessment     Row Name 05/08/19 0920       Living Environment    Lives With  facility resident    Name(s) of Who Lives With Patient  [facility]    Current Living Arrangements  extended care facility       Resource/Environmental Concerns    Resource/Environmental Concerns  none       Transition Planning    Patient/Family Anticipates Transition to  long term care facility    Patient/Family Anticipated Services at Transition  ;skilled nursing    Transportation Anticipated  health plan transportation       Discharge Needs Assessment    Readmission Within the Last 30 Days  no previous admission in last 30 days    Concerns to be Addressed  care coordination/care conferences;discharge planning    Outpatient/Agency/Support Group Needs  skilled nursing facility    Discharge Facility/Level of Care Needs  nursing facility, skilled    Current Discharge Risk  chronically ill    Discharge Coordination/Progress  Patient is a resident at [facility].  Patient will return to [facility] at discharge [phone].  Patient has a bed hold per Marian at [facility].  Spoke with patient's granddaughter who advised to her knowledge patient is to return to [facility] upon discharge.          Discharge Plan    No documentation.       Destination      No service coordination in this encounter.      Durable Medical Equipment      No service coordination in this encounter.      Dialysis/Infusion      No service coordination in this encounter.      Home Medical Care      No service coordination in this encounter.      Therapy      No service coordination in this encounter.      Community Resources      No service coordination in this encounter.          Demographic Summary    No documentation.       Functional Status    No documentation.       Psychosocial    No  documentation.       Abuse/Neglect    No documentation.       Legal    No documentation.       Substance Abuse    No documentation.       Patient Forms    No documentation.           PAL GreeneW

## 2019-05-08 NOTE — PLAN OF CARE
Problem: Patient Care Overview  Goal: Plan of Care Review  Outcome: Ongoing (interventions implemented as appropriate)   05/08/19 4081   OTHER   Outcome Summary RDN consult for enteral nutrition. Initiate Novasource renal enteral feeding. See RDN TF orders. Pt also receiving PO diet of pureed,honey thick liqiuds 1:1 with feeding. Con to follow.       Problem: Nutrition, Enteral (Adult)  Goal: Signs and Symptoms of Listed Potential Problems Will be Absent, Minimized or Managed (Nutrition, Enteral)  Outcome: Ongoing (interventions implemented as appropriate)

## 2019-05-08 NOTE — THERAPY TREATMENT NOTE
Acute Care - Speech Language Pathology   Swallow Treatment Note [facility]     Patient Name: Susy Ko  : 1940  MRN: [id number]  Today's Date: 2019  Onset of Illness/Injury or Date of Surgery: 19     Referring Physician: COLTON Kohler      Admit Date: 2019  SLP treatment complete. Pt remains fatigued but was able to remain alert throughout session with min-mod verbal stimulation. Oral care was complete via damp toothette and she was presented with trials of pureed, honey thick, nectar thick, and thin consistencies. Somewhat prolonged yet functional mastication noted secondary to weakness and fatigued. Lingual and labial function is noted to be improved from yesterday. Swallow initiation and laryngeal movment is also improving but remains decreased. No overt s/s of aspiration noted wit pureed and honey thick consistencies. Immediate weak throat clear noted 2x with nectar thick and thin consistencies. Vocal change noted with thin liquids. Pt ok to begin a pureed diet with honey thick liquids requiring 1:1 assisstance with feedings when cleared by MD for intake. Meds to be administered crushed in pudding/applesauce. Ok for ice chips or sips of water via spoon 30 minutes following any other PO intake. SLP will continue to follow and treat.  Benjamin Martinez MS CCC-SLP 2019 11:17 AM    Visit Dx:      ICD-10-CM ICD-9-CM   1. Severe sepsis (CMS/HCC) A41.9 038.9    R65.20 995.92   2. Urinary tract infection, acute N39.0 599.0   3. Hyperkalemia E87.5 276.7   4. Acute kidney injury (CMS/HCC) N17.9 584.9   5. Altered mental status, unspecified altered mental status type R41.82 780.97   6. Other dysphagia R13.19 787.29     Patient Active Problem List   Diagnosis   • Normal pressure hydrocephalus   • Non morbid obesity due to excess calories   • Tobacco non-user   • Tremor of right hand   • Abnormality of gait   • Parastomal hernia   • Hernia   • Gait abnormality   • Severe sepsis (CMS/HCC)        Therapy Treatment  Rehabilitation Treatment Summary     Row Name 05/08/19 0942             Treatment Time/Intention    Discipline  speech language pathologist  -CS      Document Type  therapy note (daily note)  -CS      Subjective Information  complains of;fatigue  -CS      Mode of Treatment  speech-language pathology  -CS      Patient/Family Observations  Daughter and grandaughter present  -CS      Patient Effort  good  -CS      Recorded by [CS] Benjamin Martinez MS CCC-SLP 05/08/19 1102      Row Name 05/08/19 0942             Pain Assessment    Additional Documentation  Pain Scale: FACES Pre/Post-Treatment (Group)  -CS      Recorded by [CS] Benjamin Martinez MS CCC-SLP 05/08/19 1102      Row Name 05/08/19 0942             Pain Scale: FACES Pre/Post-Treatment    Pain: FACES Scale, Pretreatment  0-->no hurt  -CS      Pain: FACES Scale, Post-Treatment  0-->no hurt  -CS      Recorded by [CS] Benjamin Martinez MS CCC-SLP 05/08/19 1102      Row Name                Wound 05/07/19 0532 Right heel pressure injury    Wound - Properties Group Date first assessed: 05/07/19 [] Time first assessed: 0532 [MH] Side: Right [MH] Location: heel [] Type: pressure injury [] Stage, Pressure Injury: Stage 1 [] Recorded by:  [] Haase, Mallory L, RN 05/07/19 0533    Row Name 05/08/19 0942             Outcome Summary/Treatment Plan (SLP)    Daily Summary of Progress (SLP)  progress toward functional goals is gradual  -CS      Barriers to Overall Progress (SLP)  medically complex  -CS      Plan for Continued Treatment (SLP)  Continue to follow and treat  -CS      Anticipated Dischage Disposition  skilled nursing facility  -CS      Recorded by [] Benjamin Martinez MS CCC-SLP 05/08/19 1102        User Key  (r) = Recorded By, (t) = Taken By, (c) = Cosigned By    Initials Name Effective Dates Discipline    CS Benjamin Martinez MS CCC-SLP 04/03/18 -  [facility] Haase, Mallory L, RN 03/01/17 -  Nurse          Outcome Summary  Outcome  Summary/Treatment Plan (SLP)  Daily Summary of Progress (SLP): progress toward functional goals is gradual (05/08/19 0942 : Benjamin Martinez, MS CCC-SLP)  Barriers to Overall Progress (SLP): medically complex (05/08/19 0942 : Benjamin Martinez, MS CCC-SLP)  Plan for Continued Treatment (SLP): Continue to follow and treat (05/08/19 0942 : Benjamin Martinez, MS CCC-SLP)  Anticipated Dischage Disposition: skilled nursing facility (05/08/19 0942 : Benjamin Martinez, MS CCC-SLP)      SLP GOALS     Row Name 05/08/19 0942 05/07/19 0900          Oral Nutrition/Hydration Goal 1 (SLP)    Oral Nutrition/Hydration Goal 1, SLP  Patient will tolerate LRD with no s/s aspiration.  -CS  Patient will tolerate LRD with no s/s aspiration.  -CS (r) SH (t) CS (c)     Time Frame (Oral Nutrition/Hydration Goal 1, SLP)  by discharge  -CS  by discharge  -CS (r) SH (t) CS (c)     Barriers (Oral Nutrition/Hydration Goal 1, SLP)  n/a  -CS  n/a  -CS (r) SH (t) CS (c)     Progress/Outcomes (Oral Nutrition/Hydration Goal 1, SLP)  goal ongoing  -CS  goal ongoing  -CS (r) SH (t) CS (c)        Lingual Strengthening Goal 1 (SLP)    Activity (Lingual Strengthening Goal 1, SLP)  increase lingual tone/sensation/control/coordination/movement  -CS  increase lingual tone/sensation/control/coordination/movement  -CS (r) SH (t) CS (c)     Increase Lingual Tone/Sensation/Control/Coordination/Movement  lingual movement exercises  -CS  lingual movement exercises  -CS (r) SH (t) CS (c)     [place]/Accuracy (Lingual Strengthening Goal 1, SLP)  independently (over 90% accuracy)  -CS  independently (over 90% accuracy)  -CS (r) SH (t) CS (c)     Time Frame (Lingual Strengthening Goal 1, SLP)  by discharge  -CS  by discharge  -CS (r) SH (t) CS (c)     Barriers (Lingual Strengthening Goal 1, SLP)  n/a  -CS  n/a  -CS (r) SH (t) CS (c)     Progress/Outcomes (Lingual Strengthening Goal 1, SLP)  goal ongoing  -CS  goal ongoing  -CS (r) SH (t) CS (c)        Pharyngeal Strengthening  Exercise Goal 1 (SLP)    Activity (Pharyngeal Strengthening Goal 1, SLP)  increase timing  -CS  increase timing  -CS (r) SH (t) CS (c)     Increase Timing  gustatory stimulation (sour/cold);prepping - 3 second prep or suck swallow or 3-step swallow;hard effortful swallow  -CS  gustatory stimulation (sour/cold);prepping - 3 second prep or suck swallow or 3-step swallow;hard effortful swallow  -CS (r) SH (t) CS (c)     [place]/Accuracy (Pharyngeal Strengthening Goal 1, SLP)  independently (over 90% accuracy)  -CS  independently (over 90% accuracy)  -CS (r) SH (t) CS (c)     Time Frame (Pharyngeal Strengthening Goal 1, SLP)  by discharge  -CS  by discharge  -CS (r) SH (t) CS (c)     Barriers (Pharyngeal Strengthening Goal 1, SLP)  n/a  -CS  n/a  -CS (r) SH (t) CS (c)     Progress/Outcomes (Pharyngeal Strengthening Goal 1, SLP)  goal ongoing  -CS  goal ongoing  -CS (r) SH (t) CS (c)       User Key  (r) = Recorded By, (t) = Taken By, (c) = Cosigned By    Initials Name Provider Type    Benjamin Contreras MS CCC-SLP Speech and Language Pathologist    Michelle Patterson, Speech Therapy Student Speech Therapy Student          EDUCATION  The patient has been educated in the following areas:   Dysphagia (Swallowing Impairment).    SLP Recommendation and Plan  Daily Summary of Progress (SLP): progress toward functional goals is gradual  Barriers to Overall Progress (SLP): medically complex  Plan for Continued Treatment (SLP): Continue to follow and treat  Anticipated Dischage Disposition: skilled nursing facility                    Time Calculation:   Time Calculation- SLP     Row Name 05/08/19 1116             Time Calculation- SLP    SLP Start Time  0942  -      SLP Stop Time  1012  -      SLP Time Calculation (min)  30 min  -      SLP Received On  05/08/19  -        User Key  (r) = Recorded By, (t) = Taken By, (c) = Cosigned By    Initials Name Provider Type    Benjamin Contreras MS CCC-SLP Speech and Language  Pathologist          Therapy Charges for Today     Code Description Service Date Service Provider Modifiers Qty    [id number] HC ST TREATMENT SWALLOW 2 5/8/2019 Benjamin Martinez, MS CCC-SLP GN 1                 Benjamin Martinez MS CCC-SLP  5/8/2019

## 2019-05-08 NOTE — CONSULTS
"Pharmacy Dosing Service  Pharmacokinetics  Vancomycin Initial Evaluation    Assessment/Action/Plan:  Based on patient demographic information changed order for Vancomycin 1,000 mg every 12 hours with Pharmacy to Dose note to  Vancomycin 1,250 mg IVPB once, followed by 500 mg every 48 hours for patient with ISAIAH. Vancomycin levels not ordered at this time..  Current vancomycin end date: 5/16/19. Pharmacy will monitor renal function and adjust dose accordingly.     Subjective:  Susy Ko is a 79 y.o. female initiated on Vancomycin 1,250 mg IV once followed by 500 mg every 48 hours for the treatment of Sepsis. (x7 days)    Objective:  Ht: 160 cm (63\"); Wt: 60.3 kg (133 lb)  Estimated Creatinine Clearance: 7.6 mL/min (A) (by C-G formula based on SCr of 5.73 mg/dL (H)).   Lab Results   Component Value Date    CREATININE 5.73 (H) 05/08/2019    CREATININE 5.24 (H) 05/07/2019    CREATININE 5.31 (H) 05/07/2019      Lab Results   Component Value Date    WBC 10.46 05/08/2019    WBC 17.75 (H) 05/06/2019      Baseline culture results:  Microbiology Results (last 10 days)       Procedure Component Value - Date/Time    Legionella Antigen, Urine - Urine, Urine, Clean Catch [[id number]]  (Normal) Collected:  05/07/19 0520    Lab Status:  Final result Specimen:  Urine, Clean Catch Updated:  05/07/19 0604     LEGIONELLA ANTIGEN, URINE Negative    S. Pneumo Ag Urine or CSF - Urine, Urine, Clean Catch [[id number]]  (Normal) Collected:  05/07/19 0520    Lab Status:  Final result Specimen:  Urine, Clean Catch Updated:  05/07/19 0604     Strep Pneumo Ag Negative    Blood Culture - Blood, Wrist, Right [[id number]] Collected:  05/06/19 2145    Lab Status:  Preliminary result Specimen:  Blood from Wrist, Right Updated:  05/07/19 2315     Blood Culture No growth at 24 hours    Urine Culture - Urine, Urine, Clean Catch [[id number]]  (Abnormal) Collected:  05/06/19 2145    Lab Status:  Preliminary result Specimen:  Urine, Clean Catch " Updated:  05/07/19 0713     Urine Culture >100,000 CFU/mL Escherichia coli    Blood Culture - Blood, Arm, Left [[id number]]  (Abnormal) Collected:  05/06/19 2138    Lab Status:  Preliminary result Specimen:  Blood from Arm, Left Updated:  05/08/19 0354     Blood Culture Abnormal Stain      Gram Positive Rods     Isolated from Anaerobic Bottle     Blood Culture Gram Positive Cocci     Isolated from Aerobic Bottle     Gram Stain Gram positive bacilli      Gram positive cocci    Narrative:       Blood culture does not meet the specified criteria for PCR identification.  If pregnant, immunocompromised, or clinical concern for meningitis, call lab to run BCID for Listeria monocytogenes.            Odell Godinez, Tanmay  05/08/19 4:31 AM

## 2019-05-08 NOTE — PLAN OF CARE
"Problem: Fall Risk (Adult)  Goal: Identify Related Risk Factors and Signs and Symptoms  Outcome: Ongoing (interventions implemented as appropriate)    Goal: Absence of Fall  Outcome: Ongoing (interventions implemented as appropriate)      Problem: Patient Care Overview  Goal: Plan of Care Review  Outcome: Ongoing (interventions implemented as appropriate)   05/08/19 0621   Coping/Psychosocial   Plan of Care Reviewed With patient   Plan of Care Review   Progress no change   OTHER   Outcome Summary Pt has slept throughout the entire shift except for when aroused by voice/stimulation, is oriented to time, place, self, but not really to situation or why she is here, no c/o pain except when lightly touched she will moan and say \"ow ow ow\" but then will fall back asleep. Calcium 5.4 reported to Dr. Zuniga, calcium replacement protocol ordered. + gram cocci reported to Dr. Zuniga, vancomycin ordered. Levophed was attempted to be weaned off but pt was not able to hold MAP above 65, so levo gtt back on at 0.02. U/O continues to be low, about 25ml/hr, still has very strong odor, cloudy, with sediment.      Goal: Individualization and Mutuality  Outcome: Ongoing (interventions implemented as appropriate)    Goal: Discharge Needs Assessment  Outcome: Ongoing (interventions implemented as appropriate)    Goal: Interprofessional Rounds/Family Conf  Outcome: Ongoing (interventions implemented as appropriate)      Problem: Skin Injury Risk (Adult)  Goal: Identify Related Risk Factors and Signs and Symptoms  Outcome: Ongoing (interventions implemented as appropriate)    Goal: Skin Health and Integrity  Outcome: Ongoing (interventions implemented as appropriate)      Problem: Confusion, Chronic (Adult)  Goal: Identify Related Risk Factors and Signs and Symptoms  Outcome: Ongoing (interventions implemented as appropriate)    Goal: Cognitive/Functional Impairments Minimized  Outcome: Ongoing (interventions implemented as " appropriate)    Goal: Free from Harm/Injuries  Outcome: Ongoing (interventions implemented as appropriate)      Problem: Infection, Risk/Actual (Adult)  Goal: Identify Related Risk Factors and Signs and Symptoms  Outcome: Ongoing (interventions implemented as appropriate)    Goal: Infection Prevention/Resolution  Outcome: Ongoing (interventions implemented as appropriate)      Problem: Wound (Includes Pressure Injury) (Adult)  Goal: Signs and Symptoms of Listed Potential Problems Will be Absent, Minimized or Managed (Wound)  Outcome: Ongoing (interventions implemented as appropriate)

## 2019-05-09 LAB
ALBUMIN SERPL-MCNC: 2.1 G/DL (ref 3.5–5)
ALBUMIN/GLOB SERPL: 1 G/DL (ref 1.1–2.5)
ALP SERPL-CCNC: 90 U/L (ref 24–120)
ALT SERPL W P-5'-P-CCNC: 24 U/L (ref 0–54)
ANION GAP SERPL CALCULATED.3IONS-SCNC: 11 MMOL/L (ref 4–13)
ANISOCYTOSIS BLD QL: ABNORMAL
AST SERPL-CCNC: 158 U/L (ref 7–45)
BACTERIA SPEC AEROBE CULT: ABNORMAL
BACTERIA SPEC AEROBE CULT: ABNORMAL
BASOPHILS # BLD MANUAL: 0.11 10*3/MM3 (ref 0–0.2)
BASOPHILS NFR BLD AUTO: 1 % (ref 0–2)
BILIRUB SERPL-MCNC: 0.3 MG/DL (ref 0.1–1)
BUN BLD-MCNC: 103 MG/DL (ref 5–21)
BUN/CREAT SERPL: 26.5 (ref 7–25)
CALCIUM SPEC-SCNC: 6.4 MG/DL (ref 8.4–10.4)
CHLORIDE SERPL-SCNC: 94 MMOL/L (ref 98–110)
CO2 SERPL-SCNC: 32 MMOL/L (ref 24–31)
CREAT BLD-MCNC: 3.89 MG/DL (ref 0.5–1.4)
DEPRECATED RDW RBC AUTO: 45.6 FL (ref 40–54)
EOSINOPHIL # BLD MANUAL: 0.32 10*3/MM3 (ref 0–0.7)
EOSINOPHIL NFR BLD MANUAL: 3 % (ref 0–4)
ERYTHROCYTE [DISTWIDTH] IN BLOOD BY AUTOMATED COUNT: 17.2 % (ref 12–15)
GFR SERPL CREATININE-BSD FRML MDRD: 11 ML/MIN/1.73
GFR SERPL CREATININE-BSD FRML MDRD: ABNORMAL ML/MIN/1.73
GIANT PLATELETS: ABNORMAL
GLOBULIN UR ELPH-MCNC: 2.2 GM/DL
GLUCOSE BLD-MCNC: 149 MG/DL (ref 70–100)
GRAM STN SPEC: ABNORMAL
GRAM STN SPEC: ABNORMAL
HCT VFR BLD AUTO: 22.4 % (ref 37–47)
HGB BLD-MCNC: 7.6 G/DL (ref 12–16)
HYPOCHROMIA BLD QL: ABNORMAL
ISOLATED FROM: ABNORMAL
ISOLATED FROM: ABNORMAL
LYMPHOCYTES # BLD MANUAL: 0.87 10*3/MM3 (ref 0.72–4.86)
LYMPHOCYTES NFR BLD MANUAL: 1 % (ref 4–12)
LYMPHOCYTES NFR BLD MANUAL: 8.1 % (ref 15–45)
MAGNESIUM SERPL-MCNC: 1.7 MG/DL (ref 1.4–2.2)
MCH RBC QN AUTO: 24.9 PG (ref 28–32)
MCHC RBC AUTO-ENTMCNC: 33.9 G/DL (ref 33–36)
MCV RBC AUTO: 73.4 FL (ref 82–98)
MICROCYTES BLD QL: ABNORMAL
MONOCYTES # BLD AUTO: 0.11 10*3/MM3 (ref 0.19–1.3)
MYOGLOBIN UR-MCNC: 546 NG/ML (ref 0–13)
NEUTROPHILS # BLD AUTO: 9.28 10*3/MM3 (ref 1.7–7)
NEUTROPHILS NFR BLD MANUAL: 86.9 % (ref 39–78)
NEUTS VAC BLD QL SMEAR: ABNORMAL
PHOSPHATE SERPL-MCNC: 4.3 MG/DL (ref 2.5–4.5)
PLATELET # BLD AUTO: 108 10*3/MM3 (ref 130–400)
PMV BLD AUTO: 10.8 FL (ref 6–12)
POTASSIUM BLD-SCNC: 2.8 MMOL/L (ref 3.5–5.3)
PROT SERPL-MCNC: 4.3 G/DL (ref 6.3–8.7)
RBC # BLD AUTO: 3.05 10*6/MM3 (ref 4.2–5.4)
SMALL PLATELETS BLD QL SMEAR: ABNORMAL
SODIUM BLD-SCNC: 137 MMOL/L (ref 135–145)
TOXIC GRANULATION: ABNORMAL
WBC NRBC COR # BLD: 10.68 10*3/MM3 (ref 4.8–10.8)

## 2019-05-09 PROCEDURE — 85025 COMPLETE CBC W/AUTO DIFF WBC: CPT | Performed by: INTERNAL MEDICINE

## 2019-05-09 PROCEDURE — 84100 ASSAY OF PHOSPHORUS: CPT | Performed by: NURSE PRACTITIONER

## 2019-05-09 PROCEDURE — 25810000003 SODIUM CHLORIDE 0.9 % WITH KCL 20 MEQ 20-0.9 MEQ/L-% SOLUTION: Performed by: NURSE PRACTITIONER

## 2019-05-09 PROCEDURE — 80053 COMPREHEN METABOLIC PANEL: CPT | Performed by: NURSE PRACTITIONER

## 2019-05-09 PROCEDURE — 85007 BL SMEAR W/DIFF WBC COUNT: CPT | Performed by: INTERNAL MEDICINE

## 2019-05-09 PROCEDURE — 94799 UNLISTED PULMONARY SVC/PX: CPT

## 2019-05-09 PROCEDURE — 25010000002 IRON SUCROSE PER 1 MG: Performed by: INTERNAL MEDICINE

## 2019-05-09 PROCEDURE — 25010000002 POTASSIUM CHLORIDE PER 2 MEQ: Performed by: NURSE PRACTITIONER

## 2019-05-09 PROCEDURE — 83735 ASSAY OF MAGNESIUM: CPT | Performed by: NURSE PRACTITIONER

## 2019-05-09 PROCEDURE — 92526 ORAL FUNCTION THERAPY: CPT

## 2019-05-09 PROCEDURE — 25010000002 CEFTRIAXONE PER 250 MG: Performed by: NURSE PRACTITIONER

## 2019-05-09 RX ORDER — SODIUM CHLORIDE AND POTASSIUM CHLORIDE 150; 900 MG/100ML; MG/100ML
75 INJECTION, SOLUTION INTRAVENOUS CONTINUOUS
Status: DISCONTINUED | OUTPATIENT
Start: 2019-05-09 | End: 2019-05-11

## 2019-05-09 RX ORDER — POTASSIUM CHLORIDE 14.9 MG/ML
20 INJECTION INTRAVENOUS
Status: COMPLETED | OUTPATIENT
Start: 2019-05-09 | End: 2019-05-09

## 2019-05-09 RX ORDER — OTC SKIN PROTECTANT DRUG PRODUCTS 0.04 G/G
1 OINTMENT TOPICAL EVERY EVENING
COMMUNITY

## 2019-05-09 RX ORDER — LOSARTAN POTASSIUM 50 MG/1
50 TABLET ORAL DAILY
COMMUNITY

## 2019-05-09 RX ORDER — CYANOCOBALAMIN 1000 UG/ML
1000 INJECTION, SOLUTION INTRAMUSCULAR; SUBCUTANEOUS
COMMUNITY

## 2019-05-09 RX ORDER — TRAMADOL HYDROCHLORIDE 50 MG/1
50 TABLET ORAL EVERY 6 HOURS PRN
COMMUNITY
End: 2019-05-16 | Stop reason: HOSPADM

## 2019-05-09 RX ORDER — FUROSEMIDE 20 MG/1
20 TABLET ORAL EVERY OTHER DAY
COMMUNITY
End: 2019-05-16 | Stop reason: HOSPADM

## 2019-05-09 RX ORDER — IBUPROFEN 600 MG/1
600 TABLET ORAL 3 TIMES DAILY
COMMUNITY
End: 2019-05-16 | Stop reason: HOSPADM

## 2019-05-09 RX ORDER — AMLODIPINE BESYLATE 5 MG/1
5 TABLET ORAL DAILY
COMMUNITY

## 2019-05-09 RX ORDER — POTASSIUM CHLORIDE 750 MG/1
40 CAPSULE, EXTENDED RELEASE ORAL ONCE
Status: COMPLETED | OUTPATIENT
Start: 2019-05-09 | End: 2019-05-09

## 2019-05-09 RX ORDER — METOPROLOL SUCCINATE 50 MG/1
50 TABLET, EXTENDED RELEASE ORAL DAILY
COMMUNITY

## 2019-05-09 RX ORDER — OMEPRAZOLE 20 MG/1
20 CAPSULE, DELAYED RELEASE ORAL DAILY
COMMUNITY

## 2019-05-09 RX ORDER — LORATADINE 10 MG/1
10 TABLET ORAL DAILY
COMMUNITY

## 2019-05-09 RX ADMIN — POTASSIUM CHLORIDE 40 MEQ: 750 CAPSULE, EXTENDED RELEASE ORAL at 06:05

## 2019-05-09 RX ADMIN — POTASSIUM CHLORIDE 20 MEQ: 14.9 INJECTION, SOLUTION INTRAVENOUS at 14:20

## 2019-05-09 RX ADMIN — CARBIDOPA AND LEVODOPA 1 TABLET: 25; 100 TABLET ORAL at 22:41

## 2019-05-09 RX ADMIN — SODIUM CHLORIDE, PRESERVATIVE FREE 3 ML: 5 INJECTION INTRAVENOUS at 22:45

## 2019-05-09 RX ADMIN — FAMOTIDINE 20 MG: 10 INJECTION INTRAVENOUS at 08:37

## 2019-05-09 RX ADMIN — SODIUM CHLORIDE, PRESERVATIVE FREE 3 ML: 5 INJECTION INTRAVENOUS at 08:38

## 2019-05-09 RX ADMIN — SODIUM BICARBONATE: 84 INJECTION, SOLUTION INTRAVENOUS at 08:37

## 2019-05-09 RX ADMIN — CARBIDOPA AND LEVODOPA 1 TABLET: 25; 100 TABLET ORAL at 08:37

## 2019-05-09 RX ADMIN — CARBIDOPA AND LEVODOPA 1 TABLET: 25; 100 TABLET ORAL at 16:29

## 2019-05-09 RX ADMIN — POTASSIUM CHLORIDE AND SODIUM CHLORIDE 75 ML/HR: 900; 150 INJECTION, SOLUTION INTRAVENOUS at 11:20

## 2019-05-09 RX ADMIN — CALCIUM 1250 MG: 500 TABLET ORAL at 08:37

## 2019-05-09 RX ADMIN — CALCITRIOL 0.25 MCG: 1 SOLUTION ORAL at 08:38

## 2019-05-09 RX ADMIN — ALPRAZOLAM 0.5 MG: 0.5 TABLET ORAL at 22:41

## 2019-05-09 RX ADMIN — CEFTRIAXONE SODIUM 1 G: 1 INJECTION, POWDER, FOR SOLUTION INTRAMUSCULAR; INTRAVENOUS at 17:15

## 2019-05-09 RX ADMIN — IRON SUCROSE 200 MG: 20 INJECTION, SOLUTION INTRAVENOUS at 16:29

## 2019-05-09 RX ADMIN — VITAM B12 100 MCG: 100 TAB at 08:37

## 2019-05-09 RX ADMIN — CITALOPRAM 10 MG: 10 TABLET, FILM COATED ORAL at 22:41

## 2019-05-09 RX ADMIN — POTASSIUM CHLORIDE 20 MEQ: 14.9 INJECTION, SOLUTION INTRAVENOUS at 11:22

## 2019-05-09 NOTE — PLAN OF CARE
Problem: Patient Care Overview  Goal: Plan of Care Review  Outcome: Ongoing (interventions implemented as appropriate)   05/09/19 0899   Coping/Psychosocial   Plan of Care Reviewed With patient   Plan of Care Review   Progress no change   OTHER   Outcome Summary Patient was seen upright in bed during breakfast meal. Patient appeared to be drowsy and needed moderate cues to remain alert. Lingual range of motion continues to be minimal. Trials of honey thickened liquids and nectar-thick liquids were given to patient. No s/s aspiration present during honey-thick trials, however, patient had immediate cough following nectar thick trials. Education on safe swallow strategies of staying awake and sitting upright in bed was given. Patient to remain on diet consisting of honey-thickened liquids and pureed foods. Patient currently has NG tube. Patient requires 1:1 assistance with feedings. Oral care to be provided after meals. Ice chips OK 30 minutes following PO intake and oral care regiment. Meds to be crushed in applesauce or with honey-thickened liquids. ST to follow and treat.

## 2019-05-09 NOTE — PROGRESS NOTES
Nephrology ([facility]) Progress Note      Patient:  Susy Ko  YOB: 1940  Date of Service: 5/9/2019  MRN: [id number]   Acct: [id number]   Primary Care Physician: Monster Zuniga MD  Advance Directive:   Code Status and Medical Interventions:   Ordered at: 05/07/19 1110     Level Of Support Discussed With:    Patient     Code Status:    CPR     Medical Interventions (Level of Support Prior to Arrest):    Full     Admit Date: 5/6/2019       Hospital Day: 3  Referring Provider: Monster Zuniga MD      Patient personally seen and examined.  Complete chart including Consults, Notes, Operative Reports, Labs, Cardiology, and Radiology studies reviewed as able.        Subjective:  Susy Ko is a 79 y.o. female  whom we were consulted for acute kidney injury, metabolic acidosis.  History of hypertension, Parkinson's, and has a colostomy.  Patient resides in local nursing home secondary to advanced dementia. Had been complaining of abdominal pain so was sent to emergency department for evaluation.  Found to have acute kidney injury, severe metabolic acidosis, UTI with sepsis.  Patient required Levophed for blood pressure support. Dr Weldon discussed at length with family and they have decided not to pursue any dialysis.  Now off Levophed.  Has transferred to medical floor.    Today is more awake.  No new overnight issues.  Urine output nonoliguric    Allergies:  Morphine and related and Adhesive tape    Home Meds:  Medications Prior to Admission   Medication Sig Dispense Refill Last Dose   • acetaminophen (TYLENOL) 325 MG tablet Take 650 mg by mouth Every 6 (Six) Hours As Needed for Mild Pain .      • carbidopa-levodopa (SINEMET)  MG per tablet Take 1 tablet by mouth 3 (Three) Times a Day.      • citalopram (CeleXA) 10 MG tablet Take 10 mg by mouth Every Night.   3/16/2017 at 1800   • Omeprazole Magnesium (PRILOSEC OTC PO) Take  by mouth.      •  tiotropium bromide-olodaterol (STIOLTO RESPIMAT) 2.5-2.5 MCG/ACT aerosol solution inhaler Inhale Daily.      • vitamin B-12 (CYANOCOBALAMIN) 100 MCG tablet Take 100 mcg by mouth Daily.   3/15/2017 at 0600   • ALPRAZolam (XANAX) 0.5 MG tablet Take 0.5 mg by mouth Every Night.   3/16/2017 at 1800   • HYDROcodone-acetaminophen (NORCO) 7.5-325 MG per tablet Take 1 tablet by mouth Every 4 (Four) Hours As Needed for Moderate Pain (4-6) for up to 30 doses. 30 tablet 0        Medicines:  Current Facility-Administered Medications   Medication Dose Route Frequency Provider Last Rate Last Dose   • acetaminophen (TYLENOL) tablet 650 mg  650 mg Oral Q6H PRN Monster Zuniga MD       • acetaminophen (TYLENOL) tablet 650 mg  650 mg Oral Q4H PRN Monster Zuniga MD       • ALPRAZolam (XANAX) tablet 0.5 mg  0.5 mg Oral Nightly Monster Zuniga MD   0.5 mg at 05/08/19 2027   • calcitRIOL (ROCALTROL) solution 0.25 mcg  0.25 mcg Nasogastric Daily Monster Zuniga MD   0.25 mcg at 05/09/19 0838   • calcium carbonate (oyster shell) tablet 1,250 mg  1,250 mg Oral Daily Ramsey Weldon MD   1,250 mg at 05/09/19 0837   • calcium gluconate 1 g in sodium chloride 0.9 % 100 mL IVPB  1 g Intravenous PRN Monster Zuniga  mL/hr at 05/08/19 0451 1 g at 05/08/19 0451    And   • calcium gluconate 6 g in sodium chloride 0.9 % 500 mL IVPB  6 g Intravenous PRN Monster Zuniga MD 83.3 mL/hr at 05/08/19 0647 6 g at 05/08/19 0647   • carbidopa-levodopa (SINEMET)  MG per tablet 1 tablet  1 tablet Oral TID Monster Zuniga MD   1 tablet at 05/09/19 0837   • cefTRIAXone (ROCEPHIN) 1 g/100 mL 0.9% NS (MBP)  1 g Intravenous Q24H Traci Kohler APRN   1 g at 05/08/19 1813   • citalopram (CeleXA) tablet 10 mg  10 mg Oral Nightly Monster Zuniga MD   10 mg at 05/08/19 2027   • cyancobalamin (VITAMIN B-12) tablet 100 mcg  100 mcg Oral Daily Monster Zuniga MD   100 mcg at 05/09/19 0837   •  famotidine (PEPCID) injection 20 mg  20 mg Intravenous Daily Monster Zuniga MD   20 mg at 05/09/19 0837   • HYDROcodone-acetaminophen (NORCO) 7.5-325 MG per tablet 1 tablet  1 tablet Oral Q4H PRN Monster Zuniga MD       • iron sucrose (VENOFER) 200 mg in sodium chloride 0.9 % 100 mL IVPB  200 mg Intravenous Q24H Ramsey Weldon MD   200 mg at 05/08/19 1449   • meperidine (DEMEROL) injection 25 mg  25 mg Intravenous Q6H PRN Traci Kohler APRN       • potassium chloride 20 mEq in 100 mL IVPB  20 mEq Intravenous Q1H Cirilo Ross, APRPETRA       • sodium chloride 0.9 % flush 10 mL  10 mL Intravenous PRN Prosper Jiang DO       • sodium chloride 0.9 % flush 3 mL  3 mL Intravenous Q12H Monster Zuniga MD   3 mL at 05/09/19 0838   • sodium chloride 0.9 % flush 3-10 mL  3-10 mL Intravenous PRN Monster Zuniga MD       • sodium chloride 0.9 % with KCl 20 mEq/L infusion  75 mL/hr Intravenous Continuous Cirilo Ross, APRN       • [START ON 5/10/2019] vancomycin 500 mg/100 mL 0.9% NS IVPB ([facility])  500 mg Intravenous Q48H Monster Zuniga MD           Past Medical History:  Past Medical History:   Diagnosis Date   • Anxiety    • Breast cancer (CMS/HCC)      LEFT WITH RECONSTRUCTION   • Colostomy in place (CMS/HCC)    • Dementia    • Depression    • Edema     LOWER FEET   • Hydrocephalus     NORMAL PRESSURE   • Hypertension    • Incontinence    • Nausea    • Parastomal hernia    • Parkinson disease (CMS/HCC)    • Tremor     RIGHT HAND   • Unsteady gait        Past Surgical History:  Past Surgical History:   Procedure Laterality Date   • ABDOMINAL SURGERY     • APPENDECTOMY     • BREAST RECONSTRUCTION Left     LEFT   • CHOLECYSTECTOMY     • COLON SURGERY     • COLONOSCOPY     • COLOSTOMY     • HYSTERECTOMY     • MASTECTOMY Left    • PARASTOMAL HERNIA REPAIR N/A 3/17/2017    Procedure: REPAIR PARASTOMAL HERNIA;  Surgeon: Princess Walters MD;  Location: [facility] OR;  Service:         Family History  History reviewed. No pertinent family history.    Social History  Social History     Socioeconomic History   • Marital status:      Spouse name: Not on file   • Number of children: Not on file   • Years of education: Not on file   • Highest education level: Not on file   Tobacco Use   • Smoking status: Former Smoker   • Smokeless tobacco: Never Used   Substance and Sexual Activity   • Alcohol use: No   • Drug use: No   • Sexual activity: Defer         Review of Systems:   History obtained from chart review and family  General ROS: negative  Respiratory ROS: no cough, shortness of breath, or wheezing  Cardiovascular ROS: no chest pain or dyspnea on exertion  Gastrointestinal ROS: no abdominal pain, change in bowel habits, or black or bloody stools  Genito-Urinary ROS: no dysuria, trouble voiding, or hematuria  Musculoskeletal ROS: negative  Neurological ROS: no TIA or stroke symptoms    Objective:  Patient Vitals for the past 24 hrs:   BP Temp Temp src Pulse Resp SpO2   05/09/19 0830 118/53 99 °F (37.2 °C) Axillary 114 18 96 %   05/09/19 0515 120/52 -- -- -- -- --   05/09/19 0337 132/43 98.9 °F (37.2 °C) Oral 61 18 96 %   05/08/19 2232 115/53 97.4 °F (36.3 °C) Oral 92 18 95 %   05/08/19 2202 96/49 -- -- 90 18 95 %   05/08/19 2102 136/52 -- -- 94 18 92 %   05/08/19 2000 124/62 98.3 °F (36.8 °C) Axillary 88 20 92 %   05/08/19 1902 150/61 -- -- 88 -- 92 %   05/08/19 1847 139/61 -- -- 87 -- 93 %   05/08/19 1832 156/59 -- -- 87 -- 92 %   05/08/19 1817 137/59 -- -- 87 -- 92 %   05/08/19 1802 134/60 -- -- 85 -- 93 %   05/08/19 1747 145/58 -- -- 87 -- 93 %   05/08/19 1732 147/55 -- -- 86 -- 93 %   05/08/19 1717 140/60 -- -- 85 -- 93 %   05/08/19 1702 125/63 -- -- 82 -- 94 %   05/08/19 1647 138/64 -- -- 85 -- 93 %   05/08/19 1632 138/61 -- -- 83 -- 93 %   05/08/19 1617 128/60 -- -- 82 -- 92 %   05/08/19 1602 134/59 -- -- 83 -- 93 %   05/08/19 1547 128/59 -- -- 81 -- 94 %   05/08/19 1532 135/58 --  -- 82 -- 94 %   05/08/19 1517 129/55 -- -- 81 -- 93 %   05/08/19 1502 151/60 -- -- 82 -- 95 %   05/08/19 1447 143/60 -- -- 82 -- 94 %   05/08/19 1432 149/62 -- -- 87 -- 95 %   05/08/19 1417 135/55 -- -- 83 -- 94 %   05/08/19 1402 122/63 -- -- 84 -- 93 %   05/08/19 1347 117/74 -- -- 84 -- 93 %   05/08/19 1332 137/68 -- -- 85 -- 93 %   05/08/19 1317 144/66 -- -- 84 -- 94 %   05/08/19 1302 153/63 -- -- 83 -- 94 %   05/08/19 1247 150/65 -- -- 83 -- 95 %   05/08/19 1232 138/65 -- -- 84 -- 95 %   05/08/19 1217 150/98 -- -- 85 -- 95 %   05/08/19 1202 134/73 -- -- 88 -- 94 %   05/08/19 1147 150/71 -- -- 85 -- 93 %   05/08/19 1132 147/68 -- -- 84 -- 93 %   05/08/19 1117 146/60 -- -- 86 -- 94 %   05/08/19 1102 133/60 -- -- 86 -- 93 %   05/08/19 1047 129/57 -- -- 86 -- 94 %       Intake/Output Summary (Last 24 hours) at 5/9/2019 1035  Last data filed at 5/9/2019 0856  Gross per 24 hour   Intake 4894.26 ml   Output 1460 ml   Net 3434.26 ml     General: awake, oriented X 1   Neck: supple, no JVD  Chest:  clear to auscultation bilaterally without respiratory distress  CVS: regular rate and rhythm  Abdominal: diffusely tender, +BS, colostomy bag in place  Extremities: no cyanosis or edema  Skin: warm and dry without rash  Neuro: no focal motor deficits    Labs:  Results from last 7 days   Lab Units 05/09/19  0455 05/08/19  0131 05/06/19  2104   WBC 10*3/mm3 10.68 10.46 17.75*   HEMOGLOBIN g/dL 7.6* 8.7* 9.7*   HEMATOCRIT % 22.4* 26.0* 29.8*   PLATELETS 10*3/mm3 108* 151 198         Results from last 7 days   Lab Units 05/09/19  0455 05/08/19  1943 05/08/19  0254 05/07/19  0406   SODIUM mmol/L 137 135 135 134*   POTASSIUM mmol/L 2.8* 3.1* 4.0 5.3   CHLORIDE mmol/L 94* 96* 104 107   CO2 mmol/L 32.0* 26.0 16.0* 8.0*   BUN mg/dL 103* 108* 117* 104*   CREATININE mg/dL 3.89* 4.44* 5.73* 5.24*   CALCIUM mg/dL 6.4* 6.8* 5.4* 6.2*   BILIRUBIN mg/dL 0.3  --  0.5 0.4   ALK PHOS U/L 90  --  97 103   ALT (SGPT) U/L 24  --  26 19   AST (SGOT)  U/L 158*  --  240* 229*   GLUCOSE mg/dL 149* 131* 150* 101*       Radiology:   Imaging Results (last 72 hours)     Procedure Component Value Units Date/Time    US Renal Bilateral [[id number]] Collected:  05/07/19 1124     Updated:  05/07/19 1129    Narrative:       EXAMINATION:  US RENAL BILATERAL-  5/7/2019 10:11 AM CDT     HISTORY: acute renal failure; A41.9-Sepsis, unspecified organism;  R65.20-Severe sepsis without septic shock; N39.0-Urinary tract  infection, site not specified; E87.5-Hyperkalemia; N17.9-Acute kidney  failure, unspecified; R41.82-Altered mental status, unspecified;  R13.19-Other dysphagia      COMPARISON: 05/10/2014 renal ultrasound. 05/06/2019 abdomen and pelvis  CT     TECHNIQUE:      Bilateral renal ultrasound was performed.     FINDINGS:      The right kidney measures 10.1 cm in elvp-ju-[id number] length.     The left kidney measures 11 cm in hipr-za-vnqd length.     There is mild cortical thinning and pelvic lipomatosis compatible with  age.     Normal echogenicity of renal cortex is observed without renal masses.     There are no perinephric abnormalities.     There is no pelvocaliectasis or obstructive uropathy changes.     The urinary bladder is decompressed with a Peralta catheter.       Impression:       1. Negative bilateral renal ultrasound.  This report was finalized on 05/07/2019 11:26 by Dr. Iban Queen MD.    CT Abdomen Pelvis Without Contrast [[id number]] Collected:  05/06/19 2236     Updated:  05/06/19 2242    Narrative:       EXAMINATION: CT ABDOMEN PELVIS WO CONTRAST-      5/6/2019 10:17 PM CDT     HISTORY: pain; r/o obstruction; A41.9-Sepsis, unspecified organism;  R65.20-Severe sepsis without septic shock; N39.0-Urinary tract  infection, site not specified; E87.5-Hyperkalemia; N17.9-Acute kidney  failure, unspecified; R41.82-Altered mental status, unspecified     In order to have a CT radiation dose as low as reasonably achievable  Automated Exposure Control was utilized for  adjustment of the mA and/or  KV according to patient size.     DLP in mGycm= 343.     Noncontrast abdomen/pelvis CT.     Comparison is made with 04/12/2016.     Left lower quadrant ostomy with nonobstructing parastomal hernia.     No significant bowel dilation.     Mild urinary bladder distention.     Mild dilation of the renal collecting systems and ureters is probably  based on vesicoureteral reflux. There is no obstructing stone.     Normal heart size.  No acute abnormality at the lung bases.     Cholecystectomy clips.  Normal noncontrast appearance of the liver and spleen.  The pancreas is atrophic.     Aortic calcification with no aneurysm.     Summary:  1. Moderate fecal material throughout the colon.  2. Nonobstructing parastomal hernia.  3. Mild dilation of the renal collecting systems and ureters compatible  with vesicoureteral reflux.  4. No evidence of bowel obstruction, mass or abscess.                                   This report was finalized on 05/06/2019 22:39 by Dr. Raul Ricci MD.    CT Head Without Contrast [[id number]] Collected:  05/06/19 2234     Updated:  05/06/19 2238    Narrative:       EXAMINATION: CT HEAD WO CONTRAST-      5/6/2019 10:17 PM CDT     HISTORY: AMS; A41.9-Sepsis, unspecified organism; R65.20-Severe sepsis  without septic shock; N39.0-Urinary tract infection, site not specified;  E87.5-Hyperkalemia; N17.9-Acute kidney failure, unspecified;  R41.82-Altered mental status, unspecified     In order to have a CT radiation dose as low as reasonably achievable  Automated Exposure Control was utilized for adjustment of the mA and/or  KV according to patient size.     DLP in mGycm= 1214.     Noncontrast head CT compared with 01/17/2017.     Axial, sagittal, and coronal noncontrast CT imaging of the head.     The visualized paranasal sinuses are clear.     The brain and ventricles have an age appropriate appearance.  Moderate atrophy and small vessel disease.  There is no hemorrhage or  mass-effect.   No acute infarction is seen.     No calvarial abnormality.       Impression:       1. No acute intracranial abnormality is seen.                                         This report was finalized on 05/06/2019 22:35 by Dr. Raul Ricci MD.    XR Chest 1 View [[id number]] Collected:  05/06/19 2216     Updated:  05/06/19 2219    Narrative:       EXAMINATION: XR CHEST 1 VW-     5/6/2019 10:11 PM CDT     HISTORY: Altered mental status; r/o pneumonia.     One view chest x-ray compared with 01/17/2017.     Heart size is normal.  The mediastinum is within normal limits.      The lungs are normally expanded with no pneumonia or pneumothorax.  Chronic lung changes.  No congestive failure changes.                                                                       Impression:       1. No acute disease.           This report was finalized on 05/06/2019 22:16 by Dr. Raul Ricci MD.          Culture:  Blood Culture   Date Value Ref Range Status   05/06/2019 No growth at 24 hours  Preliminary   05/06/2019 Gram Positive Rods (A)  Preliminary   05/06/2019 Gram Positive Cocci (A)  Preliminary     Urine Culture   Date Value Ref Range Status   05/06/2019 >100,000 CFU/mL Escherichia coli (A)  Final   05/06/2019 70,000-80,000 CFU/mL Mixed Gram Positive Alison (A)  Final     Comment:     Probable Contaminant           Assessment   1.  Acute kidney injury due to ATN--worse today  2.  Septic shock  3.  Urinary tract infection  4.  Metabolic acidosis--resolved  5.  Rhabdomyolysis  6.  Hypocalcemia  7.  Advanced dementia  8.  History of colostomy  9.  Hypokalemia     Plan:  1.  Replace potassium; repeat BMP this afternoon  2.  Change IV fluids to normal saline with 20 meq KCl at 75 ml/hour  3.  Monitor labs      Cirilo Ross, COLTON  5/9/2019  10:35 AM

## 2019-05-09 NOTE — PLAN OF CARE
Problem: Fall Risk (Adult)  Goal: Identify Related Risk Factors and Signs and Symptoms  Outcome: Ongoing (interventions implemented as appropriate)   05/09/19 0536   Fall Risk (Adult)   Related Risk Factors (Fall Risk) age-related changes;confusion/agitation;gait/mobility problems;bladder function altered;environment unfamiliar   Signs and Symptoms (Fall Risk) presence of risk factors     Goal: Absence of Fall  Outcome: Ongoing (interventions implemented as appropriate)   05/09/19 0536   Fall Risk (Adult)   Absence of Fall making progress toward outcome       Problem: Patient Care Overview  Goal: Plan of Care Review  Outcome: Ongoing (interventions implemented as appropriate)   05/09/19 0536   Coping/Psychosocial   Plan of Care Reviewed With patient   Plan of Care Review   Progress no change   OTHER   Outcome Summary VSS. Alert to person and place only. No s/s distress or discomfort. Continuous tube feeding continues. Pt. is tolerating. Repositioned q 2 hrs. Colostomy to left abd with scant amount of feces noted. F/C in place. Bed alarm on for patient safety. Safety maintained. Will continue to monitor.      Goal: Individualization and Mutuality  Outcome: Ongoing (interventions implemented as appropriate)   05/09/19 0536   Individualization   Patient Specific Interventions Turn q 2 hrs.     Goal: Interprofessional Rounds/Family Conf  Outcome: Ongoing (interventions implemented as appropriate)   05/09/19 0536   Interdisciplinary Rounds/Family Conf   Participants nursing;patient       Problem: Skin Injury Risk (Adult)  Goal: Identify Related Risk Factors and Signs and Symptoms  Outcome: Ongoing (interventions implemented as appropriate)   05/09/19 0536   Skin Injury Risk (Adult)   Related Risk Factors (Skin Injury Risk) advanced age;cognitive impairment;critical care admission;mobility impaired;infection;moisture     Goal: Skin Health and Integrity  Outcome: Ongoing (interventions implemented as appropriate)    05/09/19 0536   Skin Injury Risk (Adult)   Skin Health and Integrity making progress toward outcome       Problem: Confusion, Chronic (Adult)  Goal: Identify Related Risk Factors and Signs and Symptoms  Outcome: Ongoing (interventions implemented as appropriate)   05/09/19 0536   Confusion, Chronic (Adult)   Related Risk Factors (Chronic Confusion) aging effects;cognitive impairment;dementia   Signs and Symptoms (Chronic Confusion) disorientation;memory disturbed;understanding disturbed     Goal: Cognitive/Functional Impairments Minimized  Outcome: Ongoing (interventions implemented as appropriate)   05/09/19 0536   Confusion, Chronic (Adult)   Cognitive/Functional Impairments Minimized making progress toward outcome     Goal: Free from Harm/Injuries  Outcome: Ongoing (interventions implemented as appropriate)   05/09/19 0536   Confusion, Chronic (Adult)   Free from Harm/Injuries making progress toward outcome       Problem: Infection, Risk/Actual (Adult)  Goal: Identify Related Risk Factors and Signs and Symptoms  Outcome: Ongoing (interventions implemented as appropriate)   05/09/19 0536   Infection, Risk/Actual (Adult)   Related Risk Factors (Infection, Risk/Actual) age extremes;chronic illness/condition;skin integrity impairment   Signs and Symptoms (Infection, Risk/Actual) lab value changes;weakness     Goal: Infection Prevention/Resolution  Outcome: Ongoing (interventions implemented as appropriate)   05/09/19 0536   Infection, Risk/Actual (Adult)   Infection Prevention/Resolution making progress toward outcome       Problem: Wound (Includes Pressure Injury) (Adult)  Goal: Signs and Symptoms of Listed Potential Problems Will be Absent, Minimized or Managed (Wound)  Outcome: Ongoing (interventions implemented as appropriate)   05/09/19 0536   Goal/Outcome Evaluation   Problems Assessed (Wound) all   Problems Present (Wound) delayed wound healing       Problem: Nutrition, Enteral (Adult)  Goal: Signs and  Symptoms of Listed Potential Problems Will be Absent, Minimized or Managed (Nutrition, Enteral)  Outcome: Ongoing (interventions implemented as appropriate)   05/09/19 8276   Goal/Outcome Evaluation   Problems Assessed (Enteral Nutrition) all   Problems Present (Enteral Nutrition) none

## 2019-05-09 NOTE — THERAPY TREATMENT NOTE
Acute Care - Speech Language Pathology   Swallow Treatment Note [facility]     Patient Name: Susy Ko  : 1940  MRN: [id number]  Today's Date: 2019  Onset of Illness/Injury or Date of Surgery: 19     Referring Physician: COLTON Kohler      Admit Date: 2019    Patient was seen upright in bed during breakfast meal. Patient appeared to be drowsy and needed moderate cues to remain alert. Lingual range of motion continues to be minimal. Trials of honey thickened liquids and nectar-thick liquids were given to patient. No s/s aspiration present during honey-thick trials, however, patient had immediate cough following nectar thick trials. Education on safe swallow strategies of staying awake and sitting upright in bed was given. Patient to remain on diet consisting of honey-thickened liquids and pureed foods. Patient currently has NG tube. Patient requires 1:1 assistance with feedings. Oral care to be provided after meals. Ice chips OK 30 minutes following PO intake and oral care regiment. Meds to be crushed in applesauce or with honey-thickened liquids. ST to follow and treat.  Michelle Chacon, Speech Therapy Student 2019 8:41 AM    Visit Dx:      ICD-10-CM ICD-9-CM   1. Severe sepsis (CMS/HCC) A41.9 038.9    R65.20 995.92   2. Urinary tract infection, acute N39.0 599.0   3. Hyperkalemia E87.5 276.7   4. Acute kidney injury (CMS/HCC) N17.9 584.9   5. Altered mental status, unspecified altered mental status type R41.82 780.97   6. Other dysphagia R13.19 787.29     Patient Active Problem List   Diagnosis   • Normal pressure hydrocephalus   • Non morbid obesity due to excess calories   • Tobacco non-user   • Tremor of right hand   • Abnormality of gait   • Parastomal hernia   • Hernia   • Gait abnormality   • Severe sepsis (CMS/HCC)       Therapy Treatment  Rehabilitation Treatment Summary     Row Name 19 0820             Treatment Time/Intention    Discipline  speech language  pathologist  (Pended)   -      Document Type  therapy note (daily note)  (Pended)   -      Subjective Information  no complaints  (Pended)   -      Mode of Treatment  speech-language pathology  (Pended)   -      Patient/Family Observations  no family present at bedside  (Pended)   -      Care Plan Review  evaluation/treatment results reviewed;risks/benefits reviewed;care plan/treatment goals reviewed  (Pended)   -      Patient Effort  good  (Pended)   -      Recorded by [] Michelle Chacon, Speech Therapy Student 05/09/19 0834      Row Name 05/09/19 0820             Pain Scale: FACES Pre/Post-Treatment    Pain: FACES Scale, Pretreatment  0-->no hurt  (Pended)   -      Pain: FACES Scale, Post-Treatment  0-->no hurt  (Pended)   -      Recorded by [] Michelle Chacon Speech Therapy Student 05/09/19 0834      Row Name                Wound 05/08/19 1115 coccyx pressure injury;blisters    Wound - Properties Group Date first assessed: 05/08/19 [HS] Time first assessed: 1115 [HS] Location: coccyx [HS] Type: pressure injury;blisters [HS2] Stage, Pressure Injury: deep tissue injury [HS] Recorded by:  [HS] Tamara Salazar RN 05/08/19 1726 [HS2] Tamara Salazar RN 05/08/19 1728    Row Name                Wound 05/07/19 0532 Right heel pressure injury    Wound - Properties Group Date first assessed: 05/07/19 [HS] Time first assessed: 0532 [HS] Present On Admission : yes;picture taken [HS] Side: Right [HS] Location: heel [HS] Type: pressure injury [HS] Stage, Pressure Injury: deep tissue injury [HS] Recorded by:  [HS] Tamara Salazar RN 05/08/19 1746    Row Name 05/09/19 0820             Outcome Summary/Treatment Plan (SLP)    Daily Summary of Progress (SLP)  progress toward functional goals is gradual  (Pended)   -      Barriers to Overall Progress (SLP)  medically complex  (Pended)   -      Plan for Continued Treatment (SLP)  ST to follow and treat  (Pended)   -      Anticipated Dischage  Disposition  skilled nursing facility  (Pended)   -SH      Recorded by [SH] Michelle Chacon, Speech Therapy Student 05/09/19 0834        User Key  (r) = Recorded By, (t) = Taken By, (c) = Cosigned By    Initials Name Effective Dates Discipline    HS Tamara Salazar RN 12/27/16 -  Nurse    SH Michelle Chacon, Speech Therapy Student 02/27/19 -  SLP          Outcome Summary  Outcome Summary/Treatment Plan (SLP)  Daily Summary of Progress (SLP): (P) progress toward functional goals is gradual (05/09/19 0820 : Michelle Chacon Speech Therapy Student)  Barriers to Overall Progress (SLP): (P) medically complex (05/09/19 0820 : Michelle Chacon Speech Therapy Student)  Plan for Continued Treatment (SLP): (P) ST to follow and treat (05/09/19 0820 : Michelle Chacon Speech Therapy Student)  Anticipated Dischage Disposition: (P) skilled nursing facility (05/09/19 0820 : Michelle Chacon Speech Therapy Student)      SLP GOALS     Row Name 05/09/19 0820 05/08/19 0942 05/07/19 0900       Oral Nutrition/Hydration Goal 1 (SLP)    Oral Nutrition/Hydration Goal 1, SLP  Patient will tolerate LRD with no s/s aspiration.  (Pended)   -SH  Patient will tolerate LRD with no s/s aspiration.  -CS  Patient will tolerate LRD with no s/s aspiration.  -CS (r) SH (t) CS (c)    Time Frame (Oral Nutrition/Hydration Goal 1, SLP)  by discharge  (Pended)   -SH  by discharge  -CS  by discharge  -CS (r) SH (t) CS (c)    Barriers (Oral Nutrition/Hydration Goal 1, SLP)  n/a  (Pended)   -SH  n/a  -CS  n/a  -CS (r) SH (t) CS (c)    Progress/Outcomes (Oral Nutrition/Hydration Goal 1, SLP)  continuing progress toward goal  (Pended)   -SH  goal ongoing  -CS  goal ongoing  -CS (r) SH (t) CS (c)       Lingual Strengthening Goal 1 (SLP)    Activity (Lingual Strengthening Goal 1, SLP)  increase lingual tone/sensation/control/coordination/movement  (Pended)   -SH  increase lingual tone/sensation/control/coordination/movement  -CS  increase lingual  tone/sensation/control/coordination/movement  -CS (r) SH (t) CS (c)    Increase Lingual Tone/Sensation/Control/Coordination/Movement  lingual movement exercises  (Pended)   -SH  lingual movement exercises  -CS  lingual movement exercises  -CS (r) SH (t) CS (c)    [employer]/Accuracy (Lingual Strengthening Goal 1, SLP)  independently (over 90% accuracy)  (Pended)   -SH  independently (over 90% accuracy)  -CS  independently (over 90% accuracy)  -CS (r) SH (t) CS (c)    Time Frame (Lingual Strengthening Goal 1, SLP)  by discharge  (Pended)   -SH  by discharge  -CS  by discharge  -CS (r) SH (t) CS (c)    Barriers (Lingual Strengthening Goal 1, SLP)  n/a  (Pended)   -SH  n/a  -CS  n/a  -CS (r) SH (t) CS (c)    Progress/Outcomes (Lingual Strengthening Goal 1, SLP)  progress slower than expected  (Pended)   -SH  goal ongoing  -CS  goal ongoing  -CS (r) SH (t) CS (c)       Pharyngeal Strengthening Exercise Goal 1 (SLP)    Activity (Pharyngeal Strengthening Goal 1, SLP)  increase timing  (Pended)   -SH  increase timing  -CS  increase timing  -CS (r) SH (t) CS (c)    Increase Timing  gustatory stimulation (sour/cold);prepping - 3 second prep or suck swallow or 3-step swallow;hard effortful swallow  (Pended)   -SH  gustatory stimulation (sour/cold);prepping - 3 second prep or suck swallow or 3-step swallow;hard effortful swallow  -CS  gustatory stimulation (sour/cold);prepping - 3 second prep or suck swallow or 3-step swallow;hard effortful swallow  -CS (r) SH (t) CS (c)    [employer]/Accuracy (Pharyngeal Strengthening Goal 1, SLP)  independently (over 90% accuracy)  (Pended)   -SH  independently (over 90% accuracy)  -CS  independently (over 90% accuracy)  -CS (r) SH (t) CS (c)    Time Frame (Pharyngeal Strengthening Goal 1, SLP)  by discharge  (Pended)   -SH  by discharge  -CS  by discharge  -CS (r) SH (t) CS (c)    Barriers (Pharyngeal Strengthening Goal 1, SLP)  n/a  (Pended)   -SH  n/a  -CS  n/a  -CS (r) SH (t) CS (c)     Progress/Outcomes (Pharyngeal Strengthening Goal 1, SLP)  continuing progress toward goal  (Pended)   -  goal ongoing  -CS  goal ongoing  -CS (r) SH (t) CS (c)      User Key  (r) = Recorded By, (t) = Taken By, (c) = Cosigned By    Initials Name Provider Type    Benjamin Contreras, MS CCC-SLP Speech and Language Pathologist    Michelle Patterson, Speech Therapy Student Speech Therapy Student          EDUCATION  The patient has been educated in the following areas:   Dysphagia (Swallowing Impairment).    SLP Recommendation and Plan  Daily Summary of Progress (SLP): (P) progress toward functional goals is gradual  Barriers to Overall Progress (SLP): (P) medically complex  Plan for Continued Treatment (SLP): (P) ST to follow and treat  Anticipated Dischage Disposition: (P) skilled nursing facility                    Time Calculation:   Time Calculation- SLP     Row Name 05/09/19 0840             Time Calculation- [facility]    SLP Start Time  0820  (Pended)   -      SLP Stop Time  0833  (Pended)   -      SLP Time Calculation (min)  13 min  (Pended)   -      SLP Received On  05/09/19  (Pended)   -        User Key  (r) = Recorded By, (t) = Taken By, (c) = Cosigned By    Initials Name Provider Type     Michelle Chacon Speech Therapy Student Speech Therapy Student          Therapy Charges for Today     Code Description Service Date Service Provider Modifiers Qty    [id number]  ST TREATMENT SWALLOW 1 5/9/2019 Michelle Chacon Speech Therapy Student GN 1                 Michelle Chacon Speech Therapy Student  5/9/2019

## 2019-05-09 NOTE — PLAN OF CARE
Problem: Fall Risk (Adult)  Goal: Identify Related Risk Factors and Signs and Symptoms  Outcome: Ongoing (interventions implemented as appropriate)    Goal: Absence of Fall  Outcome: Ongoing (interventions implemented as appropriate)      Problem: Patient Care Overview  Goal: Plan of Care Review   05/09/19 9854   Coping/Psychosocial   Plan of Care Reviewed With patient   Plan of Care Review   Progress improving   OTHER   Outcome Summary renal function improving, K+replaced, Q2 turned, VSS, will continue to monitor       Problem: Skin Injury Risk (Adult)  Goal: Identify Related Risk Factors and Signs and Symptoms  Outcome: Ongoing (interventions implemented as appropriate)    Goal: Skin Health and Integrity  Outcome: Ongoing (interventions implemented as appropriate)      Problem: Confusion, Chronic (Adult)  Goal: Identify Related Risk Factors and Signs and Symptoms  Outcome: Ongoing (interventions implemented as appropriate)    Goal: Cognitive/Functional Impairments Minimized  Outcome: Ongoing (interventions implemented as appropriate)    Goal: Free from Harm/Injuries  Outcome: Ongoing (interventions implemented as appropriate)      Problem: Infection, Risk/Actual (Adult)  Goal: Identify Related Risk Factors and Signs and Symptoms  Outcome: Ongoing (interventions implemented as appropriate)    Goal: Infection Prevention/Resolution  Outcome: Ongoing (interventions implemented as appropriate)      Problem: Wound (Includes Pressure Injury) (Adult)  Goal: Signs and Symptoms of Listed Potential Problems Will be Absent, Minimized or Managed (Wound)  Outcome: Ongoing (interventions implemented as appropriate)      Problem: Nutrition, Enteral (Adult)  Goal: Signs and Symptoms of Listed Potential Problems Will be Absent, Minimized or Managed (Nutrition, Enteral)  Outcome: Ongoing (interventions implemented as appropriate)

## 2019-05-09 NOTE — PROGRESS NOTES
[facility]  Hieu Zuniga M.D.  JOCE Zuniga M.D.  COLTON Hunter      Internal Medicine Progress Note    5/9/2019   2:20 PM    Name:  Susy Ko  MRN:    [id number]     Acct:     [id number]   Room:  [address] [place] [address] Day: 3     Admit Date: 5/6/2019  8:39 PM  PCP: Monster Zuniga MD    Subjective:     C/C:   Chief Complaint   Patient presents with   • Abnormal KUB       Interval History: Status:  stayed the same. Resting in bed. Daughter at bedside. Patient much more alert and oriented. Conversing appropriately. DHT placed. Patient started on modified diet. Daughter asking about changing to regular diet - explained that we cannot knowingly give her an unapproved diet and then be expected to manage any resulting complications (aspiration, pneumonia, etc). Hopefully with time and improvement in her symptoms, her ability to swallow will improve to the point that she is able to tolerate a less modified diet. Renal function slightly improved.     Review of Systems   Constitution: Positive for weakness and malaise/fatigue. Negative for chills, decreased appetite, weight gain and weight loss.   HENT: Negative for congestion, ear discharge, hoarse voice and tinnitus.    Eyes: Negative for blurred vision, discharge, visual disturbance and visual halos.   Cardiovascular: Negative for chest pain, claudication, dyspnea on exertion, irregular heartbeat, leg swelling, orthopnea and paroxysmal nocturnal dyspnea.   Respiratory: Negative for cough, shortness of breath, sputum production and wheezing.    Endocrine: Negative for cold intolerance, heat intolerance and polyuria.   Hematologic/Lymphatic: Negative for adenopathy. Does not bruise/bleed easily.   Skin: Negative for dry skin, itching and suspicious lesions.   Musculoskeletal: Negative for arthritis, back pain, falls, joint pain, muscle weakness and myalgias.   Gastrointestinal: Negative for abdominal pain, constipation, diarrhea, dysphagia and  hematemesis.   Genitourinary: Negative for bladder incontinence, dysuria and frequency.   Neurological: Negative for aphonia, disturbances in coordination and dizziness.   Psychiatric/Behavioral: Negative for altered mental status, depression, memory loss and substance abuse. The patient does not have insomnia and is not nervous/anxious.    Allergic/Immunologic: Negative for environmental allergies.     Medications:     Allergies:   Allergies   Allergen Reactions   • Morphine And Related Mental Status Change   • Adhesive Tape Rash       Current Meds:   Current Facility-Administered Medications:   •  acetaminophen (TYLENOL) tablet 650 mg, 650 mg, Oral, Q6H PRN, Monster Zuniga MD  •  acetaminophen (TYLENOL) tablet 650 mg, 650 mg, Oral, Q4H PRN, Monster Zuniga MD  •  ALPRAZolam (XANAX) tablet 0.5 mg, 0.5 mg, Oral, Nightly, Monster Zuniga MD, 0.5 mg at 05/08/19 2027  •  calcitRIOL (ROCALTROL) solution 0.25 mcg, 0.25 mcg, Nasogastric, Daily, Monster Zuniga MD, 0.25 mcg at 05/09/19 0838  •  calcium carbonate (oyster shell) tablet 1,250 mg, 1,250 mg, Oral, Daily, Ramsey Weldon MD, 1,250 mg at 05/09/19 0837  •  calcium gluconate 1 g in sodium chloride 0.9 % 100 mL IVPB, 1 g, Intravenous, PRN, Last Rate: 100 mL/hr at 05/08/19 0451, 1 g at 05/08/19 0451 **AND** calcium gluconate 6 g in sodium chloride 0.9 % 500 mL IVPB, 6 g, Intravenous, PRN, Last Rate: 83.3 mL/hr at 05/08/19 0647, 6 g at 05/08/19 0647 **AND** Calcium, , , PRN, Monster Zuniga MD  •  carbidopa-levodopa (SINEMET)  MG per tablet 1 tablet, 1 tablet, Oral, TID, Monster Zuniga MD, 1 tablet at 05/09/19 0837  •  cefTRIAXone (ROCEPHIN) 1 g/100 mL 0.9% NS (MBP), 1 g, Intravenous, Q24H, Traci Kohler, APRN, 1 g at 05/08/19 1813  •  citalopram (CeleXA) tablet 10 mg, 10 mg, Oral, Nightly, Monster Zuniga MD, 10 mg at 05/08/19 2027  •  cyancobalamin (VITAMIN B-12) tablet 100 mcg, 100 mcg, Oral, Daily,  Monster Zuniga MD, 100 mcg at 05/09/19 0837  •  famotidine (PEPCID) injection 20 mg, 20 mg, Intravenous, Daily, Monster Zuniga MD, 20 mg at 05/09/19 0837  •  HYDROcodone-acetaminophen (NORCO) 7.5-325 MG per tablet 1 tablet, 1 tablet, Oral, Q4H PRN, Monster Zuniga MD  •  iron sucrose (VENOFER) 200 mg in sodium chloride 0.9 % 100 mL IVPB, 200 mg, Intravenous, Q24H, Ramsey Weldon MD, 200 mg at 05/08/19 1449  •  meperidine (DEMEROL) injection 25 mg, 25 mg, Intravenous, Q6H PRN, Traci Kohler APRN  •  potassium chloride 20 mEq in 100 mL IVPB, 20 mEq, Intravenous, Q1H, Cirilo Ross APRPETRA, Last Rate: 100 mL/hr at 05/09/19 1420, 20 mEq at 05/09/19 1420  •  [COMPLETED] Insert peripheral IV, , , Once **AND** sodium chloride 0.9 % flush 10 mL, 10 mL, Intravenous, PRN, Prosper Jiang, DO  •  sodium chloride 0.9 % flush 3 mL, 3 mL, Intravenous, Q12H, Monster Zuniga MD, 3 mL at 05/09/19 0838  •  sodium chloride 0.9 % flush 3-10 mL, 3-10 mL, Intravenous, PRN, Monster Zuniga MD  •  sodium chloride 0.9 % with KCl 20 mEq/L infusion, 75 mL/hr, Intravenous, Continuous, Cirilo Ross, APRN, Last Rate: 75 mL/hr at 05/09/19 1120, 75 mL/hr at 05/09/19 1120  •  [COMPLETED] vancomycin 1250 mg/250 mL 0.9% NS IVPB ([facility]), 20 mg/kg, Intravenous, Once, 1,250 mg at 05/08/19 0451 **FOLLOWED BY** [START ON 5/10/2019] vancomycin 500 mg/100 mL 0.9% NS IVPB ([facility]), 500 mg, Intravenous, Q48H, Monster Zuniga MD    Data:     Code Status:    Code Status and Medical Interventions:   Ordered at: 05/07/19 1110     Level Of Support Discussed With:    Patient     Code Status:    CPR     Medical Interventions (Level of Support Prior to Arrest):    Full       History reviewed. No pertinent family history.    Social History     Socioeconomic History   • Marital status:      Spouse name: Not on file   • Number of children: Not on file   • Years of education: Not on file   • Highest  "education level: Not on file   Tobacco Use   • Smoking status: Former Smoker   • Smokeless tobacco: Never Used   Substance and Sexual Activity   • Alcohol use: No   • Drug use: No   • Sexual activity: Defer       Vitals:  /50 (BP Location: Left arm, Patient Position: Lying)   Pulse 90   Temp 99.6 °F (37.6 °C) (Oral)   Resp 22   Ht 160 cm (63\")   Wt 60.3 kg (133 lb)   SpO2 96%   BMI 23.56 kg/m²   Temp (24hrs), Av.6 °F (37 °C), Min:97.4 °F (36.3 °C), Max:99.6 °F (37.6 °C)        I/O (24Hr):    Intake/Output Summary (Last 24 hours) at 2019 1420  Last data filed at 2019 0856  Gross per 24 hour   Intake 3943.86 ml   Output 1160 ml   Net 2783.86 ml       Labs and imaging:      Recent Results (from the past 12 hour(s))   Comprehensive Metabolic Panel    Collection Time: 19  4:55 AM   Result Value Ref Range    Glucose 149 (H) 70 - 100 mg/dL     (H) 5 - 21 mg/dL    Creatinine 3.89 (H) 0.50 - 1.40 mg/dL    Sodium 137 135 - 145 mmol/L    Potassium 2.8 (C) 3.5 - 5.3 mmol/L    Chloride 94 (L) 98 - 110 mmol/L    CO2 32.0 (H) 24.0 - 31.0 mmol/L    Calcium 6.4 (L) 8.4 - 10.4 mg/dL    Total Protein 4.3 (L) 6.3 - 8.7 g/dL    Albumin 2.10 (L) 3.50 - 5.00 g/dL    ALT (SGPT) 24 0 - 54 U/L    AST (SGOT) 158 (H) 7 - 45 U/L    Alkaline Phosphatase 90 24 - 120 U/L    Total Bilirubin 0.3 0.1 - 1.0 mg/dL    eGFR Non African Amer 11 (L) >60 mL/min/1.73    eGFR  African Amer  >60 mL/min/1.73    Globulin 2.2 gm/dL    A/G Ratio 1.0 (L) 1.1 - 2.5 g/dL    BUN/Creatinine Ratio 26.5 (H) 7.0 - 25.0    Anion Gap 11.0 4.0 - 13.0 mmol/L   Magnesium    Collection Time: 19  4:55 AM   Result Value Ref Range    Magnesium 1.7 1.4 - 2.2 mg/dL   Phosphorus    Collection Time: 19  4:55 AM   Result Value Ref Range    Phosphorus 4.3 2.5 - 4.5 mg/dL   CBC Auto Differential    Collection Time: 19  4:55 AM   Result Value Ref Range    WBC 10.68 4.80 - 10.80 10*3/mm3    RBC 3.05 (L) 4.20 - 5.40 10*6/mm3    " Hemoglobin 7.6 (L) 12.0 - 16.0 g/dL    Hematocrit 22.4 (L) 37.0 - 47.0 %    MCV 73.4 (L) 82.0 - 98.0 fL    MCH 24.9 (L) 28.0 - 32.0 pg    MCHC 33.9 33.0 - 36.0 g/dL    RDW 17.2 (H) 12.0 - 15.0 %    RDW-SD 45.6 40.0 - 54.0 fl    MPV 10.8 6.0 - 12.0 fL    Platelets 108 (L) 130 - 400 10*3/mm3   Manual Differential    Collection Time: 05/09/19  4:55 AM   Result Value Ref Range    Neutrophil % 86.9 (H) 39.0 - 78.0 %    Lymphocyte % 8.1 (L) 15.0 - 45.0 %    Monocyte % 1.0 (L) 4.0 - 12.0 %    Eosinophil % 3.0 0.0 - 4.0 %    Basophil % 1.0 0.0 - 2.0 %    Neutrophils Absolute 9.28 (H) 1.70 - 7.00 10*3/mm3    Lymphocytes Absolute 0.87 0.72 - 4.86 10*3/mm3    Monocytes Absolute 0.11 (L) 0.19 - 1.30 10*3/mm3    Eosinophils Absolute 0.32 0.00 - 0.70 10*3/mm3    Basophils Absolute 0.11 0.00 - 0.20 10*3/mm3    Anisocytosis Slight/1+ None Seen    Hypochromia Slight/1+ None Seen    Microcytes Slight/1+ None Seen    Toxic Granulation Slight/1+ None Seen    Vacuolated Neutrophils Slight/1+ None Seen    Platelet Estimate Decreased Normal    Giant Platelets Slight/1+ None Seen     Physical Examination:        Physical Exam   Constitutional: She is oriented to person, place, and time. She appears well-developed and well-nourished.   HENT:   Head: Normocephalic and atraumatic.   Nose: Nose normal.   Mouth/Throat: Oropharynx is clear and moist.   Eyes: Conjunctivae and EOM are normal. Pupils are equal, round, and reactive to light.   Neck: Normal range of motion. Neck supple.   Cardiovascular: Normal rate, regular rhythm, normal heart sounds and intact distal pulses.   Pulmonary/Chest: Effort normal and breath sounds normal.   Abdominal: Soft. Bowel sounds are normal.   Ostomy - stoma pink and appliance intact   Musculoskeletal:   Generalized weakness  Contractures to BLE   Neurological: She is alert and oriented to person, place, and time.   Intermittent confusion   Skin: Skin is warm and dry.   DTI to right heel  Blisters and excoriation  to coccyx   Nursing note and vitals reviewed.      Assessment:            Severe sepsis (CMS/HCC)    Past Medical History:   Diagnosis Date   • Anxiety    • Breast cancer (CMS/HCC)      LEFT WITH RECONSTRUCTION   • Colostomy in place (CMS/HCC)    • Dementia    • Depression    • Edema     LOWER FEET   • Hydrocephalus     NORMAL PRESSURE   • Hypertension    • Incontinence    • Nausea    • Parastomal hernia    • Parkinson disease (CMS/HCC)    • Tremor     RIGHT HAND   • Unsteady gait         Plan:        1. Sepsis  2. E. Coli UTI - present on admission  3. Acute renal failure  4. Rhabdomyolysis  5. Hypotension  6. Dementia  7. Abdominal pain  8. Metabolic acidosis  9. Hyperkalemia  10. Uremia  11. Anxiety    Continue current treatment. Monitor counts. Increase activity. Labs in am. Continue antibiotics. Continue fluid resuscitation. DNR per family wishes. Continue supplemental nutrition and modified diet. Aggressive therapies.       Electronically signed by COLTON Ross on 5/9/2019 at 2:20 PM

## 2019-05-10 LAB
ALBUMIN SERPL-MCNC: 2.2 G/DL (ref 3.5–5)
ALBUMIN/GLOB SERPL: 1 G/DL (ref 1.1–2.5)
ALP SERPL-CCNC: 97 U/L (ref 24–120)
ALT SERPL W P-5'-P-CCNC: 32 U/L (ref 0–54)
ANION GAP SERPL CALCULATED.3IONS-SCNC: 6 MMOL/L (ref 4–13)
ANISOCYTOSIS BLD QL: ABNORMAL
AST SERPL-CCNC: 112 U/L (ref 7–45)
BILIRUB SERPL-MCNC: 0.2 MG/DL (ref 0.1–1)
BUN BLD-MCNC: 92 MG/DL (ref 5–21)
BUN/CREAT SERPL: 35.7 (ref 7–25)
CALCIUM SPEC-SCNC: 7.3 MG/DL (ref 8.4–10.4)
CHLORIDE SERPL-SCNC: 101 MMOL/L (ref 98–110)
CK SERPL-CCNC: 5304 U/L (ref 0–203)
CO2 SERPL-SCNC: 33 MMOL/L (ref 24–31)
CREAT BLD-MCNC: 2.58 MG/DL (ref 0.5–1.4)
DEPRECATED RDW RBC AUTO: 48.6 FL (ref 40–54)
EOSINOPHIL # BLD MANUAL: 0.43 10*3/MM3 (ref 0–0.7)
EOSINOPHIL NFR BLD MANUAL: 4 % (ref 0–4)
ERYTHROCYTE [DISTWIDTH] IN BLOOD BY AUTOMATED COUNT: 17.3 % (ref 12–15)
GFR SERPL CREATININE-BSD FRML MDRD: 18 ML/MIN/1.73
GLOBULIN UR ELPH-MCNC: 2.3 GM/DL
GLUCOSE BLD-MCNC: 136 MG/DL (ref 70–100)
HCT VFR BLD AUTO: 23 % (ref 37–47)
HGB BLD-MCNC: 7.6 G/DL (ref 12–16)
LYMPHOCYTES # BLD MANUAL: 1.3 10*3/MM3 (ref 0.72–4.86)
LYMPHOCYTES NFR BLD MANUAL: 12 % (ref 15–45)
LYMPHOCYTES NFR BLD MANUAL: 3 % (ref 4–12)
MCH RBC QN AUTO: 25.4 PG (ref 28–32)
MCHC RBC AUTO-ENTMCNC: 33 G/DL (ref 33–36)
MCV RBC AUTO: 76.9 FL (ref 82–98)
MONOCYTES # BLD AUTO: 0.33 10*3/MM3 (ref 0.19–1.3)
NEUTROPHILS # BLD AUTO: 8.79 10*3/MM3 (ref 1.87–8.4)
NEUTROPHILS NFR BLD MANUAL: 81 % (ref 39–78)
NRBC BLD AUTO-RTO: 0 /100 WBC (ref 0–0.2)
PLAT MORPH BLD: NORMAL
PLATELET # BLD AUTO: 92 10*3/MM3 (ref 130–400)
PMV BLD AUTO: 11.2 FL (ref 6–12)
POIKILOCYTOSIS BLD QL SMEAR: ABNORMAL
POTASSIUM BLD-SCNC: 3.5 MMOL/L (ref 3.5–5.3)
PROT SERPL-MCNC: 4.5 G/DL (ref 6.3–8.7)
RBC # BLD AUTO: 2.99 10*6/MM3 (ref 4.2–5.4)
SODIUM BLD-SCNC: 140 MMOL/L (ref 135–145)
WBC MORPH BLD: NORMAL
WBC NRBC COR # BLD: 10.85 10*3/MM3 (ref 4.8–10.8)

## 2019-05-10 PROCEDURE — 94760 N-INVAS EAR/PLS OXIMETRY 1: CPT

## 2019-05-10 PROCEDURE — 85025 COMPLETE CBC W/AUTO DIFF WBC: CPT | Performed by: INTERNAL MEDICINE

## 2019-05-10 PROCEDURE — 25010000002 CEFTRIAXONE PER 250 MG: Performed by: NURSE PRACTITIONER

## 2019-05-10 PROCEDURE — 92610 EVALUATE SWALLOWING FUNCTION: CPT

## 2019-05-10 PROCEDURE — 25010000002 IRON SUCROSE PER 1 MG: Performed by: INTERNAL MEDICINE

## 2019-05-10 PROCEDURE — 85007 BL SMEAR W/DIFF WBC COUNT: CPT | Performed by: INTERNAL MEDICINE

## 2019-05-10 PROCEDURE — 94799 UNLISTED PULMONARY SVC/PX: CPT

## 2019-05-10 PROCEDURE — 25010000002 VANCOMYCIN 10 G RECONSTITUTED SOLUTION: Performed by: INTERNAL MEDICINE

## 2019-05-10 PROCEDURE — 82550 ASSAY OF CK (CPK): CPT | Performed by: NURSE PRACTITIONER

## 2019-05-10 PROCEDURE — 25810000003 SODIUM CHLORIDE 0.9 % WITH KCL 20 MEQ 20-0.9 MEQ/L-% SOLUTION: Performed by: NURSE PRACTITIONER

## 2019-05-10 PROCEDURE — 80053 COMPREHEN METABOLIC PANEL: CPT | Performed by: INTERNAL MEDICINE

## 2019-05-10 RX ORDER — FAMOTIDINE 20 MG/1
20 TABLET, FILM COATED ORAL DAILY
Status: DISCONTINUED | OUTPATIENT
Start: 2019-05-11 | End: 2019-05-16 | Stop reason: HOSPADM

## 2019-05-10 RX ADMIN — IRON SUCROSE 200 MG: 20 INJECTION, SOLUTION INTRAVENOUS at 12:44

## 2019-05-10 RX ADMIN — ALPRAZOLAM 0.5 MG: 0.5 TABLET ORAL at 21:24

## 2019-05-10 RX ADMIN — CALCITRIOL 0.25 MCG: 1 SOLUTION ORAL at 08:44

## 2019-05-10 RX ADMIN — CEFTRIAXONE SODIUM 1 G: 1 INJECTION, POWDER, FOR SOLUTION INTRAMUSCULAR; INTRAVENOUS at 18:34

## 2019-05-10 RX ADMIN — CARBIDOPA AND LEVODOPA 1 TABLET: 25; 100 TABLET ORAL at 08:44

## 2019-05-10 RX ADMIN — CALCIUM 1250 MG: 500 TABLET ORAL at 08:43

## 2019-05-10 RX ADMIN — POTASSIUM CHLORIDE AND SODIUM CHLORIDE 75 ML/HR: 900; 150 INJECTION, SOLUTION INTRAVENOUS at 00:38

## 2019-05-10 RX ADMIN — VANCOMYCIN HYDROCHLORIDE 500 MG: 10 INJECTION, POWDER, LYOPHILIZED, FOR SOLUTION INTRAVENOUS at 04:24

## 2019-05-10 RX ADMIN — VITAM B12 100 MCG: 100 TAB at 08:44

## 2019-05-10 RX ADMIN — POTASSIUM CHLORIDE AND SODIUM CHLORIDE 75 ML/HR: 900; 150 INJECTION, SOLUTION INTRAVENOUS at 14:28

## 2019-05-10 RX ADMIN — CITALOPRAM 10 MG: 10 TABLET, FILM COATED ORAL at 21:24

## 2019-05-10 RX ADMIN — FAMOTIDINE 20 MG: 10 INJECTION INTRAVENOUS at 08:44

## 2019-05-10 RX ADMIN — SODIUM CHLORIDE, PRESERVATIVE FREE 3 ML: 5 INJECTION INTRAVENOUS at 21:25

## 2019-05-10 RX ADMIN — SODIUM CHLORIDE, PRESERVATIVE FREE 3 ML: 5 INJECTION INTRAVENOUS at 08:44

## 2019-05-10 RX ADMIN — CARBIDOPA AND LEVODOPA 1 TABLET: 25; 100 TABLET ORAL at 18:34

## 2019-05-10 RX ADMIN — CARBIDOPA AND LEVODOPA 1 TABLET: 25; 100 TABLET ORAL at 21:24

## 2019-05-10 NOTE — PROGRESS NOTES
Pharmacy Dosing Service  Automatic IV to PO Conversion  Famotidine    Assessment/Action/Plan:  Patient meets criteria listed below. Pepcid 20 mg IV every 24 hours has been changed to PO every 24 hours.       Subjective:  Susy Ko is a 79 y.o. female who meets the following criteria for IV to PO therapy conversion     Policy Criteria:  · Tolerating oral fluids or 40ml/hour of enteral nutrition and oral route not otherwise compromised  · Receiving other oral medications on a scheduled basis    Additional Factors Considered:  • Anti-emetic usage  • Patient disposition per documentation  • Disease states or conditions contraindicating oral conversion    Objective:  Diet Order   Procedures   • Diet Soft Texture; Ground; Thin; Renal       Active Medications    Current Facility-Administered Medications:   •  acetaminophen (TYLENOL) tablet 650 mg, 650 mg, Oral, Q6H PRN, Monster Zuniga MD  •  acetaminophen (TYLENOL) tablet 650 mg, 650 mg, Oral, Q4H PRN, Monster Zuniga MD  •  ALPRAZolam (XANAX) tablet 0.5 mg, 0.5 mg, Oral, Nightly, Monster Zuniga MD, 0.5 mg at 05/09/19 2241  •  calcitRIOL (ROCALTROL) solution 0.25 mcg, 0.25 mcg, Nasogastric, Daily, Monster Zuniga MD, 0.25 mcg at 05/10/19 0844  •  calcium carbonate (oyster shell) tablet 1,250 mg, 1,250 mg, Oral, Daily, Ramsey Weldon MD, 1,250 mg at 05/10/19 0843  •  calcium gluconate 1 g in sodium chloride 0.9 % 100 mL IVPB, 1 g, Intravenous, PRN, Last Rate: 100 mL/hr at 05/08/19 0451, 1 g at 05/08/19 0451 **AND** calcium gluconate 6 g in sodium chloride 0.9 % 500 mL IVPB, 6 g, Intravenous, PRN, Last Rate: 83.3 mL/hr at 05/08/19 0647, 6 g at 05/08/19 0647 **AND** Calcium, , , PRN, Monster Zuniga MD  •  carbidopa-levodopa (SINEMET)  MG per tablet 1 tablet, 1 tablet, Oral, TID, Monster Zuniga MD, 1 tablet at 05/10/19 0844  •  cefTRIAXone (ROCEPHIN) 1 g/100 mL 0.9% NS (MBP), 1 g, Intravenous, Q24H, Edita,  Traci Sigala, APRN, 1 g at 05/09/19 1715  •  citalopram (CeleXA) tablet 10 mg, 10 mg, Oral, Nightly, Monster Zuniga MD, 10 mg at 05/09/19 2241  •  cyancobalamin (VITAMIN B-12) tablet 100 mcg, 100 mcg, Oral, Daily, Monster Zuniga MD, 100 mcg at 05/10/19 0844  •  [START ON 5/11/2019] famotidine (PEPCID) tablet 20 mg, 20 mg, Oral, Daily, Monster Zuniga MD  •  HYDROcodone-acetaminophen (NORCO) 7.5-325 MG per tablet 1 tablet, 1 tablet, Oral, Q4H PRN, Monster Zuniga MD  •  [COMPLETED] Insert peripheral IV, , , Once **AND** sodium chloride 0.9 % flush 10 mL, 10 mL, Intravenous, PRN, Prosper Jiang, DO  •  sodium chloride 0.9 % flush 3 mL, 3 mL, Intravenous, Q12H, Monster Zuniga MD, 3 mL at 05/10/19 0844  •  sodium chloride 0.9 % flush 3-10 mL, 3-10 mL, Intravenous, PRN, Monster Zuniga MD  •  sodium chloride 0.9 % with KCl 20 mEq/L infusion, 75 mL/hr, Intravenous, Continuous, Cirilo Ross, APRN, Last Rate: 75 mL/hr at 05/10/19 0038, 75 mL/hr at 05/10/19 0038  •  [COMPLETED] vancomycin 1250 mg/250 mL 0.9% NS IVPB ([facility]), 20 mg/kg, Intravenous, Once, 1,250 mg at 05/08/19 0451 **FOLLOWED BY** vancomycin 500 mg/100 mL 0.9% NS IVPB ([facility]), 500 mg, Intravenous, Q48H, Monster Zuniga MD, 500 mg at 05/10/19 0424    Basia Vides, PharmD  05/10/191:32 PMz

## 2019-05-10 NOTE — THERAPY RE-EVALUATION
Acute Care - Speech Language Pathology   Swallow Re-Evaluation [facility]     Patient Name: Susy Ko  : 1940  MRN: [id number]  Today's Date: 5/10/2019  Onset of Illness/Injury or Date of Surgery: 19     Referring Physician: COLTON Kohler      Admit Date: 2019  Clinical swallow re-eval complete. Pt remains fatigued but is more alert on this date. She completed a full range of PO consistencies except for regular solids secondary to mild-moderately prolonged mastication of mechanical soft solids with mild lingual residue present post swallow. 1x delayed throat clear post honey and nectar thick consistencies. No definite vocal quality change noted. No other overt s/s of aspiration noted, however; SLP cannot fully r/o aspiration with PO intake. Pt ok for a mechanical soft diet with thin liquids. Ok for meds whole with thin liquids. SLP will continue to follow and treat.  Benjamin Martinez MS CCC-SLP 5/10/2019 2:49 PM    Visit Dx:     ICD-10-CM ICD-9-CM   1. Severe sepsis (CMS/HCC) A41.9 038.9    R65.20 995.92   2. Urinary tract infection, acute N39.0 599.0   3. Hyperkalemia E87.5 276.7   4. Acute kidney injury (CMS/HCC) N17.9 584.9   5. Altered mental status, unspecified altered mental status type R41.82 780.97   6. Other dysphagia R13.19 787.29     Patient Active Problem List   Diagnosis   • Normal pressure hydrocephalus   • Non morbid obesity due to excess calories   • Tobacco non-user   • Tremor of right hand   • Abnormality of gait   • Parastomal hernia   • Hernia   • Gait abnormality   • Severe sepsis (CMS/HCC)     Past Medical History:   Diagnosis Date   • Anxiety    • Breast cancer (CMS/HCC)      LEFT WITH RECONSTRUCTION   • Colostomy in place (CMS/HCC)    • Dementia    • Depression    • Edema     LOWER FEET   • Hydrocephalus     NORMAL PRESSURE   • Hypertension    • Incontinence    • Nausea    • Parastomal hernia    • Parkinson disease (CMS/HCC)    • Tremor     RIGHT HAND   • Unsteady  gait      Past Surgical History:   Procedure Laterality Date   • ABDOMINAL SURGERY     • APPENDECTOMY     • BREAST RECONSTRUCTION Left     LEFT   • CHOLECYSTECTOMY     • COLON SURGERY     • COLONOSCOPY     • COLOSTOMY     • HYSTERECTOMY     • MASTECTOMY Left    • PARASTOMAL HERNIA REPAIR N/A 3/17/2017    Procedure: REPAIR PARASTOMAL HERNIA;  Surgeon: Princess Walters MD;  Location: [facility] OR;  Service:         SWALLOW EVALUATION (last 72 hours)      SLP Adult Swallow Evaluation     Row Name 05/10/19 1045                   Rehab Evaluation    Document Type  re-evaluation  -CS        Subjective Information  complains of;fatigue  -CS        Patient Observations  alert;cooperative;agree to therapy  -CS        Patient/Family Observations  Daughters present  -CS        Patient Effort  good  -CS           General Information    Patient Profile Reviewed  yes  -CS        Pertinent History Of Current Problem  Severe sepsis. CXR 5/06/2019- Lungs are normal.  -CS        Current Method of Nutrition  NPO  -CS        Precautions/Limitations, Vision  WFL  -CS        Precautions/Limitations, Hearing  WFL  -CS        Prior Level of Function-Communication  WFL  -CS        Prior Level of Function-Swallowing  pudding thick liquids;honey thick liquids  -CS        Plans/Goals Discussed with  patient  -CS        Barriers to Rehab  none identified  -CS        Patient's Goals for Discharge  return to all previous roles/activities  -CS        Family Goals for Discharge  patient able to return to regular diet  -CS           Pain Assessment    Additional Documentation  Pain Scale: Numbers Pre/Post-Treatment (Group)  -CS           Pain Scale: Numbers Pre/Post-Treatment    Pain Scale: Numbers, Pretreatment  0/10 - no pain  -CS        Pain Scale: Numbers, Post-Treatment  0/10 - no pain  -CS           Oral Motor and Function    Dentition Assessment  natural, present and adequate  -CS        Secretion Management  WNL/WFL  -CS        Mucosal Quality   dry  -CS        Volitional Swallow  delayed;weak  -CS        Volitional Cough  non-productive  -CS           Oral Musculature and Cranial Nerve Assessment    Oral Motor General Assessment  generalized oral motor weakness  -CS           General Eating/Swallowing Observations    Respiratory Support Currently in Use  room air  -CS        Eating/Swallowing Skills  fed by SLP  -CS        Positioning During Eating  upright in bed  -CS        Utensils Used  spoon;straw  -CS        Consistencies Trialed  soft textures;pureed;honey-thick liquids;nectar/syrup-thick liquids;thin liquids  -CS           Clinical Swallow Eval    Oral Prep Phase  impaired  -CS        Oral Transit  impaired  -CS        Oral Residue  impaired  -CS        Pharyngeal Phase  suspected pharyngeal impairment  -CS        Esophageal Phase  unremarkable  -CS        Clinical Swallow Evaluation Summary  Clinical swallow re-eval complete. Pt remains fatigued but is more alert on this date. She completed a full range of PO consistencies except for regular solids secondary to mild-moderately prolonged mastication of mechanical soft solids with mild lingual residue present post swallow. 1x delayed throat clear post honey and nectar thick consistencies. No definite vocal quality change noted. No other overt s/s of aspiration noted, however; SLP cannot fully r/o aspiration with PO intake. Pt ok for a mechanical soft diet with thin liquids. Ok for meds whole with thin liquids. SLP will continue to follow and treat.  -CS           Oral Prep Concerns    Oral Prep Concerns  increased prep time  -CS        Increased Prep Time  mechanical soft  -CS           Oral Transit Concerns    Oral Transit Concerns  increased oral transit time  -CS        Increased Oral Transit Time  mechanical soft  -CS           Oral Residue Concerns    Oral Residue Concerns  other (see comments) Lingual residue  -CS        Diffuse Residue Throughout Oral Cavity  mechanical soft  -CS            Pharyngeal Phase Concerns    Pharyngeal Phase Concerns  throat clear  -CS        Throat Clear  honey;nectar  -CS           Clinical Impression    SLP Swallowing Diagnosis  mild;oral dysfunction;suspected pharyngeal dysfunction  -CS        Functional Impact  risk of aspiration/pneumonia  -CS        Rehab Potential/Prognosis, Swallowing  good, to achieve stated therapy goals  -CS        Swallow Criteria for Skilled Therapeutic Interventions Met  demonstrates skilled criteria  -CS           Recommendations    Therapy Frequency (Swallow)  at least;3 days per week  -CS        Predicted Duration Therapy Intervention (Days)  until discharge  -CS        SLP Diet Recommendation  soft textures;thin liquids  -CS        Recommended Precautions and Strategies  small bites of food and sips of liquid;upright posture during/after eating  -CS        SLP Rec. for Method of Medication Administration  meds whole;with thin liquids  -CS        Monitor for Signs of Aspiration  yes;cough;gurgly voice;throat clearing;notify SLP if any concerns  -CS        Anticipated Dischage Disposition  skilled nursing facility  -CS           Swallow Goals (SLP)    Oral Nutrition/Hydration Goal Selection (SLP)  oral nutrition/hydration, SLP goal 1  -CS        Lingual Strengthening Goal Selection (SLP)  lingual strengthening, SLP goal 1  -CS        Pharyngeal Strengthening Exercise Goal Selection (SLP)  pharyngeal strengthening exercise, SLP goal 1  -CS        Additional Documentation  lingual strengthening goal selection (SLP);pharyngeal strengthening exercise goal selection (SLP)  -CS           Oral Nutrition/Hydration Goal 1 (SLP)    Oral Nutrition/Hydration Goal 1, SLP  Patient will tolerate LRD with no s/s aspiration.  -CS        Time Frame (Oral Nutrition/Hydration Goal 1, SLP)  by discharge  -CS        Barriers (Oral Nutrition/Hydration Goal 1, SLP)  n/a  -CS        Progress/Outcomes (Oral Nutrition/Hydration Goal 1, SLP)  continuing progress toward  goal  -CS           Lingual Strengthening Goal 1 (SLP)    Activity (Lingual Strengthening Goal 1, SLP)  increase lingual tone/sensation/control/coordination/movement  -CS        Increase Lingual Tone/Sensation/Control/Coordination/Movement  lingual movement exercises  -CS        Bim/Accuracy (Lingual Strengthening Goal 1, SLP)  independently (over 90% accuracy)  -CS        Time Frame (Lingual Strengthening Goal 1, SLP)  by discharge  -CS        Barriers (Lingual Strengthening Goal 1, SLP)  n/a  -CS        Progress/Outcomes (Lingual Strengthening Goal 1, SLP)  continuing progress toward goal  -CS           Pharyngeal Strengthening Exercise Goal 1 (SLP)    Activity (Pharyngeal Strengthening Goal 1, SLP)  increase timing  -CS        Increase Timing  gustatory stimulation (sour/cold);prepping - 3 second prep or suck swallow or 3-step swallow;hard effortful swallow  -CS        Bim/Accuracy (Pharyngeal Strengthening Goal 1, SLP)  independently (over 90% accuracy)  -CS        Time Frame (Pharyngeal Strengthening Goal 1, SLP)  by discharge  -CS        Barriers (Pharyngeal Strengthening Goal 1, SLP)  n/a  -CS        Progress/Outcomes (Pharyngeal Strengthening Goal 1, SLP)  continuing progress toward goal  -CS          User Key  (r) = Recorded By, (t) = Taken By, (c) = Cosigned By    Initials Name Effective Dates    Benjamin Contreras, MS CCC-SLP 04/03/18 -           EDUCATION  The patient has been educated in the following areas:   Dysphagia (Swallowing Impairment).    SLP Recommendation and Plan  SLP Swallowing Diagnosis: mild, oral dysfunction, suspected pharyngeal dysfunction  SLP Diet Recommendation: soft textures, thin liquids  Recommended Precautions and Strategies: small bites of food and sips of liquid, upright posture during/after eating  SLP Rec. for Method of Medication Administration: meds whole, with thin liquids     Monitor for Signs of Aspiration: yes, cough, gurgly voice, throat clearing,  notify SLP if any concerns     Swallow Criteria for Skilled Therapeutic Interventions Met: demonstrates skilled criteria  Anticipated Dischage Disposition: skilled nursing facility  Rehab Potential/Prognosis, Swallowing: good, to achieve stated therapy goals  Therapy Frequency (Swallow): at least, 3 days per week  Predicted Duration Therapy Intervention (Days): until discharge       Plan of Care Reviewed With: patient, family  Plan of Care Review  Plan of Care Reviewed With: patient, family  Daily Summary of Progress (SLP): progress toward functional goals is gradual  Plan for Continued Treatment (SLP): Continue to follow and treat  Progress: improving  Outcome Summary: Clinical swallow re-eval complete. Pt remains fatigued but is more alert on this date. She completed a full range of PO consistencies except for regular solids secondary to mild-moderately prolonged mastication of mechanical soft solids with mild lingual residue present post swallow. 1x delayed throat clear post honey and nectar thick consistencies. No definite vocal quality change noted. No other overt s/s of aspiration noted, however; SLP cannot fully r/o aspiration with PO intake. Pt ok for a mechanical soft diet with thin liquids. Ok for meds whole with thin liquids. SLP will continue to follow and treat.    SLP GOALS     Row Name 05/10/19 1045 05/09/19 0820 05/08/19 0942       Oral Nutrition/Hydration Goal 1 (SLP)    Oral Nutrition/Hydration Goal 1, SLP  Patient will tolerate LRD with no s/s aspiration.  -CS  Patient will tolerate LRD with no s/s aspiration.  -CS (r) SH (t) CS (c)  Patient will tolerate LRD with no s/s aspiration.  -CS    Time Frame (Oral Nutrition/Hydration Goal 1, SLP)  by discharge  -CS  by discharge  -CS (r) SH (t) CS (c)  by discharge  -CS    Barriers (Oral Nutrition/Hydration Goal 1, SLP)  n/a  -CS  n/a  -CS (r) SH (t) CS (c)  n/a  -CS    Progress/Outcomes (Oral Nutrition/Hydration Goal 1, SLP)  continuing progress toward  goal  -CS  continuing progress toward goal  -CS (r) SH (t) CS (c)  goal ongoing  -CS       Lingual Strengthening Goal 1 (SLP)    Activity (Lingual Strengthening Goal 1, SLP)  increase lingual tone/sensation/control/coordination/movement  -CS  increase lingual tone/sensation/control/coordination/movement  -CS (r) SH (t) CS (c)  increase lingual tone/sensation/control/coordination/movement  -CS    Increase Lingual Tone/Sensation/Control/Coordination/Movement  lingual movement exercises  -CS  lingual movement exercises  -CS (r) SH (t) CS (c)  lingual movement exercises  -CS    Avery/Accuracy (Lingual Strengthening Goal 1, SLP)  independently (over 90% accuracy)  -CS  independently (over 90% accuracy)  -CS (r) SH (t) CS (c)  independently (over 90% accuracy)  -CS    Time Frame (Lingual Strengthening Goal 1, SLP)  by discharge  -CS  by discharge  -CS (r) SH (t) CS (c)  by discharge  -CS    Barriers (Lingual Strengthening Goal 1, SLP)  n/a  -CS  n/a  -CS (r) SH (t) CS (c)  n/a  -CS    Progress/Outcomes (Lingual Strengthening Goal 1, SLP)  continuing progress toward goal  -CS  progress slower than expected  -CS (r) SH (t) CS (c)  goal ongoing  -CS       Pharyngeal Strengthening Exercise Goal 1 (SLP)    Activity (Pharyngeal Strengthening Goal 1, SLP)  increase timing  -CS  increase timing  -CS (r) SH (t) CS (c)  increase timing  -CS    Increase Timing  gustatory stimulation (sour/cold);prepping - 3 second prep or suck swallow or 3-step swallow;hard effortful swallow  -CS  gustatory stimulation (sour/cold);prepping - 3 second prep or suck swallow or 3-step swallow;hard effortful swallow  -CS (r) SH (t) CS (c)  gustatory stimulation (sour/cold);prepping - 3 second prep or suck swallow or 3-step swallow;hard effortful swallow  -CS    Avery/Accuracy (Pharyngeal Strengthening Goal 1, SLP)  independently (over 90% accuracy)  -CS  independently (over 90% accuracy)  -CS (r) SH (t) CS (c)  independently (over 90%  accuracy)  -CS    Time Frame (Pharyngeal Strengthening Goal 1, SLP)  by discharge  -CS  by discharge  -CS (r) SH (t) CS (c)  by discharge  -CS    Barriers (Pharyngeal Strengthening Goal 1, SLP)  n/a  -CS  n/a  -CS (r) SH (t) CS (c)  n/a  -CS    Progress/Outcomes (Pharyngeal Strengthening Goal 1, SLP)  continuing progress toward goal  -CS  continuing progress toward goal  -CS (r) SH (t) CS (c)  goal ongoing  -CS      User Key  (r) = Recorded By, (t) = Taken By, (c) = Cosigned By    Initials Name Provider Type    CS Benjamin Martinez, MS CCC-SLP Speech and Language Pathologist    Michelle Patterson, Speech Therapy Student Speech Therapy Student             Time Calculation:   Time Calculation- SLP     Row Name 05/10/19 1448             Time Calculation- SLP    SLP Start Time  1045  -CS      SLP Stop Time  1155  -CS      SLP Time Calculation (min)  70 min  -CS      SLP Received On  05/10/19  -CS      SLP Goal Re-Cert Due Date  05/20/19  -CS        User Key  (r) = Recorded By, (t) = Taken By, (c) = Cosigned By    Initials Name Provider Type    CS Benjamin Martinez, MS CCC-SLP Speech and Language Pathologist          Therapy Charges for Today     Code Description Service Date Service Provider Modifiers Qty    [id number]  ST EVAL ORAL PHARYNG SWALLOW 5 5/10/2019 Benjamin Martinez MS CCC-SLP GN 1               Benjamin Martinez MS CCC-SLP  5/10/2019

## 2019-05-10 NOTE — PROGRESS NOTES
Continued Stay Note   [facility]     Patient Name: Susy Ko  MRN: [id number]  Today's Date: 5/10/2019    Admit Date: 5/6/2019    Discharge Plan     Row Name 05/10/19 1001       Plan    Plan  [facility]    Patient/Family in Agreement with Plan  yes    Plan Comments  Pt will return to [facility] upon discharge at [facility] level care [phone].        Discharge Codes    No documentation.             JAYCE Rivero

## 2019-05-10 NOTE — PLAN OF CARE
Problem: Patient Care Overview  Goal: Plan of Care Review  Outcome: Ongoing (interventions implemented as appropriate)   05/10/19 7473   Coping/Psychosocial   Plan of Care Reviewed With patient;family   Plan of Care Review   Progress improving   OTHER   Outcome Summary Clinical swallow re-eval complete. Pt remains fatigued but is more alert on this date. She completed a full range of PO consistencies except for regular solids secondary to mild-moderately prolonged mastication of mechanical soft solids with mild lingual residue present post swallow. 1x delayed throat clear post honey and nectar thick consistencies. No definite vocal quality change noted. No other overt s/s of aspiration noted, however; SLP cannot fully r/o aspiration with PO intake. Pt ok for a mechanical soft diet with thin liquids. Ok for meds whole with thin liquids. SLP will continue to follow and treat.

## 2019-05-10 NOTE — PLAN OF CARE
Problem: Fall Risk (Adult)  Goal: Identify Related Risk Factors and Signs and Symptoms  Outcome: Ongoing (interventions implemented as appropriate)      Problem: Patient Care Overview  Goal: Plan of Care Review  Outcome: Ongoing (interventions implemented as appropriate)   05/09/19 1832 05/09/19 2000 05/10/19 0304   Coping/Psychosocial   Plan of Care Reviewed With --  patient;daughter --    Plan of Care Review   Progress improving --  --    OTHER   Outcome Summary --  --  pt c/o pain, prn po pain meds given. IV fluids continue. f/c in place, draining well. colostomy in place, draining well. vss. pt resting comfortably       Problem: Skin Injury Risk (Adult)  Goal: Identify Related Risk Factors and Signs and Symptoms  Outcome: Ongoing (interventions implemented as appropriate)    Goal: Skin Health and Integrity  Outcome: Ongoing (interventions implemented as appropriate)      Problem: Confusion, Chronic (Adult)  Goal: Identify Related Risk Factors and Signs and Symptoms  Outcome: Ongoing (interventions implemented as appropriate)    Goal: Cognitive/Functional Impairments Minimized  Outcome: Ongoing (interventions implemented as appropriate)    Goal: Free from Harm/Injuries  Outcome: Ongoing (interventions implemented as appropriate)      Problem: Infection, Risk/Actual (Adult)  Goal: Identify Related Risk Factors and Signs and Symptoms  Outcome: Ongoing (interventions implemented as appropriate)    Goal: Infection Prevention/Resolution  Outcome: Ongoing (interventions implemented as appropriate)      Problem: Wound (Includes Pressure Injury) (Adult)  Goal: Signs and Symptoms of Listed Potential Problems Will be Absent, Minimized or Managed (Wound)  Outcome: Ongoing (interventions implemented as appropriate)      Problem: Nutrition, Enteral (Adult)  Goal: Signs and Symptoms of Listed Potential Problems Will be Absent, Minimized or Managed (Nutrition, Enteral)  Outcome: Ongoing (interventions implemented as  appropriate)

## 2019-05-10 NOTE — PROGRESS NOTES
Nephrology ([facility]) Progress Note      Patient:  Susy Ko  YOB: 1940  Date of Service: 5/10/2019  MRN: [id number]   Acct: [id number]   Primary Care Physician: Monster Zuniga MD  Advance Directive:   Code Status and Medical Interventions:   Ordered at: 05/07/19 1110     Level Of Support Discussed With:    Patient     Code Status:    CPR     Medical Interventions (Level of Support Prior to Arrest):    Full     Admit Date: 5/6/2019       Hospital Day: 4  Referring Provider: Monster Zuniga MD      Patient personally seen and examined.  Complete chart including Consults, Notes, Operative Reports, Labs, Cardiology, and Radiology studies reviewed as able.        Subjective:  Susy Ko is a 79 y.o. female  whom we were consulted for acute kidney injury, metabolic acidosis.  History of hypertension, Parkinson's, and has a colostomy.  Patient resides in local nursing home secondary to advanced dementia. Had been complaining of abdominal pain so was sent to emergency department for evaluation.  Found to have acute kidney injury, severe metabolic acidosis, UTI with sepsis.  Patient required Levophed for blood pressure support. Dr Weldon discussed at length with family and they have decided not to pursue any dialysis.  Now off Levophed.  Has transferred to medical floor.    Her renal function continues to improve.  She has good urine output.  She is more alert and awake today    Allergies:  Morphine and related and Adhesive tape    Home Meds:  Medications Prior to Admission   Medication Sig Dispense Refill Last Dose   • acetaminophen (TYLENOL) 325 MG tablet Take 650 mg by mouth Every 4 (Four) Hours As Needed for Mild Pain  or Fever.   Past Month at Unknown time   • amLODIPine (NORVASC) 5 MG tablet Take 5 mg by mouth Daily.   5/6/2019   • Benzocaine (SORE THROAT RELIEF) 10 MG lozenge Dissolve 1 lozenge in the mouth 4 (Four) Times a Day As Needed (Sore  throat).   Past Month at Unknown time   • carbidopa-levodopa (SINEMET)  MG per tablet Take 1 tablet by mouth 3 (Three) Times a Day.   5/6/2019 at Unknown time   • citalopram (CeleXA) 10 MG tablet Take 10 mg by mouth Every Night.   5/5/2019   • Cranberry 250 MG tablet Take 1 tablet by mouth 3 (Three) Times a Day.   5/6/2019   • cyanocobalamin 1000 MCG/ML injection Inject 1,000 mcg into the appropriate muscle as directed by prescriber Every 30 (Thirty) Days. Given on the 14th of every month.   4/14/2019   • furosemide (LASIX) 20 MG tablet Take 20 mg by mouth Every Other Day.   5/6/2019   • ibuprofen (ADVIL,MOTRIN) 600 MG tablet Take 600 mg by mouth 3 (Three) Times a Day.   5/6/2019   • loratadine (CLARITIN) 10 MG tablet Take 10 mg by mouth Daily.   5/6/2019   • losartan (COZAAR) 50 MG tablet Take 50 mg by mouth Daily.   5/6/2019   • Menthol, Topical Analgesic, (BIOFREEZE) 4 % gel Apply 1 application topically Every 6 (Six) Hours As Needed (Apply to knees and lower back).   Past Week at Unknown time   • metoprolol succinate XL (TOPROL-XL) 50 MG 24 hr tablet Take 50 mg by mouth Daily.   5/6/2019   • omeprazole (priLOSEC) 20 MG capsule Take 20 mg by mouth Daily.   5/6/2019   • Skin Protectants, Misc. (PERIGUARD) ointment Apply 1 application topically Every Evening. Apply to buttocks.   5/6/2019   • tiotropium bromide-olodaterol (STIOLTO RESPIMAT) 2.5-2.5 MCG/ACT aerosol solution inhaler Inhale 2 puffs Daily.   5/6/2019   • traMADol (ULTRAM) 50 MG tablet Take 50 mg by mouth Every 6 (Six) Hours As Needed for Moderate Pain .   Past Month at Unknown time       Medicines:  Current Facility-Administered Medications   Medication Dose Route Frequency Provider Last Rate Last Dose   • acetaminophen (TYLENOL) tablet 650 mg  650 mg Oral Q6H PRN Monster Zuniga MD       • acetaminophen (TYLENOL) tablet 650 mg  650 mg Oral Q4H PRN Monster Zuniga MD       • ALPRAZolam (XANAX) tablet 0.5 mg  0.5 mg Oral Nightly  Monster Zuniga MD   0.5 mg at 05/09/19 2241   • calcitRIOL (ROCALTROL) solution 0.25 mcg  0.25 mcg Nasogastric Daily Monster Zuniga MD   0.25 mcg at 05/10/19 0844   • calcium carbonate (oyster shell) tablet 1,250 mg  1,250 mg Oral Daily Ramsey Weldon MD   1,250 mg at 05/10/19 0843   • calcium gluconate 1 g in sodium chloride 0.9 % 100 mL IVPB  1 g Intravenous PRN Monster Zuniga  mL/hr at 05/08/19 0451 1 g at 05/08/19 0451    And   • calcium gluconate 6 g in sodium chloride 0.9 % 500 mL IVPB  6 g Intravenous PRN Monster Zuniga MD 83.3 mL/hr at 05/08/19 0647 6 g at 05/08/19 0647   • carbidopa-levodopa (SINEMET)  MG per tablet 1 tablet  1 tablet Oral TID Monster Zuniga MD   1 tablet at 05/10/19 0844   • cefTRIAXone (ROCEPHIN) 1 g/100 mL 0.9% NS (MBP)  1 g Intravenous Q24H Traci Kohler APRN   1 g at 05/09/19 1715   • citalopram (CeleXA) tablet 10 mg  10 mg Oral Nightly Monster Zuniga MD   10 mg at 05/09/19 2241   • cyancobalamin (VITAMIN B-12) tablet 100 mcg  100 mcg Oral Daily Monster Zuniga MD   100 mcg at 05/10/19 0844   • [START ON 5/11/2019] famotidine (PEPCID) tablet 20 mg  20 mg Oral Daily Monster Zuniga MD       • HYDROcodone-acetaminophen (NORCO) 7.5-325 MG per tablet 1 tablet  1 tablet Oral Q4H PRN Monster Zuniga MD       • sodium chloride 0.9 % flush 10 mL  10 mL Intravenous PRN Prosper Jiang DO       • sodium chloride 0.9 % flush 3 mL  3 mL Intravenous Q12H Monster Zuniga MD   3 mL at 05/10/19 0844   • sodium chloride 0.9 % flush 3-10 mL  3-10 mL Intravenous PRN Monster Zuniga MD       • sodium chloride 0.9 % with KCl 20 mEq/L infusion  75 mL/hr Intravenous Continuous Cirilo Ross APRN 75 mL/hr at 05/10/19 1428 75 mL/hr at 05/10/19 1428   • vancomycin 500 mg/100 mL 0.9% NS IVPB ([facility])  500 mg Intravenous Q48H Monster Zuniga MD   500 mg at 05/10/19 0424       Past Medical  History:  Past Medical History:   Diagnosis Date   • Anxiety    • Breast cancer (CMS/HCC)      LEFT WITH RECONSTRUCTION   • Colostomy in place (CMS/HCC)    • Dementia    • Depression    • Edema     LOWER FEET   • Hydrocephalus     NORMAL PRESSURE   • Hypertension    • Incontinence    • Nausea    • Parastomal hernia    • Parkinson disease (CMS/HCC)    • Tremor     RIGHT HAND   • Unsteady gait        Past Surgical History:  Past Surgical History:   Procedure Laterality Date   • ABDOMINAL SURGERY     • APPENDECTOMY     • BREAST RECONSTRUCTION Left     LEFT   • CHOLECYSTECTOMY     • COLON SURGERY     • COLONOSCOPY     • COLOSTOMY     • HYSTERECTOMY     • MASTECTOMY Left    • PARASTOMAL HERNIA REPAIR N/A 3/17/2017    Procedure: REPAIR PARASTOMAL HERNIA;  Surgeon: Princess Walters MD;  Location: [facility];  Service:        Family History  History reviewed. No pertinent family history.    Social History  Social History     Socioeconomic History   • Marital status:      Spouse name: Not on file   • Number of children: Not on file   • Years of education: Not on file   • Highest education level: Not on file   Tobacco Use   • Smoking status: Former Smoker   • Smokeless tobacco: Never Used   Substance and Sexual Activity   • Alcohol use: No   • Drug use: No   • Sexual activity: Defer         Review of Systems:   History obtained from chart review and family  General ROS: negative  Respiratory ROS: no cough, shortness of breath, or wheezing  Cardiovascular ROS: no chest pain or dyspnea on exertion  Gastrointestinal ROS: no abdominal pain, change in bowel habits, or black or bloody stools  Genito-Urinary ROS: no dysuria, trouble voiding, or hematuria  Musculoskeletal ROS: negative  Neurological ROS: no TIA or stroke symptoms    Objective:  Patient Vitals for the past 24 hrs:   BP Temp Temp src Pulse Resp SpO2 Weight   05/10/19 0955 -- -- -- -- -- 92 % --   05/10/19 0731 153/51 99 °F (37.2 °C) Oral 85 12 96 % --   05/10/19  0333 140/48 98.7 °F (37.1 °C) Oral 86 16 96 % --   05/09/19 2324 128/49 98.2 °F (36.8 °C) Oral 87 16 96 % --   05/09/19 2100 -- -- -- 89 16 97 % --   05/09/19 1926 134/45 98.8 °F (37.1 °C) Oral 89 16 98 % 57.2 kg (126 lb)       Intake/Output Summary (Last 24 hours) at 5/10/2019 1623  Last data filed at 5/10/2019 1600  Gross per 24 hour   Intake 2201 ml   Output 1900 ml   Net 301 ml     General: awake, oriented X 1  HEENT: Normocephalic atraumatic head.  Neck: supple, no JVD  Chest:  clear to auscultation bilaterally without respiratory distress  CVS: regular rate and rhythm  Abdominal: Soft nondistended nontender, right upper quadrant ostomy  Extremities: no cyanosis or edema  Skin: warm and dry without rash  Neuro: no focal motor deficits    Labs:  Results from last 7 days   Lab Units 05/10/19  0607 05/09/19  0455 05/08/19  0131   WBC 10*3/mm3 10.85* 10.68 10.46   HEMOGLOBIN g/dL 7.6* 7.6* 8.7*   HEMATOCRIT % 23.0* 22.4* 26.0*   PLATELETS 10*3/mm3 92* 108* 151         Results from last 7 days   Lab Units 05/10/19  0607 05/09/19  0455 05/08/19  1943 05/08/19  0254   SODIUM mmol/L 140 137 135 135   POTASSIUM mmol/L 3.5 2.8* 3.1* 4.0   CHLORIDE mmol/L 101 94* 96* 104   CO2 mmol/L 33.0* 32.0* 26.0 16.0*   BUN mg/dL 92* 103* 108* 117*   CREATININE mg/dL 2.58* 3.89* 4.44* 5.73*   CALCIUM mg/dL 7.3* 6.4* 6.8* 5.4*   BILIRUBIN mg/dL 0.2 0.3  --  0.5   ALK PHOS U/L 97 90  --  97   ALT (SGPT) U/L 32 24  --  26   AST (SGOT) U/L 112* 158*  --  240*   GLUCOSE mg/dL 136* 149* 131* 150*       Radiology:   Imaging Results (last 72 hours)     Procedure Component Value Units Date/Time    US Renal Bilateral [[id number]] Collected:  05/07/19 1124     Updated:  05/07/19 1129    Narrative:       EXAMINATION:  US RENAL BILATERAL-  5/7/2019 10:11 AM CDT     HISTORY: acute renal failure; A41.9-Sepsis, unspecified organism;  R65.20-Severe sepsis without septic shock; N39.0-Urinary tract  infection, site not specified; E87.5-Hyperkalemia;  N17.9-Acute kidney  failure, unspecified; R41.82-Altered mental status, unspecified;  R13.19-Other dysphagia      COMPARISON: 05/10/2014 renal ultrasound. 05/06/2019 abdomen and pelvis  CT     TECHNIQUE:      Bilateral renal ultrasound was performed.     FINDINGS:      The right kidney measures 10.1 cm in [id number] length.     The left kidney measures 11 cm in [id number]-hu-[id number] length.     There is mild cortical thinning and pelvic lipomatosis compatible with  age.     Normal echogenicity of renal cortex is observed without renal masses.     There are no perinephric abnormalities.     There is no pelvocaliectasis or obstructive uropathy changes.     The urinary bladder is decompressed with a Peralta catheter.       Impression:       1. Negative bilateral renal ultrasound.  This report was finalized on 05/07/2019 11:26 by Dr. Iban Queen MD.    CT Abdomen Pelvis Without Contrast [[id number]] Collected:  05/06/19 2236     Updated:  05/06/19 2242    Narrative:       EXAMINATION: CT ABDOMEN PELVIS WO CONTRAST-      5/6/2019 10:17 PM CDT     HISTORY: pain; r/o obstruction; A41.9-Sepsis, unspecified organism;  R65.20-Severe sepsis without septic shock; N39.0-Urinary tract  infection, site not specified; E87.5-Hyperkalemia; N17.9-Acute kidney  failure, unspecified; R41.82-Altered mental status, unspecified     In order to have a CT radiation dose as low as reasonably achievable  Automated Exposure Control was utilized for adjustment of the mA and/or  KV according to patient size.     DLP in mGycm= 343.     Noncontrast abdomen/pelvis CT.     Comparison is made with 04/12/2016.     Left lower quadrant ostomy with nonobstructing parastomal hernia.     No significant bowel dilation.     Mild urinary bladder distention.     Mild dilation of the renal collecting systems and ureters is probably  based on vesicoureteral reflux. There is no obstructing stone.     Normal heart size.  No acute abnormality at the lung bases.      Cholecystectomy clips.  Normal noncontrast appearance of the liver and spleen.  The pancreas is atrophic.     Aortic calcification with no aneurysm.     Summary:  1. Moderate fecal material throughout the colon.  2. Nonobstructing parastomal hernia.  3. Mild dilation of the renal collecting systems and ureters compatible  with vesicoureteral reflux.  4. No evidence of bowel obstruction, mass or abscess.                                   This report was finalized on 05/06/2019 22:39 by Dr. Raul Ricci MD.    CT Head Without Contrast [[id number]] Collected:  05/06/19 2234     Updated:  05/06/19 2238    Narrative:       EXAMINATION: CT HEAD WO CONTRAST-      5/6/2019 10:17 PM CDT     HISTORY: AMS; A41.9-Sepsis, unspecified organism; R65.20-Severe sepsis  without septic shock; N39.0-Urinary tract infection, site not specified;  E87.5-Hyperkalemia; N17.9-Acute kidney failure, unspecified;  R41.82-Altered mental status, unspecified     In order to have a CT radiation dose as low as reasonably achievable  Automated Exposure Control was utilized for adjustment of the mA and/or  KV according to patient size.     DLP in mGycm= 1214.     Noncontrast head CT compared with 01/17/2017.     Axial, sagittal, and coronal noncontrast CT imaging of the head.     The visualized paranasal sinuses are clear.     The brain and ventricles have an age appropriate appearance.  Moderate atrophy and small vessel disease.  There is no hemorrhage or mass-effect.   No acute infarction is seen.     No calvarial abnormality.       Impression:       1. No acute intracranial abnormality is seen.                                         This report was finalized on 05/06/2019 22:35 by Dr. Raul Ricci MD.    XR Chest 1 View [[id number]] Collected:  05/06/19 2216     Updated:  05/06/19 2219    Narrative:       EXAMINATION: XR CHEST 1 VW-     5/6/2019 10:11 PM CDT     HISTORY: Altered mental status; r/o pneumonia.     One view chest x-ray compared  with 01/17/2017.     Heart size is normal.  The mediastinum is within normal limits.      The lungs are normally expanded with no pneumonia or pneumothorax.  Chronic lung changes.  No congestive failure changes.                                                                       Impression:       1. No acute disease.           This report was finalized on 05/06/2019 22:16 by Dr. Raul Ricci MD.          Culture:  Blood Culture   Date Value Ref Range Status   05/06/2019 No growth at 24 hours  Preliminary   05/06/2019 Gram Positive Rods (A)  Preliminary   05/06/2019 Gram Positive Cocci (A)  Preliminary     Urine Culture   Date Value Ref Range Status   05/06/2019 >100,000 CFU/mL Escherichia coli (A)  Final   05/06/2019 70,000-80,000 CFU/mL Mixed Gram Positive Alison (A)  Final     Comment:     Probable Contaminant           Assessment   1.  Acute kidney injury/ATN/improved  2.  Septic shock-improved  3.  Urinary tract infection  4.  Metabolic acidosis--resolved  5.  Rhabdomyolysis  6.  Hypocalcemia  7.  Advanced dementia  8.  History of colostomy  9.  Hypokalemia     Plan:  1.  Change IV fluid normal saline  2.  Continue IV antibiotics.  3.  Follow-up on renal recovery      Ramsey Weldon MD  5/10/2019  4:23 PM

## 2019-05-11 LAB
ALBUMIN SERPL-MCNC: 2.2 G/DL (ref 3.5–5)
ALBUMIN/GLOB SERPL: 1 G/DL (ref 1.1–2.5)
ALP SERPL-CCNC: 69 U/L (ref 24–120)
ALT SERPL W P-5'-P-CCNC: 24 U/L (ref 0–54)
ANION GAP SERPL CALCULATED.3IONS-SCNC: 3 MMOL/L (ref 4–13)
ANISOCYTOSIS BLD QL: ABNORMAL
AST SERPL-CCNC: 84 U/L (ref 7–45)
BACTERIA SPEC AEROBE CULT: NORMAL
BILIRUB SERPL-MCNC: 0.4 MG/DL (ref 0.1–1)
BUN BLD-MCNC: 75 MG/DL (ref 5–21)
BUN/CREAT SERPL: 38.7 (ref 7–25)
CALCIUM SPEC-SCNC: 7.9 MG/DL (ref 8.4–10.4)
CHLORIDE SERPL-SCNC: 101 MMOL/L (ref 98–110)
CO2 SERPL-SCNC: 31 MMOL/L (ref 24–31)
CREAT BLD-MCNC: 1.94 MG/DL (ref 0.5–1.4)
DEPRECATED RDW RBC AUTO: 47.5 FL (ref 40–54)
EOSINOPHIL # BLD MANUAL: 0.11 10*3/MM3 (ref 0–0.7)
EOSINOPHIL NFR BLD MANUAL: 1 % (ref 0–4)
ERYTHROCYTE [DISTWIDTH] IN BLOOD BY AUTOMATED COUNT: 16.7 % (ref 12–15)
GFR SERPL CREATININE-BSD FRML MDRD: 25 ML/MIN/1.73
GIANT PLATELETS: ABNORMAL
GLOBULIN UR ELPH-MCNC: 2.3 GM/DL
GLUCOSE BLD-MCNC: 91 MG/DL (ref 70–100)
HCT VFR BLD AUTO: 23.5 % (ref 37–47)
HGB BLD-MCNC: 7.6 G/DL (ref 12–16)
HYPOCHROMIA BLD QL: ABNORMAL
LYMPHOCYTES # BLD MANUAL: 1.54 10*3/MM3 (ref 0.72–4.86)
LYMPHOCYTES NFR BLD MANUAL: 14.1 % (ref 15–45)
LYMPHOCYTES NFR BLD MANUAL: 5.1 % (ref 4–12)
MCH RBC QN AUTO: 25.4 PG (ref 28–32)
MCHC RBC AUTO-ENTMCNC: 32.3 G/DL (ref 33–36)
MCV RBC AUTO: 78.6 FL (ref 82–98)
MICROCYTES BLD QL: ABNORMAL
MONOCYTES # BLD AUTO: 0.56 10*3/MM3 (ref 0.19–1.3)
NEUTROPHILS # BLD AUTO: 8.63 10*3/MM3 (ref 1.87–8.4)
NEUTROPHILS NFR BLD MANUAL: 78.8 % (ref 39–78)
PLATELET # BLD AUTO: 76 10*3/MM3 (ref 130–400)
PMV BLD AUTO: 10.8 FL (ref 6–12)
POIKILOCYTOSIS BLD QL SMEAR: ABNORMAL
POTASSIUM BLD-SCNC: 4.1 MMOL/L (ref 3.5–5.3)
PROT SERPL-MCNC: 4.5 G/DL (ref 6.3–8.7)
RBC # BLD AUTO: 2.99 10*6/MM3 (ref 4.2–5.4)
SMALL PLATELETS BLD QL SMEAR: ABNORMAL
SODIUM BLD-SCNC: 135 MMOL/L (ref 135–145)
TOXIC GRANULATION: ABNORMAL
VARIANT LYMPHS NFR BLD MANUAL: 1 % (ref 0–5)
WBC NRBC COR # BLD: 10.95 10*3/MM3 (ref 4.8–10.8)

## 2019-05-11 PROCEDURE — 25010000002 CEFTRIAXONE PER 250 MG: Performed by: NURSE PRACTITIONER

## 2019-05-11 PROCEDURE — 80053 COMPREHEN METABOLIC PANEL: CPT | Performed by: INTERNAL MEDICINE

## 2019-05-11 PROCEDURE — 25810000003 SODIUM CHLORIDE 0.9 % WITH KCL 20 MEQ 20-0.9 MEQ/L-% SOLUTION: Performed by: NURSE PRACTITIONER

## 2019-05-11 PROCEDURE — 85007 BL SMEAR W/DIFF WBC COUNT: CPT | Performed by: INTERNAL MEDICINE

## 2019-05-11 PROCEDURE — 85025 COMPLETE CBC W/AUTO DIFF WBC: CPT | Performed by: INTERNAL MEDICINE

## 2019-05-11 PROCEDURE — 94760 N-INVAS EAR/PLS OXIMETRY 1: CPT

## 2019-05-11 PROCEDURE — 94799 UNLISTED PULMONARY SVC/PX: CPT

## 2019-05-11 RX ORDER — BUMETANIDE 1 MG/1
1 TABLET ORAL DAILY
Status: DISCONTINUED | OUTPATIENT
Start: 2019-05-11 | End: 2019-05-16

## 2019-05-11 RX ADMIN — CARBIDOPA AND LEVODOPA 1 TABLET: 25; 100 TABLET ORAL at 08:24

## 2019-05-11 RX ADMIN — ALPRAZOLAM 0.5 MG: 0.5 TABLET ORAL at 20:25

## 2019-05-11 RX ADMIN — BUMETANIDE 1 MG: 1 TABLET ORAL at 14:36

## 2019-05-11 RX ADMIN — CALCIUM 1250 MG: 500 TABLET ORAL at 08:23

## 2019-05-11 RX ADMIN — SODIUM CHLORIDE, PRESERVATIVE FREE 3 ML: 5 INJECTION INTRAVENOUS at 20:25

## 2019-05-11 RX ADMIN — CARBIDOPA AND LEVODOPA 1 TABLET: 25; 100 TABLET ORAL at 15:27

## 2019-05-11 RX ADMIN — CALCITRIOL 0.25 MCG: 1 SOLUTION ORAL at 08:24

## 2019-05-11 RX ADMIN — CARBIDOPA AND LEVODOPA 1 TABLET: 25; 100 TABLET ORAL at 20:25

## 2019-05-11 RX ADMIN — POTASSIUM CHLORIDE AND SODIUM CHLORIDE 75 ML/HR: 900; 150 INJECTION, SOLUTION INTRAVENOUS at 06:05

## 2019-05-11 RX ADMIN — VITAM B12 100 MCG: 100 TAB at 08:24

## 2019-05-11 RX ADMIN — CITALOPRAM 10 MG: 10 TABLET, FILM COATED ORAL at 20:25

## 2019-05-11 RX ADMIN — CEFTRIAXONE SODIUM 1 G: 1 INJECTION, POWDER, FOR SOLUTION INTRAMUSCULAR; INTRAVENOUS at 17:26

## 2019-05-11 RX ADMIN — FAMOTIDINE 20 MG: 20 TABLET, FILM COATED ORAL at 08:24

## 2019-05-11 NOTE — PROGRESS NOTES
[facility]  ARIES Abdalla M.D.  COLTON Hunter      Internal Medicine Progress Note    5/11/2019   7:40 AM    Name:  Susy Ko  MRN:    [id number]     Acct:     [id number]   Room:  [address] [place] [address] Day: 5     Admit Date: 5/6/2019  8:39 PM  PCP: Monster Zuniga MD    Subjective:     C/C:   Chief Complaint   Patient presents with   • Abnormal KUB       Interval History: Status:  stayed the same. Resting in bed. No family at bedside. Patient alert. No c/o pain. Ostomy producing. Renal function stable/improving.     Review of Systems   Constitution: Positive for weakness and malaise/fatigue. Negative for chills, decreased appetite, weight gain and weight loss.   HENT: Negative for congestion, ear discharge, hoarse voice and tinnitus.    Eyes: Negative for blurred vision, discharge, visual disturbance and visual halos.   Cardiovascular: Negative for chest pain, claudication, dyspnea on exertion, irregular heartbeat, leg swelling, orthopnea and paroxysmal nocturnal dyspnea.   Respiratory: Negative for cough, shortness of breath, sputum production and wheezing.    Endocrine: Negative for cold intolerance, heat intolerance and polyuria.   Hematologic/Lymphatic: Negative for adenopathy. Does not bruise/bleed easily.   Skin: Negative for dry skin, itching and suspicious lesions.   Musculoskeletal: Negative for arthritis, back pain, falls, joint pain, muscle weakness and myalgias.   Gastrointestinal: Negative for abdominal pain, constipation, diarrhea, dysphagia and hematemesis.   Genitourinary: Negative for bladder incontinence, dysuria and frequency.   Neurological: Negative for aphonia, disturbances in coordination and dizziness.   Psychiatric/Behavioral: Negative for altered mental status, depression, memory loss and substance abuse. The patient does not have insomnia and is not nervous/anxious.    Allergic/Immunologic: Negative for environmental allergies.      Medications:     Allergies:   Allergies   Allergen Reactions   • Morphine And Related Mental Status Change   • Adhesive Tape Rash       Current Meds:   Current Facility-Administered Medications:   •  acetaminophen (TYLENOL) tablet 650 mg, 650 mg, Oral, Q6H PRN, Monster Zuniga MD  •  acetaminophen (TYLENOL) tablet 650 mg, 650 mg, Oral, Q4H PRN, Monster Zuniga MD  •  ALPRAZolam (XANAX) tablet 0.5 mg, 0.5 mg, Oral, Nightly, Monster Zuniga MD, 0.5 mg at 05/10/19 2124  •  calcitRIOL (ROCALTROL) solution 0.25 mcg, 0.25 mcg, Nasogastric, Daily, Monster Zuniga MD, 0.25 mcg at 05/10/19 0844  •  calcium carbonate (oyster shell) tablet 1,250 mg, 1,250 mg, Oral, Daily, Ramsey Weldon MD, 1,250 mg at 05/10/19 0843  •  calcium gluconate 1 g in sodium chloride 0.9 % 100 mL IVPB, 1 g, Intravenous, PRN, Last Rate: 100 mL/hr at 05/08/19 0451, 1 g at 05/08/19 0451 **AND** calcium gluconate 6 g in sodium chloride 0.9 % 500 mL IVPB, 6 g, Intravenous, PRN, Last Rate: 83.3 mL/hr at 05/08/19 0647, 6 g at 05/08/19 0647 **AND** Calcium, , , PRN, Monster Zuniga MD  •  carbidopa-levodopa (SINEMET)  MG per tablet 1 tablet, 1 tablet, Oral, TID, Monster Zuniga MD, 1 tablet at 05/10/19 2124  •  cefTRIAXone (ROCEPHIN) 1 g/100 mL 0.9% NS (MBP), 1 g, Intravenous, Q24H, Traci Kohler APRN, 1 g at 05/10/19 1834  •  citalopram (CeleXA) tablet 10 mg, 10 mg, Oral, Nightly, Monster Zuniga MD, 10 mg at 05/10/19 2124  •  cyancobalamin (VITAMIN B-12) tablet 100 mcg, 100 mcg, Oral, Daily, Monster Zuniga MD, 100 mcg at 05/10/19 0844  •  famotidine (PEPCID) tablet 20 mg, 20 mg, Oral, Daily, Monster Zuniga MD  •  HYDROcodone-acetaminophen (NORCO) 7.5-325 MG per tablet 1 tablet, 1 tablet, Oral, Q4H PRN, Monster Zuniga MD  •  [COMPLETED] Insert peripheral IV, , , Once **AND** sodium chloride 0.9 % flush 10 mL, 10 mL, Intravenous, PRN, Prosper Jiang, DO  •  sodium  "chloride 0.9 % flush 3 mL, 3 mL, Intravenous, Q12H, Monster Zuniga MD, 3 mL at 05/10/19 2125  •  sodium chloride 0.9 % flush 3-10 mL, 3-10 mL, Intravenous, PRN, Monster Zuniga MD  •  sodium chloride 0.9 % with KCl 20 mEq/L infusion, 75 mL/hr, Intravenous, Continuous, Cirilo Ross, APRN, Last Rate: 75 mL/hr at 19 0605, 75 mL/hr at 19 0605  •  [COMPLETED] vancomycin 1250 mg/250 mL 0.9% NS IVPB ([facility]), 20 mg/kg, Intravenous, Once, 1,250 mg at 19 0451 **FOLLOWED BY** vancomycin 500 mg/100 mL 0.9% NS IVPB ([facility]), 500 mg, Intravenous, Q48H, Monster Zuniga MD, 500 mg at 05/10/19 0424    Data:     Code Status:    Code Status and Medical Interventions:   Ordered at: 19 1110     Level Of Support Discussed With:    Patient     Code Status:    CPR     Medical Interventions (Level of Support Prior to Arrest):    Full       History reviewed. No pertinent family history.    Social History     Socioeconomic History   • Marital status:      Spouse name: Not on file   • Number of children: Not on file   • Years of education: Not on file   • Highest education level: Not on file   Tobacco Use   • Smoking status: Former Smoker   • Smokeless tobacco: Never Used   Substance and Sexual Activity   • Alcohol use: No   • Drug use: No   • Sexual activity: Defer       Vitals:  /52 (BP Location: Right arm, Patient Position: Lying)   Pulse 81   Temp 98 °F (36.7 °C) (Oral)   Resp 14   Ht 160 cm (63\")   Wt 76.4 kg (168 lb 8 oz)   SpO2 91%   BMI 29.85 kg/m²   Temp (24hrs), Av.4 °F (36.9 °C), Min:98 °F (36.7 °C), Max:98.7 °F (37.1 °C)        I/O (24Hr):    Intake/Output Summary (Last 24 hours) at 2019 0740  Last data filed at 5/10/2019 1700  Gross per 24 hour   Intake 2781 ml   Output 700 ml   Net 2081 ml       Labs and imaging:      Recent Results (from the past 12 hour(s))   Comprehensive Metabolic Panel    Collection Time: 19  6:45 AM   Result Value Ref Range "    Glucose 91 70 - 100 mg/dL    BUN 75 (H) 5 - 21 mg/dL    Creatinine 1.94 (H) 0.50 - 1.40 mg/dL    Sodium 135 135 - 145 mmol/L    Potassium 4.1 3.5 - 5.3 mmol/L    Chloride 101 98 - 110 mmol/L    CO2 31.0 24.0 - 31.0 mmol/L    Calcium 7.9 (L) 8.4 - 10.4 mg/dL    Total Protein 4.5 (L) 6.3 - 8.7 g/dL    Albumin 2.20 (L) 3.50 - 5.00 g/dL    ALT (SGPT) 24 0 - 54 U/L    AST (SGOT) 84 (H) 7 - 45 U/L    Alkaline Phosphatase 69 24 - 120 U/L    Total Bilirubin 0.4 0.1 - 1.0 mg/dL    eGFR Non African Amer 25 (L) >60 mL/min/1.73    Globulin 2.3 gm/dL    A/G Ratio 1.0 (L) 1.1 - 2.5 g/dL    BUN/Creatinine Ratio 38.7 (H) 7.0 - 25.0    Anion Gap 3.0 (L) 4.0 - 13.0 mmol/L   CBC Auto Differential    Collection Time: 05/11/19  6:45 AM   Result Value Ref Range    WBC 10.95 (H) 4.80 - 10.80 10*3/mm3    RBC 2.99 (L) 4.20 - 5.40 10*6/mm3    Hemoglobin 7.6 (L) 12.0 - 16.0 g/dL    Hematocrit 23.5 (L) 37.0 - 47.0 %    MCV 78.6 (L) 82.0 - 98.0 fL    MCH 25.4 (L) 28.0 - 32.0 pg    MCHC 32.3 (L) 33.0 - 36.0 g/dL    RDW 16.7 (H) 12.0 - 15.0 %    RDW-SD 47.5 40.0 - 54.0 fl    MPV 10.8 6.0 - 12.0 fL    Platelets 76 (L) 130 - 400 10*3/mm3     Physical Examination:        Physical Exam   Constitutional: She is oriented to person, place, and time. She appears well-developed and well-nourished.   HENT:   Head: Normocephalic and atraumatic.   Nose: Nose normal.   Mouth/Throat: Oropharynx is clear and moist.   Eyes: Conjunctivae and EOM are normal. Pupils are equal, round, and reactive to light.   Neck: Normal range of motion. Neck supple.   Cardiovascular: Normal rate, regular rhythm, normal heart sounds and intact distal pulses.   Pulmonary/Chest: Effort normal and breath sounds normal.   Abdominal: Soft. Bowel sounds are normal.   Ostomy - stoma pink and appliance intact   Musculoskeletal:   Generalized weakness  Contractures to BLE   Neurological: She is alert and oriented to person, place, and time.   Intermittent confusion   Skin: Skin is warm  and dry.   DTI to right heel  Blisters and excoriation to coccyx   Nursing note and vitals reviewed.      Assessment:            Severe sepsis (CMS/HCC)    Past Medical History:   Diagnosis Date   • Anxiety    • Breast cancer (CMS/HCC)      LEFT WITH RECONSTRUCTION   • Colostomy in place (CMS/HCC)    • Dementia    • Depression    • Edema     LOWER FEET   • Hydrocephalus     NORMAL PRESSURE   • Hypertension    • Incontinence    • Nausea    • Parastomal hernia    • Parkinson disease (CMS/HCC)    • Tremor     RIGHT HAND   • Unsteady gait         Plan:        1. Sepsis  2. E. Coli UTI - present on admission  3. Acute renal failure  4. Rhabdomyolysis  5. Hypotension  6. Dementia  7. Abdominal pain  8. Metabolic acidosis  9. Hyperkalemia  10. Uremia  11. Anxiety    Continue current treatment. Monitor counts. Increase activity. Labs in am. Continue antibiotics. Continue fluid resuscitation. DNR per family wishes. Continue nutrition support. Aggressive therapies.       Electronically signed by COLTON Ross on 5/11/2019 at 7:40 AM

## 2019-05-11 NOTE — PROGRESS NOTES
Continued Stay Note  LAURA Lugo     Patient Name: Susy Ko  MRN: [id number]  Today's Date: 5/11/2019    Admit Date: 5/6/2019    Discharge Plan     Row Name 05/11/19 1514       Plan    Plan  [facility]    Patient/Family in Agreement with Plan  yes    Plan Comments  Spoke with pt's daughter in the room about pt's insurance at the nursing home. At this time, daughter says pt is using her Medicaid and she is wanting pt to return to the facility under Medicare. Pt has had well over a 60 day break so her Medicare days should restart and pt has had her 3 night inpatient stay. She is asking someone double check on Monday.        Discharge Codes    No documentation.             JAYCE Cannon

## 2019-05-11 NOTE — NURSING NOTE
Traci SEGURA notified of dobhoff dislodgement. Instructed to leave out for now and monitor patients intake.

## 2019-05-11 NOTE — PROGRESS NOTES
Nephrology ([facility]) Progress Note      Patient:  Susy Ko  YOB: 1940  Date of Service: 5/11/2019  MRN: [id number]   Acct: [id number]   Primary Care Physician: Monster Zuniga MD  Advance Directive:   Code Status and Medical Interventions:   Ordered at: 05/07/19 1110     Level Of Support Discussed With:    Patient     Code Status:    CPR     Medical Interventions (Level of Support Prior to Arrest):    Full     Admit Date: 5/6/2019       Hospital Day: 5  Referring Provider: Monster Zuniga MD      Patient personally seen and examined.  Complete chart including Consults, Notes, Operative Reports, Labs, Cardiology, and Radiology studies reviewed as able.        Subjective:  Susy Ko is a 79 y.o. female  whom we were consulted for acute kidney injury, metabolic acidosis.  History of hypertension, Parkinson's, and has a colostomy.  Patient resides in local nursing home secondary to advanced dementia. Had been complaining of abdominal pain so was sent to emergency department for evaluation.  Found to have acute kidney injury, severe metabolic acidosis, UTI with sepsis.  Patient required Levophed for blood pressure support. Dr Weldon discussed at length with family and they have decided not to pursue any dialysis.  Now off Levophed.  Has transferred to medical floor.  Her renal function continues to improve, almost back to the baseline.    This morning she is more alert and awake and her daughter is at the bedside.    Allergies:  Morphine and related and Adhesive tape    Home Meds:  Medications Prior to Admission   Medication Sig Dispense Refill Last Dose   • acetaminophen (TYLENOL) 325 MG tablet Take 650 mg by mouth Every 4 (Four) Hours As Needed for Mild Pain  or Fever.   Past Month at Unknown time   • amLODIPine (NORVASC) 5 MG tablet Take 5 mg by mouth Daily.   5/6/2019   • Benzocaine (SORE THROAT RELIEF) 10 MG lozenge Dissolve 1 lozenge in the  mouth 4 (Four) Times a Day As Needed (Sore throat).   Past Month at Unknown time   • carbidopa-levodopa (SINEMET)  MG per tablet Take 1 tablet by mouth 3 (Three) Times a Day.   5/6/2019 at Unknown time   • citalopram (CeleXA) 10 MG tablet Take 10 mg by mouth Every Night.   5/5/2019   • Cranberry 250 MG tablet Take 1 tablet by mouth 3 (Three) Times a Day.   5/6/2019   • cyanocobalamin 1000 MCG/ML injection Inject 1,000 mcg into the appropriate muscle as directed by prescriber Every 30 (Thirty) Days. Given on the 14th of every month.   4/14/2019   • furosemide (LASIX) 20 MG tablet Take 20 mg by mouth Every Other Day.   5/6/2019   • ibuprofen (ADVIL,MOTRIN) 600 MG tablet Take 600 mg by mouth 3 (Three) Times a Day.   5/6/2019   • loratadine (CLARITIN) 10 MG tablet Take 10 mg by mouth Daily.   5/6/2019   • losartan (COZAAR) 50 MG tablet Take 50 mg by mouth Daily.   5/6/2019   • Menthol, Topical Analgesic, (BIOFREEZE) 4 % gel Apply 1 application topically Every 6 (Six) Hours As Needed (Apply to knees and lower back).   Past Week at Unknown time   • metoprolol succinate XL (TOPROL-XL) 50 MG 24 hr tablet Take 50 mg by mouth Daily.   5/6/2019   • omeprazole (priLOSEC) 20 MG capsule Take 20 mg by mouth Daily.   5/6/2019   • Skin Protectants, Misc. (PERIGUARD) ointment Apply 1 application topically Every Evening. Apply to buttocks.   5/6/2019   • tiotropium bromide-olodaterol (STIOLTO RESPIMAT) 2.5-2.5 MCG/ACT aerosol solution inhaler Inhale 2 puffs Daily.   5/6/2019   • traMADol (ULTRAM) 50 MG tablet Take 50 mg by mouth Every 6 (Six) Hours As Needed for Moderate Pain .   Past Month at Unknown time       Medicines:  Current Facility-Administered Medications   Medication Dose Route Frequency Provider Last Rate Last Dose   • acetaminophen (TYLENOL) tablet 650 mg  650 mg Oral Q6H PRN Monster Zuniga MD       • acetaminophen (TYLENOL) tablet 650 mg  650 mg Oral Q4H PRN Monster Zuniga MD       • ALPRAZolam  (XANAX) tablet 0.5 mg  0.5 mg Oral Nightly Monster Zuniga MD   0.5 mg at 05/10/19 2124   • calcitRIOL (ROCALTROL) solution 0.25 mcg  0.25 mcg Nasogastric Daily Monster Zuniga MD   0.25 mcg at 05/11/19 0824   • calcium carbonate (oyster shell) tablet 1,250 mg  1,250 mg Oral Daily Ramsey Weldon MD   1,250 mg at 05/11/19 0823   • calcium gluconate 1 g in sodium chloride 0.9 % 100 mL IVPB  1 g Intravenous PRN Monster Zuniga  mL/hr at 05/08/19 0451 1 g at 05/08/19 0451    And   • calcium gluconate 6 g in sodium chloride 0.9 % 500 mL IVPB  6 g Intravenous PRN Monster Zuniga MD 83.3 mL/hr at 05/08/19 0647 6 g at 05/08/19 0647   • carbidopa-levodopa (SINEMET)  MG per tablet 1 tablet  1 tablet Oral TID Monster Zuniga MD   1 tablet at 05/11/19 0824   • cefTRIAXone (ROCEPHIN) 1 g/100 mL 0.9% NS (MBP)  1 g Intravenous Q24H Traci Kohler APRN   1 g at 05/10/19 1834   • citalopram (CeleXA) tablet 10 mg  10 mg Oral Nightly Monster Zuniga MD   10 mg at 05/10/19 2124   • cyancobalamin (VITAMIN B-12) tablet 100 mcg  100 mcg Oral Daily Monster Zuniga MD   100 mcg at 05/11/19 0824   • famotidine (PEPCID) tablet 20 mg  20 mg Oral Daily Monster Zuniga MD   20 mg at 05/11/19 0824   • HYDROcodone-acetaminophen (NORCO) 7.5-325 MG per tablet 1 tablet  1 tablet Oral Q4H PRN Monster Zuniga MD       • sodium chloride 0.9 % flush 10 mL  10 mL Intravenous PRN Prosper Jiang DO       • sodium chloride 0.9 % flush 3 mL  3 mL Intravenous Q12H Monster Zuniga MD   3 mL at 05/10/19 2125   • sodium chloride 0.9 % flush 3-10 mL  3-10 mL Intravenous PRN Monster Zuniga MD       • sodium chloride 0.9 % with KCl 20 mEq/L infusion  75 mL/hr Intravenous Continuous Cirilo Ross APRN 75 mL/hr at 05/11/19 0605 75 mL/hr at 05/11/19 0605   • vancomycin 500 mg/100 mL 0.9% NS IVPB ([facility])  500 mg Intravenous Q48H Monster Zuniga MD   500 mg at  05/10/19 0424       Past Medical History:  Past Medical History:   Diagnosis Date   • Anxiety    • Breast cancer (CMS/HCC)      LEFT WITH RECONSTRUCTION   • Colostomy in place (CMS/HCC)    • Dementia    • Depression    • Edema     LOWER FEET   • Hydrocephalus     NORMAL PRESSURE   • Hypertension    • Incontinence    • Nausea    • Parastomal hernia    • Parkinson disease (CMS/HCC)    • Tremor     RIGHT HAND   • Unsteady gait        Past Surgical History:  Past Surgical History:   Procedure Laterality Date   • ABDOMINAL SURGERY     • APPENDECTOMY     • BREAST RECONSTRUCTION Left     LEFT   • CHOLECYSTECTOMY     • COLON SURGERY     • COLONOSCOPY     • COLOSTOMY     • HYSTERECTOMY     • MASTECTOMY Left    • PARASTOMAL HERNIA REPAIR N/A 3/17/2017    Procedure: REPAIR PARASTOMAL HERNIA;  Surgeon: Princess Walters MD;  Location: [facility] OR;  Service:        Family History  History reviewed. No pertinent family history.    Social History  Social History     Socioeconomic History   • Marital status:      Spouse name: Not on file   • Number of children: Not on file   • Years of education: Not on file   • Highest education level: Not on file   Tobacco Use   • Smoking status: Former Smoker   • Smokeless tobacco: Never Used   Substance and Sexual Activity   • Alcohol use: No   • Drug use: No   • Sexual activity: Defer         Review of Systems:   History obtained from chart review and family  General ROS: negative  Respiratory ROS: no cough, shortness of breath, or wheezing  Cardiovascular ROS: no chest pain or dyspnea on exertion  Gastrointestinal ROS: no abdominal pain, change in bowel habits, or black or bloody stools  Genito-Urinary ROS: no dysuria, trouble voiding, or hematuria  Musculoskeletal ROS: negative  Neurological ROS: no TIA or stroke symptoms    Objective:  Patient Vitals for the past 24 hrs:   BP Temp Temp src Pulse Resp SpO2 Weight   05/11/19 1155 136/47 98.6 °F (37 °C) Oral 78 16 96 % --   05/11/19 0932 --  -- -- -- -- 93 % --   05/11/19 0841 149/61 97.9 °F (36.6 °C) Axillary 79 16 94 % --   05/11/19 0318 129/52 98 °F (36.7 °C) Oral 81 14 91 % --   05/10/19 2314 122/52 98.5 °F (36.9 °C) Oral 81 16 95 % --   05/10/19 2017 -- -- -- -- -- 90 % --   05/10/19 1917 122/48 98.7 °F (37.1 °C) Oral 86 16 97 % 76.4 kg (168 lb 8 oz)   05/10/19 1700 144/69 98.3 °F (36.8 °C) Oral 83 16 92 % --       Intake/Output Summary (Last 24 hours) at 5/11/2019 1347  Last data filed at 5/10/2019 1700  Gross per 24 hour   Intake 580 ml   Output 700 ml   Net -120 ml     General: awake, oriented X 1  HEENT: Normocephalic atraumatic head.  Neck: supple, no JVD  Chest:  clear to auscultation bilaterally without respiratory distress  CVS: regular rate and rhythm  Abdominal: Soft nondistended nontender, right upper quadrant ostomy  Extremities: no cyanosis or edema  Skin: warm and dry without rash  Neuro: no focal motor deficits    Labs:  Results from last 7 days   Lab Units 05/11/19  0645 05/10/19  0607 05/09/19  0455   WBC 10*3/mm3 10.95* 10.85* 10.68   HEMOGLOBIN g/dL 7.6* 7.6* 7.6*   HEMATOCRIT % 23.5* 23.0* 22.4*   PLATELETS 10*3/mm3 76* 92* 108*         Results from last 7 days   Lab Units 05/11/19  0645 05/10/19  0607 05/09/19  0455   SODIUM mmol/L 135 140 137   POTASSIUM mmol/L 4.1 3.5 2.8*   CHLORIDE mmol/L 101 101 94*   CO2 mmol/L 31.0 33.0* 32.0*   BUN mg/dL 75* 92* 103*   CREATININE mg/dL 1.94* 2.58* 3.89*   CALCIUM mg/dL 7.9* 7.3* 6.4*   BILIRUBIN mg/dL 0.4 0.2 0.3   ALK PHOS U/L 69 97 90   ALT (SGPT) U/L 24 32 24   AST (SGOT) U/L 84* 112* 158*   GLUCOSE mg/dL 91 136* 149*       Radiology:   Imaging Results (last 72 hours)     Procedure Component Value Units Date/Time    US Renal Bilateral [[id number]] Collected:  05/07/19 1124     Updated:  05/07/19 1129    Narrative:       EXAMINATION:  US RENAL BILATERAL-  5/7/2019 10:11 AM CDT     HISTORY: acute renal failure; A41.9-Sepsis, unspecified organism;  R65.20-Severe sepsis without septic  shock; N39.0-Urinary tract  infection, site not specified; E87.5-Hyperkalemia; N17.9-Acute kidney  failure, unspecified; R41.82-Altered mental status, unspecified;  R13.19-Other dysphagia      COMPARISON: 05/10/2014 renal ultrasound. 05/06/2019 abdomen and pelvis  CT     TECHNIQUE:      Bilateral renal ultrasound was performed.     FINDINGS:      The right kidney measures 10.1 cm in [id number] length.     The left kidney measures 11 cm in mdhp-[id number] length.     There is mild cortical thinning and pelvic lipomatosis compatible with  age.     Normal echogenicity of renal cortex is observed without renal masses.     There are no perinephric abnormalities.     There is no pelvocaliectasis or obstructive uropathy changes.     The urinary bladder is decompressed with a Peralta catheter.       Impression:       1. Negative bilateral renal ultrasound.  This report was finalized on 05/07/2019 11:26 by Dr. Iban Queen MD.    CT Abdomen Pelvis Without Contrast [[id number]] Collected:  05/06/19 2236     Updated:  05/06/19 2242    Narrative:       EXAMINATION: CT ABDOMEN PELVIS WO CONTRAST-      5/6/2019 10:17 PM CDT     HISTORY: pain; r/o obstruction; A41.9-Sepsis, unspecified organism;  R65.20-Severe sepsis without septic shock; N39.0-Urinary tract  infection, site not specified; E87.5-Hyperkalemia; N17.9-Acute kidney  failure, unspecified; R41.82-Altered mental status, unspecified     In order to have a CT radiation dose as low as reasonably achievable  Automated Exposure Control was utilized for adjustment of the mA and/or  KV according to patient size.     DLP in mGycm= 343.     Noncontrast abdomen/pelvis CT.     Comparison is made with 04/12/2016.     Left lower quadrant ostomy with nonobstructing parastomal hernia.     No significant bowel dilation.     Mild urinary bladder distention.     Mild dilation of the renal collecting systems and ureters is probably  based on vesicoureteral reflux. There is no obstructing  stone.     Normal heart size.  No acute abnormality at the lung bases.     Cholecystectomy clips.  Normal noncontrast appearance of the liver and spleen.  The pancreas is atrophic.     Aortic calcification with no aneurysm.     Summary:  1. Moderate fecal material throughout the colon.  2. Nonobstructing parastomal hernia.  3. Mild dilation of the renal collecting systems and ureters compatible  with vesicoureteral reflux.  4. No evidence of bowel obstruction, mass or abscess.                                   This report was finalized on 05/06/2019 22:39 by Dr. Raul Ricci MD.    CT Head Without Contrast [[id number]] Collected:  05/06/19 2234     Updated:  05/06/19 2238    Narrative:       EXAMINATION: CT HEAD WO CONTRAST-      5/6/2019 10:17 PM CDT     HISTORY: AMS; A41.9-Sepsis, unspecified organism; R65.20-Severe sepsis  without septic shock; N39.0-Urinary tract infection, site not specified;  E87.5-Hyperkalemia; N17.9-Acute kidney failure, unspecified;  R41.82-Altered mental status, unspecified     In order to have a CT radiation dose as low as reasonably achievable  Automated Exposure Control was utilized for adjustment of the mA and/or  KV according to patient size.     DLP in mGycm= 1214.     Noncontrast head CT compared with 01/17/2017.     Axial, sagittal, and coronal noncontrast CT imaging of the head.     The visualized paranasal sinuses are clear.     The brain and ventricles have an age appropriate appearance.  Moderate atrophy and small vessel disease.  There is no hemorrhage or mass-effect.   No acute infarction is seen.     No calvarial abnormality.       Impression:       1. No acute intracranial abnormality is seen.                                         This report was finalized on 05/06/2019 22:35 by Dr. Raul Ricci MD.    XR Chest 1 View [[id number]] Collected:  05/06/19 2216     Updated:  05/06/19 2219    Narrative:       EXAMINATION: XR CHEST 1 VW-     5/6/2019 10:11 PM CDT     HISTORY:  Altered mental status; r/o pneumonia.     One view chest x-ray compared with 01/17/2017.     Heart size is normal.  The mediastinum is within normal limits.      The lungs are normally expanded with no pneumonia or pneumothorax.  Chronic lung changes.  No congestive failure changes.                                                                       Impression:       1. No acute disease.           This report was finalized on 05/06/2019 22:16 by Dr. Raul Ricci MD.          Culture:  Blood Culture   Date Value Ref Range Status   05/06/2019 No growth at 24 hours  Preliminary   05/06/2019 Gram Positive Rods (A)  Preliminary   05/06/2019 Gram Positive Cocci (A)  Preliminary     Urine Culture   Date Value Ref Range Status   05/06/2019 >100,000 CFU/mL Escherichia coli (A)  Final   05/06/2019 70,000-80,000 CFU/mL Mixed Gram Positive Alison (A)  Final     Comment:     Probable Contaminant           Assessment   1.  Acute kidney injury/ATN/improved  2.  Septic shock-improved  3.  Urinary tract infection/improved  4.  Metabolic acidosis--resolved  5.  Rhabdomyolysis  6.  Hypocalcemia  7.  Advanced dementia  8.  History of colostomy  9.  Hypokalemia     Plan:  1.  DC IV fluid.  2.  Intravenous Venofer.  3.  Plan was discussed with family      Ramsey Weldon MD  5/11/2019  1:47 PM

## 2019-05-11 NOTE — PLAN OF CARE
Problem: Fall Risk (Adult)  Goal: Identify Related Risk Factors and Signs and Symptoms  Outcome: Ongoing (interventions implemented as appropriate)    Goal: Absence of Fall  Outcome: Ongoing (interventions implemented as appropriate)      Problem: Patient Care Overview  Goal: Plan of Care Review  Outcome: Ongoing (interventions implemented as appropriate)   05/10/19 1447 05/10/19 1930 05/11/19 0341   Coping/Psychosocial   Plan of Care Reviewed With --  patient --    Plan of Care Review   Progress improving --  --    OTHER   Outcome Summary --  --  pt no c/o pain. pt turned q2 hour, wedges in place. IV fluids continue. vss. pt resting comfortably       Problem: Skin Injury Risk (Adult)  Goal: Identify Related Risk Factors and Signs and Symptoms  Outcome: Ongoing (interventions implemented as appropriate)    Goal: Skin Health and Integrity  Outcome: Ongoing (interventions implemented as appropriate)      Problem: Confusion, Chronic (Adult)  Goal: Identify Related Risk Factors and Signs and Symptoms  Outcome: Ongoing (interventions implemented as appropriate)    Goal: Cognitive/Functional Impairments Minimized  Outcome: Ongoing (interventions implemented as appropriate)    Goal: Free from Harm/Injuries  Outcome: Ongoing (interventions implemented as appropriate)      Problem: Infection, Risk/Actual (Adult)  Goal: Identify Related Risk Factors and Signs and Symptoms  Outcome: Ongoing (interventions implemented as appropriate)    Goal: Infection Prevention/Resolution  Outcome: Ongoing (interventions implemented as appropriate)      Problem: Wound (Includes Pressure Injury) (Adult)  Goal: Signs and Symptoms of Listed Potential Problems Will be Absent, Minimized or Managed (Wound)  Outcome: Ongoing (interventions implemented as appropriate)      Problem: Nutrition, Enteral (Adult)  Goal: Signs and Symptoms of Listed Potential Problems Will be Absent, Minimized or Managed (Nutrition, Enteral)  Outcome: Ongoing  (interventions implemented as appropriate)

## 2019-05-11 NOTE — PLAN OF CARE
Problem: Fall Risk (Adult)  Goal: Identify Related Risk Factors and Signs and Symptoms  Outcome: Ongoing (interventions implemented as appropriate)    Goal: Absence of Fall  Outcome: Ongoing (interventions implemented as appropriate)      Problem: Patient Care Overview  Goal: Plan of Care Review   05/11/19 1509   Coping/Psychosocial   Plan of Care Reviewed With patient   Plan of Care Review   Progress no change       Problem: Skin Injury Risk (Adult)  Goal: Identify Related Risk Factors and Signs and Symptoms  Outcome: Ongoing (interventions implemented as appropriate)    Goal: Skin Health and Integrity  Outcome: Ongoing (interventions implemented as appropriate)      Problem: Confusion, Chronic (Adult)  Goal: Identify Related Risk Factors and Signs and Symptoms  Outcome: Ongoing (interventions implemented as appropriate)    Goal: Cognitive/Functional Impairments Minimized  Outcome: Ongoing (interventions implemented as appropriate)    Goal: Free from Harm/Injuries  Outcome: Ongoing (interventions implemented as appropriate)      Problem: Infection, Risk/Actual (Adult)  Goal: Identify Related Risk Factors and Signs and Symptoms  Outcome: Ongoing (interventions implemented as appropriate)    Goal: Infection Prevention/Resolution  Outcome: Ongoing (interventions implemented as appropriate)      Problem: Wound (Includes Pressure Injury) (Adult)  Goal: Signs and Symptoms of Listed Potential Problems Will be Absent, Minimized or Managed (Wound)  Outcome: Ongoing (interventions implemented as appropriate)      Problem: Nutrition, Enteral (Adult)  Goal: Signs and Symptoms of Listed Potential Problems Will be Absent, Minimized or Managed (Nutrition, Enteral)  Outcome: Ongoing (interventions implemented as appropriate)

## 2019-05-12 LAB
ALBUMIN SERPL-MCNC: 2.3 G/DL (ref 3.5–5)
ALBUMIN/GLOB SERPL: 1 G/DL (ref 1.1–2.5)
ALP SERPL-CCNC: 64 U/L (ref 24–120)
ALT SERPL W P-5'-P-CCNC: 23 U/L (ref 0–54)
ANION GAP SERPL CALCULATED.3IONS-SCNC: 7 MMOL/L (ref 4–13)
ANISOCYTOSIS BLD QL: ABNORMAL
AST SERPL-CCNC: 66 U/L (ref 7–45)
BILIRUB SERPL-MCNC: 0.3 MG/DL (ref 0.1–1)
BUN BLD-MCNC: 66 MG/DL (ref 5–21)
BUN/CREAT SERPL: 39.1 (ref 7–25)
CALCIUM SPEC-SCNC: 8.4 MG/DL (ref 8.4–10.4)
CHLORIDE SERPL-SCNC: 98 MMOL/L (ref 98–110)
CO2 SERPL-SCNC: 29 MMOL/L (ref 24–31)
CREAT BLD-MCNC: 1.69 MG/DL (ref 0.5–1.4)
DEPRECATED RDW RBC AUTO: 46 FL (ref 40–54)
ERYTHROCYTE [DISTWIDTH] IN BLOOD BY AUTOMATED COUNT: 16.1 % (ref 12–15)
GFR SERPL CREATININE-BSD FRML MDRD: 29 ML/MIN/1.73
GLOBULIN UR ELPH-MCNC: 2.3 GM/DL
GLUCOSE BLD-MCNC: 105 MG/DL (ref 70–100)
HCT VFR BLD AUTO: 22.3 % (ref 37–47)
HGB BLD-MCNC: 7.3 G/DL (ref 12–16)
LYMPHOCYTES # BLD MANUAL: 2.84 10*3/MM3 (ref 0.72–4.86)
LYMPHOCYTES NFR BLD MANUAL: 23 % (ref 15–45)
LYMPHOCYTES NFR BLD MANUAL: 7 % (ref 4–12)
MCH RBC QN AUTO: 25.7 PG (ref 28–32)
MCHC RBC AUTO-ENTMCNC: 32.7 G/DL (ref 33–36)
MCV RBC AUTO: 78.5 FL (ref 82–98)
MICROCYTES BLD QL: ABNORMAL
MONOCYTES # BLD AUTO: 0.86 10*3/MM3 (ref 0.19–1.3)
NEUTROPHILS # BLD AUTO: 8.65 10*3/MM3 (ref 1.87–8.4)
NEUTROPHILS NFR BLD MANUAL: 70 % (ref 39–78)
PLATELET # BLD AUTO: 71 10*3/MM3 (ref 130–400)
PMV BLD AUTO: 11.1 FL (ref 6–12)
POIKILOCYTOSIS BLD QL SMEAR: ABNORMAL
POTASSIUM BLD-SCNC: 4.1 MMOL/L (ref 3.5–5.3)
PROT SERPL-MCNC: 4.6 G/DL (ref 6.3–8.7)
RBC # BLD AUTO: 2.84 10*6/MM3 (ref 4.2–5.4)
SMALL PLATELETS BLD QL SMEAR: ABNORMAL
SODIUM BLD-SCNC: 134 MMOL/L (ref 135–145)
WBC MORPH BLD: NORMAL
WBC NRBC COR # BLD: 12.35 10*3/MM3 (ref 4.8–10.8)

## 2019-05-12 PROCEDURE — 94760 N-INVAS EAR/PLS OXIMETRY 1: CPT

## 2019-05-12 PROCEDURE — 80053 COMPREHEN METABOLIC PANEL: CPT | Performed by: INTERNAL MEDICINE

## 2019-05-12 PROCEDURE — 85025 COMPLETE CBC W/AUTO DIFF WBC: CPT | Performed by: INTERNAL MEDICINE

## 2019-05-12 PROCEDURE — 85007 BL SMEAR W/DIFF WBC COUNT: CPT | Performed by: INTERNAL MEDICINE

## 2019-05-12 PROCEDURE — 94799 UNLISTED PULMONARY SVC/PX: CPT

## 2019-05-12 PROCEDURE — 25010000002 VANCOMYCIN 10 G RECONSTITUTED SOLUTION: Performed by: INTERNAL MEDICINE

## 2019-05-12 PROCEDURE — 92526 ORAL FUNCTION THERAPY: CPT | Performed by: SPEECH-LANGUAGE PATHOLOGIST

## 2019-05-12 RX ADMIN — VITAM B12 100 MCG: 100 TAB at 08:27

## 2019-05-12 RX ADMIN — FAMOTIDINE 20 MG: 20 TABLET, FILM COATED ORAL at 08:27

## 2019-05-12 RX ADMIN — BUMETANIDE 1 MG: 1 TABLET ORAL at 08:27

## 2019-05-12 RX ADMIN — CALCIUM 1250 MG: 500 TABLET ORAL at 08:27

## 2019-05-12 RX ADMIN — VANCOMYCIN HYDROCHLORIDE 500 MG: 10 INJECTION, POWDER, LYOPHILIZED, FOR SOLUTION INTRAVENOUS at 05:55

## 2019-05-12 RX ADMIN — ALPRAZOLAM 0.5 MG: 0.5 TABLET ORAL at 20:23

## 2019-05-12 RX ADMIN — CARBIDOPA AND LEVODOPA 1 TABLET: 25; 100 TABLET ORAL at 18:15

## 2019-05-12 RX ADMIN — CITALOPRAM 10 MG: 10 TABLET, FILM COATED ORAL at 20:23

## 2019-05-12 RX ADMIN — CARBIDOPA AND LEVODOPA 1 TABLET: 25; 100 TABLET ORAL at 08:27

## 2019-05-12 RX ADMIN — SODIUM CHLORIDE, PRESERVATIVE FREE 3 ML: 5 INJECTION INTRAVENOUS at 20:23

## 2019-05-12 RX ADMIN — CARBIDOPA AND LEVODOPA 1 TABLET: 25; 100 TABLET ORAL at 20:23

## 2019-05-12 RX ADMIN — CALCITRIOL 0.25 MCG: 1 SOLUTION ORAL at 08:27

## 2019-05-12 RX ADMIN — SODIUM CHLORIDE, PRESERVATIVE FREE 3 ML: 5 INJECTION INTRAVENOUS at 08:29

## 2019-05-12 NOTE — PLAN OF CARE
Problem: Patient Care Overview  Goal: Plan of Care Review  Outcome: Ongoing (interventions implemented as appropriate)   05/12/19 1125   Coping/Psychosocial   Plan of Care Reviewed With patient;family;other (see comments)  (VESTA Stephen )   Plan of Care Review   Progress improving   OTHER   Outcome Summary Swallow tx completed. Patient is alert and cooperative given cues. Family is present. She completed trials of thin liquids and banana. 2x throat clearing noted with thin liquids but voice remained clear. RN reports that she is doing well taking pills whole with thin liquids. Her PO intake is low. Her daughter tells me she is able to feed herself (she does so today with the banana) but often needs cueing to even initiate the task. Daughter states that she does clear her throat throughout the day even without PO intake. ST educated RE: s/s of aspiration and if this continues with increased congestion it may warrant diet modification. Daughter verbalized understanding. Continue mechanical soft diet with thin liquids. Strict oral care. ST will follow up.

## 2019-05-12 NOTE — THERAPY TREATMENT NOTE
Acute Care - Speech Language Pathology   Swallow Treatment Note [facility]     Patient Name: Susy Ko  : 1940  MRN: [id number]  Today's Date: 2019  Onset of Illness/Injury or Date of Surgery: 19     Referring Physician: COLTON Kohler      Admit Date: 2019  Swallow tx completed. Patient is alert and cooperative given cues. Family is present. She completed trials of thin liquids and banana. 2x throat clearing noted with thin liquids but voice remained clear. RN reports that she is doing well taking pills whole with thin liquids. Her PO intake is low. Her daughter tells me she is able to feed herself (she does so today with the banana) but often needs cueing to even initiate the task. Daughter states that she does clear her throat throughout the day even without PO intake. ST educated RE: s/s of aspiration and if this continues with increased congestion it may warrant diet modification. Daughter verbalized understanding. Continue mechanical soft diet with thin liquids. Strict oral care. ST will follow up.   Jerri Coronado MS, CFY-SLP 2019 11:29 AM    Visit Dx:      ICD-10-CM ICD-9-CM   1. Severe sepsis (CMS/HCC) A41.9 038.9    R65.20 995.92   2. Urinary tract infection, acute N39.0 599.0   3. Hyperkalemia E87.5 276.7   4. Acute kidney injury (CMS/HCC) N17.9 584.9   5. Altered mental status, unspecified altered mental status type R41.82 780.97   6. Other dysphagia R13.19 787.29     Patient Active Problem List   Diagnosis   • Normal pressure hydrocephalus   • Non morbid obesity due to excess calories   • Tobacco non-user   • Tremor of right hand   • Abnormality of gait   • Parastomal hernia   • Hernia   • Gait abnormality   • Severe sepsis (CMS/HCC)       Therapy Treatment  Rehabilitation Treatment Summary     Row Name 19 1100             Treatment Time/Intention    Discipline  speech language pathologist  -MM      Document Type  therapy note (daily note)  -MM       Subjective Information  no complaints  -MM      Mode of Treatment  individual therapy;speech-language pathology  -MM      Patient/Family Observations  Family present  -MM      Care Plan Review  care plan/treatment goals reviewed  -MM      Care Plan Review, Other Participant(s)  other (see comments) VESTA Stephen   -ADAM      Therapy Frequency (Swallow)  at least;3 days per week  -MM      Patient Effort  adequate  -MM      Recorded by [MM] Jerri Coronado, MS, [facility]-SLP 05/12/19 1124      Row Name 05/12/19 1100             Pain Assessment    Additional Documentation  Pain Scale: FACES Pre/Post-Treatment (Group)  -MM      Recorded by [MM] Jerri Coronado MS, [facility]-SLP 05/12/19 1124      Row Name 05/12/19 1100             Pain Scale: FACES Pre/Post-Treatment    Pain: FACES Scale, Pretreatment  0-->no hurt  -MM      Pain: FACES Scale, Post-Treatment  0-->no hurt  -MM      Pre/Post Treatment Pain Comment  Moans in pain with movement   -MM      Recorded by [MM] Jerri Coronado, MS, CFY-SLP 05/12/19 1124      Row Name                Wound 05/08/19 1115 coccyx pressure injury;blisters    Wound - Properties Group Date first assessed: 05/08/19 [HS] Time first assessed: 1115 [HS] Location: coccyx [HS] Type: pressure injury;blisters [HS2] Stage, Pressure Injury: deep tissue injury [HS] Recorded by:  [HS] Tamara Salazar RN 05/08/19 1726 [HS2] Tamara Salazar RN 05/08/19 1728    Row Name                Wound 05/07/19 0532 Right heel pressure injury    Wound - Properties Group Date first assessed: 05/07/19 [HS] Time first assessed: 0532 [HS] Present On Admission : yes;picture taken [HS] Side: Right [HS] Location: heel [HS] Type: pressure injury [HS] Stage, Pressure Injury: deep tissue injury [HS] Recorded by:  [HS] Tamara Salazar RN 05/08/19 1741    Row Name 05/12/19 1100             Outcome Summary/Treatment Plan (SLP)    Daily Summary of Progress (SLP)  progress toward functional goals is gradual  -MM      Barriers  to Overall Progress (SLP)  medically complex  -MM      Plan for Continued Treatment (SLP)  ST to follow and tx.  -MM      Anticipated Dischage Disposition  skilled nursing facility  -MM      Recorded by [MM] Jerri Coronado MS, CFY-SLP 05/12/19 1124        User Key  (r) = Recorded By, (t) = Taken By, (c) = Cosigned By    Initials Name Effective Dates Discipline     Tamara Salazar RN 12/27/16 -  Nurse    MM eJrri Coronado MS, CFY-SLP 07/03/18 -  SLP          Outcome Summary  Outcome Summary/Treatment Plan (SLP)  Daily Summary of Progress (SLP): progress toward functional goals is gradual (05/12/19 1100 : Jerri Coronado MS, CFY-SLP)  Barriers to Overall Progress (SLP): medically complex (05/12/19 1100 : Jerri Coronado MS, CFY-SLP)  Plan for Continued Treatment (SLP): ST to follow and tx. (05/12/19 1100 : Jerri Coronado MS, CFY-SLP)  Anticipated Dischage Disposition: skilled nursing facility (05/12/19 1100 : Jerri Coronado MS, CFY-SLP)      SLP GOALS     Row Name 05/12/19 1000 05/10/19 1045          Oral Nutrition/Hydration Goal 1 (SLP)    Oral Nutrition/Hydration Goal 1, SLP  Patient will tolerate LRD with no s/s aspiration.  -MM  Patient will tolerate LRD with no s/s aspiration.  -CS     Time Frame (Oral Nutrition/Hydration Goal 1, SLP)  by discharge  -MM  by discharge  -CS     Barriers (Oral Nutrition/Hydration Goal 1, SLP)  n/a  -MM  n/a  -CS     Progress/Outcomes (Oral Nutrition/Hydration Goal 1, SLP)  continuing progress toward goal  -MM  continuing progress toward goal  -CS        Lingual Strengthening Goal 1 (SLP)    Activity (Lingual Strengthening Goal 1, SLP)  increase lingual tone/sensation/control/coordination/movement  -MM  increase lingual tone/sensation/control/coordination/movement  -CS     Increase Lingual Tone/Sensation/Control/Coordination/Movement  lingual movement exercises  -MM  lingual movement exercises  -CS     [place]/Accuracy (Lingual  Strengthening Goal 1, SLP)  independently (over 90% accuracy)  -MM  independently (over 90% accuracy)  -CS     Time Frame (Lingual Strengthening Goal 1, SLP)  by discharge  -MM  by discharge  -CS     Barriers (Lingual Strengthening Goal 1, SLP)  n/a  -MM  n/a  -CS     Progress/Outcomes (Lingual Strengthening Goal 1, SLP)  continuing progress toward goal  -MM  continuing progress toward goal  -CS        Pharyngeal Strengthening Exercise Goal 1 (SLP)    Activity (Pharyngeal Strengthening Goal 1, SLP)  increase timing  -MM  increase timing  -CS     Increase Timing  gustatory stimulation (sour/cold);prepping - 3 second prep or suck swallow or 3-step swallow;hard effortful swallow  -MM  gustatory stimulation (sour/cold);prepping - 3 second prep or suck swallow or 3-step swallow;hard effortful swallow  -CS     [place]/Accuracy (Pharyngeal Strengthening Goal 1, SLP)  independently (over 90% accuracy)  -MM  independently (over 90% accuracy)  -CS     Time Frame (Pharyngeal Strengthening Goal 1, SLP)  by discharge  -MM  by discharge  -CS     Barriers (Pharyngeal Strengthening Goal 1, SLP)  n/a  -MM  n/a  -CS     Progress/Outcomes (Pharyngeal Strengthening Goal 1, SLP)  continuing progress toward goal  -MM  continuing progress toward goal  -CS       User Key  (r) = Recorded By, (t) = Taken By, (c) = Cosigned By    Initials Name Provider Type    Benjamin Contreras MS CCC-SLP Speech and Language Pathologist    Jerri Ortega MS, CFY-SLP Speech and Language Pathologist          EDUCATION  The patient has been educated in the following areas:   Dysphagia (Swallowing Impairment) Oral Care/Hydration.    SLP Recommendation and Plan  Daily Summary of Progress (SLP): progress toward functional goals is gradual  Barriers to Overall Progress (SLP): medically complex  Plan for Continued Treatment (SLP): ST to follow and tx.  Anticipated Dischage Disposition: skilled nursing facility                    Time Calculation:   Time  Calculation- SLP     Row Name 05/12/19 1129             Time Calculation- SLP    SLP Start Time  1100  -MM      SLP Stop Time  1123  -MM      SLP Time Calculation (min)  23 min  -MM      SLP Received On  05/12/19  -MM        User Key  (r) = Recorded By, (t) = Taken By, (c) = Cosigned By    Initials Name Provider Type    Jerri Ortega MS, CFY-SLP Speech and Language Pathologist          Therapy Charges for Today     Code Description Service Date Service Provider Modifiers Qty    [id number]  ST TREATMENT SWALLOW 2 5/12/2019 Jerri Coronado, MS, CFY-SLP GN 1                 Jerri Coronado MS, CFY-SLP  5/12/2019

## 2019-05-12 NOTE — PROGRESS NOTES
[facility]  ARIES Abdalla M.D.  COLTON Hunter      Internal Medicine Progress Note    5/12/2019   8:07 AM    Name:  Susy Ko  MRN:    [id number]     Acct:     [id number]   Room:  [address] [place] [address] Day: 6     Admit Date: 5/6/2019  8:39 PM  PCP: Monster Zuniga MD    Subjective:     C/C:   Chief Complaint   Patient presents with   • Abnormal KUB       Interval History: Status:  stayed the same. Resting in bed. No family at bedside. Patient woke from sleep. Renal function continues to improve. Patient closer to neurologic baseline. WBC elevated. Worsening thrombocytopenia.     Review of Systems   Constitution: Positive for weakness and malaise/fatigue. Negative for chills, decreased appetite, weight gain and weight loss.   HENT: Negative for congestion, ear discharge, hoarse voice and tinnitus.    Eyes: Negative for blurred vision, discharge, visual disturbance and visual halos.   Cardiovascular: Negative for chest pain, claudication, dyspnea on exertion, irregular heartbeat, leg swelling, orthopnea and paroxysmal nocturnal dyspnea.   Respiratory: Negative for cough, shortness of breath, sputum production and wheezing.    Endocrine: Negative for cold intolerance, heat intolerance and polyuria.   Hematologic/Lymphatic: Negative for adenopathy. Does not bruise/bleed easily.   Skin: Negative for dry skin, itching and suspicious lesions.   Musculoskeletal: Negative for arthritis, back pain, falls, joint pain, muscle weakness and myalgias.   Gastrointestinal: Negative for abdominal pain, constipation, diarrhea, dysphagia and hematemesis.   Genitourinary: Negative for bladder incontinence, dysuria and frequency.   Neurological: Negative for aphonia, disturbances in coordination and dizziness.   Psychiatric/Behavioral: Negative for altered mental status, depression, memory loss and substance abuse. The patient does not have insomnia and is not nervous/anxious.     Allergic/Immunologic: Negative for environmental allergies.     Medications:     Allergies:   Allergies   Allergen Reactions   • Morphine And Related Mental Status Change   • Adhesive Tape Rash       Current Meds:   Current Facility-Administered Medications:   •  acetaminophen (TYLENOL) tablet 650 mg, 650 mg, Oral, Q6H PRN, Monster Zuniga MD  •  acetaminophen (TYLENOL) tablet 650 mg, 650 mg, Oral, Q4H PRN, Monsetr Zuniga MD  •  ALPRAZolam (XANAX) tablet 0.5 mg, 0.5 mg, Oral, Nightly, Monster Zuniga MD, 0.5 mg at 05/11/19 2025  •  bumetanide (BUMEX) tablet 1 mg, 1 mg, Oral, Daily, Ramsey Weldon MD, 1 mg at 05/11/19 1436  •  calcitRIOL (ROCALTROL) solution 0.25 mcg, 0.25 mcg, Nasogastric, Daily, Monster Zuniga MD, 0.25 mcg at 05/11/19 0824  •  calcium carbonate (oyster shell) tablet 1,250 mg, 1,250 mg, Oral, Daily, Ramsey Weldon MD, 1,250 mg at 05/11/19 0823  •  calcium gluconate 1 g in sodium chloride 0.9 % 100 mL IVPB, 1 g, Intravenous, PRN, Last Rate: 100 mL/hr at 05/08/19 0451, 1 g at 05/08/19 0451 **AND** calcium gluconate 6 g in sodium chloride 0.9 % 500 mL IVPB, 6 g, Intravenous, PRN, Last Rate: 83.3 mL/hr at 05/08/19 0647, 6 g at 05/08/19 0647 **AND** Calcium, , , PRN, Monster Zuniga MD  •  carbidopa-levodopa (SINEMET)  MG per tablet 1 tablet, 1 tablet, Oral, TID, Monster Zuniga MD, 1 tablet at 05/11/19 2025  •  citalopram (CeleXA) tablet 10 mg, 10 mg, Oral, Nightly, Monster Zuniga MD, 10 mg at 05/11/19 2025  •  cyancobalamin (VITAMIN B-12) tablet 100 mcg, 100 mcg, Oral, Daily, Monster Zuniga MD, 100 mcg at 05/11/19 0824  •  famotidine (PEPCID) tablet 20 mg, 20 mg, Oral, Daily, Monster Zuniga MD, 20 mg at 05/11/19 0824  •  HYDROcodone-acetaminophen (NORCO) 7.5-325 MG per tablet 1 tablet, 1 tablet, Oral, Q4H PRN, Monster Zuniga MD  •  [COMPLETED] Insert peripheral IV, , , Once **AND** sodium chloride 0.9 % flush 10 mL, 10  "mL, Intravenous, PRN, Prosper Jiang, DO  •  sodium chloride 0.9 % flush 3 mL, 3 mL, Intravenous, Q12H, Monster Zuniga MD, 3 mL at 19  •  sodium chloride 0.9 % flush 3-10 mL, 3-10 mL, Intravenous, PRN, Monster Zuniga MD  •  [COMPLETED] vancomycin 1250 mg/250 mL 0.9% NS IVPB ([facility]), 20 mg/kg, Intravenous, Once, 1,250 mg at 19 0451 **FOLLOWED BY** vancomycin 500 mg/100 mL 0.9% NS IVPB ([facility]), 500 mg, Intravenous, Q48H, Monster Zuniga MD, Stopped at 19 0718    Data:     Code Status:    Code Status and Medical Interventions:   Ordered at: 19 1110     Level Of Support Discussed With:    Patient     Code Status:    CPR     Medical Interventions (Level of Support Prior to Arrest):    Full       History reviewed. No pertinent family history.    Social History     Socioeconomic History   • Marital status:      Spouse name: Not on file   • Number of children: Not on file   • Years of education: Not on file   • Highest education level: Not on file   Tobacco Use   • Smoking status: Former Smoker   • Smokeless tobacco: Never Used   Substance and Sexual Activity   • Alcohol use: No   • Drug use: No   • Sexual activity: Defer       Vitals:  /72 (BP Location: Right arm, Patient Position: Lying)   Pulse 83   Temp 99.1 °F (37.3 °C) (Oral)   Resp 16   Ht 160 cm (63\")   Wt 76.8 kg (169 lb 6.4 oz)   SpO2 91%   BMI 30.01 kg/m²   Temp (24hrs), Av.8 °F (37.1 °C), Min:97.9 °F (36.6 °C), Max:99.3 °F (37.4 °C)        I/O (24Hr):    Intake/Output Summary (Last 24 hours) at 2019 0807  Last data filed at 2019 0718  Gross per 24 hour   Intake 3992 ml   Output 1375 ml   Net 2617 ml       Labs and imaging:      Recent Results (from the past 12 hour(s))   Comprehensive Metabolic Panel    Collection Time: 19  6:32 AM   Result Value Ref Range    Glucose 105 (H) 70 - 100 mg/dL    BUN 66 (H) 5 - 21 mg/dL    Creatinine 1.69 (H) 0.50 - 1.40 mg/dL    Sodium 134 (L) " 135 - 145 mmol/L    Potassium 4.1 3.5 - 5.3 mmol/L    Chloride 98 98 - 110 mmol/L    CO2 29.0 24.0 - 31.0 mmol/L    Calcium 8.4 8.4 - 10.4 mg/dL    Total Protein 4.6 (L) 6.3 - 8.7 g/dL    Albumin 2.30 (L) 3.50 - 5.00 g/dL    ALT (SGPT) 23 0 - 54 U/L    AST (SGOT) 66 (H) 7 - 45 U/L    Alkaline Phosphatase 64 24 - 120 U/L    Total Bilirubin 0.3 0.1 - 1.0 mg/dL    eGFR Non African Amer 29 (L) >60 mL/min/1.73    Globulin 2.3 gm/dL    A/G Ratio 1.0 (L) 1.1 - 2.5 g/dL    BUN/Creatinine Ratio 39.1 (H) 7.0 - 25.0    Anion Gap 7.0 4.0 - 13.0 mmol/L   CBC Auto Differential    Collection Time: 05/12/19  6:32 AM   Result Value Ref Range    WBC 12.35 (H) 4.80 - 10.80 10*3/mm3    RBC 2.84 (L) 4.20 - 5.40 10*6/mm3    Hemoglobin 7.3 (L) 12.0 - 16.0 g/dL    Hematocrit 22.3 (L) 37.0 - 47.0 %    MCV 78.5 (L) 82.0 - 98.0 fL    MCH 25.7 (L) 28.0 - 32.0 pg    MCHC 32.7 (L) 33.0 - 36.0 g/dL    RDW 16.1 (H) 12.0 - 15.0 %    RDW-SD 46.0 40.0 - 54.0 fl    MPV 11.1 6.0 - 12.0 fL    Platelets 71 (L) 130 - 400 10*3/mm3   Manual Differential    Collection Time: 05/12/19  6:32 AM   Result Value Ref Range    Neutrophil % 70.0 39.0 - 78.0 %    Lymphocyte % 23.0 15.0 - 45.0 %    Monocyte % 7.0 4.0 - 12.0 %    Neutrophils Absolute 8.65 (H) 1.87 - 8.40 10*3/mm3    Lymphocytes Absolute 2.84 0.72 - 4.86 10*3/mm3    Monocytes Absolute 0.86 0.19 - 1.30 10*3/mm3    Anisocytosis Slight/1+ None Seen    Microcytes Mod/2+ None Seen    Poikilocytes Slight/1+ None Seen    WBC Morphology Normal Normal    Platelet Estimate Decreased Normal     Physical Examination:        Physical Exam   Constitutional: She is oriented to person, place, and time. She appears well-developed and well-nourished.   HENT:   Head: Normocephalic and atraumatic.   Nose: Nose normal.   Mouth/Throat: Oropharynx is clear and moist.   Eyes: Conjunctivae and EOM are normal. Pupils are equal, round, and reactive to light.   Neck: Normal range of motion. Neck supple.   Cardiovascular: Normal  rate, regular rhythm, normal heart sounds and intact distal pulses.   Pulmonary/Chest: Effort normal and breath sounds normal.   Abdominal: Soft. Bowel sounds are normal.   Ostomy - stoma pink and appliance intact   Musculoskeletal:   Generalized weakness  Contractures to BLE   Neurological: She is alert and oriented to person, place, and time.   Intermittent confusion   Skin: Skin is warm and dry.   DTI to right heel  Blisters and excoriation to coccyx   Nursing note and vitals reviewed.      Assessment:            Severe sepsis (CMS/HCC)    Past Medical History:   Diagnosis Date   • Anxiety    • Breast cancer (CMS/HCC)      LEFT WITH RECONSTRUCTION   • Colostomy in place (CMS/HCC)    • Dementia    • Depression    • Edema     LOWER FEET   • Hydrocephalus     NORMAL PRESSURE   • Hypertension    • Incontinence    • Nausea    • Parastomal hernia    • Parkinson disease (CMS/HCC)    • Tremor     RIGHT HAND   • Unsteady gait         Plan:        1. Sepsis  2. E. Coli UTI - present on admission  3. Acute renal failure  4. Rhabdomyolysis  5. Hypotension  6. Dementia  7. Abdominal pain  8. Metabolic acidosis  9. Hyperkalemia  10. Uremia  11. Anxiety  12. Thrombocytopenia    Continue current treatment. Monitor counts. Increase activity. Labs in am. Continue antibiotics. Continue fluid resuscitation. Continue nutrition support. Aggressive therapies. Monitor platelets closely. Monitor for renal recovery.       Electronically signed by COLTON Ross on 5/12/2019 at 8:07 AM   I have discussed the care of Susy Ko, including pertinent history and exam findings, with the nurse practitioner and agree with the care plan.      Electronically signed by Monster Zuniga MD on 5/12/2019 at 11:22 PM

## 2019-05-12 NOTE — PLAN OF CARE
Problem: Fall Risk (Adult)  Goal: Identify Related Risk Factors and Signs and Symptoms  Outcome: Outcome(s) achieved Date Met: 05/12/19    Goal: Absence of Fall  Outcome: Ongoing (interventions implemented as appropriate)      Problem: Patient Care Overview  Goal: Plan of Care Review  Outcome: Ongoing (interventions implemented as appropriate)  VSS, pt continues to refused to eat more than a few bites, tolerating pills whole in water. Turns attempted q2, pt refuses at times. Afebrile, bath completed this shift, wound to coccyx cleaned and dressed frequently, prevalon boots in place bilaterally. Adequate output from ostomy and purewick. No new skin issues. Daughter has multiple concerns regarding deconditioning, therapy, toleration of diet, and dispo plan. Addressed to the best of ability this shift, further concerns to be addressed in AM. Will continue to monitor.    05/12/19 5346   Coping/Psychosocial   Plan of Care Reviewed With patient;family   Plan of Care Review   Progress no change       Problem: Skin Injury Risk (Adult)  Goal: Identify Related Risk Factors and Signs and Symptoms  Outcome: Outcome(s) achieved Date Met: 05/12/19    Goal: Skin Health and Integrity  Outcome: Ongoing (interventions implemented as appropriate)      Problem: Confusion, Chronic (Adult)  Goal: Identify Related Risk Factors and Signs and Symptoms  Outcome: Outcome(s) achieved Date Met: 05/12/19    Goal: Cognitive/Functional Impairments Minimized  Outcome: Ongoing (interventions implemented as appropriate)    Goal: Free from Harm/Injuries  Outcome: Ongoing (interventions implemented as appropriate)      Problem: Infection, Risk/Actual (Adult)  Goal: Identify Related Risk Factors and Signs and Symptoms  Outcome: Outcome(s) achieved Date Met: 05/12/19    Goal: Infection Prevention/Resolution  Outcome: Ongoing (interventions implemented as appropriate)      Problem: Wound (Includes Pressure Injury) (Adult)  Goal: Signs and Symptoms of  Listed Potential Problems Will be Absent, Minimized or Managed (Wound)  Outcome: Ongoing (interventions implemented as appropriate)

## 2019-05-12 NOTE — PROGRESS NOTES
"Pharmacy Dosing Service  Pharmacokinetics  Vancomycin Follow-up Evaluation    Assessment/Action/Plan:  Renal function stable.  Ordered Vancomycin trough level due 0500 5/14/19.  Continue Vancomycin 500 mg q 48 hrs. Pharmacy will continue to monitor renal function and adjust dose accordingly.     Subjective:  Susy Ko is a 79 y.o. female currently on Vancomycin 500 mg IV every 48 hours for the treatment of sepsis, day 5 of therapy (Current vancomycin end date: 5/16/19).    Objective:  Ht: 160 cm (63\"); Wt: 76.8 kg (169 lb 6.4 oz)  Estimated Creatinine Clearance: 26.5 mL/min (A) (by C-G formula based on SCr of 1.69 mg/dL (H)).   Lab Results   Component Value Date    CREATININE 1.69 (H) 05/12/2019    CREATININE 1.94 (H) 05/11/2019    CREATININE 2.58 (H) 05/10/2019      Lab Results   Component Value Date    WBC 12.35 (H) 05/12/2019    WBC 10.95 (H) 05/11/2019    WBC 10.85 (H) 05/10/2019         Lab Results   Component Value Date    VANCOTROUGH 16.30 06/21/2014       Culture Results:  Microbiology Results (last 10 days)     Procedure Component Value - Date/Time    Legionella Antigen, Urine - Urine, Urine, Clean Catch [[id number]]  (Normal) Collected:  05/07/19 0520    Lab Status:  Final result Specimen:  Urine, Clean Catch Updated:  05/07/19 0604     LEGIONELLA ANTIGEN, URINE Negative    S. Pneumo Ag Urine or CSF - Urine, Urine, Clean Catch [[id number]]  (Normal) Collected:  05/07/19 0520    Lab Status:  Final result Specimen:  Urine, Clean Catch Updated:  05/07/19 0604     Strep Pneumo Ag Negative    Blood Culture - Blood, Wrist, Right [[id number]] Collected:  05/06/19 2145    Lab Status:  Final result Specimen:  Blood from Wrist, Right Updated:  05/11/19 2315     Blood Culture No growth at 5 days    Urine Culture - Urine, Urine, Clean Catch [[id number]]  (Abnormal)  (Susceptibility) Collected:  05/06/19 2145    Lab Status:  Final result Specimen:  Urine, Clean Catch Updated:  05/08/19 0635     Urine Culture " >100,000 CFU/mL Escherichia coli      70,000-80,000 CFU/mL Mixed Gram Positive Alison     Comment: Probable Contaminant         Susceptibility      Escherichia coli     AARON     Ampicillin Susceptible     Ampicillin + Sulbactam Susceptible     Cefazolin Susceptible [1]      Cefepime Susceptible     Ceftriaxone Susceptible     Ertapenem Susceptible     ESBL Confirmation Test Negative     Gentamicin Susceptible     Levofloxacin Susceptible     Meropenem Susceptible     Nitrofurantoin Susceptible     Piperacillin + Tazobactam Susceptible     Trimethoprim + Sulfamethoxazole Susceptible            [1]   Cefazolin results may be used to predict the potential effectiveness of oral cephalosporins for treating uncomplicated urinary tract infections.                 Blood Culture - Blood, Arm, Left [[id number]]  (Abnormal) Collected:  05/06/19 2138    Lab Status:  Final result Specimen:  Blood from Arm, Left Updated:  05/09/19 1504     Blood Culture Clostridium perfringens     Isolated from Anaerobic Bottle     Blood Culture Staphylococcus, coagulase negative     Comment: Probable contaminant        Isolated from Aerobic Bottle     Gram Stain Gram positive bacilli      Gram positive cocci    Narrative:       Blood culture does not meet the specified criteria for PCR identification.  If pregnant, immunocompromised, or clinical concern for meningitis, call lab to run BCID for Listeria monocytogenes.          LILLIAN Morales RPH   05/12/19 8:17 AM

## 2019-05-12 NOTE — PLAN OF CARE
Problem: Fall Risk (Adult)  Goal: Identify Related Risk Factors and Signs and Symptoms  Outcome: Ongoing (interventions implemented as appropriate)   05/12/19 0305   Fall Risk (Adult)   Related Risk Factors (Fall Risk) age-related changes;confusion/agitation;fatigue/slow reaction;history of falls;environment unfamiliar   Signs and Symptoms (Fall Risk) presence of risk factors       Goal: Absence of Fall  Outcome: Ongoing (interventions implemented as appropriate)   05/12/19 0305   Fall Risk (Adult)   Absence of Fall achieves outcome       Problem: Patient Care Overview  Goal: Plan of Care Review  Outcome: Ongoing (interventions implemented as appropriate)   05/12/19 0305   Coping/Psychosocial   Plan of Care Reviewed With patient   Plan of Care Review   Progress no change   OTHER   Outcome Summary No c/o pain this shift. Turn q2h. PureWick in place. IV ABX continue. VSS. Safety maintained. Continue to monitor.       Problem: Skin Injury Risk (Adult)  Goal: Identify Related Risk Factors and Signs and Symptoms  Outcome: Ongoing (interventions implemented as appropriate)   05/12/19 0305   Skin Injury Risk (Adult)   Related Risk Factors (Skin Injury Risk) advanced age;cognitive impairment;infection;mobility impaired;nutritional deficiencies     Goal: Skin Health and Integrity  Outcome: Ongoing (interventions implemented as appropriate)   05/12/19 0305   Skin Injury Risk (Adult)   Skin Health and Integrity making progress toward outcome       Problem: Confusion, Chronic (Adult)  Goal: Identify Related Risk Factors and Signs and Symptoms  Outcome: Ongoing (interventions implemented as appropriate)   05/12/19 0305   Confusion, Chronic (Adult)   Related Risk Factors (Chronic Confusion) aging effects;cognitive impairment   Signs and Symptoms (Chronic Confusion) disorientation;memory disturbed;understanding disturbed     Goal: Cognitive/Functional Impairments Minimized  Outcome: Ongoing (interventions implemented as  appropriate)   05/12/19 0305   Confusion, Chronic (Adult)   Cognitive/Functional Impairments Minimized making progress toward outcome     Goal: Free from Harm/Injuries  Outcome: Ongoing (interventions implemented as appropriate)   05/12/19 0305   Confusion, Chronic (Adult)   Free from Harm/Injuries making progress toward outcome       Problem: Infection, Risk/Actual (Adult)  Goal: Identify Related Risk Factors and Signs and Symptoms  Outcome: Ongoing (interventions implemented as appropriate)   05/12/19 0305   Infection, Risk/Actual (Adult)   Related Risk Factors (Infection, Risk/Actual) age extremes;chronic illness/condition;skin integrity impairment   Signs and Symptoms (Infection, Risk/Actual) lab value changes;weakness     Goal: Infection Prevention/Resolution  Outcome: Ongoing (interventions implemented as appropriate)   05/12/19 0305   Infection, Risk/Actual (Adult)   Infection Prevention/Resolution making progress toward outcome       Problem: Wound (Includes Pressure Injury) (Adult)  Goal: Signs and Symptoms of Listed Potential Problems Will be Absent, Minimized or Managed (Wound)  Outcome: Ongoing (interventions implemented as appropriate)   05/12/19 0305   Goal/Outcome Evaluation   Problems Assessed (Wound) all   Problems Present (Wound) delayed wound healing

## 2019-05-13 LAB
ANION GAP SERPL CALCULATED.3IONS-SCNC: 5 MMOL/L (ref 4–13)
ANISOCYTOSIS BLD QL: ABNORMAL
BUN BLD-MCNC: 63 MG/DL (ref 5–21)
BUN/CREAT SERPL: 35.2 (ref 7–25)
CALCIUM SPEC-SCNC: 8.5 MG/DL (ref 8.4–10.4)
CHLORIDE SERPL-SCNC: 97 MMOL/L (ref 98–110)
CO2 SERPL-SCNC: 31 MMOL/L (ref 24–31)
CREAT BLD-MCNC: 1.79 MG/DL (ref 0.5–1.4)
DEPRECATED RDW RBC AUTO: 45 FL (ref 40–54)
EOSINOPHIL # BLD MANUAL: 0.37 10*3/MM3 (ref 0–0.7)
EOSINOPHIL NFR BLD MANUAL: 2 % (ref 0–4)
ERYTHROCYTE [DISTWIDTH] IN BLOOD BY AUTOMATED COUNT: 15.6 % (ref 12–15)
GFR SERPL CREATININE-BSD FRML MDRD: 27 ML/MIN/1.73
GLUCOSE BLD-MCNC: 113 MG/DL (ref 70–100)
HCT VFR BLD AUTO: 22.5 % (ref 37–47)
HGB BLD-MCNC: 7.3 G/DL (ref 12–16)
HYPOCHROMIA BLD QL: ABNORMAL
LYMPHOCYTES # BLD MANUAL: 1.5 10*3/MM3 (ref 0.72–4.86)
LYMPHOCYTES NFR BLD MANUAL: 4 % (ref 4–12)
LYMPHOCYTES NFR BLD MANUAL: 8.1 % (ref 15–45)
MCH RBC QN AUTO: 25.8 PG (ref 28–32)
MCHC RBC AUTO-ENTMCNC: 32.4 G/DL (ref 33–36)
MCV RBC AUTO: 79.5 FL (ref 82–98)
MICROCYTES BLD QL: ABNORMAL
MONOCYTES # BLD AUTO: 0.74 10*3/MM3 (ref 0.19–1.3)
NEUTROPHILS # BLD AUTO: 15.94 10*3/MM3 (ref 1.87–8.4)
NEUTROPHILS NFR BLD MANUAL: 84.8 % (ref 39–78)
NEUTS BAND NFR BLD MANUAL: 1 % (ref 0–10)
PLATELET # BLD AUTO: 71 10*3/MM3 (ref 130–400)
PMV BLD AUTO: 12 FL (ref 6–12)
POTASSIUM BLD-SCNC: 4.2 MMOL/L (ref 3.5–5.3)
RBC # BLD AUTO: 2.83 10*6/MM3 (ref 4.2–5.4)
SMALL PLATELETS BLD QL SMEAR: ABNORMAL
SODIUM BLD-SCNC: 133 MMOL/L (ref 135–145)
WBC MORPH BLD: NORMAL
WBC NRBC COR # BLD: 18.57 10*3/MM3 (ref 4.8–10.8)

## 2019-05-13 PROCEDURE — 97162 PT EVAL MOD COMPLEX 30 MIN: CPT | Performed by: PHYSICAL THERAPIST

## 2019-05-13 PROCEDURE — 85025 COMPLETE CBC W/AUTO DIFF WBC: CPT | Performed by: INTERNAL MEDICINE

## 2019-05-13 PROCEDURE — 97168 OT RE-EVAL EST PLAN CARE: CPT | Performed by: OCCUPATIONAL THERAPIST

## 2019-05-13 PROCEDURE — 97530 THERAPEUTIC ACTIVITIES: CPT | Performed by: OCCUPATIONAL THERAPIST

## 2019-05-13 PROCEDURE — 97530 THERAPEUTIC ACTIVITIES: CPT | Performed by: PHYSICAL THERAPIST

## 2019-05-13 PROCEDURE — 97110 THERAPEUTIC EXERCISES: CPT | Performed by: OCCUPATIONAL THERAPIST

## 2019-05-13 PROCEDURE — 92526 ORAL FUNCTION THERAPY: CPT

## 2019-05-13 PROCEDURE — 25010000002 LEVOFLOXACIN PER 250 MG: Performed by: NURSE PRACTITIONER

## 2019-05-13 PROCEDURE — 97164 PT RE-EVAL EST PLAN CARE: CPT | Performed by: PHYSICAL THERAPIST

## 2019-05-13 PROCEDURE — 97535 SELF CARE MNGMENT TRAINING: CPT | Performed by: OCCUPATIONAL THERAPIST

## 2019-05-13 PROCEDURE — 80048 BASIC METABOLIC PNL TOTAL CA: CPT | Performed by: INTERNAL MEDICINE

## 2019-05-13 PROCEDURE — 85007 BL SMEAR W/DIFF WBC COUNT: CPT | Performed by: INTERNAL MEDICINE

## 2019-05-13 RX ORDER — ALPRAZOLAM 0.25 MG/1
0.25 TABLET ORAL NIGHTLY
Status: DISCONTINUED | OUTPATIENT
Start: 2019-05-13 | End: 2019-05-16 | Stop reason: HOSPADM

## 2019-05-13 RX ORDER — LEVOFLOXACIN 5 MG/ML
250 INJECTION, SOLUTION INTRAVENOUS
Status: DISCONTINUED | OUTPATIENT
Start: 2019-05-13 | End: 2019-05-16 | Stop reason: HOSPADM

## 2019-05-13 RX ADMIN — CARBIDOPA AND LEVODOPA 1 TABLET: 25; 100 TABLET ORAL at 15:06

## 2019-05-13 RX ADMIN — CALCIUM 1250 MG: 500 TABLET ORAL at 08:02

## 2019-05-13 RX ADMIN — VITAM B12 100 MCG: 100 TAB at 08:03

## 2019-05-13 RX ADMIN — ALPRAZOLAM 0.25 MG: 0.25 TABLET ORAL at 20:46

## 2019-05-13 RX ADMIN — CARBIDOPA AND LEVODOPA 1 TABLET: 25; 100 TABLET ORAL at 08:03

## 2019-05-13 RX ADMIN — SODIUM CHLORIDE, PRESERVATIVE FREE 3 ML: 5 INJECTION INTRAVENOUS at 08:04

## 2019-05-13 RX ADMIN — CARBIDOPA AND LEVODOPA 1 TABLET: 25; 100 TABLET ORAL at 20:46

## 2019-05-13 RX ADMIN — BUMETANIDE 1 MG: 1 TABLET ORAL at 08:02

## 2019-05-13 RX ADMIN — HYDROCODONE BITARTRATE AND ACETAMINOPHEN 1 TABLET: 7.5; 325 TABLET ORAL at 21:36

## 2019-05-13 RX ADMIN — CALCITRIOL 0.25 MCG: 1 SOLUTION ORAL at 08:04

## 2019-05-13 RX ADMIN — LEVOFLOXACIN 250 MG: 5 INJECTION, SOLUTION INTRAVENOUS at 13:51

## 2019-05-13 RX ADMIN — FAMOTIDINE 20 MG: 20 TABLET, FILM COATED ORAL at 08:02

## 2019-05-13 RX ADMIN — SODIUM CHLORIDE, PRESERVATIVE FREE 3 ML: 5 INJECTION INTRAVENOUS at 20:46

## 2019-05-13 RX ADMIN — CITALOPRAM 10 MG: 10 TABLET, FILM COATED ORAL at 20:46

## 2019-05-13 NOTE — PLAN OF CARE
Problem: Fall Risk (Adult)  Goal: Identify Related Risk Factors and Signs and Symptoms  Outcome: Ongoing (interventions implemented as appropriate)   05/13/19 0258   Fall Risk (Adult)   Related Risk Factors (Fall Risk) age-related changes;confusion/agitation;fatigue/slow reaction;gait/mobility problems;history of falls   Signs and Symptoms (Fall Risk) presence of risk factors     Goal: Absence of Fall  Outcome: Ongoing (interventions implemented as appropriate)   05/13/19 0258   Fall Risk (Adult)   Absence of Fall achieves outcome       Problem: Patient Care Overview  Goal: Plan of Care Review  Outcome: Ongoing (interventions implemented as appropriate)   05/13/19 0258   Coping/Psychosocial   Plan of Care Reviewed With patient   Plan of Care Review   Progress no change   OTHER   Outcome Summary No c/o pain this shift. Turn q2h. VSS. Bed alarm in use. Safety maintained. Continue to monitor.       Problem: Skin Injury Risk (Adult)  Goal: Identify Related Risk Factors and Signs and Symptoms  Outcome: Ongoing (interventions implemented as appropriate)   05/13/19 0258   Skin Injury Risk (Adult)   Related Risk Factors (Skin Injury Risk) advanced age;cognitive impairment;infection;mobility impaired;nutritional deficiencies     Goal: Skin Health and Integrity  Outcome: Ongoing (interventions implemented as appropriate)   05/13/19 0258   Skin Injury Risk (Adult)   Skin Health and Integrity making progress toward outcome       Problem: Confusion, Chronic (Adult)  Goal: Identify Related Risk Factors and Signs and Symptoms  Outcome: Ongoing (interventions implemented as appropriate)   05/13/19 0258   Confusion, Chronic (Adult)   Related Risk Factors (Chronic Confusion) aging effects;cognitive impairment   Signs and Symptoms (Chronic Confusion) disorientation;memory disturbed;understanding disturbed     Goal: Cognitive/Functional Impairments Minimized  Outcome: Ongoing (interventions implemented as appropriate)   05/13/19 0258    Confusion, Chronic (Adult)   Cognitive/Functional Impairments Minimized making progress toward outcome     Goal: Free from Harm/Injuries  Outcome: Ongoing (interventions implemented as appropriate)   05/13/19 0258   Confusion, Chronic (Adult)   Free from Harm/Injuries making progress toward outcome       Problem: Infection, Risk/Actual (Adult)  Goal: Identify Related Risk Factors and Signs and Symptoms  Outcome: Ongoing (interventions implemented as appropriate)   05/13/19 0258   Infection, Risk/Actual (Adult)   Related Risk Factors (Infection, Risk/Actual) age extremes;chronic illness/condition;skin integrity impairment   Signs and Symptoms (Infection, Risk/Actual) edema;lab value changes;malaise     Goal: Infection Prevention/Resolution  Outcome: Ongoing (interventions implemented as appropriate)   05/13/19 0258   Infection, Risk/Actual (Adult)   Infection Prevention/Resolution making progress toward outcome       Problem: Wound (Includes Pressure Injury) (Adult)  Goal: Signs and Symptoms of Listed Potential Problems Will be Absent, Minimized or Managed (Wound)  Outcome: Ongoing (interventions implemented as appropriate)   05/13/19 0258   Goal/Outcome Evaluation   Problems Assessed (Wound) all   Problems Present (Wound) bleeding;delayed wound healing

## 2019-05-13 NOTE — PLAN OF CARE
Problem: Patient Care Overview  Goal: Plan of Care Review  Outcome: Ongoing (interventions implemented as appropriate)   05/13/19 0383   Coping/Psychosocial   Plan of Care Reviewed With patient;daughter   OTHER   Outcome Summary OT re-eval and treatment completed this date. Pt tends to close eyes even with awake. Daughter present and encouraging. Pt requires constant verbal and tactile cues for participation. Pt was max A x2 for bed mobility. Pt was min to max A x1-2 for sitting EOB. Pt had increased arousal once sitting EOB. Pt was min A to wash face with strong verbal cues. Skilled OT recommended to address adls, bed mobility, transfers and command following. Recommended d/c to SNF.

## 2019-05-13 NOTE — THERAPY RE-EVALUATION
Acute Care - Occupational Therapy Re-Evaluation  [facility]     Patient Name: Susy Ko  : 1940  MRN: [id number]  Today's Date: 2019  Onset of Illness/Injury or Date of Surgery: 19  Date of Referral to OT: 19  Referring Physician: Dr. Zuniga    Admit Date: 2019       ICD-10-CM ICD-9-CM   1. Severe sepsis (CMS/HCC) A41.9 038.9    R65.20 995.92   2. Urinary tract infection, acute N39.0 599.0   3. Hyperkalemia E87.5 276.7   4. Acute kidney injury (CMS/HCC) N17.9 584.9   5. Altered mental status, unspecified altered mental status type R41.82 780.97   6. Other dysphagia R13.19 787.29   7. Impaired mobility Z74.09 799.89   8. Impaired mobility and ADLs Z74.09 799.89     Patient Active Problem List   Diagnosis   • Normal pressure hydrocephalus   • Non morbid obesity due to excess calories   • Tobacco non-user   • Tremor of right hand   • Abnormality of gait   • Parastomal hernia   • Hernia   • Gait abnormality   • Severe sepsis (CMS/HCC)     Past Medical History:   Diagnosis Date   • Anxiety    • Breast cancer (CMS/HCC)      LEFT WITH RECONSTRUCTION   • Colostomy in place (CMS/HCC)    • Dementia    • Depression    • Edema     LOWER FEET   • Hydrocephalus     NORMAL PRESSURE   • Hypertension    • Incontinence    • Nausea    • Parastomal hernia    • Parkinson disease (CMS/HCC)    • Tremor     RIGHT HAND   • Unsteady gait      Past Surgical History:   Procedure Laterality Date   • ABDOMINAL SURGERY     • APPENDECTOMY     • BREAST RECONSTRUCTION Left     LEFT   • CHOLECYSTECTOMY     • COLON SURGERY     • COLONOSCOPY     • COLOSTOMY     • HYSTERECTOMY     • MASTECTOMY Left    • PARASTOMAL HERNIA REPAIR N/A 3/17/2017    Procedure: REPAIR PARASTOMAL HERNIA;  Surgeon: Princess Walters MD;  Location: [facility];  Service:           OT ASSESSMENT FLOWSHEET (last 12 hours)      Occupational Therapy Evaluation     Row Name 19 1120 19 0846                OT Evaluation Time/Intention     Subjective Information  complains of;pain  -MM  complains of;pain grimmaces with R shoulder flexion initially  -MM       Document Type  therapy note (daily note)  -MM  re-evaluation  -MM       Mode of Treatment  occupational therapy  -MM  occupational therapy  -MM       Patient Effort  adequate  -MM  adequate  -MM       Symptoms Noted During/After Treatment  increased pain  -MM  increased pain  -MM          General Information    Patient Profile Reviewed?  yes  -MM  yes  -MM       Onset of Illness/Injury or Date of Surgery  --  05/06/19  -MM       Referring Physician  --  Dr. Zuniga  -MM       Patient Observations  --  lethargic;agree to therapy  -MM       Patient/Family Observations  --  daughter present and encouraging  -MM       General Observations of Patient  --  fowlers, awake with eyes closed, breakfast tray set up over lap  -MM       Prior Level of Function  --  independent:;feeding;max assist:;dependent:;transfer;w/c or scooter;bed mobility;dressing;bathing  -MM       Equipment Currently Used at Home  --  -- NH has DME  -MM       Pertinent History of Current Functional Problem  --  no significant changes since initial evaluation  -MM       Existing Precautions/Restrictions  --  fall  -MM       Risks Reviewed  --  patient and family:;LOB;dizziness;nausea/vomiting;increased discomfort;change in vital signs;increased drainage;lines disloged  -MM       Benefits Reviewed  --  patient and family:;improve function;increase strength;increase independence;increase balance;decrease risk of DVT;decrease pain;increase knowledge;improve skin integrity  -MM       Barriers to Rehab  --  previous functional deficit;medically complex;cognitive status;physical barrier;contractures  -MM          Relationship/Environment    Lives With  --  facility resident  -MM          Resource/Environmental Concerns    Current Living Arrangements  --  extended care facility  -MM       Resource/Environmental Concerns  --  none  -MM           Cognitive Assessment/Interventions    Additional Documentation  --  Cognitive Assessment/Intervention (Group)  -MM          Cognitive Assessment/Intervention- PT/OT    Affect/Mental Status (Cognitive)  --  low arousal/lethargic;confused  -MM       Orientation Status (Cognition)  --  other (see comments) responds to name, but won't answer when asked what name is  -MM       Follows Commands (Cognition)  --  follows one step commands;0-24% accuracy;delayed response/completion;increased processing time needed;physical/tactile prompts required;repetition of directions required;verbal cues/prompting required  -MM       Safety Deficit (Cognitive)  --  ability to follow commands  -MM       Personal Safety Interventions  --  fall prevention program maintained;muscle strengthening facilitated;supervised activity  -MM          Safety Issues, Functional Mobility    Safety Issues Affecting Function (Mobility)  --  ability to follow commands  -       Impairments Affecting Function (Mobility)  --  balance;cognition;strength;range of motion (ROM);pain;muscle tone abnormal  -MM          Bed Mobility Assessment/Treatment    Bed Mobility Assessment/Treatment  --  scooting/bridging  -MM       Scooting/Bridging [place] (Bed Mobility)  --  dependent (less than 25% patient effort) reposisitioning shoulders in bed  -MM       Assistive Device (Bed Mobility)  --  head of bed elevated;draw sheet  -          ADL Assessment/Intervention    BADL Assessment/Intervention  --  grooming;feeding  -          Grooming Assessment/Training    [place] Level (Grooming)  --  wash face, hands;set up;verbal cues;nonverbal cues (demo/gesture);minimum assist (75% patient effort)  -MM       Grooming Position  --  sitting up in bed  -MM          Self-Feeding Assessment/Training    [place] Level (Feeding)  --  feeding skills;minimum assist (75% patient effort);set up;verbal cues;nonverbal cues (demo/gesture)  -MM       Self-Feeding  Assess/Train, Spillage Amount  --  minimal  -MM       Position (Self-Feeding)  --  sitting up in bed  -MM       Comment (Feeding)  --  min physical assistance but max verbal and tactile cues.  -MM          BADL Safety/Performance    Impairments, BADL Safety/Performance  --  balance;endurance/activity tolerance;cognition;motor planning;pain;shortness of breath;strength  -MM          General ROM    GENERAL ROM COMMENTS  --  Pt initially resistive to ROM, once pt relaxed PROM WNL in BUE. except B shoulders 25% impaired.  -MM          MMT (Manual Muscle Testing)    General MMT Comments  --  atleast 3/5 BUE  -MM          Motor Assessment/Interventions    Additional Documentation  --  Therapeutic Exercise (Group);Therapeutic Exercise Interventions (Group)  -MM          Therapeutic Exercise    Upper Extremity Range of Motion (Therapeutic Exercise)  --  shoulder flexion/extension, bilateral;shoulder abduction/adduction, bilateral;shoulder horizontal abduction/adduction, bilateral;elbow flexion/extension, bilateral;wrist flexion/extension, bilateral  -MM       Hand (Therapeutic Exercise)  --  hand , bilateral  -MM       Exercise Type (Therapeutic Exercise)  --  AAROM (active assistive range of motion)  -MM          Positioning and Restraints    Pre-Treatment Position  --  in bed  -MM       Post Treatment Position  --  bed  -MM       In Bed  --  side lying left;call light within reach;encouraged to call for assist;with family/caregiver;side rails up x2  -MM          Pain Assessment    Additional Documentation  --  Pain Scale: FACES Pre/Post-Treatment (Group)  -MM          Pain Scale: Numbers Pre/Post-Treatment    Pain Location - Side  --  Right  -MM       Pain Location  --  shoulder with flexion  -MM       Pain Intervention(s)  --  Repositioned  -MM          Pain Scale: FACES Pre/Post-Treatment    Pain: FACES Scale, Pretreatment  --  0-->no hurt  -MM       Pain: FACES Scale, Post-Treatment  --  4-->hurts little more  -MM           Wound 05/08/19 1115 coccyx pressure injury;blisters    Wound - Properties Group Date first assessed: 05/08/19  -HS Time first assessed: 1115  -HS Location: coccyx  -HS Type: pressure injury;blisters  -HS Stage, Pressure Injury: deep tissue injury  -HS       Wound 05/07/19 0532 Right heel pressure injury    Wound - Properties Group Date first assessed: 05/07/19  -HS Time first assessed: 0532  -HS Present On Admission : yes;picture taken  -HS Side: Right  -HS Location: heel  -HS Type: pressure injury  -HS Stage, Pressure Injury: deep tissue injury  -HS       Plan of Care Review    Plan of Care Reviewed With  --  patient;daughter  -MM          Clinical Impression (OT)    Date of Referral to OT  --  05/11/19  -MM       OT Diagnosis  --  impaired mobility and adls  -MM       Functional Level at Time of Evaluation (OT Eval)  --  adequate  -MM       Patient/Family Goals Statement (OT Eval)  --  return to OF per daughter  -MM       Criteria for Skilled Therapeutic Interventions Met (OT Eval)  --  yes;treatment indicated  -MM       Rehab Potential (OT Eval)  --  good, to achieve stated therapy goals  -MM       Therapy Frequency (OT Eval)  --  5 times/wk  -MM       Predicted Duration of Therapy Intervention (Therapy Eval)  --  until d/c  -MM       Care Plan Review (OT)  --  evaluation/treatment results reviewed;care plan/treatment goals reviewed;risks/benefits reviewed;current/potential barriers reviewed;patient/other agree to care plan  -MM       Anticipated Discharge Disposition (OT)  --  skilled nursing facility  -MM          Planned OT Interventions    Planned Therapy Interventions (OT Eval)  --  activity tolerance training;BADL retraining;cognitive/visual perception retraining;functional balance retraining;occupation/activity based interventions;passive ROM/stretching;patient/caregiver education/training;ROM/therapeutic exercise;strengthening exercise;transfer/mobility retraining;wheelchair assessment/training   -MM          OT Goals    Grooming Goal Selection (OT)  --  grooming, OT goal 1  -MM       Self-Feeding Goal Selection (OT)  --  self feeding, OT goal 1  -MM       Additional Documentation  --  Grooming Goal Selection (OT) (Row);Self-Feeding Goal Selection (OT) (Row)  -MM          Grooming Goal 1 (OT)    Activity/Device (Grooming Goal 1, OT)  --  grooming skills, all  -MM       [place]ay (Grooming Goal 1, OT)  --  supervision required;set-up required  -MM       Time Frame (Grooming Goal 1, OT)  --  10 days  -MM       Progress/Outcome (Grooming Goal 1, OT)  --  goal ongoing  -MM          Self-Feeding Goal 1 (OT)    Activity/Assistive Device (Self-Feeding Goal 1, OT)  --  self-feeding skills, all  -MM       [place] Level/Cues Needed (Self-Feeding Goal 1, OT)  --  supervision required;set-up required  -MM       Time Frame (Self-Feeding Goal 1, OT)  --  10 days  -MM       Progress/Outcomes (Self-Feeding Goal 1, OT)  --  goal ongoing  -MM          OT Cognitive Goals    Directions Goal Selection (OT)  --  direction following, OT goal 1  -MM       Additional Documentation  --  Directions Goal Selection (OT) (Row)  -MM          Direction Following Goal 1 (OT)    Activity (Direction Following Goal 1, OT)  --  follow one step directions  -MM       Clay/Cues/Accuracy (Direction Following Goal 1, OT)  --  with minimum;physical/tactile cues;verbal cues/redirection;with 60% accuracy  -MM       Time Frame (Direction Following Goal 1, OT)  --  10 days  -MM       Progress/Outcome (Direction Following Goal 1, OT)  --  goal ongoing  -MM          Living Environment    Home Accessibility  --  wheelchair accessible  -MM         User Key  (r) = Recorded By, (t) = Taken By, (c) = Cosigned By    Initials Name Effective Dates    MM Ronald Arroyo, OTR/L 04/03/18 -     HS Tamara Salazar, RN 12/27/16 -          Occupational Therapy Education     Title: PT OT SLP Therapies (In Progress)     Topic: Occupational Therapy (In  Progress)     Point: ADL training (In Progress)     Description: Instruct learner(s) on proper safety adaptation and remediation techniques during self care or transfers.   Instruct in proper use of assistive devices.    Learning Progress Summary           Patient Acceptance, E, NR by  at 5/13/2019  1:54 PM    Comment:  benefits of activity    Acceptance, E, NR by MM at 5/13/2019  1:43 PM    Comment:  OT role, benefits, POC, d/c planning    Acceptance, E, VU by  at 5/7/2019  3:18 PM    Comment:  OT POC   Family Acceptance, E, NR by MM at 5/13/2019  1:54 PM    Comment:  benefits of activity    Acceptance, E, NR by MM at 5/13/2019  1:43 PM    Comment:  OT role, benefits, POC, d/c planning    Acceptance, E, VU by  at 5/7/2019  3:18 PM    Comment:  OT POC                               User Key     Initials Effective Dates Name Provider Type Discipline     04/03/18 -  Ronald Arroyo, OTR/L Occupational Therapist OT     08/28/18 -  Fidelia Gonzalez, OTR/L Occupational Therapist OT                  OT Recommendation and Plan  Outcome Summary/Treatment Plan (OT)  Anticipated Discharge Disposition (OT): skilled nursing facility  Planned Therapy Interventions (OT Eval): activity tolerance training, BADL retraining, cognitive/visual perception retraining, functional balance retraining, occupation/activity based interventions, passive ROM/stretching, patient/caregiver education/training, ROM/therapeutic exercise, strengthening exercise, transfer/mobility retraining, wheelchair assessment/training  Therapy Frequency (OT Eval): 5 times/wk  Plan of Care Review  Plan of Care Reviewed With: patient, family  Plan of Care Reviewed With: patient, family  Outcome Summary: OT re-eval and treatment completed this date. Pt tends to close eyes even with awake. Daughter present and encouraging. Pt requires constant verbal and tactile cues for participation. Pt was max A x2 for bed mobility. Pt was min to max A x1-2 for sitting  EOB. Pt had increased arousal once sitting EOB. Pt was min A to wash face with strong verbal cues. Skilled OT recommended to address adls, bed mobility, transfers and command following. Recommended d/c to SNF.    Outcome Measures     Row Name 05/13/19 1354 05/13/19 1300 05/13/19 0846       How much help from another person do you currently need...    Turning from your back to your side while in flat bed without using bedrails?  --  1  -SB  --    Moving from lying on back to sitting on the side of a flat bed without bedrails?  --  1  -SB  --    Moving to and from a bed to a chair (including a wheelchair)?  --  1  -SB  --    Standing up from a chair using your arms (e.g., wheelchair, bedside chair)?  --  1  -SB  --    Climbing 3-5 steps with a railing?  --  1  -SB  --    To walk in hospital room?  --  1  -SB  --    AM-PAC 6 Clicks Score  --  6  -SB  --       How much help from another is currently needed...    Putting on and taking off regular lower body clothing?  1  -MM  --  1  -MM    Bathing (including washing, rinsing, and drying)  2  -MM  --  2  -MM    Toileting (which includes using toilet bed pan or urinal)  1  -MM  --  1  -MM    Putting on and taking off regular upper body clothing  2  -MM  --  2  -MM    Taking care of personal grooming (such as brushing teeth)  3  -MM  --  3  -MM    Eating meals  3  -MM  --  3  -MM    Score  12  -MM  --  12  -MM       Functional Assessment    Outcome Measure Options  AM-PAC 6 Clicks Daily Activity (OT)  -MM  AM-PAC 6 Clicks Basic Mobility (PT)  -SB  AM-PAC 6 Clicks Daily Activity (OT)  -MM      User Key  (r) = Recorded By, (t) = Taken By, (c) = Cosigned By    Initials Name Provider Type    MM Ronald Arroyo, OTR/L Occupational Therapist    Kim Crum PT Physical Therapist          Time Calculation:   Time Calculation- OT     Row Name 05/13/19 1119 05/13/19 0846          Time Calculation- OT    OT Start Time  1119  -MM  0846 + 14 min chart review   -MM     OT Stop  Time  1200  -MM  0931  -MM     OT Time Calculation (min)  41 min  -MM  45 min  -MM     Total Timed Code Minutes- OT  41 minute(s)  -MM  --     OT Received On  05/13/19  -MM  05/13/19  -MM     OT Goal Re-Cert Due Date  --  05/23/19  -MM        Timed Charges    88009 - OT Therapeutic Activity Minutes  41  -MM  --       User Key  (r) = Recorded By, (t) = Taken By, (c) = Cosigned By    Initials Name Provider Type    MM Ronald Arroyo, OTR/L Occupational Therapist        Therapy Charges for Today     Code Description Service Date Service Provider Modifiers Qty    96215816996 HC OT RE-EVAL 2 5/13/2019 Ronald Arroyo, OTR/L GO 1    00040875461 HC OT THER PROC EA 15 MIN 5/13/2019 Ronald Arroyo, OTR/L GO 1    91091074758 HC OT SELF CARE/MGMT/TRAIN EA 15 MIN 5/13/2019 Ronald Arroyo, OTR/L GO 1    26426646998 HC OT THERAPEUTIC ACT EA 15 MIN 5/13/2019 Ronald Arroyo OTR/L GO 3               Ronald Arroyo OTR/LILLIAN  5/13/2019

## 2019-05-13 NOTE — PROGRESS NOTES
Nephrology ([facility]) Progress Note      Patient:  Susy Ko  YOB: 1940  Date of Service: 5/13/2019  MRN: [id number]   Acct: [id number]   Primary Care Physician: Monster Zuniga MD  Advance Directive:   Code Status and Medical Interventions:   Ordered at: 05/12/19 1039     Level Of Support Discussed With:    Next of Kin (If No Surrogate)     Code Status:    No CPR     Medical Interventions (Level of Support Prior to Arrest):    Full     Admit Date: 5/6/2019       Hospital Day: 7  Referring Provider: Monster Zuniga MD      Patient personally seen and examined.  Complete chart including Consults, Notes, Operative Reports, Labs, Cardiology, and Radiology studies reviewed as able.        Subjective:  Susy Ko is a 79 y.o. female  whom we were consulted for acute kidney injury, metabolic acidosis.  History of hypertension, Parkinson's, and has a colostomy.  Patient resides in local nursing home secondary to advanced dementia. Had been complaining of abdominal pain so was sent to emergency department for evaluation.  Found to have acute kidney injury, severe metabolic acidosis, UTI with sepsis.  Patient required Levophed for blood pressure support. Family had decided not to pursue any dialysis.  Now off Levophed.  Has transferred to medical floor. Renal function has slowly been recovering.  Good output with diuretics.    Today is drowsy.  No new overnight issues.  Urine output nonoliguric    Allergies:  Morphine and related and Adhesive tape    Home Meds:  Medications Prior to Admission   Medication Sig Dispense Refill Last Dose   • acetaminophen (TYLENOL) 325 MG tablet Take 650 mg by mouth Every 4 (Four) Hours As Needed for Mild Pain  or Fever.   Past Month at Unknown time   • amLODIPine (NORVASC) 5 MG tablet Take 5 mg by mouth Daily.   5/6/2019   • Benzocaine (SORE THROAT RELIEF) 10 MG lozenge Dissolve 1 lozenge in the mouth 4 (Four) Times a  Day As Needed (Sore throat).   Past Month at Unknown time   • carbidopa-levodopa (SINEMET)  MG per tablet Take 1 tablet by mouth 3 (Three) Times a Day.   5/6/2019 at Unknown time   • citalopram (CeleXA) 10 MG tablet Take 10 mg by mouth Every Night.   5/5/2019   • Cranberry 250 MG tablet Take 1 tablet by mouth 3 (Three) Times a Day.   5/6/2019   • cyanocobalamin 1000 MCG/ML injection Inject 1,000 mcg into the appropriate muscle as directed by prescriber Every 30 (Thirty) Days. Given on the 14th of every month.   4/14/2019   • furosemide (LASIX) 20 MG tablet Take 20 mg by mouth Every Other Day.   5/6/2019   • ibuprofen (ADVIL,MOTRIN) 600 MG tablet Take 600 mg by mouth 3 (Three) Times a Day.   5/6/2019   • loratadine (CLARITIN) 10 MG tablet Take 10 mg by mouth Daily.   5/6/2019   • losartan (COZAAR) 50 MG tablet Take 50 mg by mouth Daily.   5/6/2019   • Menthol, Topical Analgesic, (BIOFREEZE) 4 % gel Apply 1 application topically Every 6 (Six) Hours As Needed (Apply to knees and lower back).   Past Week at Unknown time   • metoprolol succinate XL (TOPROL-XL) 50 MG 24 hr tablet Take 50 mg by mouth Daily.   5/6/2019   • omeprazole (priLOSEC) 20 MG capsule Take 20 mg by mouth Daily.   5/6/2019   • Skin Protectants, Misc. (PERIGUARD) ointment Apply 1 application topically Every Evening. Apply to buttocks.   5/6/2019   • tiotropium bromide-olodaterol (STIOLTO RESPIMAT) 2.5-2.5 MCG/ACT aerosol solution inhaler Inhale 2 puffs Daily.   5/6/2019   • traMADol (ULTRAM) 50 MG tablet Take 50 mg by mouth Every 6 (Six) Hours As Needed for Moderate Pain .   Past Month at Unknown time       Medicines:  Current Facility-Administered Medications   Medication Dose Route Frequency Provider Last Rate Last Dose   • acetaminophen (TYLENOL) tablet 650 mg  650 mg Oral Q6H PRN Monster Zuniga MD       • acetaminophen (TYLENOL) tablet 650 mg  650 mg Oral Q4H PRN Monster Zuniga MD       • ALPRAZolam (XANAX) tablet 0.5 mg  0.5  mg Oral Nightly Monster Zuniga MD   0.5 mg at 05/12/19 2023   • bumetanide (BUMEX) tablet 1 mg  1 mg Oral Daily Ramsey Weldon MD   1 mg at 05/13/19 0802   • calcitRIOL (ROCALTROL) solution 0.25 mcg  0.25 mcg Nasogastric Daily Monster Zuniga MD   0.25 mcg at 05/13/19 0804   • calcium carbonate (oyster shell) tablet 1,250 mg  1,250 mg Oral Daily Ramsey Weldon MD   1,250 mg at 05/13/19 0802   • calcium gluconate 1 g in sodium chloride 0.9 % 100 mL IVPB  1 g Intravenous PRN Monster Zuniga  mL/hr at 05/08/19 0451 1 g at 05/08/19 0451    And   • calcium gluconate 6 g in sodium chloride 0.9 % 500 mL IVPB  6 g Intravenous PRN Monster Zuniga MD 83.3 mL/hr at 05/08/19 0647 6 g at 05/08/19 0647   • carbidopa-levodopa (SINEMET)  MG per tablet 1 tablet  1 tablet Oral TID Monster Zuniga MD   1 tablet at 05/13/19 0803   • citalopram (CeleXA) tablet 10 mg  10 mg Oral Nightly Monster Zuniga MD   10 mg at 05/12/19 2023   • cyancobalamin (VITAMIN B-12) tablet 100 mcg  100 mcg Oral Daily Monster Zuniga MD   100 mcg at 05/13/19 0803   • famotidine (PEPCID) tablet 20 mg  20 mg Oral Daily Monster Zuniga MD   20 mg at 05/13/19 0802   • HYDROcodone-acetaminophen (NORCO) 7.5-325 MG per tablet 1 tablet  1 tablet Oral Q4H PRN Monster Zuniga MD       • sodium chloride 0.9 % flush 10 mL  10 mL Intravenous PRN Prosper Jiang DO       • sodium chloride 0.9 % flush 3 mL  3 mL Intravenous Q12H Monster Zuniga MD   3 mL at 05/13/19 0804   • sodium chloride 0.9 % flush 3-10 mL  3-10 mL Intravenous PRN Monster Zuniga MD       • vancomycin 500 mg/100 mL 0.9% NS IVPB ([facility])  500 mg Intravenous Q48H Monster Zuniga MD   Stopped at 05/12/19 0718       Past Medical History:  Past Medical History:   Diagnosis Date   • Anxiety    • Breast cancer (CMS/HCC)      LEFT WITH RECONSTRUCTION   • Colostomy in place (CMS/HCC)    • Dementia    • Depression    •  Edema     LOWER FEET   • Hydrocephalus     NORMAL PRESSURE   • Hypertension    • Incontinence    • Nausea    • Parastomal hernia    • Parkinson disease (CMS/HCC)    • Tremor     RIGHT HAND   • Unsteady gait        Past Surgical History:  Past Surgical History:   Procedure Laterality Date   • ABDOMINAL SURGERY     • APPENDECTOMY     • BREAST RECONSTRUCTION Left     LEFT   • CHOLECYSTECTOMY     • COLON SURGERY     • COLONOSCOPY     • COLOSTOMY     • HYSTERECTOMY     • MASTECTOMY Left    • PARASTOMAL HERNIA REPAIR N/A 3/17/2017    Procedure: REPAIR PARASTOMAL HERNIA;  Surgeon: Princess Walters MD;  Location: [facility];  Service:        Family History  History reviewed. No pertinent family history.    Social History  Social History     Socioeconomic History   • Marital status:      Spouse name: Not on file   • Number of children: Not on file   • Years of education: Not on file   • Highest education level: Not on file   Tobacco Use   • Smoking status: Former Smoker   • Smokeless tobacco: Never Used   Substance and Sexual Activity   • Alcohol use: No   • Drug use: No   • Sexual activity: Defer         Review of Systems:   History obtained from chart review and family  General ROS: negative  Respiratory ROS: no cough, shortness of breath, or wheezing  Cardiovascular ROS: no chest pain or dyspnea on exertion  Gastrointestinal ROS: no abdominal pain, change in bowel habits, or black or bloody stools  Genito-Urinary ROS: no dysuria, trouble voiding, or hematuria  Musculoskeletal ROS: negative  Neurological ROS: no TIA or stroke symptoms    Objective:  Patient Vitals for the past 24 hrs:   BP Temp Temp src Pulse Resp SpO2 Weight   05/13/19 0815 127/45 98.3 °F (36.8 °C) Axillary 84 16 91 % --   05/13/19 0242 124/55 98.8 °F (37.1 °C) Oral 91 16 93 % --   05/13/19 0002 -- 98.8 °F (37.1 °C) Oral 88 16 94 % --   05/12/19 1941 146/45 99.6 °F (37.6 °C) Oral 92 16 95 % 71.7 kg (158 lb)   05/12/19 1647 138/47 98.3 °F (36.8 °C)  Oral 88 16 95 % --   05/12/19 1212 140/50 98 °F (36.7 °C) Oral 85 16 92 % --       Intake/Output Summary (Last 24 hours) at 5/13/2019 1132  Last data filed at 5/13/2019 1000  Gross per 24 hour   Intake 120 ml   Output 2550 ml   Net -2430 ml     General: awake, oriented X 1   Neck: supple, no JVD  Chest:  clear to auscultation bilaterally without respiratory distress  CVS: regular rate and rhythm  Abdominal: diffusely tender, +BS, colostomy bag in place  Extremities: no cyanosis or edema  Skin: warm and dry without rash  Neuro: no focal motor deficits    Labs:  Results from last 7 days   Lab Units 05/13/19  0534 05/12/19  0632 05/11/19  0645   WBC 10*3/mm3 18.57* 12.35* 10.95*   HEMOGLOBIN g/dL 7.3* 7.3* 7.6*   HEMATOCRIT % 22.5* 22.3* 23.5*   PLATELETS 10*3/mm3 71* 71* 76*         Results from last 7 days   Lab Units 05/13/19  0534 05/12/19  0632 05/11/19  0645 05/10/19  0607   SODIUM mmol/L 133* 134* 135 140   POTASSIUM mmol/L 4.2 4.1 4.1 3.5   CHLORIDE mmol/L 97* 98 101 101   CO2 mmol/L 31.0 29.0 31.0 33.0*   BUN mg/dL 63* 66* 75* 92*   CREATININE mg/dL 1.79* 1.69* 1.94* 2.58*   CALCIUM mg/dL 8.5 8.4 7.9* 7.3*   BILIRUBIN mg/dL  --  0.3 0.4 0.2   ALK PHOS U/L  --  64 69 97   ALT (SGPT) U/L  --  23 24 32   AST (SGOT) U/L  --  66* 84* 112*   GLUCOSE mg/dL 113* 105* 91 136*       Radiology:   Imaging Results (last 72 hours)     Procedure Component Value Units Date/Time    US Renal Bilateral [[id number]] Collected:  05/07/19 1124     Updated:  05/07/19 1129    Narrative:       EXAMINATION:  US RENAL BILATERAL-  5/7/2019 10:11 AM CDT     HISTORY: acute renal failure; A41.9-Sepsis, unspecified organism;  R65.20-Severe sepsis without septic shock; N39.0-Urinary tract  infection, site not specified; E87.5-Hyperkalemia; N17.9-Acute kidney  failure, unspecified; R41.82-Altered mental status, unspecified;  R13.19-Other dysphagia      COMPARISON: 05/10/2014 renal ultrasound. 05/06/2019 abdomen and pelvis  CT     TECHNIQUE:       Bilateral renal ultrasound was performed.     FINDINGS:      The right kidney measures 10.1 cm in [id number] length.     The left kidney measures 11 cm in [id number] length.     There is mild cortical thinning and pelvic lipomatosis compatible with  age.     Normal echogenicity of renal cortex is observed without renal masses.     There are no perinephric abnormalities.     There is no pelvocaliectasis or obstructive uropathy changes.     The urinary bladder is decompressed with a Peralta catheter.       Impression:       1. Negative bilateral renal ultrasound.  This report was finalized on 05/07/2019 11:26 by Dr. Iban Queen MD.    CT Abdomen Pelvis Without Contrast [[id number]] Collected:  05/06/19 2236     Updated:  05/06/19 2242    Narrative:       EXAMINATION: CT ABDOMEN PELVIS WO CONTRAST-      5/6/2019 10:17 PM CDT     HISTORY: pain; r/o obstruction; A41.9-Sepsis, unspecified organism;  R65.20-Severe sepsis without septic shock; N39.0-Urinary tract  infection, site not specified; E87.5-Hyperkalemia; N17.9-Acute kidney  failure, unspecified; R41.82-Altered mental status, unspecified     In order to have a CT radiation dose as low as reasonably achievable  Automated Exposure Control was utilized for adjustment of the mA and/or  KV according to patient size.     DLP in mGycm= 343.     Noncontrast abdomen/pelvis CT.     Comparison is made with 04/12/2016.     Left lower quadrant ostomy with nonobstructing parastomal hernia.     No significant bowel dilation.     Mild urinary bladder distention.     Mild dilation of the renal collecting systems and ureters is probably  based on vesicoureteral reflux. There is no obstructing stone.     Normal heart size.  No acute abnormality at the lung bases.     Cholecystectomy clips.  Normal noncontrast appearance of the liver and spleen.  The pancreas is atrophic.     Aortic calcification with no aneurysm.     Summary:  1. Moderate fecal material throughout the colon.  2.  Nonobstructing parastomal hernia.  3. Mild dilation of the renal collecting systems and ureters compatible  with vesicoureteral reflux.  4. No evidence of bowel obstruction, mass or abscess.                                   This report was finalized on 05/06/2019 22:39 by Dr. Raul Ricci MD.    CT Head Without Contrast [[id number]] Collected:  05/06/19 2234     Updated:  05/06/19 2238    Narrative:       EXAMINATION: CT HEAD WO CONTRAST-      5/6/2019 10:17 PM CDT     HISTORY: AMS; A41.9-Sepsis, unspecified organism; R65.20-Severe sepsis  without septic shock; N39.0-Urinary tract infection, site not specified;  E87.5-Hyperkalemia; N17.9-Acute kidney failure, unspecified;  R41.82-Altered mental status, unspecified     In order to have a CT radiation dose as low as reasonably achievable  Automated Exposure Control was utilized for adjustment of the mA and/or  KV according to patient size.     DLP in mGycm= 1214.     Noncontrast head CT compared with 01/17/2017.     Axial, sagittal, and coronal noncontrast CT imaging of the head.     The visualized paranasal sinuses are clear.     The brain and ventricles have an age appropriate appearance.  Moderate atrophy and small vessel disease.  There is no hemorrhage or mass-effect.   No acute infarction is seen.     No calvarial abnormality.       Impression:       1. No acute intracranial abnormality is seen.                                         This report was finalized on 05/06/2019 22:35 by Dr. Raul Ricci MD.    XR Chest 1 View [[id number]] Collected:  05/06/19 2216     Updated:  05/06/19 2219    Narrative:       EXAMINATION: XR CHEST 1 VW-     5/6/2019 10:11 PM CDT     HISTORY: Altered mental status; r/o pneumonia.     One view chest x-ray compared with 01/17/2017.     Heart size is normal.  The mediastinum is within normal limits.      The lungs are normally expanded with no pneumonia or pneumothorax.  Chronic lung changes.  No congestive failure changes.                                                                        Impression:       1. No acute disease.           This report was finalized on 05/06/2019 22:16 by Dr. Raul Ricci MD.          Culture:  Blood Culture   Date Value Ref Range Status   05/06/2019 No growth at 24 hours  Preliminary   05/06/2019 Gram Positive Rods (A)  Preliminary   05/06/2019 Gram Positive Cocci (A)  Preliminary     Urine Culture   Date Value Ref Range Status   05/06/2019 >100,000 CFU/mL Escherichia coli (A)  Final   05/06/2019 70,000-80,000 CFU/mL Mixed Gram Positive Alison (A)  Final     Comment:     Probable Contaminant           Assessment   1.  Acute kidney injury due to ATN--improving  2.  Baseline CKD stage 3  3.  UTI/urosepsis--resolved  4.  Metabolic acidosis--resolved  5.  Rhabdomyolysis--resolved  6.  Hypocalcemia--improved  7.  Advanced dementia  8.  History of colostomy  9.  Hyponatremia    Plan:  1.  Continue oral diuretics  2.  Monitor labs      Cirilo Ross, COLTON  5/13/2019  11:32 AM

## 2019-05-13 NOTE — THERAPY RE-EVALUATION
Acute Care - Physical Therapy Re-Evaluation  [facility]     Patient Name: Susy Ko  : 1940  MRN: [id number]  Today's Date: 2019   Onset of Illness/Injury or Date of Surgery: 19  Date of Referral to PT: 05/10/19  Referring Physician: Dr. Zuniga      Admit Date: 2019    Visit Dx:     ICD-10-CM ICD-9-CM   1. Severe sepsis (CMS/HCC) A41.9 038.9    R65.20 995.92   2. Urinary tract infection, acute N39.0 599.0   3. Hyperkalemia E87.5 276.7   4. Acute kidney injury (CMS/HCC) N17.9 584.9   5. Altered mental status, unspecified altered mental status type R41.82 780.97   6. Other dysphagia R13.19 787.29   7. Impaired mobility Z74.09 799.89     Patient Active Problem List   Diagnosis   • Normal pressure hydrocephalus   • Non morbid obesity due to excess calories   • Tobacco non-user   • Tremor of right hand   • Abnormality of gait   • Parastomal hernia   • Hernia   • Gait abnormality   • Severe sepsis (CMS/HCC)     Past Medical History:   Diagnosis Date   • Anxiety    • Breast cancer (CMS/HCC)      LEFT WITH RECONSTRUCTION   • Colostomy in place (CMS/HCC)    • Dementia    • Depression    • Edema     LOWER FEET   • Hydrocephalus     NORMAL PRESSURE   • Hypertension    • Incontinence    • Nausea    • Parastomal hernia    • Parkinson disease (CMS/HCC)    • Tremor     RIGHT HAND   • Unsteady gait      Past Surgical History:   Procedure Laterality Date   • ABDOMINAL SURGERY     • APPENDECTOMY     • BREAST RECONSTRUCTION Left     LEFT   • CHOLECYSTECTOMY     • COLON SURGERY     • COLONOSCOPY     • COLOSTOMY     • HYSTERECTOMY     • MASTECTOMY Left    • PARASTOMAL HERNIA REPAIR N/A 3/17/2017    Procedure: REPAIR PARASTOMAL HERNIA;  Surgeon: Princess Walters MD;  Location: [facility] OR;  Service:         PT ASSESSMENT (last 12 hours)      Physical Therapy Evaluation     Row Name 19 1119          PT Evaluation Time/Intention    Subjective Information  complains of;pain moans and grimaces when  performing bed mob  -SB     Document Type  re-evaluation  -SB     Mode of Treatment  physical therapy  -SB     Patient Effort  adequate  -SB     Symptoms Noted During/After Treatment  increased pain  -SB     Row Name 05/13/19 1119          General Information    Patient Profile Reviewed?  yes  -SB     Onset of Illness/Injury or Date of Surgery  05/06/19  -SB     Referring Physician  Dr. Zuniga  -SB     Patient Observations  agree to therapy;lethargic  -SB     Patient/Family Observations  dtr and granddaughter present  -SB     General Observations of Patient  fowlers, awake but closed eyes through most of tx  -SB     Risks Reviewed  patient:;family:;nausea/vomiting;LOB;dizziness;increased discomfort;change in vital signs;increased drainage;lines disloged  -SB     Benefits Reviewed  patient:;family:;increase independence;improve function;increase balance;increase strength;decrease pain;decrease risk of DVT;improve skin integrity;increase knowledge  -SB     Barriers to Rehab  previous functional deficit;medically complex;cognitive status;physical barrier;contractures  -SB     Row Name 05/13/19 1119          Relationship/Environment    Lives With  facility resident  -SB     Row Name 05/13/19 1119          Cognitive Assessment/Interventions    Additional Documentation  Cognitive Assessment/Intervention (Group)  -SB     Row Name 05/13/19 1119          Cognitive Assessment/Intervention- PT/OT    Affect/Mental Status (Cognitive)  low arousal/lethargic;confused  -SB     Follows Commands (Cognition)  follows one step commands;0-24% accuracy  -SB     Safety Deficit (Cognitive)  ability to follow commands  -SB     Personal Safety Interventions  fall prevention program maintained;muscle strengthening facilitated;supervised activity  -SB     Row Name 05/13/19 1119          Safety Issues, Functional Mobility    Safety Issues Affecting Function (Mobility)  ability to follow commands  -SB     Impairments Affecting Function  (Mobility)  balance;cognition;strength;range of motion (ROM);pain;muscle tone abnormal  -SB     Row Name 05/13/19 1119          Bed Mobility Assessment/Treatment    Bed Mobility Assessment/Treatment  rolling right;supine-sit;sit-supine;scooting/bridging  -SB     Rolling Right [place] (Bed Mobility)  maximum assist (25% patient effort)  -SB     Scooting/Bridging [place] (Bed Mobility)  dependent (less than 25% patient effort);2 person assist  -SB     Supine-Sit [place] (Bed Mobility)  maximum assist (25% patient effort);2 person assist  -SB     Sit-Supine [place] (Bed Mobility)  maximum assist (25% patient effort);2 person assist;moderate assist (50% patient effort)  -SB     Bed Mobility, Safety Issues  cognitive deficits limit understanding;decreased use of arms for pushing/pulling;decreased use of legs for bridging/pushing;impaired trunk control for bed mobility  -SB     Assistive Device (Bed Mobility)  head of bed elevated;draw sheet;bed rails  -SB     Row Name 05/13/19 1119          Transfer Assessment/Treatment    Comment (Transfers)  not performed due to pt fatigue and lack of participation  -SB     Row Name 05/13/19 1119          General ROM    GENERAL ROM COMMENTS  B ankle PROM WFL, B knee PROM impaired 95% ( B knee near contractured at ~ 90 degrees flexion), B hip PROM impaired 75%  -SB     Row Name 05/13/19 1119          MMT (Manual Muscle Testing)    General MMT Comments  0/5 in B LE, no movement observed  -SB     Row Name 05/13/19 1119          Motor Assessment/Intervention    Muscle Tone Assessment  Bilateral Lower Extremities  -SB     Bilateral Lower Extremities Muscle Tone Assessment  -- B knee contractures at near 90 degrees flexion  -SB     Additional Documentation  Muscle Tone Assessment (Row)  -SB     Row Name 05/13/19 1119          Pain Scale: Numbers Pre/Post-Treatment    Pain Scale: Numbers, Pretreatment  0/10 - no pain  -SB     Pain Scale: Numbers, Post-Treatment  0/10 - no  pain  -SB     Pain Intervention(s)  Repositioned  -SB     Row Name             Wound 05/08/19 1115 coccyx pressure injury;blisters    Wound - Properties Group Date first assessed: 05/08/19  -HS Time first assessed: 1115  -HS Location: coccyx  -HS Type: pressure injury;blisters  -HS Stage, Pressure Injury: deep tissue injury  -HS    Row Name             Wound 05/07/19 0532 Right heel pressure injury    Wound - Properties Group Date first assessed: 05/07/19  -HS Time first assessed: 0532  -HS Present On Admission : yes;picture taken  -HS Side: Right  -HS Location: heel  -HS Type: pressure injury  -HS Stage, Pressure Injury: deep tissue injury  -HS    Row Name 05/13/19 1119          Plan of Care Review    Plan of Care Reviewed With  patient;family  -SB     Row Name 05/13/19 1119          Physical Therapy Clinical Impression    Date of Referral to PT  05/10/19  -SB     Patient/Family Goals Statement (PT Clinical Impression)  return to NH  -SB     Criteria for Skilled Interventions Met (PT Clinical Impression)  yes  -SB     Rehab Potential (PT Clinical Summary)  fair, will monitor progress closely  -SB     Predicted Duration of Therapy (PT)  until d/c   -SB     Care Plan Review (PT)  evaluation/treatment results reviewed;care plan/treatment goals reviewed;risks/benefits reviewed;current/potential barriers reviewed;patient/other agree to care plan  -SB     Care Plan Review, Other Participant (PT Clinical Impression)  daughter  -SB     Row Name 05/13/19 1119          Physical Therapy Goals    Bed Mobility Goal Selection (PT)  bed mobility, PT goal 1  -SB     Transfer Goal Selection (PT)  transfer, PT goal 1  -SB     Row Name 05/13/19 1119          Bed Mobility Goal 1 (PT)    Activity/Assistive Device (Bed Mobility Goal 1, PT)  sit to supine;supine to sit  -SB     [place] Level/Cues Needed (Bed Mobility Goal 1, PT)  moderate assist (50-74% patient effort)  -SB     Time Frame (Bed Mobility Goal 1, PT)  by discharge  -SB      Progress/Outcomes (Bed Mobility Goal 1, PT)  goal ongoing  -SB     Row Name 05/13/19 1119          Transfer Goal 1 (PT)    Activity/Assistive Device (Transfer Goal 1, PT)  bed-to-chair/chair-to-bed squat pivot  -SB     [place] Level/Cues Needed (Transfer Goal 1, PT)  moderate assist (50-74% patient effort);2 person assist  -SB     Time Frame (Transfer Goal 1, PT)  by discharge  -SB     Progress/Outcome (Transfer Goal 1, PT)  goal ongoing  -SB     Row Name 05/13/19 1119          Positioning and Restraints    Pre-Treatment Position  in bed  -SB     Post Treatment Position  bed  -SB     In Bed  side lying right;call light within reach;encouraged to call for assist;with family/caregiver  -SB       User Key  (r) = Recorded By, (t) = Taken By, (c) = Cosigned By    Initials Name Provider Type    Tamara Montes RN Registered Nurse    Kim Crum, PT Physical Therapist        Physical Therapy Education     Title: PT OT SLP Therapies (Done)     Topic: Physical Therapy (Done)     Point: Mobility training (Done)     Learning Progress Summary           Patient Acceptance, E, VU by SB at 5/13/2019  1:27 PM    Comment:  pt family educated on POC, benefits of activity and d/c plans   Family Acceptance, E, VU by SB at 5/13/2019  1:27 PM    Comment:  pt family educated on POC, benefits of activity and d/c plans    Acceptance, E, VU by FE at 5/7/2019  1:57 PM    Comment:  Educated celeste on benefits of activity, 1 time PT eval at this time                   Point: Home exercise program (Done)     Learning Progress Summary           Patient Acceptance, E, VU by SB at 5/13/2019  1:27 PM    Comment:  pt family educated on POC, benefits of activity and d/c plans   Family Acceptance, E, VU by SB at 5/13/2019  1:27 PM    Comment:  pt family educated on POC, benefits of activity and d/c plans                   Point: Body mechanics (Done)     Learning Progress Summary           Patient Acceptance, E, VU by SB at  5/13/2019  1:27 PM    Comment:  pt family educated on POC, benefits of activity and d/c plans   Family Acceptance, E, VU by SB at 5/13/2019  1:27 PM    Comment:  pt family educated on POC, benefits of activity and d/c plans                   Point: Precautions (Done)     Learning Progress Summary           Patient Acceptance, E, VU by SB at 5/13/2019  1:27 PM    Comment:  pt family educated on POC, benefits of activity and d/c plans   Family Acceptance, E, VU by SB at 5/13/2019  1:27 PM    Comment:  pt family educated on POC, benefits of activity and d/c plans                               User Key     Initials Effective Dates Name Provider Type Discipline    FE 08/02/16 -  Braulio Fulton PT DPT Physical Therapist PT    KAROLINA 08/31/18 -  Kim Knight PT Physical Therapist PT              PT Recommendation and Plan  Anticipated Discharge Disposition (PT): skilled nursing facility  Planned Therapy Interventions (PT Eval): balance training, bed mobility training, home exercise program, patient/family education, strengthening, transfer training, stretching  Therapy Frequency (PT Clinical Impression): daily  Outcome Summary/Treatment Plan (PT)  Anticipated Discharge Disposition (PT): skilled nursing facility  Plan of Care Reviewed With: patient, daughter  Progress: no change  Outcome Summary: PT eval completed. Pt asleep upon arrival, but was able to open eyes and nod or shake head to some of questions asked. Pt needed max verba and tactile cues for participation. Pt with decreased command following throughout eval. Pt with no active LE movement, and has B knee flexion contractures to 90 degs. Pt family reported that prior to hospitalization, pt was getting assist performing squat pivot t/f to wheelchair and spent most her day in chair. Pt performed rolling with max A x2 and grabbed onto bedrail, and needed max A x2 for sup to sit. Pt unable to sit EOB without min-max A. Pt could benefit from PT to improve bed  mobility and t/f to return to baseline function, but unsure how she will do due to command following and cognitive deficits. Anticipate d/c to SNF.   Outcome Measures     Row Name 05/13/19 1300             How much help from another person do you currently need...    Turning from your back to your side while in flat bed without using bedrails?  1  -SB      Moving from lying on back to sitting on the side of a flat bed without bedrails?  1  -SB      Moving to and from a bed to a chair (including a wheelchair)?  1  -SB      Standing up from a chair using your arms (e.g., wheelchair, bedside chair)?  1  -SB      Climbing 3-5 steps with a railing?  1  -SB      To walk in hospital room?  1  -SB      AM-PAC 6 Clicks Score  6  -SB         Functional Assessment    Outcome Measure Options  AM-PAC 6 Clicks Basic Mobility (PT)  -SB        User Key  (r) = Recorded By, (t) = Taken By, (c) = Cosigned By    Initials Name Provider Type    Kim Crum PT Physical Therapist         Time Calculation:   PT Charges     Row Name 05/13/19 1335             Time Calculation    Start Time  1119  -SB      Stop Time  1159  -SB      Time Calculation (min)  40 min  -SB      PT Received On  05/13/19  -SB      PT Goal Re-Cert Due Date  05/23/19  -SB        User Key  (r) = Recorded By, (t) = Taken By, (c) = Cosigned By    Initials Name Provider Type    Kim Crum PT Physical Therapist        Therapy Charges for Today     Code Description Service Date Service Provider Modifiers Qty    15992961683 HC PT RE-EVAL ESTABLISHED PLAN 2 5/13/2019 Kim Knight, PT GP 1    [id number]  PT THERAPEUTIC ACT EA 15 MIN 5/13/2019 Kim Knight PT GP 1          PT G-Codes  Outcome Measure Options: AM-PAC 6 Clicks Basic Mobility (PT)  AM-PAC 6 Clicks Score: 6  Score: 6      Kim Knight PT  5/13/2019

## 2019-05-13 NOTE — THERAPY TREATMENT NOTE
Acute Care - Speech Language Pathology   Swallow Treatment Note [facility]     Patient Name: Susy Ko  : 1940  MRN: [id number]  Today's Date: 2019  Onset of Illness/Injury or Date of Surgery: 19     Referring Physician: Dr. Zuniga      Admit Date: 2019  SLP treatment complete. Pt asleep at time of SLP arrival, but she was able to awaken and complete multiple trials of thin liquids. No overt s/s of aspiration were noted. RN reports pt completed meds whole with thin liquids this AM w/o difficulty. Pt's daughter reports that the pt did become choked approximately 10 minutes following meal yesterday. SLP educated pt's daughter regarding only providing PO intake while pt is sitting alert and upright and to not force feed. SLP can not ensure that pt will be able to complete enough PO intake to provide adequate nutrition. Pt's daughter verbalized understanding re: feeding safety concerns. Pt ok to continue current diet. SLP will continue to follow and treat.  Benjamin Martinez MS CCC-SLP 2019 1:20 PM    Visit Dx:      ICD-10-CM ICD-9-CM   1. Severe sepsis (CMS/HCC) A41.9 038.9    R65.20 995.92   2. Urinary tract infection, acute N39.0 599.0   3. Hyperkalemia E87.5 276.7   4. Acute kidney injury (CMS/HCC) N17.9 584.9   5. Altered mental status, unspecified altered mental status type R41.82 780.97   6. Other dysphagia R13.19 787.29     Patient Active Problem List   Diagnosis   • Normal pressure hydrocephalus   • Non morbid obesity due to excess calories   • Tobacco non-user   • Tremor of right hand   • Abnormality of gait   • Parastomal hernia   • Hernia   • Gait abnormality   • Severe sepsis (CMS/HCC)       Therapy Treatment  Rehabilitation Treatment Summary     Row Name 19 1050             Treatment Time/Intention    Discipline  speech language pathologist  -CS      Document Type  therapy note (daily note)  -CS      Subjective Information  no complaints  -CS      Mode of Treatment   speech-language pathology  -CS      Care Plan Review  care plan/treatment goals reviewed  -CS      Patient Effort  adequate  -CS      Recorded by [CS] Benjamin Martinez MS CCC-SLP 05/13/19 1257      Row Name 05/13/19 1050             Pain Assessment    Additional Documentation  Pain Scale: Numbers Pre/Post-Treatment (Group)  -CS      Recorded by [CS] Benjamin Martinez MS CCC-SLP 05/13/19 1257      Row Name 05/13/19 1050             Pain Scale: Numbers Pre/Post-Treatment    Pain Scale: Numbers, Pretreatment  0/10 - no pain  -CS      Pain Scale: Numbers, Post-Treatment  0/10 - no pain  -CS      Recorded by [CS] Benjamin Martinez, MS CCC-SLP 05/13/19 1257      Row Name                Wound 05/08/19 1115 coccyx pressure injury;blisters    Wound - Properties Group Date first assessed: 05/08/19 [HS] Time first assessed: 1115 [HS] Location: coccyx [HS] Type: pressure injury;blisters [HS2] Stage, Pressure Injury: deep tissue injury [HS] Recorded by:  [HS] Tamara Salazar RN 05/08/19 1726 [HS2] Tamara Salazar RN 05/08/19 1728    Row Name                Wound 05/07/19 0532 Right heel pressure injury    Wound - Properties Group Date first assessed: 05/07/19 [HS] Time first assessed: 0532 [HS] Present On Admission : yes;picture taken [HS] Side: Right [HS] Location: heel [HS] Type: pressure injury [HS] Stage, Pressure Injury: deep tissue injury [HS] Recorded by:  [HS] Tamara Salazar RN 05/08/19 1741    Row Name 05/13/19 1050             Outcome Summary/Treatment Plan (SLP)    Daily Summary of Progress (SLP)  progress toward functional goals is gradual  -CS      Barriers to Overall Progress (SLP)  medically complex  -CS      Plan for Continued Treatment (SLP)  continue to follow and treat  -CS      Anticipated Dischage Disposition  skilled nursing facility  -CS      Recorded by [CS] Benjamin Martinez MS CCC-SLP 05/13/19 1257        User Key  (r) = Recorded By, (t) = Taken By, (c) = Cosigned By    Initials Name Effective Dates  Discipline    CS Benjamin Martinez, MS CCC-SLP 04/03/18 -  SLP    HS Tamara Salazar RN 12/27/16 -  Nurse          Outcome Summary  Outcome Summary/Treatment Plan (SLP)  Daily Summary of Progress (SLP): progress toward functional goals is gradual (05/13/19 1050 : Benjamin Martinez, MS CCC-SLP)  Barriers to Overall Progress (SLP): medically complex (05/13/19 1050 : Benjamin Martinez MS CCC-SLP)  Plan for Continued Treatment (SLP): continue to follow and treat (05/13/19 1050 : Benjamin Martinez, MS CCC-SLP)  Anticipated Dischage Disposition: skilled nursing facility (05/13/19 1050 : Benjamin Martinez MS CCC-SLP)      SLP GOALS     Row Name 05/13/19 1050 05/12/19 1000          Oral Nutrition/Hydration Goal 1 (SLP)    Oral Nutrition/Hydration Goal 1, SLP  Patient will tolerate LRD with no s/s aspiration.  -CS  Patient will tolerate LRD with no s/s aspiration.  -MM     Time Frame (Oral Nutrition/Hydration Goal 1, SLP)  by discharge  -CS  by discharge  -MM     Barriers (Oral Nutrition/Hydration Goal 1, SLP)  n/a  -CS  n/a  -MM     Progress/Outcomes (Oral Nutrition/Hydration Goal 1, SLP)  continuing progress toward goal  -CS  continuing progress toward goal  -MM        Lingual Strengthening Goal 1 (SLP)    Activity (Lingual Strengthening Goal 1, SLP)  increase lingual tone/sensation/control/coordination/movement  -CS  increase lingual tone/sensation/control/coordination/movement  -MM     Increase Lingual Tone/Sensation/Control/Coordination/Movement  lingual movement exercises  -CS  lingual movement exercises  -MM     [place]offee/Accuracy (Lingual Strengthening Goal 1, SLP)  independently (over 90% accuracy)  -CS  independently (over 90% accuracy)  -MM     Time Frame (Lingual Strengthening Goal 1, SLP)  by discharge  -CS  by discharge  -MM     Barriers (Lingual Strengthening Goal 1, SLP)  n/a  -CS  n/a  -MM     Progress/Outcomes (Lingual Strengthening Goal 1, SLP)  continuing progress toward goal  -CS  continuing progress toward goal   -MM        Pharyngeal Strengthening Exercise Goal 1 (SLP)    Activity (Pharyngeal Strengthening Goal 1, SLP)  increase timing  -CS  increase timing  -MM     Increase Timing  gustatory stimulation (sour/cold);prepping - 3 second prep or suck swallow or 3-step swallow;hard effortful swallow  -CS  gustatory stimulation (sour/cold);prepping - 3 second prep or suck swallow or 3-step swallow;hard effortful swallow  -MM     [place]/Accuracy (Pharyngeal Strengthening Goal 1, SLP)  independently (over 90% accuracy)  -CS  independently (over 90% accuracy)  -MM     Time Frame (Pharyngeal Strengthening Goal 1, SLP)  by discharge  -CS  by discharge  -MM     Barriers (Pharyngeal Strengthening Goal 1, SLP)  n/a  -CS  n/a  -MM     Progress/Outcomes (Pharyngeal Strengthening Goal 1, SLP)  continuing progress toward goal  -CS  continuing progress toward goal  -MM       User Key  (r) = Recorded By, (t) = Taken By, (c) = Cosigned By    Initials Name Provider Type    Benjamin Contreras MS CCC-SLP Speech and Language Pathologist    Jerri Ortega MS, CFY-SLP Speech and Language Pathologist          EDUCATION  The patient has been educated in the following areas:   Dysphagia (Swallowing Impairment).    SLP Recommendation and Plan  Daily Summary of Progress (SLP): progress toward functional goals is gradual  Barriers to Overall Progress (SLP): medically complex  Plan for Continued Treatment (SLP): continue to follow and treat  Anticipated Dischage Disposition: skilled nursing facility                    Time Calculation:   Time Calculation- SLP     Row Name 05/13/19 1314             Time Calculation- SLP    SLP Start Time  1050  -CS      SLP Stop Time  1130  -      SLP Time Calculation (min)  40 min  -      SLP Received On  05/13/19  -        User Key  (r) = Recorded By, (t) = Taken By, (c) = Cosigned By    Initials Name Provider Type    Benjamin Contreras MS CCC-SLP Speech and Language Pathologist          Therapy  Charges for Today     Code Description Service Date Service Provider Modifiers Qty    22760055319 HC ST TREATMENT SWALLOW 3 5/13/2019 Benjamin Maritnez, MS CCC-SLP GN 1                 Benjamin Martinez MS CCC-SLP  5/13/2019

## 2019-05-13 NOTE — PLAN OF CARE
Problem: Patient Care Overview  Goal: Plan of Care Review   05/13/19 1534   OTHER   Outcome Summary VSS, no c/o of pain, family concerned pt is sleeping more, oriented to self, does wake and follow simple commands, mepilex to coccyx, wound care reconsulted for re evaluation due to area beginning to open, continue with prevalon boots, bilat heel DTI, pt being turned every 2 hrs, has purewick with good urine output, BLE edematous, family at bedside, bed alarm in use

## 2019-05-13 NOTE — PROGRESS NOTES
[facility]  ARIES Abdalla M.D.  COLTON Hunter      Internal Medicine Progress Note    5/13/2019   12:41 PM    Name:  Susy Ko  MRN:    [id number]     Acct:     [id number]   Room:  [address] [place] [address] Day: 7     Admit Date: 5/6/2019  8:39 PM  PCP: Monster Zuniga MD    Subjective:     C/C:   Chief Complaint   Patient presents with   • Abnormal KUB       Interval History: Status:  stayed the same. Resting in bed.Daughter at bedside. Patient more lethargic today. Daughter reports increasing lethargy over the past 2 days. Slow progress with therapy. Renal function stable. WBC elevated.     Review of Systems   Constitution: Positive for weakness and malaise/fatigue. Negative for chills, decreased appetite, weight gain and weight loss.   HENT: Negative for congestion, ear discharge, hoarse voice and tinnitus.    Eyes: Negative for blurred vision, discharge, visual disturbance and visual halos.   Cardiovascular: Negative for chest pain, claudication, dyspnea on exertion, irregular heartbeat, leg swelling, orthopnea and paroxysmal nocturnal dyspnea.   Respiratory: Negative for cough, shortness of breath, sputum production and wheezing.    Endocrine: Negative for cold intolerance, heat intolerance and polyuria.   Hematologic/Lymphatic: Negative for adenopathy. Does not bruise/bleed easily.   Skin: Negative for dry skin, itching and suspicious lesions.   Musculoskeletal: Negative for arthritis, back pain, falls, joint pain, muscle weakness and myalgias.   Gastrointestinal: Negative for abdominal pain, constipation, diarrhea, dysphagia and hematemesis.   Genitourinary: Negative for bladder incontinence, dysuria and frequency.   Neurological: Negative for aphonia, disturbances in coordination and dizziness.   Psychiatric/Behavioral: Negative for altered mental status, depression, memory loss and substance abuse. The patient does not have insomnia and is not nervous/anxious.     Allergic/Immunologic: Negative for environmental allergies.     Medications:     Allergies:   Allergies   Allergen Reactions   • Morphine And Related Mental Status Change   • Adhesive Tape Rash       Current Meds:   Current Facility-Administered Medications:   •  acetaminophen (TYLENOL) tablet 650 mg, 650 mg, Oral, Q6H PRN, Monster Zuniga MD  •  acetaminophen (TYLENOL) tablet 650 mg, 650 mg, Oral, Q4H PRN, Monster Zuniga MD  •  ALPRAZolam (XANAX) tablet 0.5 mg, 0.5 mg, Oral, Nightly, Monster Zuniga MD, 0.5 mg at 05/12/19 2023  •  bumetanide (BUMEX) tablet 1 mg, 1 mg, Oral, Daily, Ramsey Weldon MD, 1 mg at 05/13/19 0802  •  calcitRIOL (ROCALTROL) solution 0.25 mcg, 0.25 mcg, Nasogastric, Daily, Monster Zuniga MD, 0.25 mcg at 05/13/19 0804  •  calcium carbonate (oyster shell) tablet 1,250 mg, 1,250 mg, Oral, Daily, Ramsey Weldon MD, 1,250 mg at 05/13/19 0802  •  calcium gluconate 1 g in sodium chloride 0.9 % 100 mL IVPB, 1 g, Intravenous, PRN, Last Rate: 100 mL/hr at 05/08/19 0451, 1 g at 05/08/19 0451 **AND** calcium gluconate 6 g in sodium chloride 0.9 % 500 mL IVPB, 6 g, Intravenous, PRN, Last Rate: 83.3 mL/hr at 05/08/19 0647, 6 g at 05/08/19 0647 **AND** Calcium, , , PRN, Monster Zuniga MD  •  carbidopa-levodopa (SINEMET)  MG per tablet 1 tablet, 1 tablet, Oral, TID, Monster Zuniga MD, 1 tablet at 05/13/19 0803  •  citalopram (CeleXA) tablet 10 mg, 10 mg, Oral, Nightly, Monster Zuniga MD, 10 mg at 05/12/19 2023  •  cyancobalamin (VITAMIN B-12) tablet 100 mcg, 100 mcg, Oral, Daily, Monster Zuniga MD, 100 mcg at 05/13/19 0803  •  famotidine (PEPCID) tablet 20 mg, 20 mg, Oral, Daily, Monster Zuniga MD, 20 mg at 05/13/19 0802  •  HYDROcodone-acetaminophen (NORCO) 7.5-325 MG per tablet 1 tablet, 1 tablet, Oral, Q4H PRN, Monster Zuniga MD  •  levoFLOXacin (LEVAQUIN) 250 mg/50 mL D5W (premix) 250 mg, 250 mg, Intravenous, Q24H,  "Traci Kohler, APRN  •  [COMPLETED] Insert peripheral IV, , , Once **AND** sodium chloride 0.9 % flush 10 mL, 10 mL, Intravenous, PRN, Prosper Jiang, DO  •  sodium chloride 0.9 % flush 3 mL, 3 mL, Intravenous, Q12H, Monster Zuniga MD, 3 mL at 19 0804  •  sodium chloride 0.9 % flush 3-10 mL, 3-10 mL, Intravenous, PRN, Monster Zuniga MD    Data:     Code Status:    Code Status and Medical Interventions:   Ordered at: 19 1039     Level Of Support Discussed With:    Next of Kin (If No Surrogate)     Code Status:    No CPR     Medical Interventions (Level of Support Prior to Arrest):    Full       History reviewed. No pertinent family history.    Social History     Socioeconomic History   • Marital status:      Spouse name: Not on file   • Number of children: Not on file   • Years of education: Not on file   • Highest education level: Not on file   Tobacco Use   • Smoking status: Former Smoker   • Smokeless tobacco: Never Used   Substance and Sexual Activity   • Alcohol use: No   • Drug use: No   • Sexual activity: Defer       Vitals:  /56 (BP Location: Left arm, Patient Position: Lying)   Pulse 76   Temp 98.5 °F (36.9 °C) (Axillary)   Resp 16   Ht 160 cm (63\")   Wt 71.7 kg (158 lb)   SpO2 94%   BMI 27.99 kg/m²   Temp (24hrs), Av.7 °F (37.1 °C), Min:98.3 °F (36.8 °C), Max:99.6 °F (37.6 °C)        I/O (24Hr):    Intake/Output Summary (Last 24 hours) at 2019 1241  Last data filed at 2019 1000  Gross per 24 hour   Intake 120 ml   Output 1300 ml   Net -1180 ml       Labs and imaging:      Recent Results (from the past 12 hour(s))   Basic Metabolic Panel    Collection Time: 19  5:34 AM   Result Value Ref Range    Glucose 113 (H) 70 - 100 mg/dL    BUN 63 (H) 5 - 21 mg/dL    Creatinine 1.79 (H) 0.50 - 1.40 mg/dL    Sodium 133 (L) 135 - 145 mmol/L    Potassium 4.2 3.5 - 5.3 mmol/L    Chloride 97 (L) 98 - 110 mmol/L    CO2 31.0 24.0 - 31.0 mmol/L    " Calcium 8.5 8.4 - 10.4 mg/dL    eGFR Non African Amer 27 (L) >60 mL/min/1.73    BUN/Creatinine Ratio 35.2 (H) 7.0 - 25.0    Anion Gap 5.0 4.0 - 13.0 mmol/L   CBC Auto Differential    Collection Time: 05/13/19  5:34 AM   Result Value Ref Range    WBC 18.57 (H) 4.80 - 10.80 10*3/mm3    RBC 2.83 (L) 4.20 - 5.40 10*6/mm3    Hemoglobin 7.3 (L) 12.0 - 16.0 g/dL    Hematocrit 22.5 (L) 37.0 - 47.0 %    MCV 79.5 (L) 82.0 - 98.0 fL    MCH 25.8 (L) 28.0 - 32.0 pg    MCHC 32.4 (L) 33.0 - 36.0 g/dL    RDW 15.6 (H) 12.0 - 15.0 %    RDW-SD 45.0 40.0 - 54.0 fl    MPV 12.0 6.0 - 12.0 fL    Platelets 71 (L) 130 - 400 10*3/mm3   Manual Differential    Collection Time: 05/13/19  5:34 AM   Result Value Ref Range    Neutrophil % 84.8 (H) 39.0 - 78.0 %    Lymphocyte % 8.1 (L) 15.0 - 45.0 %    Monocyte % 4.0 4.0 - 12.0 %    Eosinophil % 2.0 0.0 - 4.0 %    Bands %  1.0 0.0 - 10.0 %    Neutrophils Absolute 15.94 (H) 1.87 - 8.40 10*3/mm3    Lymphocytes Absolute 1.50 0.72 - 4.86 10*3/mm3    Monocytes Absolute 0.74 0.19 - 1.30 10*3/mm3    Eosinophils Absolute 0.37 0.00 - 0.70 10*3/mm3    Anisocytosis Slight/1+ None Seen    Hypochromia Slight/1+ None Seen    Microcytes Slight/1+ None Seen    WBC Morphology Normal Normal    Platelet Estimate Decreased Normal     Physical Examination:        Physical Exam   Constitutional: She is oriented to person, place, and time. She appears well-developed and well-nourished.   HENT:   Head: Normocephalic and atraumatic.   Nose: Nose normal.   Mouth/Throat: Oropharynx is clear and moist.   Eyes: Conjunctivae and EOM are normal. Pupils are equal, round, and reactive to light.   Neck: Normal range of motion. Neck supple.   Cardiovascular: Normal rate, regular rhythm, normal heart sounds and intact distal pulses.   Pulmonary/Chest: Effort normal and breath sounds normal.   Abdominal: Soft. Bowel sounds are normal.   Ostomy - stoma pink and appliance intact   Musculoskeletal:   Generalized weakness  Contractures to  BLE   Neurological: She is oriented to person, place, and time.   Intermittent confusion  Drowsy but arousable   Skin: Skin is warm and dry.   DTI to right heel  Blisters and excoriation to coccyx   Nursing note and vitals reviewed.      Assessment:            Severe sepsis (CMS/HCC)    Past Medical History:   Diagnosis Date   • Anxiety    • Breast cancer (CMS/HCC)      LEFT WITH RECONSTRUCTION   • Colostomy in place (CMS/HCC)    • Dementia    • Depression    • Edema     LOWER FEET   • Hydrocephalus     NORMAL PRESSURE   • Hypertension    • Incontinence    • Nausea    • Parastomal hernia    • Parkinson disease (CMS/HCC)    • Tremor     RIGHT HAND   • Unsteady gait         Plan:        1. Sepsis  2. E. Coli UTI - present on admission  3. Acute renal failure  4. Rhabdomyolysis  5. Hypotension  6. Dementia  7. Abdominal pain  8. Metabolic acidosis  9. Hyperkalemia  10. Uremia  11. Anxiety  12. Thrombocytopenia    Continue current treatment. Monitor counts. Increase activity. Labs in am. Continue antibiotics. Continue fluid resuscitation. Continue nutrition support. Aggressive therapies. Monitor platelets closely. Monitor for renal recovery. Dc vancomycin. Start levaquin.      Electronically signed by COLTON Ross on 5/13/2019 at 12:41 PM     I have discussed the care of Susy Ko, including pertinent history and exam findings, with the nurse practitioner.    I have seen and examined the patient and the key elements of all parts of the encounter have been performed by me.  I agree with the assessment, plan and orders as documented by Traci SEGURA, after I modified the exam findings and the plan of treatments and the final version is my approved version of the assessment.        Electronically signed by Monster Zuniga MD on 5/13/2019 at 4:34 PM

## 2019-05-13 NOTE — PLAN OF CARE
Problem: Patient Care Overview  Goal: Plan of Care Review  Outcome: Ongoing (interventions implemented as appropriate)   05/13/19 1330   Coping/Psychosocial   Plan of Care Reviewed With patient;daughter   Plan of Care Review   Progress no change   OTHER   Outcome Summary PT re-eval completed. Pt asleep upon arrival, but was able to open eyes and nod or shake head to some of questions asked. Pt needed max verba and tactile cues for participation. Pt with decreased command following throughout eval. Pt with no active LE movement, and has B knee flexion contractures to 90 degs. Pt family reported that prior to hospitalization, pt was getting assist performing squat pivot t/f to wheelchair and spent most her day in chair. Pt performed rolling with max A x2 and grabbed onto bedrail, and needed max A x2 for sup to sit. Pt unable to sit EOB without min-max A. Pt could benefit from PT to improve bed mobility and t/f to return to baseline function, but unsure how she will do due to command following and cognitive deficits. Anticipate d/c to SNF.

## 2019-05-13 NOTE — PLAN OF CARE
Problem: Patient Care Overview  Goal: Plan of Care Review  Outcome: Ongoing (interventions implemented as appropriate)   05/13/19 8216   Coping/Psychosocial   Plan of Care Reviewed With patient   Plan of Care Review   Progress no change   OTHER   Outcome Summary SLP treatment complete. Pt asleep at time of SLP arrival, but she was able to awaken and complete multiple trials of thin liquids. No overt s/s of aspiration were noted. RN reports pt completed meds whole with thin liquids this AM w/o difficulty. Pt's daughter reports that the pt did become choked approximately 10 minutes following meal yesterday. SLP educated pt's daughter regarding only providing PO intake while pt is sitting alert and upright and to not force feed. SLP can not ensure that pt will be able to complete enough PO intake to provide adequate nutrition. Pt's daughter verbalized understanding re: feeding safety concerns. Pt ok to continue current diet. SLP will continue to follow and treat.

## 2019-05-14 LAB
ABO GROUP BLD: NORMAL
ANION GAP SERPL CALCULATED.3IONS-SCNC: 6 MMOL/L (ref 4–13)
BASOPHILS # BLD AUTO: 0.05 10*3/MM3 (ref 0–0.2)
BASOPHILS NFR BLD AUTO: 0.3 % (ref 0–2)
BLD GP AB SCN SERPL QL: NEGATIVE
BUN BLD-MCNC: 61 MG/DL (ref 5–21)
BUN/CREAT SERPL: 34.5 (ref 7–25)
CALCIUM SPEC-SCNC: 8.5 MG/DL (ref 8.4–10.4)
CHLORIDE SERPL-SCNC: 93 MMOL/L (ref 98–110)
CO2 SERPL-SCNC: 31 MMOL/L (ref 24–31)
CREAT BLD-MCNC: 1.77 MG/DL (ref 0.5–1.4)
DEPRECATED RDW RBC AUTO: 43.8 FL (ref 40–54)
EOSINOPHIL # BLD AUTO: 0.38 10*3/MM3 (ref 0–0.7)
EOSINOPHIL NFR BLD AUTO: 2.1 % (ref 0–4)
ERYTHROCYTE [DISTWIDTH] IN BLOOD BY AUTOMATED COUNT: 15.3 % (ref 12–15)
FERRITIN SERPL-MCNC: 587 NG/ML (ref 11.1–264)
GFR SERPL CREATININE-BSD FRML MDRD: 28 ML/MIN/1.73
GLUCOSE BLD-MCNC: 103 MG/DL (ref 70–100)
HCT VFR BLD AUTO: 20.3 % (ref 37–47)
HEMOCCULT STL QL: NEGATIVE
HGB BLD-MCNC: 6.6 G/DL (ref 12–16)
IMM GRANULOCYTES # BLD AUTO: 0.51 10*3/MM3 (ref 0–0.05)
IMM GRANULOCYTES NFR BLD AUTO: 2.9 % (ref 0–5)
IRON 24H UR-MRATE: 17 MCG/DL (ref 42–180)
IRON SATN MFR SERPL: 7 % (ref 20–45)
LYMPHOCYTES # BLD AUTO: 2.1 10*3/MM3 (ref 0.72–4.86)
LYMPHOCYTES NFR BLD AUTO: 11.8 % (ref 15–45)
MCH RBC QN AUTO: 25.8 PG (ref 28–32)
MCHC RBC AUTO-ENTMCNC: 32.5 G/DL (ref 33–36)
MCV RBC AUTO: 79.3 FL (ref 82–98)
MONOCYTES # BLD AUTO: 1.2 10*3/MM3 (ref 0.19–1.3)
MONOCYTES NFR BLD AUTO: 6.7 % (ref 4–12)
NEUTROPHILS # BLD AUTO: 13.54 10*3/MM3 (ref 1.87–8.4)
NEUTROPHILS NFR BLD AUTO: 76.2 % (ref 39–78)
NRBC BLD AUTO-RTO: 0 /100 WBC (ref 0–0.2)
PLATELET # BLD AUTO: 78 10*3/MM3 (ref 130–400)
PMV BLD AUTO: 11.5 FL (ref 6–12)
POTASSIUM BLD-SCNC: 4.3 MMOL/L (ref 3.5–5.3)
RBC # BLD AUTO: 2.56 10*6/MM3 (ref 4.2–5.4)
RH BLD: POSITIVE
SODIUM BLD-SCNC: 130 MMOL/L (ref 135–145)
T&S EXPIRATION DATE: NORMAL
TIBC SERPL-MCNC: 229 MCG/DL (ref 225–420)
VANCOMYCIN TROUGH SERPL-MCNC: 6.61 MCG/ML (ref 10–20)
WBC NRBC COR # BLD: 17.78 10*3/MM3 (ref 4.8–10.8)

## 2019-05-14 PROCEDURE — 36430 TRANSFUSION BLD/BLD COMPNT: CPT

## 2019-05-14 PROCEDURE — 85025 COMPLETE CBC W/AUTO DIFF WBC: CPT | Performed by: INTERNAL MEDICINE

## 2019-05-14 PROCEDURE — P9016 RBC LEUKOCYTES REDUCED: HCPCS

## 2019-05-14 PROCEDURE — 86920 COMPATIBILITY TEST SPIN: CPT

## 2019-05-14 PROCEDURE — 82272 OCCULT BLD FECES 1-3 TESTS: CPT | Performed by: NURSE PRACTITIONER

## 2019-05-14 PROCEDURE — 86901 BLOOD TYPING SEROLOGIC RH(D): CPT | Performed by: NURSE PRACTITIONER

## 2019-05-14 PROCEDURE — 83550 IRON BINDING TEST: CPT | Performed by: INTERNAL MEDICINE

## 2019-05-14 PROCEDURE — 86900 BLOOD TYPING SEROLOGIC ABO: CPT | Performed by: NURSE PRACTITIONER

## 2019-05-14 PROCEDURE — 86900 BLOOD TYPING SEROLOGIC ABO: CPT

## 2019-05-14 PROCEDURE — 97530 THERAPEUTIC ACTIVITIES: CPT

## 2019-05-14 PROCEDURE — 25010000002 LEVOFLOXACIN PER 250 MG: Performed by: NURSE PRACTITIONER

## 2019-05-14 PROCEDURE — 92526 ORAL FUNCTION THERAPY: CPT

## 2019-05-14 PROCEDURE — 82728 ASSAY OF FERRITIN: CPT | Performed by: INTERNAL MEDICINE

## 2019-05-14 PROCEDURE — 87040 BLOOD CULTURE FOR BACTERIA: CPT | Performed by: INTERNAL MEDICINE

## 2019-05-14 PROCEDURE — 83540 ASSAY OF IRON: CPT | Performed by: INTERNAL MEDICINE

## 2019-05-14 PROCEDURE — 80202 ASSAY OF VANCOMYCIN: CPT | Performed by: INTERNAL MEDICINE

## 2019-05-14 PROCEDURE — 80048 BASIC METABOLIC PNL TOTAL CA: CPT | Performed by: INTERNAL MEDICINE

## 2019-05-14 PROCEDURE — 86850 RBC ANTIBODY SCREEN: CPT | Performed by: NURSE PRACTITIONER

## 2019-05-14 RX ORDER — FERROUS SULFATE 325(65) MG
325 TABLET ORAL 2 TIMES DAILY WITH MEALS
Status: DISCONTINUED | OUTPATIENT
Start: 2019-05-14 | End: 2019-05-16 | Stop reason: HOSPADM

## 2019-05-14 RX ADMIN — ACETAMINOPHEN 650 MG: 325 TABLET, FILM COATED ORAL at 23:50

## 2019-05-14 RX ADMIN — CARBIDOPA AND LEVODOPA 1 TABLET: 25; 100 TABLET ORAL at 21:25

## 2019-05-14 RX ADMIN — BUMETANIDE 1 MG: 1 TABLET ORAL at 08:28

## 2019-05-14 RX ADMIN — FAMOTIDINE 20 MG: 20 TABLET, FILM COATED ORAL at 08:28

## 2019-05-14 RX ADMIN — CARBIDOPA AND LEVODOPA 1 TABLET: 25; 100 TABLET ORAL at 08:27

## 2019-05-14 RX ADMIN — ALPRAZOLAM 0.25 MG: 0.25 TABLET ORAL at 21:25

## 2019-05-14 RX ADMIN — LEVOFLOXACIN 250 MG: 5 INJECTION, SOLUTION INTRAVENOUS at 17:27

## 2019-05-14 RX ADMIN — SODIUM CHLORIDE, PRESERVATIVE FREE 3 ML: 5 INJECTION INTRAVENOUS at 09:48

## 2019-05-14 RX ADMIN — CALCIUM 1250 MG: 500 TABLET ORAL at 08:27

## 2019-05-14 RX ADMIN — CARBIDOPA AND LEVODOPA 1 TABLET: 25; 100 TABLET ORAL at 16:26

## 2019-05-14 RX ADMIN — SODIUM CHLORIDE, PRESERVATIVE FREE 3 ML: 5 INJECTION INTRAVENOUS at 21:25

## 2019-05-14 RX ADMIN — VITAM B12 100 MCG: 100 TAB at 08:28

## 2019-05-14 RX ADMIN — CITALOPRAM 10 MG: 10 TABLET, FILM COATED ORAL at 21:25

## 2019-05-14 RX ADMIN — FERROUS SULFATE TAB 325 MG (65 MG ELEMENTAL FE) 325 MG: 325 (65 FE) TAB at 16:26

## 2019-05-14 RX ADMIN — CALCITRIOL 0.25 MCG: 1 SOLUTION ORAL at 08:28

## 2019-05-14 NOTE — NURSING NOTE
Patient is up in bed eating lunch.  Daughter asked if I could return at later time.  Will return to re-assess wounds.

## 2019-05-14 NOTE — PROGRESS NOTES
Nephrology ([facility]) Progress Note      Patient:  Susy Ko  YOB: 1940  Date of Service: 5/14/2019  MRN: [id number]   Acct: [id number]   Primary Care Physician: Monster Zuniga MD  Advance Directive:   Code Status and Medical Interventions:   Ordered at: 05/12/19 1039     Level Of Support Discussed With:    Next of Kin (If No Surrogate)     Code Status:    No CPR     Medical Interventions (Level of Support Prior to Arrest):    Full     Admit Date: 5/6/2019       Hospital Day: 8  Referring Provider: Monster Zuniga MD      Patient personally seen and examined.  Complete chart including Consults, Notes, Operative Reports, Labs, Cardiology, and Radiology studies reviewed as able.        Subjective:  Susy Ko is a 79 y.o. female  whom we were consulted for acute kidney injury, metabolic acidosis.  History of hypertension, Parkinson's, and has a colostomy.  Patient resides in local nursing home secondary to advanced dementia. Had been complaining of abdominal pain so was sent to emergency department for evaluation.  Found to have acute kidney injury, severe metabolic acidosis, UTI with sepsis.  Patient required Levophed for blood pressure support. Family had decided not to pursue any dialysis.  Now off Levophed.  Has transferred to medical floor. Renal function has slowly been recovering.  Maintaining adequate output with diuretics.    Today is lethargic.  No new overnight issues.  Planning to transfuse 2 units PRBC today    Allergies:  Morphine and related and Adhesive tape    Home Meds:  Medications Prior to Admission   Medication Sig Dispense Refill Last Dose   • acetaminophen (TYLENOL) 325 MG tablet Take 650 mg by mouth Every 4 (Four) Hours As Needed for Mild Pain  or Fever.   Past Month at Unknown time   • amLODIPine (NORVASC) 5 MG tablet Take 5 mg by mouth Daily.   5/6/2019   • Benzocaine (SORE THROAT RELIEF) 10 MG lozenge Dissolve 1  lozenge in the mouth 4 (Four) Times a Day As Needed (Sore throat).   Past Month at Unknown time   • carbidopa-levodopa (SINEMET)  MG per tablet Take 1 tablet by mouth 3 (Three) Times a Day.   5/6/2019 at Unknown time   • citalopram (CeleXA) 10 MG tablet Take 10 mg by mouth Every Night.   5/5/2019   • Cranberry 250 MG tablet Take 1 tablet by mouth 3 (Three) Times a Day.   5/6/2019   • cyanocobalamin 1000 MCG/ML injection Inject 1,000 mcg into the appropriate muscle as directed by prescriber Every 30 (Thirty) Days. Given on the 14th of every month.   4/14/2019   • furosemide (LASIX) 20 MG tablet Take 20 mg by mouth Every Other Day.   5/6/2019   • ibuprofen (ADVIL,MOTRIN) 600 MG tablet Take 600 mg by mouth 3 (Three) Times a Day.   5/6/2019   • loratadine (CLARITIN) 10 MG tablet Take 10 mg by mouth Daily.   5/6/2019   • losartan (COZAAR) 50 MG tablet Take 50 mg by mouth Daily.   5/6/2019   • Menthol, Topical Analgesic, (BIOFREEZE) 4 % gel Apply 1 application topically Every 6 (Six) Hours As Needed (Apply to knees and lower back).   Past Week at Unknown time   • metoprolol succinate XL (TOPROL-XL) 50 MG 24 hr tablet Take 50 mg by mouth Daily.   5/6/2019   • omeprazole (priLOSEC) 20 MG capsule Take 20 mg by mouth Daily.   5/6/2019   • Skin Protectants, Misc. (PERIGUARD) ointment Apply 1 application topically Every Evening. Apply to buttocks.   5/6/2019   • tiotropium bromide-olodaterol (STIOLTO RESPIMAT) 2.5-2.5 MCG/ACT aerosol solution inhaler Inhale 2 puffs Daily.   5/6/2019   • traMADol (ULTRAM) 50 MG tablet Take 50 mg by mouth Every 6 (Six) Hours As Needed for Moderate Pain .   Past Month at Unknown time       Medicines:  Current Facility-Administered Medications   Medication Dose Route Frequency Provider Last Rate Last Dose   • acetaminophen (TYLENOL) tablet 650 mg  650 mg Oral Q6H PRN Monster Zuniga MD       • acetaminophen (TYLENOL) tablet 650 mg  650 mg Oral Q4H PRN Monster Zuniga MD       •  ALPRAZolam (XANAX) tablet 0.25 mg  0.25 mg Oral Nightly Traci Kohler APRN   0.25 mg at 05/13/19 2046   • bumetanide (BUMEX) tablet 1 mg  1 mg Oral Daily Ramsey Weldon MD   1 mg at 05/14/19 0828   • calcitRIOL (ROCALTROL) solution 0.25 mcg  0.25 mcg Nasogastric Daily Monster Zuniga MD   0.25 mcg at 05/14/19 0828   • calcium carbonate (oyster shell) tablet 1,250 mg  1,250 mg Oral Daily Ramsey Weldon MD   1,250 mg at 05/14/19 0827   • calcium gluconate 1 g in sodium chloride 0.9 % 100 mL IVPB  1 g Intravenous PRN Monster Zuniga  mL/hr at 05/08/19 0451 1 g at 05/08/19 0451    And   • calcium gluconate 6 g in sodium chloride 0.9 % 500 mL IVPB  6 g Intravenous PRN Monster Zuniga MD 83.3 mL/hr at 05/08/19 0647 6 g at 05/08/19 0647   • carbidopa-levodopa (SINEMET)  MG per tablet 1 tablet  1 tablet Oral TID Monster Zuniga MD   1 tablet at 05/14/19 0827   • citalopram (CeleXA) tablet 10 mg  10 mg Oral Nightly Monster Zuniga MD   10 mg at 05/13/19 2046   • cyancobalamin (VITAMIN B-12) tablet 100 mcg  100 mcg Oral Daily Monster Zuniga MD   100 mcg at 05/14/19 0828   • famotidine (PEPCID) tablet 20 mg  20 mg Oral Daily Monster Zuniga MD   20 mg at 05/14/19 0828   • HYDROcodone-acetaminophen (NORCO) 7.5-325 MG per tablet 1 tablet  1 tablet Oral Q4H PRN Monster Zuniga MD   1 tablet at 05/13/19 2136   • levoFLOXacin (LEVAQUIN) 250 mg/50 mL D5W (premix) 250 mg  250 mg Intravenous Q24H Traci Kohler APRN   Stopped at 05/13/19 1504   • sodium chloride 0.9 % flush 10 mL  10 mL Intravenous PRN Prosper Jiang, DO       • sodium chloride 0.9 % flush 3 mL  3 mL Intravenous Q12H Monster Zuniga MD   3 mL at 05/14/19 0948   • sodium chloride 0.9 % flush 3-10 mL  3-10 mL Intravenous PRN Monster Zuniga MD           Past Medical History:  Past Medical History:   Diagnosis Date   • Anxiety    • Breast cancer (CMS/HCC)      LEFT WITH  RECONSTRUCTION   • Colostomy in place (CMS/HCC)    • Dementia    • Depression    • Edema     LOWER FEET   • Hydrocephalus     NORMAL PRESSURE   • Hypertension    • Incontinence    • Nausea    • Parastomal hernia    • Parkinson disease (CMS/HCC)    • Tremor     RIGHT HAND   • Unsteady gait        Past Surgical History:  Past Surgical History:   Procedure Laterality Date   • ABDOMINAL SURGERY     • APPENDECTOMY     • BREAST RECONSTRUCTION Left     LEFT   • CHOLECYSTECTOMY     • COLON SURGERY     • COLONOSCOPY     • COLOSTOMY     • HYSTERECTOMY     • MASTECTOMY Left    • PARASTOMAL HERNIA REPAIR N/A 3/17/2017    Procedure: REPAIR PARASTOMAL HERNIA;  Surgeon: Princess Walters MD;  Location: [facility] OR;  Service:        Family History  History reviewed. No pertinent family history.    Social History  Social History     Socioeconomic History   • Marital status:      Spouse name: Not on file   • Number of children: Not on file   • Years of education: Not on file   • Highest education level: Not on file   Tobacco Use   • Smoking status: Former Smoker   • Smokeless tobacco: Never Used   Substance and Sexual Activity   • Alcohol use: No   • Drug use: No   • Sexual activity: Defer         Review of Systems:   History obtained from chart review and family  General ROS: negative  Respiratory ROS: no cough, shortness of breath, or wheezing  Cardiovascular ROS: no chest pain or dyspnea on exertion  Gastrointestinal ROS: no abdominal pain, change in bowel habits, or black or bloody stools  Genito-Urinary ROS: no dysuria, trouble voiding, or hematuria  Musculoskeletal ROS: negative  Neurological ROS: no TIA or stroke symptoms    Objective:  Patient Vitals for the past 24 hrs:   BP Temp Temp src Pulse Resp SpO2 Weight   05/14/19 1023 (!) 124/35 98.1 °F (36.7 °C) Oral 80 16 97 % --   05/14/19 1008 (!) 106/26 98.2 °F (36.8 °C) Oral 77 16 90 % --   05/14/19 0945 (!) 107/36 98 °F (36.7 °C) Oral 77 16 92 % --   05/14/19 0700 127/47  97.5 °F (36.4 °C) Oral 72 16 94 % --   05/14/19 0341 110/52 99.1 °F (37.3 °C) Oral 75 16 96 % --   05/13/19 2324 116/50 99.2 °F (37.3 °C) Oral 87 16 94 % --   05/13/19 1943 120/50 99.3 °F (37.4 °C) Oral 83 16 97 % 72.3 kg (159 lb 6.4 oz)   05/13/19 1608 132/52 97.9 °F (36.6 °C) Axillary 75 16 94 % --   05/13/19 1201 107/56 98.5 °F (36.9 °C) Axillary 76 16 94 % --       Intake/Output Summary (Last 24 hours) at 5/14/2019 1059  Last data filed at 5/14/2019 1000  Gross per 24 hour   Intake 290 ml   Output 800 ml   Net -510 ml     General: drowsy, oriented X 1   Neck: supple, no JVD  Chest:  clear to auscultation bilaterally without respiratory distress  CVS: regular rate and rhythm  Abdominal: diffusely tender, +BS, colostomy bag in place  Extremities: no cyanosis or edema  Skin: warm and dry without rash  Neuro: no focal motor deficits    Labs:  Results from last 7 days   Lab Units 05/14/19 0444 05/13/19  0534 05/12/19  0632   WBC 10*3/mm3 17.78* 18.57* 12.35*   HEMOGLOBIN g/dL 6.6* 7.3* 7.3*   HEMATOCRIT % 20.3* 22.5* 22.3*   PLATELETS 10*3/mm3 78* 71* 71*         Results from last 7 days   Lab Units 05/14/19 0444 05/13/19  0534 05/12/19  0632 05/11/19  0645 05/10/19  0607   SODIUM mmol/L 130* 133* 134* 135 140   POTASSIUM mmol/L 4.3 4.2 4.1 4.1 3.5   CHLORIDE mmol/L 93* 97* 98 101 101   CO2 mmol/L 31.0 31.0 29.0 31.0 33.0*   BUN mg/dL 61* 63* 66* 75* 92*   CREATININE mg/dL 1.77* 1.79* 1.69* 1.94* 2.58*   CALCIUM mg/dL 8.5 8.5 8.4 7.9* 7.3*   BILIRUBIN mg/dL  --   --  0.3 0.4 0.2   ALK PHOS U/L  --   --  64 69 97   ALT (SGPT) U/L  --   --  23 24 32   AST (SGOT) U/L  --   --  66* 84* 112*   GLUCOSE mg/dL 103* 113* 105* 91 136*       Radiology:   Imaging Results (last 72 hours)     Procedure Component Value Units Date/Time    US Renal Bilateral [[id number]] Collected:  05/07/19 1124     Updated:  05/07/19 1129    Narrative:       EXAMINATION:  US RENAL BILATERAL-  5/7/2019 10:11 AM CDT     HISTORY: acute renal failure;  A41.9-Sepsis, unspecified organism;  R65.20-Severe sepsis without septic shock; N39.0-Urinary tract  infection, site not specified; E87.5-Hyperkalemia; N17.9-Acute kidney  failure, unspecified; R41.82-Altered mental status, unspecified;  R13.19-Other dysphagia      COMPARISON: 05/10/2014 renal ultrasound. 05/06/2019 abdomen and pelvis  CT     TECHNIQUE:      Bilateral renal ultrasound was performed.     FINDINGS:      The right kidney measures 10.1 cm in [id number]-avgj length.     The left kidney measures 11 cm in mzrk-[id number]-[id number] length.     There is mild cortical thinning and pelvic lipomatosis compatible with  age.     Normal echogenicity of renal cortex is observed without renal masses.     There are no perinephric abnormalities.     There is no pelvocaliectasis or obstructive uropathy changes.     The urinary bladder is decompressed with a Peralta catheter.       Impression:       1. Negative bilateral renal ultrasound.  This report was finalized on 05/07/2019 11:26 by Dr. Iban Queen MD.    CT Abdomen Pelvis Without Contrast [[id number]] Collected:  05/06/19 2236     Updated:  05/06/19 2242    Narrative:       EXAMINATION: CT ABDOMEN PELVIS WO CONTRAST-      5/6/2019 10:17 PM CDT     HISTORY: pain; r/o obstruction; A41.9-Sepsis, unspecified organism;  R65.20-Severe sepsis without septic shock; N39.0-Urinary tract  infection, site not specified; E87.5-Hyperkalemia; N17.9-Acute kidney  failure, unspecified; R41.82-Altered mental status, unspecified     In order to have a CT radiation dose as low as reasonably achievable  Automated Exposure Control was utilized for adjustment of the mA and/or  KV according to patient size.     DLP in mGycm= 343.     Noncontrast abdomen/pelvis CT.     Comparison is made with 04/12/2016.     Left lower quadrant ostomy with nonobstructing parastomal hernia.     No significant bowel dilation.     Mild urinary bladder distention.     Mild dilation of the renal collecting systems and ureters  is probably  based on vesicoureteral reflux. There is no obstructing stone.     Normal heart size.  No acute abnormality at the lung bases.     Cholecystectomy clips.  Normal noncontrast appearance of the liver and spleen.  The pancreas is atrophic.     Aortic calcification with no aneurysm.     Summary:  1. Moderate fecal material throughout the colon.  2. Nonobstructing parastomal hernia.  3. Mild dilation of the renal collecting systems and ureters compatible  with vesicoureteral reflux.  4. No evidence of bowel obstruction, mass or abscess.                                   This report was finalized on 05/06/2019 22:39 by Dr. Raul Ricci MD.    CT Head Without Contrast [[id number]] Collected:  05/06/19 2234     Updated:  05/06/19 2238    Narrative:       EXAMINATION: CT HEAD WO CONTRAST-      5/6/2019 10:17 PM CDT     HISTORY: AMS; A41.9-Sepsis, unspecified organism; R65.20-Severe sepsis  without septic shock; N39.0-Urinary tract infection, site not specified;  E87.5-Hyperkalemia; N17.9-Acute kidney failure, unspecified;  R41.82-Altered mental status, unspecified     In order to have a CT radiation dose as low as reasonably achievable  Automated Exposure Control was utilized for adjustment of the mA and/or  KV according to patient size.     DLP in mGycm= 1214.     Noncontrast head CT compared with 01/17/2017.     Axial, sagittal, and coronal noncontrast CT imaging of the head.     The visualized paranasal sinuses are clear.     The brain and ventricles have an age appropriate appearance.  Moderate atrophy and small vessel disease.  There is no hemorrhage or mass-effect.   No acute infarction is seen.     No calvarial abnormality.       Impression:       1. No acute intracranial abnormality is seen.                                         This report was finalized on 05/06/2019 22:35 by Dr. Raul Ricci MD.    XR Chest 1 View [[id number]] Collected:  05/06/19 2216     Updated:  05/06/19 2219    Narrative:        EXAMINATION: XR CHEST 1 VW-     5/6/2019 10:11 PM CDT     HISTORY: Altered mental status; r/o pneumonia.     One view chest x-ray compared with 01/17/2017.     Heart size is normal.  The mediastinum is within normal limits.      The lungs are normally expanded with no pneumonia or pneumothorax.  Chronic lung changes.  No congestive failure changes.                                                                       Impression:       1. No acute disease.           This report was finalized on 05/06/2019 22:16 by Dr. Raul Ricci MD.          Culture:  Blood Culture   Date Value Ref Range Status   05/06/2019 No growth at 24 hours  Preliminary   05/06/2019 Gram Positive Rods (A)  Preliminary   05/06/2019 Gram Positive Cocci (A)  Preliminary     Urine Culture   Date Value Ref Range Status   05/06/2019 >100,000 CFU/mL Escherichia coli (A)  Final   05/06/2019 70,000-80,000 CFU/mL Mixed Gram Positive Alison (A)  Final     Comment:     Probable Contaminant           Assessment   1.  Acute kidney injury due to ATN--improving  2.  Baseline CKD stage 3  3.  UTI/urosepsis--resolved  4.  Advanced dementia  5.  Hyponatremia--worse today  6.  Anemia      Plan:  1.  Agree with PRBC administration  2.  Monitor labs      Cirilo Ross, COLTON  5/14/2019  10:59 AM

## 2019-05-14 NOTE — THERAPY TREATMENT NOTE
Acute Care - Physical Therapy Treatment Note  [facility]     Patient Name: Susy Ko  : 1940  MRN: [id number]  Today's Date: 2019  Onset of Illness/Injury or Date of Surgery: 19  Date of Referral to PT: 05/10/19  Referring Physician: Dr. Zuniga    Admit Date: 2019    Visit Dx:    ICD-10-CM ICD-9-CM   1. Severe sepsis (CMS/HCC) A41.9 038.9    R65.20 995.92   2. Urinary tract infection, acute N39.0 599.0   3. Hyperkalemia E87.5 276.7   4. Acute kidney injury (CMS/HCC) N17.9 584.9   5. Altered mental status, unspecified altered mental status type R41.82 780.97   6. Other dysphagia R13.19 787.29   7. Impaired mobility Z74.09 799.89   8. Impaired mobility and ADLs Z74.09 799.89     Patient Active Problem List   Diagnosis   • Normal pressure hydrocephalus   • Non morbid obesity due to excess calories   • Tobacco non-user   • Tremor of right hand   • Abnormality of gait   • Parastomal hernia   • Hernia   • Gait abnormality   • Severe sepsis (CMS/HCC)       Therapy Treatment    Rehabilitation Treatment Summary     Row Name 19 1359 19 1132          Treatment Time/Intention    Discipline  physical therapy assistant  -  physical therapy assistant  -     Document Type  therapy note (daily note)  -AH2  --     Subjective Information  complains of;pain  -AH2  --     Comment  --  pt H/H  6.6/20.3,recieving blood, daughter requested to check back in pm  -AH2     Existing Precautions/Restrictions  fall monitor BP  -2  --     Recorded by [] Stefanie Thomas, PTA 19 1400  [AH2] Stefanie Thomas, PTA 19 1441 [] Stefanie Thomas, PTA 19 1133  [AH2] Stefanie Thomas, PTA 19 1138     Row Name 19 1359             Vital Signs    Intra Systolic BP Rehab  83  -AH      Intra Treatment Diastolic BP  48  -AH      Post Systolic BP Rehab  92  -AH      Post Treatment Diastolic BP  31  -AH      Intra Patient Position  Sitting  -AH      Post Patient Position  Supine  -AH       Recorded by [] Stefanie Thomas, [facility] 05/14/19 1441      Row Name 05/14/19 1359             Bed Mobility Assessment/Treatment    Supine-Sit [place] (Bed Mobility)  maximum assist (25% patient effort);2 person assist;verbal cues  -      Sit-Supine [place] (Bed Mobility)  maximum assist (25% patient effort);2 person assist;verbal cues  -      Recorded by [] Stefnaie Thomas, [facility] 05/14/19 1441      Row Name 05/14/19 1359             Transfer Assessment/Treatment    Comment (Transfers)  not appropriate  -      Recorded by [] Stefanie Thomas, [facility] 05/14/19 1441      Row Name 05/14/19 1359             Motor Skills Assessment/Interventions    Additional Documentation  Therapeutic Exercise (Group)  -      Recorded by [] Stefanie Thomas, [facility] 05/14/19 1441      Row Name 05/14/19 1359             Therapeutic Exercise    Exercise Type (Therapeutic Exercise)  AAROM (active assistive range of motion);PROM (passive range of motion)  -      Position (Therapeutic Exercise)  seated  -      Sets/Reps (Therapeutic Exercise)  10  -      Comment (Therapeutic Exercise)  AA-PROM all 4 ext within available range  -      Recorded by [] Stefanie Thomas, [facility] 05/14/19 1441      Row Name 05/14/19 1359             Static Sitting Balance    Level of [place] (Unsupported Sitting, Static Balance)  minimal assist, 75% patient effort  -      Sitting Position (Unsupported Sitting, Static Balance)  sitting on edge of bed  -      Time Able to Maintain Position (Unsupported Sitting, Static Balance)  more than 5 minutes  -      Comment (Supported Sitting, Static Balance)  pt sat EOB 20 minutes  -      Recorded by [] Stefanie Thomas, [facility] 05/14/19 1441      Row Name 05/14/19 1359             Positioning and Restraints    Pre-Treatment Position  in bed  -AH      Post Treatment Position  bed  -AH      In Bed  side lying left;call light within reach;encouraged to call for assist;with family/caregiver;pillow  between legs;waffle boots/both  -AH      Recorded by [] Stefanie Thomas, PTA 05/14/19 1441      Row Name 05/14/19 1359             Pain Scale: Numbers Pre/Post-Treatment    Pain Location - Orientation  generalized  -AH      Pre/Post Treatment Pain Comment  pain with mobility  -AH      Recorded by [] Stefanie Thomas, PTA 05/14/19 1441      Row Name 05/14/19 1359             Pain Scale: FACES Pre/Post-Treatment    Pain: FACES Scale, Pretreatment  4-->hurts little more  -AH      Pain: FACES Scale, Post-Treatment  4-->hurts little more  -AH      Pre/Post Treatment Pain Comment  moans with mobility  -AH      Recorded by [] Stefanie Thomas, PTA 05/14/19 1441      Row Name                Wound 05/08/19 1115 coccyx pressure injury;blisters    Wound - Properties Group Date first assessed: 05/08/19 [HS] Time first assessed: 1115 [HS] Location: coccyx [HS] Type: pressure injury;blisters [HS2] Stage, Pressure Injury: deep tissue injury [HS] Recorded by:  [HS] Tamara Salazar RN 05/08/19 1726 [HS2] Tamara Salazar RN 05/08/19 1728    Row Name                Wound 05/07/19 0532 Right heel pressure injury    Wound - Properties Group Date first assessed: 05/07/19 [HS] Time first assessed: 0532 [HS] Present On Admission : yes;picture taken [HS] Side: Right [HS] Location: heel [HS] Type: pressure injury [HS] Stage, Pressure Injury: deep tissue injury [HS] Recorded by:  [HS] Tamara Salazar RN 05/08/19 1741      User Key  (r) = Recorded By, (t) = Taken By, (c) = Cosigned By    Initials Name Effective Dates Discipline     Stefanie Thomas, PTA 08/02/16 -  PT    HS Tamara Salazar RN 12/27/16 -  Nurse          Wound 05/08/19 1115 coccyx pressure injury;blisters (Active)   Dressing Appearance moist drainage 5/14/2019  8:00 AM   Closure None 5/14/2019  8:00 AM   Base bleeding;maroon/purple;red 5/14/2019  8:00 AM   Periwound non-blanchable;redness 5/14/2019  8:00 AM   Periwound Temperature warm 5/14/2019  8:00 AM    Periwound Skin Turgor soft 5/14/2019  8:00 AM   Edges irregular 5/14/2019  8:00 AM   Drainage Characteristics/Odor serosanguineous 5/14/2019  8:00 AM   Drainage Amount scant 5/14/2019  8:00 AM   Care, Wound cleansed with;soap and water 5/14/2019 10:00 AM   Dressing Care, Wound dressing changed 5/14/2019 10:00 AM       Wound 05/07/19 0532 Right heel pressure injury (Active)   Dressing Appearance dry;intact 5/14/2019  8:00 AM   Closure None 5/14/2019  8:00 AM   Base red;non-blanchable 5/14/2019  8:00 AM   Periwound Temperature warm 5/14/2019  8:00 AM   Periwound Skin Turgor soft 5/14/2019  8:00 AM   Edges irregular 5/14/2019  8:00 AM   Drainage Amount none 5/14/2019  8:00 AM   Dressing Care, Wound dressing changed 5/14/2019 10:00 AM           Physical Therapy Education     Title: PT OT SLP Therapies (In Progress)     Topic: Physical Therapy (Done)     Point: Mobility training (Done)     Learning Progress Summary           Patient Acceptance, E, VU by SB at 5/13/2019  1:27 PM    Comment:  pt family educated on POC, benefits of activity and d/c plans   Family Acceptance, E, VU by SB at 5/13/2019  1:27 PM    Comment:  pt family educated on POC, benefits of activity and d/c plans    Acceptance, E, VU by FE at 5/7/2019  1:57 PM    Comment:  Educated celeste on benefits of activity, 1 time PT jennifer at this time                   Point: Home exercise program (Done)     Learning Progress Summary           Patient Acceptance, E, VU by SB at 5/13/2019  1:27 PM    Comment:  pt family educated on POC, benefits of activity and d/c plans   Family Acceptance, E, VU by SB at 5/13/2019  1:27 PM    Comment:  pt family educated on POC, benefits of activity and d/c plans                   Point: Body mechanics (Done)     Learning Progress Summary           Patient Acceptance, E, VU by SB at 5/13/2019  1:27 PM    Comment:  pt family educated on POC, benefits of activity and d/c plans   Family Acceptance, E, VU by SB at 5/13/2019  1:27  PM    Comment:  pt family educated on POC, benefits of activity and d/c plans                   Point: Precautions (Done)     Learning Progress Summary           Patient Acceptance, E, VU by SB at 5/13/2019  1:27 PM    Comment:  pt family educated on POC, benefits of activity and d/c plans   Family Acceptance, E, VU by SB at 5/13/2019  1:27 PM    Comment:  pt family educated on POC, benefits of activity and d/c plans                               User Key     Initials Effective Dates Name Provider Type Discipline    FE 08/02/16 -  Braulio Fulton, PT DPT Physical Therapist PT    SB 08/31/18 -  Kim Knight, PT Physical Therapist PT                PT Recommendation and Plan     Plan of Care Reviewed With: patient, daughter  Progress: no change  Outcome Summary: pt trans to EOB max of 2, moans with pain with mobility, pt sat EOB min assist to maintain sitting balance, performed P-AAROM all 4 ext in available range, trans back to bed max of 2, pt will return to SNF at d/c  Outcome Measures     Row Name 05/13/19 1354 05/13/19 1300 05/13/19 0846       How much help from another person do you currently need...    Turning from your back to your side while in flat bed without using bedrails?  --  1  -SB  --    Moving from lying on back to sitting on the side of a flat bed without bedrails?  --  1  -SB  --    Moving to and from a bed to a chair (including a wheelchair)?  --  1  -SB  --    Standing up from a chair using your arms (e.g., wheelchair, bedside chair)?  --  1  -SB  --    Climbing 3-5 steps with a railing?  --  1  -SB  --    To walk in hospital room?  --  1  -SB  --    AM-PAC 6 Clicks Score  --  6  -SB  --       How much help from another is currently needed...    Putting on and taking off regular lower body clothing?  1  -MM  --  1  -MM    Bathing (including washing, rinsing, and drying)  2  -MM  --  2  -MM    Toileting (which includes using toilet bed pan or urinal)  1  -MM  --  1  -MM    Putting on and  taking off regular upper body clothing  2  -MM  --  2  -MM    Taking care of personal grooming (such as brushing teeth)  3  -MM  --  3  -MM    Eating meals  3  -MM  --  3  -MM    Score  12  -MM  --  12  -MM       Functional Assessment    Outcome Measure Options  AM-PAC 6 Clicks Daily Activity (OT)  -MM  AM-PAC 6 Clicks Basic Mobility (PT)  -SB  AM-PAC 6 Clicks Daily Activity (OT)  -MM      User Key  (r) = Recorded By, (t) = Taken By, (c) = Cosigned By    Initials Name Provider Type    MM Ronald Arroyo, OTR/L Occupational Therapist    SB Kim Knight, PT Physical Therapist         Time Calculation:   PT Charges     Row Name 05/14/19 1515             Time Calculation    Start Time  1359  -      Stop Time  1430  -      Time Calculation (min)  31 min  -      PT Received On  05/14/19  -         Time Calculation- PT    Total Timed Code Minutes- PT  31 minute(s)  -         Timed Charges    06453 - PT Therapeutic Activity Minutes  31  -AH        User Key  (r) = Recorded By, (t) = Taken By, (c) = Cosigned By    Initials Name Provider Type     Stefanie Thomas PTA Physical Therapy Assistant        Therapy Charges for Today     Code Description Service Date Service Provider Modifiers Qty    [id number]  PT THERAPEUTIC ACT EA 15 MIN 5/14/2019 Stefanie Thomas PTA GP 2          PT G-Codes  Outcome Measure Options: AM-PAC 6 Clicks Daily Activity (OT)  AM-PAC 6 Clicks Score: 6  Score: 12    Stefanie Thomas PTA  5/14/2019

## 2019-05-14 NOTE — PROGRESS NOTES
[facility]  ARIES Abdalla M.D.  COLTON Hunter      Internal Medicine Progress Note    5/14/2019   1:00 PM    Name:  Susy Ko  MRN:    [id number]     Acct:     [id number]   Room:  [address] [place] [address] Day: 8     Admit Date: 5/6/2019  8:39 PM  PCP: Monster Zuniga MD    Subjective:     C/C:   Chief Complaint   Patient presents with   • Abnormal KUB       Interval History: Status:  stayed the same. Resting in bed. Daughter at bedside. Patient more alert today. Hgb down to <7. Transfusion started. Stool for ob negative. Iron low.     Review of Systems   Constitution: Positive for weakness and malaise/fatigue. Negative for chills, decreased appetite, weight gain and weight loss.   HENT: Negative for congestion, ear discharge, hoarse voice and tinnitus.    Eyes: Negative for blurred vision, discharge, visual disturbance and visual halos.   Cardiovascular: Negative for chest pain, claudication, dyspnea on exertion, irregular heartbeat, leg swelling, orthopnea and paroxysmal nocturnal dyspnea.   Respiratory: Negative for cough, shortness of breath, sputum production and wheezing.    Endocrine: Negative for cold intolerance, heat intolerance and polyuria.   Hematologic/Lymphatic: Negative for adenopathy. Does not bruise/bleed easily.   Skin: Negative for dry skin, itching and suspicious lesions.   Musculoskeletal: Negative for arthritis, back pain, falls, joint pain, muscle weakness and myalgias.   Gastrointestinal: Negative for abdominal pain, constipation, diarrhea, dysphagia and hematemesis.   Genitourinary: Negative for bladder incontinence, dysuria and frequency.   Neurological: Negative for aphonia, disturbances in coordination and dizziness.   Psychiatric/Behavioral: Negative for altered mental status, depression, memory loss and substance abuse. The patient does not have insomnia and is not nervous/anxious.    Allergic/Immunologic: Negative for environmental  allergies.     Medications:     Allergies:   Allergies   Allergen Reactions   • Morphine And Related Mental Status Change   • Adhesive Tape Rash       Current Meds:   Current Facility-Administered Medications:   •  acetaminophen (TYLENOL) tablet 650 mg, 650 mg, Oral, Q4H PRN, Monster Zuniga MD  •  ALPRAZolam (XANAX) tablet 0.25 mg, 0.25 mg, Oral, Nightly, Traci Kohler, APRN, 0.25 mg at 05/13/19 2046  •  bumetanide (BUMEX) tablet 1 mg, 1 mg, Oral, Daily, Ramsey Weldon MD, 1 mg at 05/14/19 0828  •  calcitRIOL (ROCALTROL) solution 0.25 mcg, 0.25 mcg, Nasogastric, Daily, Monster Zuniga MD, 0.25 mcg at 05/14/19 0828  •  calcium carbonate (oyster shell) tablet 1,250 mg, 1,250 mg, Oral, Daily, Ramsey Weldon MD, 1,250 mg at 05/14/19 0827  •  calcium gluconate 1 g in sodium chloride 0.9 % 100 mL IVPB, 1 g, Intravenous, PRN, Last Rate: 100 mL/hr at 05/08/19 0451, 1 g at 05/08/19 0451 **AND** calcium gluconate 6 g in sodium chloride 0.9 % 500 mL IVPB, 6 g, Intravenous, PRN, Last Rate: 83.3 mL/hr at 05/08/19 0647, 6 g at 05/08/19 0647 **AND** Calcium, , , PRN, Monster Zuniga MD  •  carbidopa-levodopa (SINEMET)  MG per tablet 1 tablet, 1 tablet, Oral, TID, Monster Zuniga MD, 1 tablet at 05/14/19 0827  •  citalopram (CeleXA) tablet 10 mg, 10 mg, Oral, Nightly, Monster Zuniga MD, 10 mg at 05/13/19 2046  •  cyancobalamin (VITAMIN B-12) tablet 100 mcg, 100 mcg, Oral, Daily, Monster Zuniga MD, 100 mcg at 05/14/19 0828  •  famotidine (PEPCID) tablet 20 mg, 20 mg, Oral, Daily, Monster Zuniga MD, 20 mg at 05/14/19 0828  •  ferrous sulfate tablet 325 mg, 325 mg, Oral, BID With Meals, Traci Kohler APRN  •  HYDROcodone-acetaminophen (NORCO) 7.5-325 MG per tablet 1 tablet, 1 tablet, Oral, Q4H PRN, Monster Zuniga MD, 1 tablet at 05/13/19 2136  •  levoFLOXacin (LEVAQUIN) 250 mg/50 mL D5W (premix) 250 mg, 250 mg, Intravenous, Q24H, Traci Kohler,  "APRN, Stopped at 19 1504  •  [COMPLETED] Insert peripheral IV, , , Once **AND** sodium chloride 0.9 % flush 10 mL, 10 mL, Intravenous, PRN, Prosper Jiang, DO  •  sodium chloride 0.9 % flush 3 mL, 3 mL, Intravenous, Q12H, Monster Zuniga MD, 3 mL at 19 0948  •  sodium chloride 0.9 % flush 3-10 mL, 3-10 mL, Intravenous, PRN, Monster Zuniga MD    Data:     Code Status:    Code Status and Medical Interventions:   Ordered at: 19 1039     Level Of Support Discussed With:    Next of Kin (If No Surrogate)     Code Status:    No CPR     Medical Interventions (Level of Support Prior to Arrest):    Full       History reviewed. No pertinent family history.    Social History     Socioeconomic History   • Marital status:      Spouse name: Not on file   • Number of children: Not on file   • Years of education: Not on file   • Highest education level: Not on file   Tobacco Use   • Smoking status: Former Smoker   • Smokeless tobacco: Never Used   Substance and Sexual Activity   • Alcohol use: No   • Drug use: No   • Sexual activity: Defer       Vitals:  BP (!) 144/102   Pulse 78   Temp 98.7 °F (37.1 °C) (Oral)   Resp 16   Ht 160 cm (63\")   Wt 72.3 kg (159 lb 6.4 oz)   SpO2 100%   BMI 28.24 kg/m²   Temp (24hrs), Av.4 °F (36.9 °C), Min:97.5 °F (36.4 °C), Max:99.3 °F (37.4 °C)        I/O (24Hr):    Intake/Output Summary (Last 24 hours) at 2019 1300  Last data filed at 2019 1253  Gross per 24 hour   Intake 621.79 ml   Output 800 ml   Net -178.21 ml       Labs and imaging:      Recent Results (from the past 12 hour(s))   Vancomycin, Trough    Collection Time: 19  4:44 AM   Result Value Ref Range    Vancomycin Trough 6.61 (L) 10.00 - 20.00 mcg/mL   Basic Metabolic Panel    Collection Time: 19  4:44 AM   Result Value Ref Range    Glucose 103 (H) 70 - 100 mg/dL    BUN 61 (H) 5 - 21 mg/dL    Creatinine 1.77 (H) 0.50 - 1.40 mg/dL    Sodium 130 (L) 135 - 145 mmol/L    " Potassium 4.3 3.5 - 5.3 mmol/L    Chloride 93 (L) 98 - 110 mmol/L    CO2 31.0 24.0 - 31.0 mmol/L    Calcium 8.5 8.4 - 10.4 mg/dL    eGFR Non African Amer 28 (L) >60 mL/min/1.73    BUN/Creatinine Ratio 34.5 (H) 7.0 - 25.0    Anion Gap 6.0 4.0 - 13.0 mmol/L   Ferritin    Collection Time: 05/14/19  4:44 AM   Result Value Ref Range    Ferritin 587.00 (H) 11.10 - 264.00 ng/mL   Iron Profile    Collection Time: 05/14/19  4:44 AM   Result Value Ref Range    Iron 17 (L) 42 - 180 mcg/dL    TIBC 229 225 - 420 mcg/dL    Iron Saturation 7 (L) 20 - 45 %   CBC Auto Differential    Collection Time: 05/14/19  4:44 AM   Result Value Ref Range    WBC 17.78 (H) 4.80 - 10.80 10*3/mm3    RBC 2.56 (L) 4.20 - 5.40 10*6/mm3    Hemoglobin 6.6 (C) 12.0 - 16.0 g/dL    Hematocrit 20.3 (C) 37.0 - 47.0 %    MCV 79.3 (L) 82.0 - 98.0 fL    MCH 25.8 (L) 28.0 - 32.0 pg    MCHC 32.5 (L) 33.0 - 36.0 g/dL    RDW 15.3 (H) 12.0 - 15.0 %    RDW-SD 43.8 40.0 - 54.0 fl    MPV 11.5 6.0 - 12.0 fL    Platelets 78 (L) 130 - 400 10*3/mm3    Neutrophil % 76.2 39.0 - 78.0 %    Lymphocyte % 11.8 (L) 15.0 - 45.0 %    Monocyte % 6.7 4.0 - 12.0 %    Eosinophil % 2.1 0.0 - 4.0 %    Basophil % 0.3 0.0 - 2.0 %    Immature Grans % 2.9 0.0 - 5.0 %    Neutrophils, Absolute 13.54 (H) 1.87 - 8.40 10*3/mm3    Lymphocytes, Absolute 2.10 0.72 - 4.86 10*3/mm3    Monocytes, Absolute 1.20 0.19 - 1.30 10*3/mm3    Eosinophils, Absolute 0.38 0.00 - 0.70 10*3/mm3    Basophils, Absolute 0.05 0.00 - 0.20 10*3/mm3    Immature Grans, Absolute 0.51 (H) 0.00 - 0.05 10*3/mm3    nRBC 0.0 0.0 - 0.2 /100 WBC   Type & Screen    Collection Time: 05/14/19  7:43 AM   Result Value Ref Range    ABO Type A     RH type Positive     Antibody Screen Negative     T&S Expiration Date 5/17/2019 11:59:59 PM    Prepare RBC, 2 Units    Collection Time: 05/14/19  9:58 AM   Result Value Ref Range    Product Code G8984G85     Unit Number [id number]     UNIT  ABO A     UNIT  RH POS     Crossmatch  Interpretation Compatible     Dispense Status IS     Blood Type APOS     Blood Expiration Date 201905272359     Blood Type Barcode 6200     Product Code L2513A65     Unit Number [id number]     UNIT  ABO A     UNIT  RH POS     Crossmatch Interpretation Compatible     Dispense Status XM     Blood Type APOS     Blood Expiration Date 201905272359     Blood Type Barcode 6200    Occult Blood X 1, Stool - Stool, Per Stoma    Collection Time: 05/14/19 10:37 AM   Result Value Ref Range    Fecal Occult Blood Negative Negative     Physical Examination:        Physical Exam   Constitutional: She is oriented to person, place, and time. She appears well-developed and well-nourished.   HENT:   Head: Normocephalic and atraumatic.   Nose: Nose normal.   Mouth/Throat: Oropharynx is clear and moist.   Eyes: Conjunctivae and EOM are normal. Pupils are equal, round, and reactive to light.   Neck: Normal range of motion. Neck supple.   Cardiovascular: Normal rate, regular rhythm, normal heart sounds and intact distal pulses.   Pulmonary/Chest: Effort normal and breath sounds normal.   Abdominal: Soft. Bowel sounds are normal.   Ostomy - stoma pink and appliance intact   Musculoskeletal:   Generalized weakness  Contractures to BLE   Neurological: She is oriented to person, place, and time.   Intermittent confusion  Drowsy but arousable   Skin: Skin is warm and dry.   DTI to right heel  Blisters and excoriation to coccyx   Nursing note and vitals reviewed.      Assessment:            Severe sepsis (CMS/HCC)    Past Medical History:   Diagnosis Date   • Anxiety    • Breast cancer (CMS/HCC)      LEFT WITH RECONSTRUCTION   • Colostomy in place (CMS/HCC)    • Dementia    • Depression    • Edema     LOWER FEET   • Hydrocephalus     NORMAL PRESSURE   • Hypertension    • Incontinence    • Nausea    • Parastomal hernia    • Parkinson disease (CMS/HCC)    • Tremor     RIGHT HAND   • Unsteady gait         Plan:        1. Sepsis  2. E. Coli UTI  - present on admission  3. Acute renal failure  4. Rhabdomyolysis  5. Hypotension  6. Dementia  7. Abdominal pain  8. Metabolic acidosis  9. Hyperkalemia  10. Uremia  11. Anxiety  12. Thrombocytopenia    Continue current treatment. Monitor counts. Increase activity. Labs in am. Continue antibiotics. Continue fluid resuscitation. Continue nutrition support. Aggressive therapies. Monitor platelets closely. Transfuse today.       Electronically signed by COLTON Ross on 5/14/2019 at 1:00 PM

## 2019-05-14 NOTE — NURSING NOTE
WOCN Note      Patient: Susy Ko  MRN: [id number] : 1940         Problem List:   Patient Active Problem List    Diagnosis   • Severe sepsis (CMS/HCC) [A41.9, R65.20]   • Gait abnormality [R26.9]   • Parastomal hernia [K43.5]   • Hernia [K46.9]   • Tremor of right hand [R25.1]   • Abnormality of gait [R26.9]   • Normal pressure hydrocephalus [G91.2]   • Non morbid obesity due to excess calories [E66.09]   • Tobacco non-user [Z78.9]         Reason for Visit: Patient is an 79 y.o. female, being seen by [facility] for re-evaluation of pressure injuries.      Patient presents in bed on left side.  RN reported PT just completed therapy which resulted in orthostatic hypotension.  Pt is now receiving blood transfusion.  Blisters on coccyx have ruptured.  Foam dressing was in place. Removed due to incontinence and risk of worsening MASD.  DTI continues to have a purple base with red blanchable periwound area.  Wedges are in use.      Recommendations:  Please see wound-ostomy order set for recommended orders.  Continue use of wedges and foam heel protector boots.  Do not place dressing on coccyx wound.      )Tamara Salazar RN 2019

## 2019-05-14 NOTE — PLAN OF CARE
Problem: Patient Care Overview  Goal: Plan of Care Review  Outcome: Ongoing (interventions implemented as appropriate)   05/14/19 0733   Coping/Psychosocial   Plan of Care Reviewed With patient;daughter   Plan of Care Review   Progress no change   OTHER   Outcome Summary pt trans to EOB max of 2, moans with pain with mobility, pt sat EOB min assist to maintain sitting balance, performed P-AAROM all 4 ext in available range, trans back to bed max of 2, pt will return to SNF at d/c

## 2019-05-14 NOTE — PLAN OF CARE
Problem: Patient Care Overview  Goal: Plan of Care Review  Outcome: Ongoing (interventions implemented as appropriate)   05/14/19 9321   Coping/Psychosocial   Plan of Care Reviewed With patient;daughter   Plan of Care Review   Progress no change   OTHER   Outcome Summary SLP treatment complete. Pt very fatigued and required mod-max cues in order to participate. Pt' daughter reports pt has done well with completing PO intake, but has not taken alot during meal times. SLP reviewed position precautions as well as appropriate alertness level in order for pt to complete meals. Pt only complete trials of thin liquids on this date. Her swallow was noted to be timely with adequate bolus control and clearance. Her voice was strong and clear w/o any overt s/s of aspiration noted. Pt to continue a mechanical soft diet with thin liquids.

## 2019-05-14 NOTE — PROGRESS NOTES
Continued Stay Note   [place]     Patient Name: Susy Ko  MRN: [id number]  Today's Date: 5/14/2019    Admit Date: 5/6/2019    Discharge Plan     Row Name 05/14/19 0857       Plan    Plan  [facility]    Patient/Family in Agreement with Plan  yes    Plan Comments  Spoke with Marian from [facility] and pt will return back there under SNF level care. She is getting  to call daughter to inform.  [phone]        Discharge Codes    No documentation.             JAYCE Rivero

## 2019-05-14 NOTE — PLAN OF CARE
Problem: Patient Care Overview  Goal: Plan of Care Review   05/14/19 0344   Coping/Psychosocial   Plan of Care Reviewed With patient   Plan of Care Review   Progress no change   OTHER   Outcome Summary Pt alert and oriented to self. Pt turned q 2 hours. Pt c/o stomach pain. PRN norco given with relief. External catheter still in place with good urine output. VSS. Safety maintained. Will continue to monitor.       Problem: Skin Injury Risk (Adult)  Goal: Skin Health and Integrity  Outcome: Ongoing (interventions implemented as appropriate)      Problem: Confusion, Chronic (Adult)  Goal: Cognitive/Functional Impairments Minimized  Outcome: Ongoing (interventions implemented as appropriate)      Problem: Infection, Risk/Actual (Adult)  Goal: Infection Prevention/Resolution  Outcome: Ongoing (interventions implemented as appropriate)      Problem: Wound (Includes Pressure Injury) (Adult)  Goal: Signs and Symptoms of Listed Potential Problems Will be Absent, Minimized or Managed (Wound)  Outcome: Ongoing (interventions implemented as appropriate)

## 2019-05-14 NOTE — THERAPY TREATMENT NOTE
Acute Care - Speech Language Pathology   Swallow Treatment Note [facility]     Patient Name: Susy Ko  : 1940  MRN: [id number]  Today's Date: 2019  Onset of Illness/Injury or Date of Surgery: 19     Referring Physician: Dr. Zuniga      Admit Date: 2019  SLP treatment complete. Pt very fatigued and required mod-max cues in order to participate. Pt' daughter reports pt has done well with completing PO intake, but has not taken alot during meal times. SLP reviewed position precautions as well as appropriate alertness level in order for pt to complete meals. Pt only complete trials of thin liquids on this date. Her swallow was noted to be timely with adequate bolus control and clearance. Her voice was strong and clear w/o any overt s/s of aspiration noted. Pt to continue a mechanical soft diet with thin liquids.  Benjamin Martinez, MS CCC-SLP 2019 4:13 PM    Visit Dx:      ICD-10-CM ICD-9-CM   1. Severe sepsis (CMS/HCC) A41.9 038.9    R65.20 995.92   2. Urinary tract infection, acute N39.0 599.0   3. Hyperkalemia E87.5 276.7   4. Acute kidney injury (CMS/HCC) N17.9 584.9   5. Altered mental status, unspecified altered mental status type R41.82 780.97   6. Other dysphagia R13.19 787.29   7. Impaired mobility Z74.09 799.89   8. Impaired mobility and ADLs Z74.09 799.89     Patient Active Problem List   Diagnosis   • Normal pressure hydrocephalus   • Non morbid obesity due to excess calories   • Tobacco non-user   • Tremor of right hand   • Abnormality of gait   • Parastomal hernia   • Hernia   • Gait abnormality   • Severe sepsis (CMS/HCC)       Therapy Treatment  Rehabilitation Treatment Summary     Row Name 19 1430 19 1359 19 1132       Treatment Time/Intention    Discipline  speech language pathologist  -CS  physical therapy assistant  -AH  physical therapy assistant  -AH    Document Type  therapy note (daily note)  -CS  therapy note (daily note)  -[facility]  --     Subjective Information  complains of;fatigue  -CS  complains of;pain  -AH2  --    Mode of Treatment  speech-language pathology  -CS  --  --    Patient/Family Observations  Daughter present  -CS  --  --    Comment  --  --  pt H/H  6.6/20.3,recieving blood, daughter requested to check back in pm  -AH2    Existing Precautions/Restrictions  --  fall monitor BP  -AH2  --    Recorded by [CS] Benjamin Martinez, MS CCC-SLP 05/14/19 1604 [] Stefanie Thomas, \[facility] [place] [facility]\"" 05/14/19 1400  [AH2] Stefanie Thomas, \[facility] [place] [facility]\"" 05/14/19 1441 [] Stefanie Thomas, \[facility] [place] [facility]\"" 05/14/19 1133  [2] Stefanie Thomas, \[facility] [place] [facility]\"" 05/14/19 1138    Row Name 05/14/19 1359             Vital Signs    Intra Systolic BP Rehab  83  -AH      Intra Treatment Diastolic BP  48  -AH      Post Systolic BP Rehab  92  -AH      Post Treatment Diastolic BP  31  -AH      Intra Patient Position  Sitting  -AH      Post Patient Position  Supine  -AH      Recorded by [] Stefanie Thomas, \[facility]\"" 05/14/19 1441      Row Name 05/14/19 1359             Bed Mobility Assessment/Treatment    Supine-Sit [place] (Bed Mobility)  maximum assist (25% patient effort);2 person assist;verbal cues  -      Sit-Supine [place] (Bed Mobility)  maximum assist (25% patient effort);2 person assist;verbal cues  -AH      Recorded by [] Stefanie Thomas, \[facility]\"" 05/14/19 1441      Row Name 05/14/19 1359             Transfer Assessment/Treatment    Comment (Transfers)  not appropriate  -      Recorded by [] Stefanie Thomas, \[facility]\"" 05/14/19 1441      Row Name 05/14/19 1359             Motor Skills Assessment/Interventions    Additional Documentation  Therapeutic Exercise (Group)  -      Recorded by [] Stefanie Thomas, \[facility]\"" 05/14/19 1441      Row Name 05/14/19 1359             Therapeutic Exercise    Exercise Type (Therapeutic Exercise)  AAROM (active assistive range of motion);PROM (passive range of motion)  -AH      Position (Therapeutic Exercise)  seated  -AH      Sets/Reps (Therapeutic Exercise)  10  -AH       Comment (Therapeutic Exercise)  AA-PROM all 4 ext within available range  -      Recorded by [] Stefanie Thomas, [facility] 05/14/19 1441      Row Name 05/14/19 1359             Static Sitting Balance    Level of [place] (Unsupported Sitting, Static Balance)  minimal assist, 75% patient effort  -      Sitting Position (Unsupported Sitting, Static Balance)  sitting on edge of bed  -      Time Able to Maintain Position (Unsupported Sitting, Static Balance)  more than 5 minutes  -      Comment (Supported Sitting, Static Balance)  pt sat EOB 20 minutes  -      Recorded by [] Stefanie Thomas, [facility] 05/14/19 1441      Row Name 05/14/19 1359             Positioning and Restraints    Pre-Treatment Position  in bed  -      Post Treatment Position  bed  -AH      In Bed  side lying left;call light within reach;encouraged to call for assist;with family/caregiver;pillow between legs;waffle boots/both  -AH      Recorded by [] Stefanie Thomas, [facility] 05/14/19 1441      Row Name 05/14/19 1430             Pain Assessment    Additional Documentation  Pain Scale: Numbers Pre/Post-Treatment (Group)  -      Recorded by [CS] Benjamin Martinez, MS CCC-SLP 05/14/19 1604      Row Name 05/14/19 1352             Pain Scale: Numbers Pre/Post-Treatment    Pain Location - Orientation  generalized  -      Pre/Post Treatment Pain Comment  pain with mobility  -      Recorded by [] Stefanie Thomas, PTA 05/14/19 1441      Row Name 05/14/19 1430 05/14/19 1359          Pain Scale: FACES Pre/Post-Treatment    Pain: FACES Scale, Pretreatment  0-->no hurt  -CS  4-->hurts little more  -     Pain: FACES Scale, Post-Treatment  0-->no hurt  -CS  4-->hurts little more  -     Pre/Post Treatment Pain Comment  --  moans with mobility  -     Recorded by [CS] Benjamin Martinez, MS CCC-SLP 05/14/19 1604 [] Stefanie Thomas, [facility] 05/14/19 1441     Row Name                Wound 05/08/19 1115 coccyx pressure injury;blisters    Wound - Properties Group  Date first assessed: 05/08/19 [HS] Time first assessed: 1115 [HS] Location: coccyx [HS] Type: pressure injury;blisters [HS2] Stage, Pressure Injury: deep tissue injury [HS] Recorded by:  [HS] Tamara Salazar RN 05/08/19 1726 [HS2] Tamara Salazar RN 05/08/19 1728    Row Name                Wound 05/07/19 0532 Right heel pressure injury    Wound - Properties Group Date first assessed: 05/07/19 [HS] Time first assessed: 0532 [HS] Present On Admission : yes;picture taken [HS] Side: Right [HS] Location: heel [HS] Type: pressure injury [HS] Stage, Pressure Injury: deep tissue injury [HS] Recorded by:  [HS] Tamara Salazar RN 05/08/19 1741    Row Name 05/14/19 1430             Outcome Summary/Treatment Plan (SLP)    Daily Summary of Progress (SLP)  progress toward functional goals is gradual  -CS      Barriers to Overall Progress (SLP)  medically complex  -CS      Plan for Continued Treatment (SLP)  Continue to follow and treatt  -CS      Anticipated Dischage Disposition  skilled nursing facility  -CS      Recorded by [CS] Benjamin Martinez, MS CCC-SLP 05/14/19 1604        User Key  (r) = Recorded By, (t) = Taken By, (c) = Cosigned By    Initials Name Effective Dates Discipline     Stefanie Thomas, PTA 08/02/16 -  PT    CS Benjamin Martinez, MS CCC-SLP 04/03/18 -  SLP    HS Tamara Salazar RN 12/27/16 -  Nurse          Outcome Summary  Outcome Summary/Treatment Plan (SLP)  Daily Summary of Progress (SLP): progress toward functional goals is gradual (05/14/19 1430 : Benjamin Martinez, MS CCC-SLP)  Barriers to Overall Progress (SLP): medically complex (05/14/19 1430 : Benjamin Martinez, MS CCC-SLP)  Plan for Continued Treatment (SLP): Continue to follow and treatt (05/14/19 1430 : Bejnamin Martinez, MS CCC-SLP)  Anticipated Dischage Disposition: skilled nursing facility (05/14/19 1430 : Benjamin Martinez, MS CCC-SLP)      SLP GOALS     Row Name 05/14/19 1600 05/13/19 1050 05/12/19 1000       Oral Nutrition/Hydration Goal 1 (SLP)     Oral Nutrition/Hydration Goal 1, SLP  Patient will tolerate LRD with no s/s aspiration.  -CS  Patient will tolerate LRD with no s/s aspiration.  -CS  Patient will tolerate LRD with no s/s aspiration.  -MM    Time Frame (Oral Nutrition/Hydration Goal 1, SLP)  by discharge  -CS  by discharge  -CS  by discharge  -MM    Barriers (Oral Nutrition/Hydration Goal 1, SLP)  n/a  -CS  n/a  -CS  n/a  -MM    Progress/Outcomes (Oral Nutrition/Hydration Goal 1, SLP)  continuing progress toward goal  -CS  continuing progress toward goal  -CS  continuing progress toward goal  -MM       Lingual Strengthening Goal 1 (SLP)    Activity (Lingual Strengthening Goal 1, SLP)  increase lingual tone/sensation/control/coordination/movement  -CS  increase lingual tone/sensation/control/coordination/movement  -CS  increase lingual tone/sensation/control/coordination/movement  -MM    Increase Lingual Tone/Sensation/Control/Coordination/Movement  lingual movement exercises  -CS  lingual movement exercises  -CS  lingual movement exercises  -MM    [place]/Accuracy (Lingual Strengthening Goal 1, SLP)  independently (over 90% accuracy)  -CS  independently (over 90% accuracy)  -CS  independently (over 90% accuracy)  -MM    Time Frame (Lingual Strengthening Goal 1, SLP)  by discharge  -CS  by discharge  -CS  by discharge  -MM    Barriers (Lingual Strengthening Goal 1, SLP)  n/a  -CS  n/a  -CS  n/a  -MM    Progress/Outcomes (Lingual Strengthening Goal 1, SLP)  goal ongoing  -CS  continuing progress toward goal  -CS  continuing progress toward goal  -MM       Pharyngeal Strengthening Exercise Goal 1 (SLP)    Activity (Pharyngeal Strengthening Goal 1, SLP)  increase timing  -CS  increase timing  -CS  increase timing  -MM    Increase Timing  gustatory stimulation (sour/cold);prepping - 3 second prep or suck swallow or 3-step swallow;hard effortful swallow  -CS  gustatory stimulation (sour/cold);prepping - 3 second prep or suck swallow or 3-step  swallow;hard effortful swallow  -CS  gustatory stimulation (sour/cold);prepping - 3 second prep or suck swallow or 3-step swallow;hard effortful swallow  -MM    [place]/Accuracy (Pharyngeal Strengthening Goal 1, SLP)  independently (over 90% accuracy)  -CS  independently (over 90% accuracy)  -CS  independently (over 90% accuracy)  -MM    Time Frame (Pharyngeal Strengthening Goal 1, SLP)  by discharge  -CS  by discharge  -CS  by discharge  -MM    Barriers (Pharyngeal Strengthening Goal 1, SLP)  n/a  -CS  n/a  -CS  n/a  -MM    Progress/Outcomes (Pharyngeal Strengthening Goal 1, SLP)  goal ongoing  -CS  continuing progress toward goal  -CS  continuing progress toward goal  -MM      User Key  (r) = Recorded By, (t) = Taken By, (c) = Cosigned By    Initials Name Provider Type    Benjamin Contreras MS CCC-SLP Speech and Language Pathologist    Jerri Ortega MS, CFY-SLP Speech and Language Pathologist          EDUCATION  The patient has been educated in the following areas:   Dysphagia (Swallowing Impairment).    SLP Recommendation and Plan  Daily Summary of Progress (SLP): progress toward functional goals is gradual  Barriers to Overall Progress (SLP): medically complex  Plan for Continued Treatment (SLP): Continue to follow and treatt  Anticipated Dischage Disposition: skilled nursing facility                    Time Calculation:   Time Calculation- SLP     Row Name 05/14/19 1612             Time Calculation- SLP    SLP Start Time  1430  -CS      SLP Stop Time  1454  -      SLP Time Calculation (min)  24 min  -      SLP Received On  05/14/19  -        User Key  (r) = Recorded By, (t) = Taken By, (c) = Cosigned By    Initials Name Provider Type    Benjamin Contreras MS CCC-SLP Speech and Language Pathologist          Therapy Charges for Today     Code Description Service Date Service Provider Modifiers Qty    [id number]  ST TREATMENT SWALLOW 3 5/13/2019 Benjamin Martinez MS CCC-SLP GN 1    [id number]  HC ST TREATMENT SWALLOW 2 5/14/2019 Benjamin Martinez, MS CCC-SLP GN 1                 Benjamin Martinez, MS CCC-SLP  5/14/2019

## 2019-05-15 LAB
ABO + RH BLD: NORMAL
ABO + RH BLD: NORMAL
ANION GAP SERPL CALCULATED.3IONS-SCNC: 4 MMOL/L (ref 4–13)
BASOPHILS # BLD AUTO: 0.05 10*3/MM3 (ref 0–0.2)
BASOPHILS NFR BLD AUTO: 0.3 % (ref 0–2)
BH BB BLOOD EXPIRATION DATE: NORMAL
BH BB BLOOD EXPIRATION DATE: NORMAL
BH BB BLOOD TYPE BARCODE: 6200
BH BB BLOOD TYPE BARCODE: 6200
BH BB DISPENSE STATUS: NORMAL
BH BB DISPENSE STATUS: NORMAL
BH BB PRODUCT CODE: NORMAL
BH BB PRODUCT CODE: NORMAL
BH BB UNIT NUMBER: NORMAL
BH BB UNIT NUMBER: NORMAL
BUN BLD-MCNC: 58 MG/DL (ref 5–21)
BUN/CREAT SERPL: 34.7 (ref 7–25)
CALCIUM SPEC-SCNC: 8.4 MG/DL (ref 8.4–10.4)
CHLORIDE SERPL-SCNC: 94 MMOL/L (ref 98–110)
CO2 SERPL-SCNC: 31 MMOL/L (ref 24–31)
CREAT BLD-MCNC: 1.67 MG/DL (ref 0.5–1.4)
CROSSMATCH INTERPRETATION: NORMAL
CROSSMATCH INTERPRETATION: NORMAL
DEPRECATED RDW RBC AUTO: 44.4 FL (ref 40–54)
EOSINOPHIL # BLD AUTO: 0.29 10*3/MM3 (ref 0–0.7)
EOSINOPHIL NFR BLD AUTO: 1.5 % (ref 0–4)
ERYTHROCYTE [DISTWIDTH] IN BLOOD BY AUTOMATED COUNT: 15.2 % (ref 12–15)
GFR SERPL CREATININE-BSD FRML MDRD: 30 ML/MIN/1.73
GLUCOSE BLD-MCNC: 100 MG/DL (ref 70–100)
HCT VFR BLD AUTO: 29.6 % (ref 37–47)
HGB BLD-MCNC: 9.8 G/DL (ref 12–16)
LYMPHOCYTES # BLD AUTO: 1.84 10*3/MM3 (ref 0.72–4.86)
LYMPHOCYTES NFR BLD AUTO: 9.6 % (ref 15–45)
MCH RBC QN AUTO: 26.8 PG (ref 28–32)
MCHC RBC AUTO-ENTMCNC: 33.1 G/DL (ref 33–36)
MCV RBC AUTO: 81.1 FL (ref 82–98)
MONOCYTES # BLD AUTO: 1.13 10*3/MM3 (ref 0.19–1.3)
MONOCYTES NFR BLD AUTO: 5.9 % (ref 4–12)
NEUTROPHILS # BLD AUTO: 15.46 10*3/MM3 (ref 1.87–8.4)
NEUTROPHILS NFR BLD AUTO: 80.6 % (ref 39–78)
OSMOLALITY UR: 388 MOSM/KG (ref 601–850)
PLATELET # BLD AUTO: 110 10*3/MM3 (ref 130–400)
PMV BLD AUTO: 12.1 FL (ref 6–12)
POTASSIUM BLD-SCNC: 4.2 MMOL/L (ref 3.5–5.3)
RBC # BLD AUTO: 3.65 10*6/MM3 (ref 4.2–5.4)
SODIUM BLD-SCNC: 129 MMOL/L (ref 135–145)
SODIUM UR-SCNC: 75 MMOL/L (ref 30–90)
UNIT  ABO: NORMAL
UNIT  ABO: NORMAL
UNIT  RH: NORMAL
UNIT  RH: NORMAL
WBC NRBC COR # BLD: 19.18 10*3/MM3 (ref 4.8–10.8)

## 2019-05-15 PROCEDURE — 25010000002 LEVOFLOXACIN PER 250 MG: Performed by: NURSE PRACTITIONER

## 2019-05-15 PROCEDURE — 97530 THERAPEUTIC ACTIVITIES: CPT

## 2019-05-15 PROCEDURE — 83935 ASSAY OF URINE OSMOLALITY: CPT | Performed by: NURSE PRACTITIONER

## 2019-05-15 PROCEDURE — 85025 COMPLETE CBC W/AUTO DIFF WBC: CPT | Performed by: INTERNAL MEDICINE

## 2019-05-15 PROCEDURE — 84300 ASSAY OF URINE SODIUM: CPT | Performed by: NURSE PRACTITIONER

## 2019-05-15 PROCEDURE — 80048 BASIC METABOLIC PNL TOTAL CA: CPT | Performed by: INTERNAL MEDICINE

## 2019-05-15 RX ORDER — SODIUM CHLORIDE 9 MG/ML
50 INJECTION, SOLUTION INTRAVENOUS CONTINUOUS
Status: DISPENSED | OUTPATIENT
Start: 2019-05-15 | End: 2019-05-16

## 2019-05-15 RX ADMIN — CALCIUM 1250 MG: 500 TABLET ORAL at 08:54

## 2019-05-15 RX ADMIN — HYDROCODONE BITARTRATE AND ACETAMINOPHEN 1 TABLET: 7.5; 325 TABLET ORAL at 09:55

## 2019-05-15 RX ADMIN — CITALOPRAM 10 MG: 10 TABLET, FILM COATED ORAL at 20:19

## 2019-05-15 RX ADMIN — SODIUM CHLORIDE 50 ML/HR: 9 INJECTION, SOLUTION INTRAVENOUS at 17:16

## 2019-05-15 RX ADMIN — FERROUS SULFATE TAB 325 MG (65 MG ELEMENTAL FE) 325 MG: 325 (65 FE) TAB at 17:15

## 2019-05-15 RX ADMIN — ALPRAZOLAM 0.25 MG: 0.25 TABLET ORAL at 20:19

## 2019-05-15 RX ADMIN — SODIUM CHLORIDE, PRESERVATIVE FREE 3 ML: 5 INJECTION INTRAVENOUS at 08:55

## 2019-05-15 RX ADMIN — SODIUM CHLORIDE, PRESERVATIVE FREE 3 ML: 5 INJECTION INTRAVENOUS at 20:19

## 2019-05-15 RX ADMIN — ACETAMINOPHEN 650 MG: 325 TABLET, FILM COATED ORAL at 20:18

## 2019-05-15 RX ADMIN — FAMOTIDINE 20 MG: 20 TABLET, FILM COATED ORAL at 08:54

## 2019-05-15 RX ADMIN — LEVOFLOXACIN 250 MG: 5 INJECTION, SOLUTION INTRAVENOUS at 12:01

## 2019-05-15 RX ADMIN — VITAM B12 100 MCG: 100 TAB at 08:53

## 2019-05-15 RX ADMIN — CALCITRIOL 0.25 MCG: 1 SOLUTION ORAL at 09:56

## 2019-05-15 RX ADMIN — FERROUS SULFATE TAB 325 MG (65 MG ELEMENTAL FE) 325 MG: 325 (65 FE) TAB at 08:53

## 2019-05-15 RX ADMIN — CARBIDOPA AND LEVODOPA 1 TABLET: 25; 100 TABLET ORAL at 17:15

## 2019-05-15 RX ADMIN — CARBIDOPA AND LEVODOPA 1 TABLET: 25; 100 TABLET ORAL at 08:53

## 2019-05-15 RX ADMIN — CARBIDOPA AND LEVODOPA 1 TABLET: 25; 100 TABLET ORAL at 20:18

## 2019-05-15 RX ADMIN — BUMETANIDE 1 MG: 1 TABLET ORAL at 08:54

## 2019-05-15 NOTE — PROGRESS NOTES
Nephrology ([facility]) Progress Note      Patient:  Susy Ko  YOB: 1940  Date of Service: 5/15/2019  MRN: [id number]   Acct: [id number]   Primary Care Physician: Monster Zuniga MD  Advance Directive:   Code Status and Medical Interventions:   Ordered at: 05/12/19 1039     Level Of Support Discussed With:    Next of Kin (If No Surrogate)     Code Status:    No CPR     Medical Interventions (Level of Support Prior to Arrest):    Full     Admit Date: 5/6/2019       Hospital Day: 9  Referring Provider: Monster Zuniga MD      Patient personally seen and examined.  Complete chart including Consults, Notes, Operative Reports, Labs, Cardiology, and Radiology studies reviewed as able.        Subjective:  Susy Ko is a 79 y.o. female  whom we were consulted for acute kidney injury, metabolic acidosis.  History of hypertension, Parkinson's, and has a colostomy.  Patient resides in local nursing home secondary to advanced dementia. Had been complaining of abdominal pain so was sent to emergency department for evaluation.  Found to have acute kidney injury, severe metabolic acidosis, UTI with sepsis.  Patient required Levophed for blood pressure support. Family had decided not to pursue any dialysis.  Now off Levophed.  Has transferred to medical floor. Renal function has slowly been recovering.  Maintaining adequate output with diuretics. 2 units PRBC given on 5/14.    Sleeping soundly at time of exam; PRN pain medication given just prior to exam.  Per daughter; patient was more awake earlier this morning.  No new overnight issues.    Allergies:  Morphine and related and Adhesive tape    Home Meds:  Medications Prior to Admission   Medication Sig Dispense Refill Last Dose   • acetaminophen (TYLENOL) 325 MG tablet Take 650 mg by mouth Every 4 (Four) Hours As Needed for Mild Pain  or Fever.   Past Month at Unknown time   • amLODIPine (NORVASC) 5 MG  tablet Take 5 mg by mouth Daily.   5/6/2019   • Benzocaine (SORE THROAT RELIEF) 10 MG lozenge Dissolve 1 lozenge in the mouth 4 (Four) Times a Day As Needed (Sore throat).   Past Month at Unknown time   • carbidopa-levodopa (SINEMET)  MG per tablet Take 1 tablet by mouth 3 (Three) Times a Day.   5/6/2019 at Unknown time   • citalopram (CeleXA) 10 MG tablet Take 10 mg by mouth Every Night.   5/5/2019   • Cranberry 250 MG tablet Take 1 tablet by mouth 3 (Three) Times a Day.   5/6/2019   • cyanocobalamin 1000 MCG/ML injection Inject 1,000 mcg into the appropriate muscle as directed by prescriber Every 30 (Thirty) Days. Given on the 14th of every month.   4/14/2019   • furosemide (LASIX) 20 MG tablet Take 20 mg by mouth Every Other Day.   5/6/2019   • ibuprofen (ADVIL,MOTRIN) 600 MG tablet Take 600 mg by mouth 3 (Three) Times a Day.   5/6/2019   • loratadine (CLARITIN) 10 MG tablet Take 10 mg by mouth Daily.   5/6/2019   • losartan (COZAAR) 50 MG tablet Take 50 mg by mouth Daily.   5/6/2019   • Menthol, Topical Analgesic, (BIOFREEZE) 4 % gel Apply 1 application topically Every 6 (Six) Hours As Needed (Apply to knees and lower back).   Past Week at Unknown time   • metoprolol succinate XL (TOPROL-XL) 50 MG 24 hr tablet Take 50 mg by mouth Daily.   5/6/2019   • omeprazole (priLOSEC) 20 MG capsule Take 20 mg by mouth Daily.   5/6/2019   • Skin Protectants, Misc. (PERIGUARD) ointment Apply 1 application topically Every Evening. Apply to buttocks.   5/6/2019   • tiotropium bromide-olodaterol (STIOLTO RESPIMAT) 2.5-2.5 MCG/ACT aerosol solution inhaler Inhale 2 puffs Daily.   5/6/2019   • traMADol (ULTRAM) 50 MG tablet Take 50 mg by mouth Every 6 (Six) Hours As Needed for Moderate Pain .   Past Month at Unknown time       Medicines:  Current Facility-Administered Medications   Medication Dose Route Frequency Provider Last Rate Last Dose   • acetaminophen (TYLENOL) tablet 650 mg  650 mg Oral Q4H PRN Monster Zuniga,  MD   650 mg at 05/14/19 2350   • ALPRAZolam (XANAX) tablet 0.25 mg  0.25 mg Oral Nightly Traci Kohler APRN   0.25 mg at 05/14/19 2125   • bumetanide (BUMEX) tablet 1 mg  1 mg Oral Daily Ramsey Weldon MD   1 mg at 05/15/19 0854   • calcitRIOL (ROCALTROL) solution 0.25 mcg  0.25 mcg Nasogastric Daily Monster Zuniga MD   0.25 mcg at 05/15/19 0956   • calcium carbonate (oyster shell) tablet 1,250 mg  1,250 mg Oral Daily Ramsey Weldon MD   1,250 mg at 05/15/19 0854   • calcium gluconate 1 g in sodium chloride 0.9 % 100 mL IVPB  1 g Intravenous PRN Monster Zuniga  mL/hr at 05/08/19 0451 1 g at 05/08/19 0451    And   • calcium gluconate 6 g in sodium chloride 0.9 % 500 mL IVPB  6 g Intravenous PRN Monster Zuniga MD 83.3 mL/hr at 05/08/19 0647 6 g at 05/08/19 0647   • carbidopa-levodopa (SINEMET)  MG per tablet 1 tablet  1 tablet Oral TID Monster Zuniga MD   1 tablet at 05/15/19 0853   • citalopram (CeleXA) tablet 10 mg  10 mg Oral Nightly Monster Zuniga MD   10 mg at 05/14/19 2125   • cyancobalamin (VITAMIN B-12) tablet 100 mcg  100 mcg Oral Daily Monster Zuniga MD   100 mcg at 05/15/19 0853   • famotidine (PEPCID) tablet 20 mg  20 mg Oral Daily Monster Zuniga MD   20 mg at 05/15/19 0854   • ferrous sulfate tablet 325 mg  325 mg Oral BID With Meals Traci Kohler APRN   325 mg at 05/15/19 0853   • HYDROcodone-acetaminophen (NORCO) 7.5-325 MG per tablet 1 tablet  1 tablet Oral Q4H PRN Monster Zuniga MD   1 tablet at 05/15/19 0955   • levoFLOXacin (LEVAQUIN) 250 mg/50 mL D5W (premix) 250 mg  250 mg Intravenous Q24H Traci Kohler APRN 0 mL/hr at 05/13/19 1504 250 mg at 05/14/19 1727   • sodium chloride 0.9 % flush 10 mL  10 mL Intravenous PRN Prosper Jiang, DO       • sodium chloride 0.9 % flush 3 mL  3 mL Intravenous Q12H Monster Zuniga MD   3 mL at 05/15/19 0855   • sodium chloride 0.9 % flush 3-10 mL  3-10 mL  Intravenous Monster Germain MD           Past Medical History:  Past Medical History:   Diagnosis Date   • Anxiety    • Breast cancer (CMS/HCC)      LEFT WITH RECONSTRUCTION   • Colostomy in place (CMS/HCC)    • Dementia    • Depression    • Edema     LOWER FEET   • Hydrocephalus     NORMAL PRESSURE   • Hypertension    • Incontinence    • Nausea    • Parastomal hernia    • Parkinson disease (CMS/HCC)    • Tremor     RIGHT HAND   • Unsteady gait        Past Surgical History:  Past Surgical History:   Procedure Laterality Date   • ABDOMINAL SURGERY     • APPENDECTOMY     • BREAST RECONSTRUCTION Left     LEFT   • CHOLECYSTECTOMY     • COLON SURGERY     • COLONOSCOPY     • COLOSTOMY     • HYSTERECTOMY     • MASTECTOMY Left    • PARASTOMAL HERNIA REPAIR N/A 3/17/2017    Procedure: REPAIR PARASTOMAL HERNIA;  Surgeon: Princess Walters MD;  Location: [facility] OR;  Service:        Family History  History reviewed. No pertinent family history.    Social History  Social History     Socioeconomic History   • Marital status:      Spouse name: Not on file   • Number of children: Not on file   • Years of education: Not on file   • Highest education level: Not on file   Tobacco Use   • Smoking status: Former Smoker   • Smokeless tobacco: Never Used   Substance and Sexual Activity   • Alcohol use: No   • Drug use: No   • Sexual activity: Defer         Review of Systems:   History obtained from chart review and family  General ROS: negative  Respiratory ROS: no cough, shortness of breath, or wheezing  Cardiovascular ROS: no chest pain or dyspnea on exertion  Gastrointestinal ROS: no abdominal pain, change in bowel habits, or black or bloody stools  Genito-Urinary ROS: no dysuria, trouble voiding, or hematuria  Musculoskeletal ROS: negative  Neurological ROS: no TIA or stroke symptoms    Objective:  Patient Vitals for the past 24 hrs:   BP Temp Temp src Pulse Resp SpO2   05/15/19 0700 130/49 98.7 °F (37.1 °C) -- 68 15  --   05/15/19 0339 129/43 98.6 °F (37 °C) Oral 78 16 95 %   05/14/19 2309 149/55 (!) 100.8 °F (38.2 °C) Oral 87 16 94 %   05/14/19 2230 -- (!) 100.7 °F (38.2 °C) Oral -- -- --   05/14/19 2130 -- 100.4 °F (38 °C) Oral -- -- --   05/14/19 1954 -- 99.9 °F (37.7 °C) Oral -- -- --   05/14/19 1930 136/48 100.5 °F (38.1 °C) Oral 87 16 95 %   05/14/19 1729 150/88 98.3 °F (36.8 °C) Oral 84 16 96 %   05/14/19 1500 93/58 98.2 °F (36.8 °C) -- 79 16 96 %   05/14/19 1415 (!) 83/48 97.8 °F (36.6 °C) Oral 99 16 96 %   05/14/19 1346 120/54 98.3 °F (36.8 °C) Oral 85 16 94 %   05/14/19 1253 (!) 144/102 98.7 °F (37.1 °C) Oral 78 16 100 %       Intake/Output Summary (Last 24 hours) at 5/15/2019 1124  Last data filed at 5/15/2019 1107  Gross per 24 hour   Intake 1321.79 ml   Output 2250 ml   Net -928.21 ml     General: drowsy, oriented X 1   Neck: supple, no JVD  Chest:  clear to auscultation bilaterally without respiratory distress  CVS: regular rate and rhythm  Abdominal: diffusely tender, +BS, colostomy bag in place  Extremities: 1+ BLE edema  Skin: warm and dry without rash  Neuro: no focal motor deficits    Labs:  Results from last 7 days   Lab Units 05/15/19  0604 05/14/19  0444 05/13/19  0534   WBC 10*3/mm3 19.18* 17.78* 18.57*   HEMOGLOBIN g/dL 9.8* 6.6* 7.3*   HEMATOCRIT % 29.6* 20.3* 22.5*   PLATELETS 10*3/mm3 110* 78* 71*         Results from last 7 days   Lab Units 05/15/19  0604 05/14/19  0444 05/13/19  0534 05/12/19  0632 05/11/19  0645 05/10/19  0607   SODIUM mmol/L 129* 130* 133* 134* 135 140   POTASSIUM mmol/L 4.2 4.3 4.2 4.1 4.1 3.5   CHLORIDE mmol/L 94* 93* 97* 98 101 101   CO2 mmol/L 31.0 31.0 31.0 29.0 31.0 33.0*   BUN mg/dL 58* 61* 63* 66* 75* 92*   CREATININE mg/dL 1.67* 1.77* 1.79* 1.69* 1.94* 2.58*   CALCIUM mg/dL 8.4 8.5 8.5 8.4 7.9* 7.3*   BILIRUBIN mg/dL  --   --   --  0.3 0.4 0.2   ALK PHOS U/L  --   --   --  64 69 97   ALT (SGPT) U/L  --   --   --  23 24 32   AST (SGOT) U/L  --   --   --  66* 84* 112*    GLUCOSE mg/dL 100 103* 113* 105* 91 136*       Radiology:   Imaging Results (last 72 hours)     Procedure Component Value Units Date/Time    US Renal Bilateral [[id number]] Collected:  05/07/19 1124     Updated:  05/07/19 1129    Narrative:       EXAMINATION:  US RENAL BILATERAL-  5/7/2019 10:11 AM CDT     HISTORY: acute renal failure; A41.9-Sepsis, unspecified organism;  R65.20-Severe sepsis without septic shock; N39.0-Urinary tract  infection, site not specified; E87.5-Hyperkalemia; N17.9-Acute kidney  failure, unspecified; R41.82-Altered mental status, unspecified;  R13.19-Other dysphagia      COMPARISON: 05/10/2014 renal ultrasound. 05/06/2019 abdomen and pelvis  CT     TECHNIQUE:      Bilateral renal ultrasound was performed.     FINDINGS:      The right kidney measures 10.1 cm in ahxd-sb-rgrd length.     The left kidney measures 11 cm in wnbu-ny-plej length.     There is mild cortical thinning and pelvic lipomatosis compatible with  age.     Normal echogenicity of renal cortex is observed without renal masses.     There are no perinephric abnormalities.     There is no pelvocaliectasis or obstructive uropathy changes.     The urinary bladder is decompressed with a Peralta catheter.       Impression:       1. Negative bilateral renal ultrasound.  This report was finalized on 05/07/2019 11:26 by Dr. Iban Queen MD.    CT Abdomen Pelvis Without Contrast [[id number]] Collected:  05/06/19 2236     Updated:  05/06/19 2242    Narrative:       EXAMINATION: CT ABDOMEN PELVIS WO CONTRAST-      5/6/2019 10:17 PM CDT     HISTORY: pain; r/o obstruction; A41.9-Sepsis, unspecified organism;  R65.20-Severe sepsis without septic shock; N39.0-Urinary tract  infection, site not specified; E87.5-Hyperkalemia; N17.9-Acute kidney  failure, unspecified; R41.82-Altered mental status, unspecified     In order to have a CT radiation dose as low as reasonably achievable  Automated Exposure Control was utilized for adjustment of the mA  and/or  KV according to patient size.     DLP in mGycm= 343.     Noncontrast abdomen/pelvis CT.     Comparison is made with 04/12/2016.     Left lower quadrant ostomy with nonobstructing parastomal hernia.     No significant bowel dilation.     Mild urinary bladder distention.     Mild dilation of the renal collecting systems and ureters is probably  based on vesicoureteral reflux. There is no obstructing stone.     Normal heart size.  No acute abnormality at the lung bases.     Cholecystectomy clips.  Normal noncontrast appearance of the liver and spleen.  The pancreas is atrophic.     Aortic calcification with no aneurysm.     Summary:  1. Moderate fecal material throughout the colon.  2. Nonobstructing parastomal hernia.  3. Mild dilation of the renal collecting systems and ureters compatible  with vesicoureteral reflux.  4. No evidence of bowel obstruction, mass or abscess.                                   This report was finalized on 05/06/2019 22:39 by Dr. Raul Ricci MD.    CT Head Without Contrast [[id number]] Collected:  05/06/19 2234     Updated:  05/06/19 2238    Narrative:       EXAMINATION: CT HEAD WO CONTRAST-      5/6/2019 10:17 PM CDT     HISTORY: AMS; A41.9-Sepsis, unspecified organism; R65.20-Severe sepsis  without septic shock; N39.0-Urinary tract infection, site not specified;  E87.5-Hyperkalemia; N17.9-Acute kidney failure, unspecified;  R41.82-Altered mental status, unspecified     In order to have a CT radiation dose as low as reasonably achievable  Automated Exposure Control was utilized for adjustment of the mA and/or  KV according to patient size.     DLP in mGycm= 1214.     Noncontrast head CT compared with 01/17/2017.     Axial, sagittal, and coronal noncontrast CT imaging of the head.     The visualized paranasal sinuses are clear.     The brain and ventricles have an age appropriate appearance.  Moderate atrophy and small vessel disease.  There is no hemorrhage or mass-effect.   No  acute infarction is seen.     No calvarial abnormality.       Impression:       1. No acute intracranial abnormality is seen.                                         This report was finalized on 05/06/2019 22:35 by Dr. Raul Ricci MD.    XR Chest 1 View [[id number]] Collected:  05/06/19 2216     Updated:  05/06/19 2219    Narrative:       EXAMINATION: XR CHEST 1 VW-     5/6/2019 10:11 PM CDT     HISTORY: Altered mental status; r/o pneumonia.     One view chest x-ray compared with 01/17/2017.     Heart size is normal.  The mediastinum is within normal limits.      The lungs are normally expanded with no pneumonia or pneumothorax.  Chronic lung changes.  No congestive failure changes.                                                                       Impression:       1. No acute disease.           This report was finalized on 05/06/2019 22:16 by Dr. Raul Ricci MD.          Culture:  Blood Culture   Date Value Ref Range Status   05/06/2019 No growth at 24 hours  Preliminary   05/06/2019 Gram Positive Rods (A)  Preliminary   05/06/2019 Gram Positive Cocci (A)  Preliminary     Urine Culture   Date Value Ref Range Status   05/06/2019 >100,000 CFU/mL Escherichia coli (A)  Final   05/06/2019 70,000-80,000 CFU/mL Mixed Gram Positive Alison (A)  Final     Comment:     Probable Contaminant           Assessment   1.  Acute kidney injury due to ATN--improving  2.  Baseline CKD stage 3  3.  UTI/urosepsis--resolved  4.  Advanced dementia  5.  Hyponatremia--worse today  6.  Anemia      Plan:  1.  Hyponatremia trending worse. Urine studies pending  2.  Monitor labs      COLTON Smith  5/15/2019  11:24 AM

## 2019-05-15 NOTE — PLAN OF CARE
Problem: Fall Risk (Adult)  Goal: Identify Related Risk Factors and Signs and Symptoms  Outcome: Ongoing (interventions implemented as appropriate)    Goal: Absence of Fall  Outcome: Ongoing (interventions implemented as appropriate)      Problem: Patient Care Overview  Goal: Plan of Care Review  Outcome: Ongoing (interventions implemented as appropriate)   05/15/19 1522   Coping/Psychosocial   Plan of Care Reviewed With patient   Plan of Care Review   Progress no change   OTHER   Outcome Summary Pt c/o generalized pain. Medicated x1 with pain medication with good relief. Pt alert to self/ place. Turned q2 when family/ patient allowed. Wound care performed. Wedges in place. Good urine output. Purewick in place. Urine sent to lab. IV abx continue. Family at bedside. Safety maintained. Will continue to monitor.      Goal: Individualization and Mutuality  Outcome: Ongoing (interventions implemented as appropriate)    Goal: Discharge Needs Assessment  Outcome: Ongoing (interventions implemented as appropriate)    Goal: Interprofessional Rounds/Family Conf  Outcome: Ongoing (interventions implemented as appropriate)      Problem: Skin Injury Risk (Adult)  Goal: Identify Related Risk Factors and Signs and Symptoms  Outcome: Ongoing (interventions implemented as appropriate)    Goal: Skin Health and Integrity  Outcome: Ongoing (interventions implemented as appropriate)      Problem: Confusion, Chronic (Adult)  Goal: Identify Related Risk Factors and Signs and Symptoms  Outcome: Ongoing (interventions implemented as appropriate)    Goal: Cognitive/Functional Impairments Minimized  Outcome: Ongoing (interventions implemented as appropriate)    Goal: Free from Harm/Injuries  Outcome: Ongoing (interventions implemented as appropriate)      Problem: Infection, Risk/Actual (Adult)  Goal: Identify Related Risk Factors and Signs and Symptoms  Outcome: Ongoing (interventions implemented as appropriate)    Goal: Infection  Prevention/Resolution  Outcome: Ongoing (interventions implemented as appropriate)      Problem: Wound (Includes Pressure Injury) (Adult)  Goal: Signs and Symptoms of Listed Potential Problems Will be Absent, Minimized or Managed (Wound)  Outcome: Ongoing (interventions implemented as appropriate)

## 2019-05-15 NOTE — PLAN OF CARE
Problem: Patient Care Overview  Goal: Plan of Care Review  Outcome: Ongoing (interventions implemented as appropriate)   05/15/19 4352   Coping/Psychosocial   Plan of Care Reviewed With patient   Plan of Care Review   Progress no change   OTHER   Outcome Summary pt trans to EOB max of 2, pt required min assist to maintain sitting balance, constant cues for pt to keep eyes open, performed P-AAROM, mostly PROM all 4 ext within available range, pt did not follow commands, trans back to bed max of 2. pt will return to NH at d/c

## 2019-05-15 NOTE — THERAPY TREATMENT NOTE
Acute Care - Physical Therapy Treatment Note  [facility]     Patient Name: Susy Ko  : 1940  MRN: [id number]  Today's Date: 5/15/2019  Onset of Illness/Injury or Date of Surgery: 19  Date of Referral to PT: 05/10/19  Referring Physician: Dr. Zuniga    Admit Date: 2019    Visit Dx:    ICD-10-CM ICD-9-CM   1. Severe sepsis (CMS/HCC) A41.9 038.9    R65.20 995.92   2. Urinary tract infection, acute N39.0 599.0   3. Hyperkalemia E87.5 276.7   4. Acute kidney injury (CMS/HCC) N17.9 584.9   5. Altered mental status, unspecified altered mental status type R41.82 780.97   6. Other dysphagia R13.19 787.29   7. Impaired mobility Z74.09 799.89   8. Impaired mobility and ADLs Z74.09 799.89     Patient Active Problem List   Diagnosis   • Normal pressure hydrocephalus   • Non morbid obesity due to excess calories   • Tobacco non-user   • Tremor of right hand   • Abnormality of gait   • Parastomal hernia   • Hernia   • Gait abnormality   • Severe sepsis (CMS/HCC)       Therapy Treatment    Rehabilitation Treatment Summary     Row Name 05/15/19 1500 05/15/19 1142          Treatment Time/Intention    Discipline  physical therapy assistant  -  physical therapy assistant  -     Document Type  therapy note (daily note)  -[facility]  therapy note (daily note)  -     Subjective Information  -- pt moans with mobility  -[facility]  --     Comment  --  family reports pt just had bath and bandages changed along w/ a pain pill request to let her rest and ck bk after lunch  (Significant)   -     Existing Precautions/Restrictions  fall  -[facility]  --     Recorded by [] Stefanie Thomas, PTA 05/15/19 1505  [[facility]] Stefanie Thomas, PTA 05/15/19 1549 [NW] Genevieve Tripp, [facility] 05/15/19 1144     Row Name 05/15/19 1500             Bed Mobility Assessment/Treatment    Scooting/Bridging [place] (Bed Mobility)  dependent (less than 25% patient effort);2 person assist  -      Supine-Sit [place] (Bed Mobility)  maximum assist  (25% patient effort);2 person assist;verbal cues  -      Sit-Supine [place] (Bed Mobility)  maximum assist (25% patient effort);2 person assist;verbal cues  -      Recorded by [] Stefanie Thomas, \[facility]\"" 05/15/19 1549      Row Name 05/15/19 1500             Transfer Assessment/Treatment    Comment (Transfers)  not appropriate  -      Recorded by [] Stefanie Thomas, \[facility]\"" 05/15/19 1549      Row Name 05/15/19 1500             Therapeutic Exercise    25444 - PT Therapeutic Activity Minutes  30  -      Recorded by [] Stefanie Thomas, \[facility]\"" 05/15/19 1549      Row Name 05/15/19 1500             Therapeutic Exercise    Comment (Therapeutic Exercise)  P-AAROM all 4 ext in available range while sitting EOB, pt  did not follow commands,constant cues to keep pt awake  -      Recorded by [] Stefanie Thomas, \[facility]\"" 05/15/19 1549      Row Name 05/15/19 1500             Static Sitting Balance    Level of [place] (Unsupported Sitting, Static Balance)  minimal assist, 75% patient effort  -      Comment (Supported Sitting, Static Balance)  pt sat EOB 20 MINUTES  -      Recorded by [] Stefanie Thomas, \[facility]\"" 05/15/19 1549      Row Name 05/15/19 1500             Positioning and Restraints    Pre-Treatment Position  in bed  -      Post Treatment Position  bed  -      In Bed  side lying left;call light within reach;encouraged to call for assist;with family/caregiver;SCD pump applied;pillow between legs;waffle boots/both  -      Recorded by [] Stefanie Thomas, \[facility]\"" 05/15/19 1549      Row Name 05/15/19 1500             Pain Scale: Numbers Pre/Post-Treatment    Pain Location - Orientation  generalized  -      Pre/Post Treatment Pain Comment  pt would moan with mobility  -      Pain Intervention(s)  Repositioned  -      Recorded by [] Stefanie Thomas, \[facility]\"" 05/15/19 1549      Row Name 05/15/19 1500             Pain Scale: FACES Pre/Post-Treatment    Pain: FACES Scale, Pretreatment  4-->hurts little more  -AH       Pain: FACES Scale, Post-Treatment  4-->hurts little more  -      Recorded by [AH] Stefanie Thomas, PTA 05/15/19 1549      Row Name                Wound 05/08/19 1115 coccyx pressure injury;blisters    Wound - Properties Group Date first assessed: 05/08/19 [HS] Time first assessed: 1115 [HS] Location: coccyx [HS] Type: pressure injury;blisters [HS2] Stage, Pressure Injury: deep tissue injury [HS] Recorded by:  [HS] Tamara Salazar RN 05/08/19 1726 [HS2] Tamara Salazar RN 05/08/19 1728    Row Name                Wound 05/07/19 0532 Right heel pressure injury    Wound - Properties Group Date first assessed: 05/07/19 [HS] Time first assessed: 0532 [HS] Present On Admission : yes;picture taken [HS] Side: Right [HS] Location: heel [HS] Type: pressure injury [HS] Stage, Pressure Injury: deep tissue injury [HS] Recorded by:  [HS] Tamara Salazar RN 05/08/19 1741      User Key  (r) = Recorded By, (t) = Taken By, (c) = Cosigned By    Initials Name Effective Dates Discipline     Stefanie Thomas, PTA 08/02/16 -  PT    NW Genevieve Tripp, PTA 08/02/16 -  PT    HS Tamara Salazar RN 12/27/16 -  Nurse          Wound 05/08/19 1115 coccyx pressure injury;blisters (Active)   Dressing Appearance moist drainage 5/15/2019 10:00 AM   Closure None 5/15/2019 10:00 AM   Base bleeding;maroon/purple;red 5/15/2019 10:00 AM   Periwound non-blanchable;redness 5/15/2019 10:00 AM   Periwound Temperature warm 5/15/2019 10:00 AM   Periwound Skin Turgor soft 5/15/2019 10:00 AM   Edges irregular 5/15/2019 10:00 AM   Drainage Characteristics/Odor serosanguineous 5/14/2019  9:00 PM   Drainage Amount scant 5/14/2019  9:00 PM       Wound 05/07/19 0532 Right heel pressure injury (Active)   Dressing Appearance dry;intact 5/15/2019  8:00 AM   Closure None 5/15/2019  8:00 AM   Base red;non-blanchable 5/15/2019  8:00 AM   Periwound dry;non-blanchable 5/15/2019  8:00 AM   Periwound Temperature warm 5/15/2019  8:00 AM   Periwound Skin  Turgor soft 5/15/2019  8:00 AM   Edges irregular 5/15/2019  8:00 AM   Drainage Amount none 5/15/2019  8:00 AM           Physical Therapy Education     Title: PT OT SLP Therapies (In Progress)     Topic: Physical Therapy (Done)     Point: Mobility training (Done)     Learning Progress Summary           Patient Acceptance, E, VU by SB at 5/13/2019  1:27 PM    Comment:  pt family educated on POC, benefits of activity and d/c plans   Family Acceptance, E, VU by SB at 5/13/2019  1:27 PM    Comment:  pt family educated on POC, benefits of activity and d/c plans    Acceptance, E, VU by FE at 5/7/2019  1:57 PM    Comment:  Educated charyhter on benefits of activity, 1 time PT jennifer at this time                   Point: Home exercise program (Done)     Learning Progress Summary           Patient Acceptance, E, VU by SB at 5/13/2019  1:27 PM    Comment:  pt family educated on POC, benefits of activity and d/c plans   Family Acceptance, E, VU by SB at 5/13/2019  1:27 PM    Comment:  pt family educated on POC, benefits of activity and d/c plans                   Point: Body mechanics (Done)     Learning Progress Summary           Patient Acceptance, E, VU by SB at 5/13/2019  1:27 PM    Comment:  pt family educated on POC, benefits of activity and d/c plans   Family Acceptance, E, VU by SB at 5/13/2019  1:27 PM    Comment:  pt family educated on POC, benefits of activity and d/c plans                   Point: Precautions (Done)     Learning Progress Summary           Patient Acceptance, E, VU by SB at 5/13/2019  1:27 PM    Comment:  pt family educated on POC, benefits of activity and d/c plans   Family Acceptance, E, VU by SB at 5/13/2019  1:27 PM    Comment:  pt family educated on POC, benefits of activity and d/c plans                               User Key     Initials Effective Dates Name Provider Type Discipline    FE 08/02/16 -  Braulio Fulton PT DPT Physical Therapist PT    SB 08/31/18 -  Kim Knight PT Physical  Therapist PT                PT Recommendation and Plan     Plan of Care Reviewed With: patient  Progress: no change  Outcome Summary: pt trans to EOB max of 2, pt required min assist to maintain sitting balance, constant cues for pt to keep eyes open, performed P-AAROM, mostly PROM all 4 ext within available range, pt did not follow commands, trans back to bed max of 2. pt will return to NH at d/c  Outcome Measures     Row Name 05/13/19 1354 05/13/19 1300 05/13/19 0846       How much help from another person do you currently need...    Turning from your back to your side while in flat bed without using bedrails?  --  1  -SB  --    Moving from lying on back to sitting on the side of a flat bed without bedrails?  --  1  -SB  --    Moving to and from a bed to a chair (including a wheelchair)?  --  1  -SB  --    Standing up from a chair using your arms (e.g., wheelchair, bedside chair)?  --  1  -SB  --    Climbing 3-5 steps with a railing?  --  1  -SB  --    To walk in hospital room?  --  1  -SB  --    AM-PAC 6 Clicks Score  --  6  -SB  --       How much help from another is currently needed...    Putting on and taking off regular lower body clothing?  1  -MM  --  1  -MM    Bathing (including washing, rinsing, and drying)  2  -MM  --  2  -MM    Toileting (which includes using toilet bed pan or urinal)  1  -MM  --  1  -MM    Putting on and taking off regular upper body clothing  2  -MM  --  2  -MM    Taking care of personal grooming (such as brushing teeth)  3  -MM  --  3  -MM    Eating meals  3  -MM  --  3  -MM    Score  12  -MM  --  12  -MM       Functional Assessment    Outcome Measure Options  AM-PAC 6 Clicks Daily Activity (OT)  -MM  AM-PAC 6 Clicks Basic Mobility (PT)  -SB  AM-PAC 6 Clicks Daily Activity (OT)  -MM      User Key  (r) = Recorded By, (t) = Taken By, (c) = Cosigned By    Initials Name Provider Type    MM Ronald Arroyo, OTR/L Occupational Therapist    Kim Crum, PT Physical Therapist          Time Calculation:   PT Charges     Row Name 05/15/19 1552 05/15/19 1500          Time Calculation    Start Time  1500  -  --     Stop Time  1530  -  --     Time Calculation (min)  30 min  -  --     PT Received On  05/15/19  -  --        Time Calculation- PT    Total Timed Code Minutes- PT  30 minute(s)  -  --        Timed Charges    23646 - PT Therapeutic Activity Minutes  --  30  -AH       User Key  (r) = Recorded By, (t) = Taken By, (c) = Cosigned By    Initials Name Provider Type     Stefanie Thomas PTA Physical Therapy Assistant        Therapy Charges for Today     Code Description Service Date Service Provider Modifiers Qty    08033720846 HC PT THERAPEUTIC ACT EA 15 MIN 5/14/2019 Stefanie Thomas PTA GP 2    70489759585 HC PT THERAPEUTIC ACT EA 15 MIN 5/15/2019 Stefanie Thomas PTA GP 2          PT G-Codes  Outcome Measure Options: AM-PAC 6 Clicks Daily Activity (OT)  AM-PAC 6 Clicks Score: 6  Score: 12    Stefanie Thomas PTA  5/15/2019

## 2019-05-15 NOTE — PLAN OF CARE
Problem: Fall Risk (Adult)  Goal: Identify Related Risk Factors and Signs and Symptoms  Outcome: Ongoing (interventions implemented as appropriate)    Goal: Absence of Fall  Outcome: Ongoing (interventions implemented as appropriate)      Problem: Patient Care Overview  Goal: Plan of Care Review  Outcome: Ongoing (interventions implemented as appropriate)   05/14/19 1606 05/14/19 2100 05/15/19 0423   Coping/Psychosocial   Plan of Care Reviewed With --  patient --    Plan of Care Review   Progress no change --  --    OTHER   Outcome Summary --  --  pt no c/o pain. pt turned q2 hours, wedges in place. pure wick in place, good output noted. vss. pt resting comfortably       Problem: Skin Injury Risk (Adult)  Goal: Identify Related Risk Factors and Signs and Symptoms  Outcome: Ongoing (interventions implemented as appropriate)    Goal: Skin Health and Integrity  Outcome: Ongoing (interventions implemented as appropriate)      Problem: Confusion, Chronic (Adult)  Goal: Identify Related Risk Factors and Signs and Symptoms  Outcome: Ongoing (interventions implemented as appropriate)    Goal: Cognitive/Functional Impairments Minimized  Outcome: Ongoing (interventions implemented as appropriate)    Goal: Free from Harm/Injuries  Outcome: Ongoing (interventions implemented as appropriate)      Problem: Infection, Risk/Actual (Adult)  Goal: Identify Related Risk Factors and Signs and Symptoms  Outcome: Ongoing (interventions implemented as appropriate)    Goal: Infection Prevention/Resolution  Outcome: Ongoing (interventions implemented as appropriate)      Problem: Wound (Includes Pressure Injury) (Adult)  Goal: Signs and Symptoms of Listed Potential Problems Will be Absent, Minimized or Managed (Wound)  Outcome: Ongoing (interventions implemented as appropriate)

## 2019-05-15 NOTE — PROGRESS NOTES
[facility]  ARIES Abdalla M.D.  COLTON Hunter      Internal Medicine Progress Note    5/15/2019   12:27 PM    Name:  Susy Ko  MRN:    [id number]     Acct:     [id number]   Room:  [address] [place] [address] Day: 9     Admit Date: 5/6/2019  8:39 PM  PCP: Monster Zuniga MD    Subjective:     C/C:   Chief Complaint   Patient presents with   • Abnormal KUB       Interval History: Status:  stayed the same. Resting in bed. Daughter at bedside. Patient more alert today. Tolerated transfusion yesterday and counts stable. Sodium level down to 129 and renal function continues to improve. Platelet count improved.     Review of Systems   Constitution: Positive for weakness and malaise/fatigue. Negative for chills, decreased appetite, weight gain and weight loss.   HENT: Negative for congestion, ear discharge, hoarse voice and tinnitus.    Eyes: Negative for blurred vision, discharge, visual disturbance and visual halos.   Cardiovascular: Negative for chest pain, claudication, dyspnea on exertion, irregular heartbeat, leg swelling, orthopnea and paroxysmal nocturnal dyspnea.   Respiratory: Negative for cough, shortness of breath, sputum production and wheezing.    Endocrine: Negative for cold intolerance, heat intolerance and polyuria.   Hematologic/Lymphatic: Negative for adenopathy. Does not bruise/bleed easily.   Skin: Negative for dry skin, itching and suspicious lesions.   Musculoskeletal: Negative for arthritis, back pain, falls, joint pain, muscle weakness and myalgias.   Gastrointestinal: Negative for abdominal pain, constipation, diarrhea, dysphagia and hematemesis.   Genitourinary: Negative for bladder incontinence, dysuria and frequency.   Neurological: Negative for aphonia, disturbances in coordination and dizziness.   Psychiatric/Behavioral: Negative for altered mental status, depression, memory loss and substance abuse. The patient does not have insomnia and is not  nervous/anxious.    Allergic/Immunologic: Negative for environmental allergies.     Medications:     Allergies:   Allergies   Allergen Reactions   • Morphine And Related Mental Status Change   • Adhesive Tape Rash       Current Meds:   Current Facility-Administered Medications:   •  acetaminophen (TYLENOL) tablet 650 mg, 650 mg, Oral, Q4H PRN, Monster Zuniga MD, 650 mg at 05/14/19 2350  •  ALPRAZolam (XANAX) tablet 0.25 mg, 0.25 mg, Oral, Nightly, Traci Kohler APRN, 0.25 mg at 05/14/19 2125  •  bumetanide (BUMEX) tablet 1 mg, 1 mg, Oral, Daily, Ramsey Weldon MD, 1 mg at 05/15/19 0854  •  calcitRIOL (ROCALTROL) solution 0.25 mcg, 0.25 mcg, Nasogastric, Daily, Monster Zuniga MD, 0.25 mcg at 05/15/19 0956  •  calcium carbonate (oyster shell) tablet 1,250 mg, 1,250 mg, Oral, Daily, Ramsey Weldon MD, 1,250 mg at 05/15/19 0854  •  calcium gluconate 1 g in sodium chloride 0.9 % 100 mL IVPB, 1 g, Intravenous, PRN, Last Rate: 100 mL/hr at 05/08/19 0451, 1 g at 05/08/19 0451 **AND** calcium gluconate 6 g in sodium chloride 0.9 % 500 mL IVPB, 6 g, Intravenous, PRN, Last Rate: 83.3 mL/hr at 05/08/19 0647, 6 g at 05/08/19 0647 **AND** Calcium, , , PRN, Monster Zuniga MD  •  carbidopa-levodopa (SINEMET)  MG per tablet 1 tablet, 1 tablet, Oral, TID, Monster Zuniga MD, 1 tablet at 05/15/19 0853  •  citalopram (CeleXA) tablet 10 mg, 10 mg, Oral, Nightly, Monster Zuniga MD, 10 mg at 05/14/19 2125  •  cyancobalamin (VITAMIN B-12) tablet 100 mcg, 100 mcg, Oral, Daily, Monster Zuniga MD, 100 mcg at 05/15/19 0853  •  famotidine (PEPCID) tablet 20 mg, 20 mg, Oral, Daily, Monster Zuniga MD, 20 mg at 05/15/19 0854  •  ferrous sulfate tablet 325 mg, 325 mg, Oral, BID With Meals, Traci Kohler APRN, 325 mg at 05/15/19 0853  •  HYDROcodone-acetaminophen (NORCO) 7.5-325 MG per tablet 1 tablet, 1 tablet, Oral, Q4H PRN, Monsetr Zuniga MD, 1 tablet at 05/15/19  "0955  •  levoFLOXacin (LEVAQUIN) 250 mg/50 mL D5W (premix) 250 mg, 250 mg, Intravenous, Q24H, Traci Kohler APRN, Last Rate: 0 mL/hr at 19 1504, 250 mg at 05/15/19 1201  •  [COMPLETED] Insert peripheral IV, , , Once **AND** sodium chloride 0.9 % flush 10 mL, 10 mL, Intravenous, PRN, Prosper Jiang, DO  •  sodium chloride 0.9 % flush 3 mL, 3 mL, Intravenous, Q12H, Monster Zuniga MD, 3 mL at 05/15/19 0855  •  sodium chloride 0.9 % flush 3-10 mL, 3-10 mL, Intravenous, PRN, Monster Zuniga MD    Data:     Code Status:    Code Status and Medical Interventions:   Ordered at: 19 1039     Level Of Support Discussed With:    Next of Kin (If No Surrogate)     Code Status:    No CPR     Medical Interventions (Level of Support Prior to Arrest):    Full       History reviewed. No pertinent family history.    Social History     Socioeconomic History   • Marital status:      Spouse name: Not on file   • Number of children: Not on file   • Years of education: Not on file   • Highest education level: Not on file   Tobacco Use   • Smoking status: Former Smoker   • Smokeless tobacco: Never Used   Substance and Sexual Activity   • Alcohol use: No   • Drug use: No   • Sexual activity: Defer       Vitals:  BP (!) 108/39   Pulse 71   Temp 98.1 °F (36.7 °C)   Resp 16   Ht 160 cm (63\")   Wt 72.3 kg (159 lb 6.4 oz)   SpO2 98%   BMI 28.24 kg/m²   Temp (24hrs), Av.2 °F (37.3 °C), Min:97.8 °F (36.6 °C), Max:100.8 °F (38.2 °C)        I/O (24Hr):    Intake/Output Summary (Last 24 hours) at 5/15/2019 1227  Last data filed at 5/15/2019 1201  Gross per 24 hour   Intake 1371.79 ml   Output 2450 ml   Net -1078.21 ml       Labs and imaging:      Recent Results (from the past 12 hour(s))   Prepare RBC, 2 Units    Collection Time: 05/15/19  5:40 AM   Result Value Ref Range    Product Code F8772V94     Unit Number [id number]     UNIT  ABO A     UNIT  RH POS     Crossmatch Interpretation " Compatible     Dispense Status PT     Blood Type JENNIFER     Blood Expiration Date 201905272359     Blood Type Barcode 6200     Product Code S7381P25     Unit Number [id number]     UNIT  ABO A     UNIT  RH POS     Crossmatch Interpretation Compatible     Dispense Status PT     Blood Type APOS     Blood Expiration Date 201905272359     Blood Type Barcode 6200    Basic Metabolic Panel    Collection Time: 05/15/19  6:04 AM   Result Value Ref Range    Glucose 100 70 - 100 mg/dL    BUN 58 (H) 5 - 21 mg/dL    Creatinine 1.67 (H) 0.50 - 1.40 mg/dL    Sodium 129 (L) 135 - 145 mmol/L    Potassium 4.2 3.5 - 5.3 mmol/L    Chloride 94 (L) 98 - 110 mmol/L    CO2 31.0 24.0 - 31.0 mmol/L    Calcium 8.4 8.4 - 10.4 mg/dL    eGFR Non African Amer 30 (L) >60 mL/min/1.73    BUN/Creatinine Ratio 34.7 (H) 7.0 - 25.0    Anion Gap 4.0 4.0 - 13.0 mmol/L   CBC Auto Differential    Collection Time: 05/15/19  6:04 AM   Result Value Ref Range    WBC 19.18 (H) 4.80 - 10.80 10*3/mm3    RBC 3.65 (L) 4.20 - 5.40 10*6/mm3    Hemoglobin 9.8 (L) 12.0 - 16.0 g/dL    Hematocrit 29.6 (L) 37.0 - 47.0 %    MCV 81.1 (L) 82.0 - 98.0 fL    MCH 26.8 (L) 28.0 - 32.0 pg    MCHC 33.1 33.0 - 36.0 g/dL    RDW 15.2 (H) 12.0 - 15.0 %    RDW-SD 44.4 40.0 - 54.0 fl    MPV 12.1 (H) 6.0 - 12.0 fL    Platelets 110 (L) 130 - 400 10*3/mm3    Neutrophil % 80.6 (H) 39.0 - 78.0 %    Lymphocyte % 9.6 (L) 15.0 - 45.0 %    Monocyte % 5.9 4.0 - 12.0 %    Eosinophil % 1.5 0.0 - 4.0 %    Basophil % 0.3 0.0 - 2.0 %    Neutrophils, Absolute 15.46 (H) 1.87 - 8.40 10*3/mm3    Lymphocytes, Absolute 1.84 0.72 - 4.86 10*3/mm3    Monocytes, Absolute 1.13 0.19 - 1.30 10*3/mm3    Eosinophils, Absolute 0.29 0.00 - 0.70 10*3/mm3    Basophils, Absolute 0.05 0.00 - 0.20 10*3/mm3     Physical Examination:        Physical Exam   Constitutional: She is oriented to person, place, and time. She appears well-developed and well-nourished.   HENT:   Head: Normocephalic and atraumatic.   Nose: Nose  normal.   Mouth/Throat: Oropharynx is clear and moist.   Eyes: Conjunctivae and EOM are normal. Pupils are equal, round, and reactive to light.   Neck: Normal range of motion. Neck supple.   Cardiovascular: Normal rate, regular rhythm, normal heart sounds and intact distal pulses.   Pulmonary/Chest: Effort normal and breath sounds normal.   Abdominal: Soft. Bowel sounds are normal.   Ostomy - stoma pink and appliance intact   Musculoskeletal:   Generalized weakness  Contractures to BLE   Neurological: She is alert and oriented to person, place, and time.   Intermittent confusion     Skin: Skin is warm and dry.   DTI to right heel  Blisters and excoriation to coccyx   Nursing note and vitals reviewed.      Assessment:            Severe sepsis (CMS/HCC)    Past Medical History:   Diagnosis Date   • Anxiety    • Breast cancer (CMS/HCC)      LEFT WITH RECONSTRUCTION   • Colostomy in place (CMS/HCC)    • Dementia    • Depression    • Edema     LOWER FEET   • Hydrocephalus     NORMAL PRESSURE   • Hypertension    • Incontinence    • Nausea    • Parastomal hernia    • Parkinson disease (CMS/HCC)    • Tremor     RIGHT HAND   • Unsteady gait         Plan:        1. Sepsis  2. E. Coli UTI - present on admission  3. Acute renal failure  4. Rhabdomyolysis  5. Hypotension  6. Dementia  7. Abdominal pain  8. Metabolic acidosis  9. Hyperkalemia  10. Uremia  11. Anxiety  12. Thrombocytopenia  13. Hyponatremia    Continue current treatment. Monitor counts. Increase activity. Labs in am. Continue antibiotics. Continue nutrition support. Aggressive therapies. Monitor platelets closely. Continue gentle diuresis. Continue antibiotics.     RSW D/w daughter long term goals. Patient functional level pre infection was minimal.     Electronically signed by COLTON Ross on 5/15/2019 at 12:27 PM     I have discussed the care of Susy Ko, including pertinent history and exam findings, with the nurse practitioner.    I  have seen and examined the patient and the key elements of all parts of the encounter have been performed by me.  I agree with the assessment, plan and orders as documented by Traci SEGURA, after I modified the exam findings and the plan of treatments and the final version is my approved version of the assessment.        Electronically signed by Monster Zuniga MD on 5/15/2019 at 3:05 PM

## 2019-05-16 VITALS
TEMPERATURE: 98.2 F | SYSTOLIC BLOOD PRESSURE: 110 MMHG | WEIGHT: 159.4 LBS | DIASTOLIC BLOOD PRESSURE: 53 MMHG | BODY MASS INDEX: 28.24 KG/M2 | HEIGHT: 63 IN | RESPIRATION RATE: 16 BRPM | HEART RATE: 83 BPM | OXYGEN SATURATION: 94 %

## 2019-05-16 LAB
ANION GAP SERPL CALCULATED.3IONS-SCNC: 8 MMOL/L (ref 4–13)
BASOPHILS # BLD AUTO: 0.06 10*3/MM3 (ref 0–0.2)
BASOPHILS NFR BLD AUTO: 0.4 % (ref 0–2)
BUN BLD-MCNC: 57 MG/DL (ref 5–21)
BUN/CREAT SERPL: 40.4 (ref 7–25)
CALCIUM SPEC-SCNC: 8.5 MG/DL (ref 8.4–10.4)
CHLORIDE SERPL-SCNC: 93 MMOL/L (ref 98–110)
CO2 SERPL-SCNC: 29 MMOL/L (ref 24–31)
CREAT BLD-MCNC: 1.41 MG/DL (ref 0.5–1.4)
DEPRECATED RDW RBC AUTO: 44.2 FL (ref 40–54)
EOSINOPHIL # BLD AUTO: 0.25 10*3/MM3 (ref 0–0.7)
EOSINOPHIL NFR BLD AUTO: 1.5 % (ref 0–4)
ERYTHROCYTE [DISTWIDTH] IN BLOOD BY AUTOMATED COUNT: 15.7 % (ref 12–15)
GFR SERPL CREATININE-BSD FRML MDRD: 36 ML/MIN/1.73
GLUCOSE BLD-MCNC: 95 MG/DL (ref 70–100)
HCT VFR BLD AUTO: 31.7 % (ref 37–47)
HGB BLD-MCNC: 10.5 G/DL (ref 12–16)
LYMPHOCYTES # BLD AUTO: 1.28 10*3/MM3 (ref 0.72–4.86)
LYMPHOCYTES NFR BLD AUTO: 7.9 % (ref 15–45)
MCH RBC QN AUTO: 26.8 PG (ref 28–32)
MCHC RBC AUTO-ENTMCNC: 33.1 G/DL (ref 33–36)
MCV RBC AUTO: 80.9 FL (ref 82–98)
MONOCYTES # BLD AUTO: 0.98 10*3/MM3 (ref 0.19–1.3)
MONOCYTES NFR BLD AUTO: 6 % (ref 4–12)
NEUTROPHILS # BLD AUTO: 13.35 10*3/MM3 (ref 1.87–8.4)
NEUTROPHILS NFR BLD AUTO: 82.1 % (ref 39–78)
PLATELET # BLD AUTO: 136 10*3/MM3 (ref 130–400)
PMV BLD AUTO: 10.8 FL (ref 6–12)
POTASSIUM BLD-SCNC: 4.3 MMOL/L (ref 3.5–5.3)
RBC # BLD AUTO: 3.92 10*6/MM3 (ref 4.2–5.4)
SODIUM BLD-SCNC: 130 MMOL/L (ref 135–145)
WBC NRBC COR # BLD: 16.26 10*3/MM3 (ref 4.8–10.8)

## 2019-05-16 PROCEDURE — 85025 COMPLETE CBC W/AUTO DIFF WBC: CPT | Performed by: INTERNAL MEDICINE

## 2019-05-16 PROCEDURE — 97110 THERAPEUTIC EXERCISES: CPT

## 2019-05-16 PROCEDURE — 80048 BASIC METABOLIC PNL TOTAL CA: CPT | Performed by: INTERNAL MEDICINE

## 2019-05-16 PROCEDURE — 25010000002 LEVOFLOXACIN PER 250 MG: Performed by: NURSE PRACTITIONER

## 2019-05-16 RX ORDER — BUMETANIDE 1 MG/1
1 TABLET ORAL DAILY
Status: DISCONTINUED | OUTPATIENT
Start: 2019-05-17 | End: 2019-05-16 | Stop reason: HOSPADM

## 2019-05-16 RX ORDER — LEVOFLOXACIN 500 MG/1
500 TABLET, FILM COATED ORAL DAILY
Qty: 5 TABLET | Refills: 0 | Status: SHIPPED | OUTPATIENT
Start: 2019-05-16 | End: 2019-05-21

## 2019-05-16 RX ADMIN — ACETAMINOPHEN 650 MG: 325 TABLET, FILM COATED ORAL at 06:07

## 2019-05-16 RX ADMIN — CALCITRIOL 0.25 MCG: 1 SOLUTION ORAL at 10:18

## 2019-05-16 RX ADMIN — LEVOFLOXACIN 250 MG: 5 INJECTION, SOLUTION INTRAVENOUS at 13:03

## 2019-05-16 RX ADMIN — FAMOTIDINE 20 MG: 20 TABLET, FILM COATED ORAL at 10:18

## 2019-05-16 RX ADMIN — VITAM B12 100 MCG: 100 TAB at 10:15

## 2019-05-16 RX ADMIN — CALCIUM 1250 MG: 500 TABLET ORAL at 10:15

## 2019-05-16 RX ADMIN — FERROUS SULFATE TAB 325 MG (65 MG ELEMENTAL FE) 325 MG: 325 (65 FE) TAB at 10:15

## 2019-05-16 RX ADMIN — CARBIDOPA AND LEVODOPA 1 TABLET: 25; 100 TABLET ORAL at 10:15

## 2019-05-16 NOTE — PLAN OF CARE
Problem: Fall Risk (Adult)  Goal: Identify Related Risk Factors and Signs and Symptoms  Outcome: Outcome(s) achieved Date Met: 05/16/19    Goal: Absence of Fall  Outcome: Ongoing (interventions implemented as appropriate)      Problem: Patient Care Overview  Goal: Plan of Care Review  Outcome: Ongoing (interventions implemented as appropriate)   05/16/19 0415   Coping/Psychosocial   Plan of Care Reviewed With other (see comments)  (unable, patient alert to person only.)   Plan of Care Review   Progress no change   OTHER   Outcome Summary Patient turned Q 2 hrs with wedge and pillow support utilized. Stable on RA. Alert to person only and sleeping between care, will arouse to voice. IV fluids infusing. Safety maintained. VSS.       Problem: Skin Injury Risk (Adult)  Goal: Identify Related Risk Factors and Signs and Symptoms  Outcome: Outcome(s) achieved Date Met: 05/16/19    Goal: Skin Health and Integrity  Outcome: Ongoing (interventions implemented as appropriate)      Problem: Confusion, Chronic (Adult)  Goal: Identify Related Risk Factors and Signs and Symptoms  Outcome: Outcome(s) achieved Date Met: 05/16/19    Goal: Cognitive/Functional Impairments Minimized  Outcome: Ongoing (interventions implemented as appropriate)    Goal: Free from Harm/Injuries  Outcome: Ongoing (interventions implemented as appropriate)      Problem: Infection, Risk/Actual (Adult)  Goal: Identify Related Risk Factors and Signs and Symptoms  Outcome: Outcome(s) achieved Date Met: 05/16/19    Goal: Infection Prevention/Resolution  Outcome: Ongoing (interventions implemented as appropriate)      Problem: Wound (Includes Pressure Injury) (Adult)  Goal: Signs and Symptoms of Listed Potential Problems Will be Absent, Minimized or Managed (Wound)  Outcome: Ongoing (interventions implemented as appropriate)

## 2019-05-16 NOTE — PROGRESS NOTES
Nephrology ([facility]) Progress Note      Patient:  Susy Ko  YOB: 1940  Date of Service: 5/16/2019  MRN: [id number]   Acct: [id number]   Primary Care Physician: Monster Zuniga MD  Advance Directive:   Code Status and Medical Interventions:   Ordered at: 05/12/19 1039     Level Of Support Discussed With:    Next of Kin (If No Surrogate)     Code Status:    No CPR     Medical Interventions (Level of Support Prior to Arrest):    Full     Admit Date: 5/6/2019       Hospital Day: 10  Referring Provider: Monster Zuniga MD      Patient personally seen and examined.  Complete chart including Consults, Notes, Operative Reports, Labs, Cardiology, and Radiology studies reviewed as able.        Subjective:  Susy Ko is a 79 y.o. female  whom we were consulted for acute kidney injury, metabolic acidosis.  History of hypertension, Parkinson's, and has a colostomy.  Patient resides in local nursing home secondary to advanced dementia. Had been complaining of abdominal pain so was sent to emergency department for evaluation.  Found to have acute kidney injury, severe metabolic acidosis, UTI with sepsis.  Patient required Levophed for blood pressure support. Family had decided not to pursue any dialysis.  Now off Levophed.  Has transferred to medical floor. Renal function has slowly been recovering.  Maintaining adequate output with diuretics. 2 units PRBC given on 5/14.    Awake, oriented to self only.  No new overnight issues.  Urine output nonoliguric.    Allergies:  Morphine and related and Adhesive tape    Home Meds:  Medications Prior to Admission   Medication Sig Dispense Refill Last Dose   • acetaminophen (TYLENOL) 325 MG tablet Take 650 mg by mouth Every 4 (Four) Hours As Needed for Mild Pain  or Fever.   Past Month at Unknown time   • amLODIPine (NORVASC) 5 MG tablet Take 5 mg by mouth Daily.   5/6/2019   • Benzocaine (SORE THROAT RELIEF) 10 MG  lozenge Dissolve 1 lozenge in the mouth 4 (Four) Times a Day As Needed (Sore throat).   Past Month at Unknown time   • carbidopa-levodopa (SINEMET)  MG per tablet Take 1 tablet by mouth 3 (Three) Times a Day.   5/6/2019 at Unknown time   • citalopram (CeleXA) 10 MG tablet Take 10 mg by mouth Every Night.   5/5/2019   • Cranberry 250 MG tablet Take 1 tablet by mouth 3 (Three) Times a Day.   5/6/2019   • cyanocobalamin 1000 MCG/ML injection Inject 1,000 mcg into the appropriate muscle as directed by prescriber Every 30 (Thirty) Days. Given on the 14th of every month.   4/14/2019   • furosemide (LASIX) 20 MG tablet Take 20 mg by mouth Every Other Day.   5/6/2019   • ibuprofen (ADVIL,MOTRIN) 600 MG tablet Take 600 mg by mouth 3 (Three) Times a Day.   5/6/2019   • loratadine (CLARITIN) 10 MG tablet Take 10 mg by mouth Daily.   5/6/2019   • losartan (COZAAR) 50 MG tablet Take 50 mg by mouth Daily.   5/6/2019   • Menthol, Topical Analgesic, (BIOFREEZE) 4 % gel Apply 1 application topically Every 6 (Six) Hours As Needed (Apply to knees and lower back).   Past Week at Unknown time   • metoprolol succinate XL (TOPROL-XL) 50 MG 24 hr tablet Take 50 mg by mouth Daily.   5/6/2019   • omeprazole (priLOSEC) 20 MG capsule Take 20 mg by mouth Daily.   5/6/2019   • Skin Protectants, Misc. (PERIGUARD) ointment Apply 1 application topically Every Evening. Apply to buttocks.   5/6/2019   • tiotropium bromide-olodaterol (STIOLTO RESPIMAT) 2.5-2.5 MCG/ACT aerosol solution inhaler Inhale 2 puffs Daily.   5/6/2019   • traMADol (ULTRAM) 50 MG tablet Take 50 mg by mouth Every 6 (Six) Hours As Needed for Moderate Pain .   Past Month at Unknown time       Medicines:  Current Facility-Administered Medications   Medication Dose Route Frequency Provider Last Rate Last Dose   • acetaminophen (TYLENOL) tablet 650 mg  650 mg Oral Q4H PRN Monster Zuniga MD   650 mg at 05/16/19 0607   • ALPRAZolam (XANAX) tablet 0.25 mg  0.25 mg Oral  Nightly Traci Kohler APRN   0.25 mg at 05/15/19 2019   • [START ON 5/17/2019] bumetanide (BUMEX) tablet 1 mg  1 mg Oral Daily Cirilo Ross APRN       • calcitRIOL (ROCALTROL) solution 0.25 mcg  0.25 mcg Nasogastric Daily Monster Zuniga MD   0.25 mcg at 05/16/19 1018   • calcium carbonate (oyster shell) tablet 1,250 mg  1,250 mg Oral Daily Ramsey Weldon MD   1,250 mg at 05/16/19 1015   • calcium gluconate 1 g in sodium chloride 0.9 % 100 mL IVPB  1 g Intravenous PRN Monster Zuniga  mL/hr at 05/08/19 0451 1 g at 05/08/19 0451    And   • calcium gluconate 6 g in sodium chloride 0.9 % 500 mL IVPB  6 g Intravenous PRN Monster Zuniga MD 83.3 mL/hr at 05/08/19 0647 6 g at 05/08/19 0647   • carbidopa-levodopa (SINEMET)  MG per tablet 1 tablet  1 tablet Oral TID Monster Zuniga MD   1 tablet at 05/16/19 1015   • citalopram (CeleXA) tablet 10 mg  10 mg Oral Nightly Monster Zuniga MD   10 mg at 05/15/19 2019   • cyancobalamin (VITAMIN B-12) tablet 100 mcg  100 mcg Oral Daily Monster Zuniga MD   100 mcg at 05/16/19 1015   • famotidine (PEPCID) tablet 20 mg  20 mg Oral Daily Monster Zuniga MD   20 mg at 05/16/19 1018   • ferrous sulfate tablet 325 mg  325 mg Oral BID With Meals Traci Kohler APRN   325 mg at 05/16/19 1015   • levoFLOXacin (LEVAQUIN) 250 mg/50 mL D5W (premix) 250 mg  250 mg Intravenous Q24H Traci Kohler APRN 0 mL/hr at 05/13/19 1504 250 mg at 05/15/19 1201   • sodium chloride 0.9 % flush 10 mL  10 mL Intravenous PRN Prosper Jiang, DO       • sodium chloride 0.9 % flush 3 mL  3 mL Intravenous Q12H Monster Zuniga MD   3 mL at 05/15/19 2019   • sodium chloride 0.9 % flush 3-10 mL  3-10 mL Intravenous PRN Monster Zuniga MD           Past Medical History:  Past Medical History:   Diagnosis Date   • Anxiety    • Breast cancer (CMS/HCC)      LEFT WITH RECONSTRUCTION   • Colostomy in place (CMS/HCC)     • Dementia    • Depression    • Edema     LOWER FEET   • Hydrocephalus     NORMAL PRESSURE   • Hypertension    • Incontinence    • Nausea    • Parastomal hernia    • Parkinson disease (CMS/HCC)    • Tremor     RIGHT HAND   • Unsteady gait        Past Surgical History:  Past Surgical History:   Procedure Laterality Date   • ABDOMINAL SURGERY     • APPENDECTOMY     • BREAST RECONSTRUCTION Left     LEFT   • CHOLECYSTECTOMY     • COLON SURGERY     • COLONOSCOPY     • COLOSTOMY     • HYSTERECTOMY     • MASTECTOMY Left    • PARASTOMAL HERNIA REPAIR N/A 3/17/2017    Procedure: REPAIR PARASTOMAL HERNIA;  Surgeon: Princess Walters MD;  Location: [facility] OR;  Service:        Family History  History reviewed. No pertinent family history.    Social History  Social History     Socioeconomic History   • Marital status:      Spouse name: Not on file   • Number of children: Not on file   • Years of education: Not on file   • Highest education level: Not on file   Tobacco Use   • Smoking status: Former Smoker   • Smokeless tobacco: Never Used   Substance and Sexual Activity   • Alcohol use: No   • Drug use: No   • Sexual activity: Defer         Review of Systems:   History obtained from chart review and family  General ROS: negative  Respiratory ROS: no cough, shortness of breath, or wheezing  Cardiovascular ROS: no chest pain or dyspnea on exertion  Gastrointestinal ROS: no abdominal pain, change in bowel habits, or black or bloody stools  Genito-Urinary ROS: no dysuria, trouble voiding, or hematuria  Musculoskeletal ROS: negative  Neurological ROS: no TIA or stroke symptoms    Objective:  Patient Vitals for the past 24 hrs:   BP Temp Temp src Pulse Resp SpO2   05/16/19 0739 119/50 98.5 °F (36.9 °C) Oral 86 16 95 %   05/16/19 0405 136/53 98.6 °F (37 °C) Oral 85 16 94 %   05/15/19 1900 (!) 133/39 98.3 °F (36.8 °C) Oral 81 16 92 %   05/15/19 1500 108/51 97.8 °F (36.6 °C) -- 72 18 94 %   05/15/19 1136 (!) 108/39 98.1 °F (36.7  °C) -- 71 16 98 %       Intake/Output Summary (Last 24 hours) at 5/16/2019 1108  Last data filed at 5/15/2019 2005  Gross per 24 hour   Intake 1050 ml   Output 850 ml   Net 200 ml     General: awake, oriented X 1   Neck: supple, no JVD  Chest:  clear to auscultation bilaterally without respiratory distress  CVS: regular rate and rhythm  Abdominal: diffusely tender, +BS, colostomy bag in place  Extremities: 1+ BLE edema  Skin: warm and dry without rash  Neuro: no focal motor deficits    Labs:  Results from last 7 days   Lab Units 05/16/19  0617 05/15/19  0604 05/14/19  0444   WBC 10*3/mm3 16.26* 19.18* 17.78*   HEMOGLOBIN g/dL 10.5* 9.8* 6.6*   HEMATOCRIT % 31.7* 29.6* 20.3*   PLATELETS 10*3/mm3 136 110* 78*         Results from last 7 days   Lab Units 05/16/19  0617 05/15/19  0604 05/14/19  0444  05/12/19  0632 05/11/19  0645 05/10/19  0607   SODIUM mmol/L 130* 129* 130*   < > 134* 135 140   POTASSIUM mmol/L 4.3 4.2 4.3   < > 4.1 4.1 3.5   CHLORIDE mmol/L 93* 94* 93*   < > 98 101 101   CO2 mmol/L 29.0 31.0 31.0   < > 29.0 31.0 33.0*   BUN mg/dL 57* 58* 61*   < > 66* 75* 92*   CREATININE mg/dL 1.41* 1.67* 1.77*   < > 1.69* 1.94* 2.58*   CALCIUM mg/dL 8.5 8.4 8.5   < > 8.4 7.9* 7.3*   BILIRUBIN mg/dL  --   --   --   --  0.3 0.4 0.2   ALK PHOS U/L  --   --   --   --  64 69 97   ALT (SGPT) U/L  --   --   --   --  23 24 32   AST (SGOT) U/L  --   --   --   --  66* 84* 112*   GLUCOSE mg/dL 95 100 103*   < > 105* 91 136*    < > = values in this interval not displayed.       Radiology:   Imaging Results (last 72 hours)     Procedure Component Value Units Date/Time    US Renal Bilateral [[id number]] Collected:  05/07/19 1124     Updated:  05/07/19 1129    Narrative:       EXAMINATION:  US RENAL BILATERAL-  5/7/2019 10:11 AM CDT     HISTORY: acute renal failure; A41.9-Sepsis, unspecified organism;  R65.20-Severe sepsis without septic shock; N39.0-Urinary tract  infection, site not specified; E87.5-Hyperkalemia; N17.9-Acute  kidney  failure, unspecified; R41.82-Altered mental status, unspecified;  R13.19-Other dysphagia      COMPARISON: 05/10/2014 renal ultrasound. 05/06/2019 abdomen and pelvis  CT     TECHNIQUE:      Bilateral renal ultrasound was performed.     FINDINGS:      The right kidney measures 10.1 cm in [id number] length.     The left kidney measures 11 cm in rqkm-bt-[id number] length.     There is mild cortical thinning and pelvic lipomatosis compatible with  age.     Normal echogenicity of renal cortex is observed without renal masses.     There are no perinephric abnormalities.     There is no pelvocaliectasis or obstructive uropathy changes.     The urinary bladder is decompressed with a Peralta catheter.       Impression:       1. Negative bilateral renal ultrasound.  This report was finalized on 05/07/2019 11:26 by Dr. Iban Queen MD.    CT Abdomen Pelvis Without Contrast [[id number]] Collected:  05/06/19 2236     Updated:  05/06/19 2242    Narrative:       EXAMINATION: CT ABDOMEN PELVIS WO CONTRAST-      5/6/2019 10:17 PM CDT     HISTORY: pain; r/o obstruction; A41.9-Sepsis, unspecified organism;  R65.20-Severe sepsis without septic shock; N39.0-Urinary tract  infection, site not specified; E87.5-Hyperkalemia; N17.9-Acute kidney  failure, unspecified; R41.82-Altered mental status, unspecified     In order to have a CT radiation dose as low as reasonably achievable  Automated Exposure Control was utilized for adjustment of the mA and/or  KV according to patient size.     DLP in mGycm= 343.     Noncontrast abdomen/pelvis CT.     Comparison is made with 04/12/2016.     Left lower quadrant ostomy with nonobstructing parastomal hernia.     No significant bowel dilation.     Mild urinary bladder distention.     Mild dilation of the renal collecting systems and ureters is probably  based on vesicoureteral reflux. There is no obstructing stone.     Normal heart size.  No acute abnormality at the lung bases.     Cholecystectomy  clips.  Normal noncontrast appearance of the liver and spleen.  The pancreas is atrophic.     Aortic calcification with no aneurysm.     Summary:  1. Moderate fecal material throughout the colon.  2. Nonobstructing parastomal hernia.  3. Mild dilation of the renal collecting systems and ureters compatible  with vesicoureteral reflux.  4. No evidence of bowel obstruction, mass or abscess.                                   This report was finalized on 05/06/2019 22:39 by Dr. Raul Ricci MD.    CT Head Without Contrast [[id number]] Collected:  05/06/19 2234     Updated:  05/06/19 2238    Narrative:       EXAMINATION: CT HEAD WO CONTRAST-      5/6/2019 10:17 PM CDT     HISTORY: AMS; A41.9-Sepsis, unspecified organism; R65.20-Severe sepsis  without septic shock; N39.0-Urinary tract infection, site not specified;  E87.5-Hyperkalemia; N17.9-Acute kidney failure, unspecified;  R41.82-Altered mental status, unspecified     In order to have a CT radiation dose as low as reasonably achievable  Automated Exposure Control was utilized for adjustment of the mA and/or  KV according to patient size.     DLP in mGycm= 1214.     Noncontrast head CT compared with 01/17/2017.     Axial, sagittal, and coronal noncontrast CT imaging of the head.     The visualized paranasal sinuses are clear.     The brain and ventricles have an age appropriate appearance.  Moderate atrophy and small vessel disease.  There is no hemorrhage or mass-effect.   No acute infarction is seen.     No calvarial abnormality.       Impression:       1. No acute intracranial abnormality is seen.                                         This report was finalized on 05/06/2019 22:35 by Dr. Raul Ricci MD.    XR Chest 1 View [[id number]] Collected:  05/06/19 2216     Updated:  05/06/19 2219    Narrative:       EXAMINATION: XR CHEST 1 VW-     5/6/2019 10:11 PM CDT     HISTORY: Altered mental status; r/o pneumonia.     One view chest x-ray compared with 01/17/2017.      Heart size is normal.  The mediastinum is within normal limits.      The lungs are normally expanded with no pneumonia or pneumothorax.  Chronic lung changes.  No congestive failure changes.                                                                       Impression:       1. No acute disease.           This report was finalized on 05/06/2019 22:16 by Dr. Raul Ricci MD.          Culture:  Blood Culture   Date Value Ref Range Status   05/06/2019 No growth at 24 hours  Preliminary   05/06/2019 Gram Positive Rods (A)  Preliminary   05/06/2019 Gram Positive Cocci (A)  Preliminary     Urine Culture   Date Value Ref Range Status   05/06/2019 >100,000 CFU/mL Escherichia coli (A)  Final   05/06/2019 70,000-80,000 CFU/mL Mixed Gram Positive Alison (A)  Final     Comment:     Probable Contaminant           Assessment   1.  Acute kidney injury due to ATN--improving  2.  Baseline CKD stage 3  3.  UTI/urosepsis--resolved  4.  Advanced dementia  5.  Hyponatremia--improving  6.  Anemia      Plan:  1.  Hyponatremia improving with IV fluids. Will hold Bumex today and continue IV fluids  2.  Monitor labs      Cirilo Ross, COL[place]  5/16/2019  11:08 AM

## 2019-05-16 NOTE — THERAPY TREATMENT NOTE
Acute Care - Physical Therapy Treatment Note  [facility]     Patient Name: Susy Ko  : 1940  MRN: [id number]  Today's Date: 2019  Onset of Illness/Injury or Date of Surgery: 19  Date of Referral to PT: 05/10/19  Referring Physician: Dr. Zuniga    Admit Date: 2019    Visit Dx:    ICD-10-CM ICD-9-CM   1. Severe sepsis (CMS/HCC) A41.9 038.9    R65.20 995.92   2. Urinary tract infection, acute N39.0 599.0   3. Hyperkalemia E87.5 276.7   4. Acute kidney injury (CMS/HCC) N17.9 584.9   5. Altered mental status, unspecified altered mental status type R41.82 780.97   6. Other dysphagia R13.19 787.29   7. Impaired mobility Z74.09 799.89   8. Impaired mobility and ADLs Z74.09 799.89     Patient Active Problem List   Diagnosis   • Normal pressure hydrocephalus   • Non morbid obesity due to excess calories   • Tobacco non-user   • Tremor of right hand   • Abnormality of gait   • Parastomal hernia   • Hernia   • Gait abnormality   • Severe sepsis (CMS/HCC)       Therapy Treatment    Rehabilitation Treatment Summary     Row Name 19 1110             Treatment Time/Intention    Discipline  physical therapy assistant  -LG      Document Type  therapy note (daily note)  -LG      Patient/Family Observations  daughter present bedside  -LG2      Comment  Daughter requesting info about hospice, spoke to Stephanie LEMONS who is following up on  information.   -LG2      Existing Precautions/Restrictions  fall  -LG2      Treatment Considerations/Comments  Nsg just turned and positioned pt, pt did not want to tf to bedside but agreed to AROM.  -LG2      Recorded by [LG] Demetrio Beltran, PTA 19 1111  [LG2] Demetrio Beltran, PTA 19 1134      Row Name 19 1110             Therapeutic Exercise    Comment (Therapeutic Exercise)  PROM-AAROM 2 sets of 10 reps. B LE/IUE's  -LG      Recorded by [LG] Demetrio Beltran, PTA 19 1134      Row Name 19 1110             Positioning and Restraints     Pre-Treatment Position  in bed  -LG      Post Treatment Position  bed  -LG      In Bed  side lying left;call light within reach;encouraged to call for assist;with family/caregiver;with nsg;side rails up x2;SCD pump applied;pillow between legs;R multipodus;L multipodus  -LG      Recorded by [LG] Demetrio Beltran, PTA 05/16/19 1134      Row Name 05/16/19 1110             Pain Scale: Numbers Pre/Post-Treatment    Pain Scale: Numbers, Pretreatment  0/10 - no pain  -LG      Pain Scale: Numbers, Post-Treatment  0/10 - no pain  -LG      Recorded by [LG] Demetrio Beltran, PTA 05/16/19 1134      Row Name                Wound 05/08/19 1115 coccyx pressure injury;blisters    Wound - Properties Group Date first assessed: 05/08/19 [HS] Time first assessed: 1115 [HS] Location: coccyx [HS] Type: pressure injury;blisters [HS2] Stage, Pressure Injury: deep tissue injury [HS] Recorded by:  [HS] Tamara Salazar RN 05/08/19 1726 [HS2] Tamara Salazar RN 05/08/19 1728    Row Name                Wound 05/07/19 0532 Right heel pressure injury    Wound - Properties Group Date first assessed: 05/07/19 [HS] Time first assessed: 0532 [HS] Present On Admission : yes;picture taken [HS] Side: Right [HS] Location: heel [HS] Type: pressure injury [HS] Stage, Pressure Injury: deep tissue injury [HS] Recorded by:  [HS] Tamara Salazar RN 05/08/19 1741      User Key  (r) = Recorded By, (t) = Taken By, (c) = Cosigned By    Initials Name Effective Dates Discipline    LG Demetrio Beltran, PTA 08/02/16 -  PT    HS Tamara Salazar RN 12/27/16 -  Nurse          Wound 05/08/19 1115 coccyx pressure injury;blisters (Active)   Dressing Appearance open to air 5/16/2019  8:00 AM   Closure None 5/16/2019  8:00 AM   Base other (see comments);dressing in place, unable to visualize 5/16/2019  8:00 AM   Periwound non-blanchable;redness 5/16/2019  8:00 AM   Periwound Temperature warm 5/16/2019  8:00 AM   Periwound Skin Turgor soft 5/16/2019  8:00 AM   Edges  irregular 5/16/2019  8:00 AM   Drainage Characteristics/Odor serosanguineous 5/16/2019  8:00 AM   Drainage Amount scant 5/16/2019  8:00 AM       Wound 05/07/19 0532 Right heel pressure injury (Active)   Dressing Appearance dry;intact 5/16/2019  8:00 AM   Closure None 5/16/2019  8:00 AM   Base red;non-blanchable 5/16/2019  8:00 AM   Periwound dry;non-blanchable 5/16/2019  8:00 AM   Periwound Temperature warm 5/16/2019  8:00 AM   Periwound Skin Turgor soft 5/16/2019  8:00 AM   Edges irregular 5/16/2019  8:00 AM   Drainage Amount none 5/16/2019  8:00 AM           Physical Therapy Education     Title: PT OT SLP Therapies (In Progress)     Topic: Physical Therapy (Done)     Point: Mobility training (Done)     Learning Progress Summary           Patient Acceptance, E, VU by SB at 5/13/2019  1:27 PM    Comment:  pt family educated on POC, benefits of activity and d/c plans   Family Acceptance, E, VU by SB at 5/13/2019  1:27 PM    Comment:  pt family educated on POC, benefits of activity and d/c plans    Acceptance, E, VU by FE at 5/7/2019  1:57 PM    Comment:  Educated arminmanindersamra on benefits of activity, 1 time PT jennifer at this time                   Point: Home exercise program (Done)     Learning Progress Summary           Patient Acceptance, E, VU by SB at 5/13/2019  1:27 PM    Comment:  pt family educated on POC, benefits of activity and d/c plans   Family Acceptance, E, VU by SB at 5/13/2019  1:27 PM    Comment:  pt family educated on POC, benefits of activity and d/c plans                   Point: Body mechanics (Done)     Learning Progress Summary           Patient Acceptance, E, VU by SB at 5/13/2019  1:27 PM    Comment:  pt family educated on POC, benefits of activity and d/c plans   Family Acceptance, E, VU by SB at 5/13/2019  1:27 PM    Comment:  pt family educated on POC, benefits of activity and d/c plans                   Point: Precautions (Done)     Learning Progress Summary           Patient Acceptance, E, VU  by SB at 5/13/2019  1:27 PM    Comment:  pt family educated on POC, benefits of activity and d/c plans   Family Acceptance, E, VU by SB at 5/13/2019  1:27 PM    Comment:  pt family educated on POC, benefits of activity and d/c plans                               User Key     Initials Effective Dates Name Provider Type Discipline    FE 08/02/16 -  Braulio Fulton PT DPT Physical Therapist PT    SB 08/31/18 -  Kim Knight, HARSHA Physical Therapist PT                PT Recommendation and Plan        Outcome Measures     Row Name 05/13/19 1354 05/13/19 1300          How much help from another person do you currently need...    Turning from your back to your side while in flat bed without using bedrails?  --  1  -SB     Moving from lying on back to sitting on the side of a flat bed without bedrails?  --  1  -SB     Moving to and from a bed to a chair (including a wheelchair)?  --  1  -SB     Standing up from a chair using your arms (e.g., wheelchair, bedside chair)?  --  1  -SB     Climbing 3-5 steps with a railing?  --  1  -SB     To walk in hospital room?  --  1  -SB     AM-PAC 6 Clicks Score  --  6  -SB        How much help from another is currently needed...    Putting on and taking off regular lower body clothing?  1  -MM  --     Bathing (including washing, rinsing, and drying)  2  -MM  --     Toileting (which includes using toilet bed pan or urinal)  1  -MM  --     Putting on and taking off regular upper body clothing  2  -MM  --     Taking care of personal grooming (such as brushing teeth)  3  -MM  --     Eating meals  3  -MM  --     Score  12  -MM  --        Functional Assessment    Outcome Measure Options  AM-PAC 6 Clicks Daily Activity (OT)  -MM  AM-PAC 6 Clicks Basic Mobility (PT)  -SB       User Key  (r) = Recorded By, (t) = Taken By, (c) = Cosigned By    Initials Name Provider Type    MM Ronald Arroyo, OTR/L Occupational Therapist    SB Kim Knight, PT Physical Therapist         Time Calculation:    PT Charges     Row Name 05/16/19 1110             Time Calculation    Start Time  1110  -      Stop Time  1134  -      Time Calculation (min)  24 min  -LG      PT Received On  05/16/19  -      PT Goal Re-Cert Due Date  05/23/19  -        User Key  (r) = Recorded By, (t) = Taken By, (c) = Cosigned By    Initials Name Provider Type    LG Demetrio Beltran PTA Physical Therapy Assistant        Therapy Charges for Today     Code Description Service Date Service Provider Modifiers Qty    32096307986 HC PT THER PROC EA 15 MIN 5/16/2019 Demetrio Beltran PTA GP 2          PT G-Codes  Outcome Measure Options: AM-PAC 6 Clicks Daily Activity (OT)  AM-PAC 6 Clicks Score: 6  Score: 12    Demetrio Beltran PTA  5/16/2019

## 2019-05-16 NOTE — DISCHARGE SUMMARY
[facility]  Hieu Zuniga M.D.  JOCE Zuniga M.D.  COLTON Hunter    Internal Medicine Discharge Summary    Patient ID: Susy Ko  MRN: [id number]     Acct:  [id number]       Patient's PCP: Montser Zuniga MD    Admit Date: 5/6/2019     Discharge Date:   5/16/2019    Admitting Physician: Monster Zuniga MD    Discharge Physician: Monster Zuniga MD     Active Discharge Diagnoses:    Primary Problem  <principal problem not specified>      Hospital Problems    Severe sepsis (CMS/HCC)     Past Medical History:   Diagnosis Date   • Anxiety    • Breast cancer (CMS/HCC)      LEFT WITH RECONSTRUCTION   • Colostomy in place (CMS/HCC)    • Dementia    • Depression    • Edema     LOWER FEET   • Hydrocephalus     NORMAL PRESSURE   • Hypertension    • Incontinence    • Nausea    • Parastomal hernia    • Parkinson disease (CMS/HCC)    • Tremor     RIGHT HAND   • Unsteady gait        The patient was seen and examined on the day of discharge and this discharge summary is in conjunction with any daily progress note from day of discharge.    Code Status:    Code Status and Medical Interventions:   Ordered at: 05/12/19 1039     Level Of Support Discussed With:    Next of Kin (If No Surrogate)     Code Status:    No CPR     Medical Interventions (Level of Support Prior to Arrest):    Full       Hospital Course: The patient was admitted to the hospital with  1. Sepsis  2. E. Coli UTI - present on admission  3. Acute renal failure  4. Rhabdomyolysis  5. Hypotension  6. Dementia  7. Abdominal pain  8. Metabolic acidosis  9. Hyperkalemia  10. Uremia  11. Anxiety  12. Thrombocytopenia  13. Hyponatremia      She did well with iv antibiotics and fluid management. It was discussed with her daughters that a big component of her decline is the worsening of the underlying dementia and they both agreed. I suggested that transition to comfort care may be appropriate depending how she does the  next couple of weeks. Both daughters acknowledged that she eventually would not want aggressive measures as her mental and physical health declined. She will dc back to snf with oral antibiotics and dc of diuretics.    Consults:      Disposition: SNF    Discharged Condition: Stable    Follow Up: Monster Zuniga MD  [address] [place] [address] [address] [place] [address]  [phone]            Diet: Diet Soft Texture; Ground; Thin; Renal    Discharge Medications:      Discharge Medications      New Medications      Instructions Start Date   levoFLOXacin 500 MG tablet  Commonly known as:  LEVAQUIN   500 mg, Oral, Daily         Continue These Medications      Instructions Start Date   acetaminophen 325 MG tablet  Commonly known as:  TYLENOL   650 mg, Oral, Every 4 Hours PRN      amLODIPine 5 MG tablet  Commonly known as:  NORVASC   5 mg, Oral, Daily      BIOFREEZE 4 % gel  Generic drug:  Menthol (Topical Analgesic)   1 application, Apply externally, Every 6 Hours PRN      carbidopa-levodopa  MG per tablet  Commonly known as:  SINEMET   1 tablet, Oral, 3 Times Daily      citalopram 10 MG tablet  Commonly known as:  CeleXA   10 mg, Oral, Nightly      Cranberry 250 MG tablet   1 tablet, Oral, 3 Times Daily      cyanocobalamin 1000 MCG/ML injection   1,000 mcg, Intramuscular, Every 30 Days, Given on the 14th of every month.       loratadine 10 MG tablet  Commonly known as:  CLARITIN   10 mg, Oral, Daily      losartan 50 MG tablet  Commonly known as:  COZAAR   50 mg, Oral, Daily      metoprolol succinate XL 50 MG 24 hr tablet  Commonly known as:  TOPROL-XL   50 mg, Oral, Daily      omeprazole 20 MG capsule  Commonly known as:  priLOSEC   20 mg, Oral, Daily      PERIGUARD ointment   1 application, Apply externally, Every Evening, Apply to buttocks.       SORE THROAT RELIEF 10 MG lozenge  Generic drug:  Benzocaine   1 lozenge, Mouth/Throat, 4 Times Daily PRN      tiotropium bromide-olodaterol 2.5-2.5 MCG/ACT aerosol solution  inhaler  Commonly known as:  STIOLTO RESPIMAT   2 puffs, Inhalation, Daily         Stop These Medications    furosemide 20 MG tablet  Commonly known as:  LASIX     ibuprofen 600 MG tablet  Commonly known as:  ADVIL,MOTRIN     traMADol 50 MG tablet  Commonly known as:  ULTRAM            Time Spent on discharge is  32 minutes in patient examination, evaluation, patient/family counseling as well as medication reconciliation, prescriptions for required medications, discharge plan and follow up.     Electronically signed by Monster Zuniga MD on 5/16/2019 at 1:27 PM

## 2019-05-16 NOTE — PROGRESS NOTES
Continued Stay Note  [facility]     Patient Name: Susy Ko  MRN: [id number]  Today's Date: 5/16/2019    Admit Date: 5/6/2019    Discharge Plan     Row Name 05/16/19 1403       Plan    Plan  [facility]    Patient/Family in Agreement with Plan  yes    Final Discharge Disposition Code  03 - skilled nursing facility (SNF)    Final Note  Pt is being discharged to [facility].  Spoke with Marian at facility and informed [phone]. Daughter in room and pt will transport back via [facility] ambulance [phone].    [facility] [phone]        Discharge Codes    No documentation.       Expected Discharge Date and Time     Expected Discharge Date Expected Discharge Time    May 16, 2019             JAYCE Rivero

## 2019-05-16 NOTE — DISCHARGE PLACEMENT REQUEST
"Luci Nunez [phone]  Susy Pittman (79 y.o. Female)     Date of Birth Social Security Number Address Home Phone MRN    1940  [address] [place] [address] [phone] [id number]    Jehovah's witness Marital Status          Quaker        Admission Date Admission Type Admitting Provider Attending Provider Department, Room/Bed    5/6/19 Emergency Monster Zuniga MD Wilson, Richard Scott, MD [address], [address]    Discharge Date Discharge Disposition Discharge Destination         Skilled Nursing Facility (DC - External)              Attending Provider:  Monster Zuniga MD    Allergies:  Morphine And Related, Adhesive Tape    Isolation:  None   Infection:  None   Code Status:  No CPR    Ht:  160 cm (63\")   Wt:  72.3 kg (159 lb 6.4 oz)    Admission Cmt:  None   Principal Problem:  None                Active Insurance as of 5/6/2019     Primary Coverage     Payor Plan Insurance Group Employer/Plan Group    MEDICARE RAILROAD MEDICARE      Payor Plan Address Payor Plan Phone Number Payor Plan Fax Number Effective Dates    [address] [phone]  9/1/1998 - None Entered    [facility] [id number]       Subscriber Name Subscriber Birth Date Member ID       SUSY PITTMAN 1940 [id number]           Secondary Coverage     Payor Plan Insurance Group Employer/Plan Group    [employer] [place] [employer] [id number]     Payor Plan Address Payor Plan Phone Number Payor Plan Fax Number Effective Dates    [address] [address] [phone]  1/1/2017 - None Entered    [facility] [id number]       Subscriber Name Subscriber Birth Date Member ID       MRS SHAYLA PITTMAN 2/12/1919 [id number]                 Emergency Contacts      (Rel.) Home Phone Work Phone Mobile Phone    Petty Riggs (Daughter) -- -- [phone]              "

## 2019-05-17 NOTE — THERAPY DISCHARGE NOTE
Acute Care - Physical Therapy Discharge Summary  [facility]       Patient Name: Susy Ko  : 1940  MRN: [id number]    Today's Date: 2019  Onset of Illness/Injury or Date of Surgery: 19    Date of Referral to PT: 05/10/19  Referring Physician: Dr. Zuniga      Admit Date: 2019      PT Recommendation and Plan    Visit Dx:    ICD-10-CM ICD-9-CM   1. Severe sepsis (CMS/[facility]) A41.9 038.9    R65.20 995.92   2. Urinary tract infection, acute N39.0 599.0   3. Hyperkalemia E87.5 276.7   4. Acute kidney injury (CMS/[facility]) N17.9 584.9   5. Altered mental status, unspecified altered mental status type R41.82 780.97   6. Other dysphagia R13.19 787.29   7. Impaired mobility Z74.09 799.89   8. Impaired mobility and ADLs Z74.09 799.89               Rehab Goal Summary     Row Name 19 1611             Physical Therapy Goals    Bed Mobility Goal Selection (PT)  bed mobility, PT goal 1  -LG      Transfer Goal Selection (PT)  transfer, PT goal 1  -LG         Bed Mobility Goal 1 (PT)    Activity/Assistive Device (Bed Mobility Goal 1, PT)  sit to supine;supine to sit  -LG      [place] Level/Cues Needed (Bed Mobility Goal 1, PT)  moderate assist (50-74% patient effort)  -LG      Time Frame (Bed Mobility Goal 1, PT)  by discharge  -LG      Progress/Outcomes (Bed Mobility Goal 1, PT)  goal not met  -LG         Transfer Goal 1 (PT)    Activity/Assistive Device (Transfer Goal 1, PT)  bed-to-chair/chair-to-bed squat pivot  -LG      [place] Level/Cues Needed (Transfer Goal 1, PT)  moderate assist (50-74% patient effort);2 person assist  -LG      Time Frame (Transfer Goal 1, PT)  by discharge  -LG      Progress/Outcome (Transfer Goal 1, PT)  goal not met  -LG        User Key  (r) = Recorded By, (t) = Taken By, (c) = Cosigned By    Initials Name Provider Type Discipline    LG Demetrio Beltran, PTA Physical Therapy Assistant PT          Therapy Charges for Today     Code Description Service Date Service  Provider Modifiers Qty    20612691136 HC PT THER PROC EA 15 MIN 5/16/2019 Demetrio Beltran, PTA GP 2          PT Discharge Summary  Anticipated Discharge Disposition (PT): skilled nursing facility  Reason for Discharge: Discharge from facility  Outcomes Achieved: Refer to plan of care for updates on goals achieved  Discharge Destination: SNF      Demetrio Beltran PTA   5/17/2019

## 2019-05-20 LAB
BACTERIA SPEC AEROBE CULT: NORMAL
BACTERIA SPEC AEROBE CULT: NORMAL

## 2019-05-20 NOTE — THERAPY DISCHARGE NOTE
Acute Care - Speech Language Pathology Discharge Summary  [facility]       Patient Name: Susy Ko  : 1940  MRN: [id number]    Today's Date: 2019  Onset of Illness/Injury or Date of Surgery: 19       Referring Physician: Dr. Zuniga        Admit Date: 2019      SLP Recommendation and Plan  Mechanical soft solids and thin liquids    Visit Dx:    ICD-10-CM ICD-9-CM   1. Severe sepsis (CMS/HCC) A41.9 038.9    R65.20 995.92   2. Urinary tract infection, acute N39.0 599.0   3. Hyperkalemia E87.5 276.7   4. Acute kidney injury (CMS/[facility]) N17.9 584.9   5. Altered mental status, unspecified altered mental status type R41.82 780.97   6. Other dysphagia R13.19 787.29   7. Impaired mobility Z74.09 799.89   8. Impaired mobility and ADLs Z74.09 799.89               SLP GOALS     Row Name 19 1600             Oral Nutrition/Hydration Goal 1 (SLP)    Oral Nutrition/Hydration Goal 1, SLP  Patient will tolerate LRD with no s/s aspiration.  -MB      Time Frame (Oral Nutrition/Hydration Goal 1, SLP)  by discharge  -MB      Barriers (Oral Nutrition/Hydration Goal 1, SLP)  n/a  -MB      Progress/Outcomes (Oral Nutrition/Hydration Goal 1, SLP)  goal partially met  -MB         Lingual Strengthening Goal 1 (SLP)    Activity (Lingual Strengthening Goal 1, SLP)  increase lingual tone/sensation/control/coordination/movement  -MB      Increase Lingual Tone/Sensation/Control/Coordination/Movement  lingual movement exercises  -MB      [place]/Accuracy (Lingual Strengthening Goal 1, SLP)  independently (over 90% accuracy)  -MB      Time Frame (Lingual Strengthening Goal 1, SLP)  by discharge  -MB      Barriers (Lingual Strengthening Goal 1, SLP)  n/a  -MB      Progress/Outcomes (Lingual Strengthening Goal 1, SLP)  goal not met  -MB         Pharyngeal Strengthening Exercise Goal 1 (SLP)    Activity (Pharyngeal Strengthening Goal 1, SLP)  increase timing  -MB      Increase Timing  gustatory stimulation  (sour/cold);prepping - 3 second prep or suck swallow or 3-step swallow;hard effortful swallow  -MB      [place]/Accuracy (Pharyngeal Strengthening Goal 1, SLP)  independently (over 90% accuracy)  -MB      Time Frame (Pharyngeal Strengthening Goal 1, SLP)  by discharge  -MB      Barriers (Pharyngeal Strengthening Goal 1, SLP)  n/a  -MB      Progress/Outcomes (Pharyngeal Strengthening Goal 1, SLP)  goal not met  -MB        User Key  (r) = Recorded By, (t) = Taken By, (c) = Cosigned By    Initials Name Provider Type    Bull Hernandez CCC-SLP Speech and Language Pathologist                  SLP Discharge Summary  Anticipated Dischage Disposition: skilled nursing facility  Reason for Discharge: discharge from this facility  Progress Toward Achieving Short/long Term Goals: goals partially met within established timelines  Discharge Destination: SNF      Bull Boothe CCC-SLP  5/20/2019

## 2019-05-27 ENCOUNTER — LAB REQUISITION (OUTPATIENT)
Dept: LAB | Facility: HOSPITAL | Age: 79
End: 2019-05-27

## 2019-05-27 DIAGNOSIS — Z00.00 ROUTINE GENERAL MEDICAL EXAMINATION AT A HEALTH CARE FACILITY: ICD-10-CM

## 2019-05-27 LAB
BACTERIA UR QL AUTO: ABNORMAL /HPF
BASOPHILS # BLD AUTO: 0.05 10*3/MM3 (ref 0–0.2)
BASOPHILS NFR BLD AUTO: 0.4 % (ref 0–2)
BILIRUB UR QL STRIP: ABNORMAL
CLARITY UR: ABNORMAL
COLOR UR: ABNORMAL
DEPRECATED RDW RBC AUTO: 48.8 FL (ref 40–54)
EOSINOPHIL # BLD AUTO: 0.12 10*3/MM3 (ref 0–0.7)
EOSINOPHIL NFR BLD AUTO: 0.9 % (ref 0–4)
ERYTHROCYTE [DISTWIDTH] IN BLOOD BY AUTOMATED COUNT: 15.6 % (ref 12–15)
GLUCOSE UR STRIP-MCNC: NEGATIVE MG/DL
HCT VFR BLD AUTO: 30.8 % (ref 37–47)
HGB BLD-MCNC: 9.5 G/DL (ref 12–16)
HGB UR QL STRIP.AUTO: ABNORMAL
IMM GRANULOCYTES # BLD AUTO: 0.33 10*3/MM3 (ref 0–0.05)
IMM GRANULOCYTES NFR BLD AUTO: 2.4 % (ref 0–5)
KETONES UR QL STRIP: ABNORMAL
LEUKOCYTE ESTERASE UR QL STRIP.AUTO: ABNORMAL
LYMPHOCYTES # BLD AUTO: 1.74 10*3/MM3 (ref 0.72–4.86)
LYMPHOCYTES NFR BLD AUTO: 12.7 % (ref 15–45)
MCH RBC QN AUTO: 26.6 PG (ref 28–32)
MCHC RBC AUTO-ENTMCNC: 30.8 G/DL (ref 33–36)
MCV RBC AUTO: 86.3 FL (ref 82–98)
MONOCYTES # BLD AUTO: 1.67 10*3/MM3 (ref 0.19–1.3)
MONOCYTES NFR BLD AUTO: 12.1 % (ref 4–12)
NEUTROPHILS # BLD AUTO: 9.84 10*3/MM3 (ref 1.87–8.4)
NEUTROPHILS NFR BLD AUTO: 71.5 % (ref 39–78)
NITRITE UR QL STRIP: NEGATIVE
NRBC BLD AUTO-RTO: 0 /100 WBC (ref 0–0.2)
PH UR STRIP.AUTO: 5.5 [PH] (ref 5–8)
PLATELET # BLD AUTO: 266 10*3/MM3 (ref 130–400)
PMV BLD AUTO: 9.8 FL (ref 6–12)
PROT UR QL STRIP: ABNORMAL
RBC # BLD AUTO: 3.57 10*6/MM3 (ref 4.2–5.4)
RBC # UR: ABNORMAL /HPF
REF LAB TEST METHOD: ABNORMAL
SP GR UR STRIP: 1.02 (ref 1–1.03)
SQUAMOUS #/AREA URNS HPF: ABNORMAL /HPF
UROBILINOGEN UR QL STRIP: ABNORMAL
WBC NRBC COR # BLD: 13.75 10*3/MM3 (ref 4.8–10.8)
WBC UR QL AUTO: ABNORMAL /HPF

## 2019-05-27 PROCEDURE — 87086 URINE CULTURE/COLONY COUNT: CPT

## 2019-05-27 PROCEDURE — 81001 URINALYSIS AUTO W/SCOPE: CPT

## 2019-05-27 PROCEDURE — 85025 COMPLETE CBC W/AUTO DIFF WBC: CPT

## 2019-05-29 LAB — BACTERIA SPEC AEROBE CULT: NORMAL

## 2019-06-05 ENCOUNTER — OFFICE VISIT (OUTPATIENT)
Dept: WOUND CARE | Facility: HOSPITAL | Age: 79
End: 2019-06-05

## 2019-06-05 PROCEDURE — G0463 HOSPITAL OUTPT CLINIC VISIT: HCPCS

## 2019-06-24 ENCOUNTER — OFFICE VISIT (OUTPATIENT)
Dept: WOUND CARE | Facility: HOSPITAL | Age: 79
End: 2019-06-24

## 2019-06-24 ENCOUNTER — LAB REQUISITION (OUTPATIENT)
Dept: LAB | Facility: HOSPITAL | Age: 79
End: 2019-06-24

## 2019-06-24 DIAGNOSIS — Z00.00 ENCOUNTER FOR GENERAL ADULT MEDICAL EXAMINATION WITHOUT ABNORMAL FINDINGS: ICD-10-CM

## 2019-06-24 PROCEDURE — 87075 CULTR BACTERIA EXCEPT BLOOD: CPT | Performed by: NURSE PRACTITIONER

## 2019-06-24 PROCEDURE — 87176 TISSUE HOMOGENIZATION CULTR: CPT | Performed by: NURSE PRACTITIONER

## 2019-06-24 PROCEDURE — 87205 SMEAR GRAM STAIN: CPT | Performed by: NURSE PRACTITIONER

## 2019-06-24 PROCEDURE — 87077 CULTURE AEROBIC IDENTIFY: CPT | Performed by: NURSE PRACTITIONER

## 2019-06-24 PROCEDURE — 87070 CULTURE OTHR SPECIMN AEROBIC: CPT | Performed by: NURSE PRACTITIONER

## 2019-06-24 PROCEDURE — 87186 SC STD MICRODIL/AGAR DIL: CPT | Performed by: NURSE PRACTITIONER

## 2019-06-24 PROCEDURE — 87185 SC STD ENZYME DETCJ PER NZM: CPT | Performed by: NURSE PRACTITIONER

## 2019-06-27 LAB
BACTERIA SPEC AEROBE CULT: ABNORMAL
BACTERIA SPEC AEROBE CULT: ABNORMAL
GRAM STN SPEC: ABNORMAL

## 2019-06-28 LAB
B-LACTAMASE USUAL SUSC ISLT: POSITIVE
BACTERIA SPEC ANAEROBE CULT: ABNORMAL

## 2019-06-30 ENCOUNTER — LAB REQUISITION (OUTPATIENT)
Dept: LAB | Facility: HOSPITAL | Age: 79
End: 2019-06-30

## 2019-06-30 DIAGNOSIS — Z00.00 ROUTINE GENERAL MEDICAL EXAMINATION AT A HEALTH CARE FACILITY: ICD-10-CM

## 2019-06-30 LAB
BACTERIA UR QL AUTO: ABNORMAL /HPF
BILIRUB UR QL STRIP: NEGATIVE
CLARITY UR: ABNORMAL
COLOR UR: ABNORMAL
GLUCOSE UR STRIP-MCNC: NEGATIVE MG/DL
HGB UR QL STRIP.AUTO: ABNORMAL
HYALINE CASTS UR QL AUTO: ABNORMAL /LPF
KETONES UR QL STRIP: NEGATIVE
LEUKOCYTE ESTERASE UR QL STRIP.AUTO: ABNORMAL
NITRITE UR QL STRIP: NEGATIVE
PH UR STRIP.AUTO: 5.5 [PH] (ref 5–8)
PROT UR QL STRIP: ABNORMAL
RBC # UR: ABNORMAL /HPF
REF LAB TEST METHOD: ABNORMAL
SP GR UR STRIP: 1.02 (ref 1–1.03)
SQUAMOUS #/AREA URNS HPF: ABNORMAL /HPF
UROBILINOGEN UR QL STRIP: ABNORMAL
WBC UR QL AUTO: ABNORMAL /HPF

## 2019-06-30 PROCEDURE — 81001 URINALYSIS AUTO W/SCOPE: CPT

## 2019-06-30 PROCEDURE — 87086 URINE CULTURE/COLONY COUNT: CPT

## 2019-07-02 LAB — BACTERIA SPEC AEROBE CULT: NORMAL

## 2019-07-10 ENCOUNTER — OFFICE VISIT (OUTPATIENT)
Dept: WOUND CARE | Facility: HOSPITAL | Age: 79
End: 2019-07-10

## 2019-07-11 ENCOUNTER — TELEPHONE (OUTPATIENT)
Dept: CASE MANAGEMENT | Age: 79
End: 2019-07-11

## 2019-07-11 NOTE — TELEPHONE ENCOUNTER
Thank you. RN Case Manager, Vaishali Benedict RN, and Paola Cohen have been notified.     Electronically signed by Abhilash Jimenez RN on 7/11/2019 at 11:20 AM

## 2019-07-11 NOTE — TELEPHONE ENCOUNTER
Pt: Renea Man    : 1940  Dr. Johnny Glez  Allergies: Dilaudid  DX: Parkinsons   ALzheimers  Dementia  Hydrocephalus  Chronic Kidney Disease  Polyneuropathy  Anxiety  Major depressive disorder  Symbolic dysfunction  Dysphagia  GERD  Osteo arthritis  Pain unspecified   Muscle weakness  Pressure ulcer left hip  Pressure ulcer of scaral region  Pressure ulcer right heel  Pressure ulcer left heel    Pt was admitted: 7/10/19   (Last night)   Has a wound that is across sacral region, wound bed has a strong odor  Mi Nicolas RN is requesting Flagyl to be applied to wound bed with dressing changes, is that okay?     Electronically signed by Adela Hough RN on 2019 at 10:38 AM

## 2021-06-23 NOTE — PROGRESS NOTES
Nephrology ([facility]) Progress Note      Patient:  Susy Ko  YOB: 1940  Date of Service: 5/12/2019  MRN: [id number]   Acct: [id number]   Primary Care Physician: Monster Zuniga MD  Advance Directive:   Code Status and Medical Interventions:   Ordered at: 05/12/19 1039     Level Of Support Discussed With:    Next of Kin (If No Surrogate)     Code Status:    No CPR     Medical Interventions (Level of Support Prior to Arrest):    Full     Admit Date: 5/6/2019       Hospital Day: 6  Referring Provider: Monster Zuniga MD      Patient personally seen and examined.  Complete chart including Consults, Notes, Operative Reports, Labs, Cardiology, and Radiology studies reviewed as able.        Subjective:  Susy Ko is a 79 y.o. female  whom we were consulted for acute kidney injury, metabolic acidosis.  History of hypertension, Parkinson's, and has a colostomy.  Patient resides in local nursing home secondary to advanced dementia. Had been complaining of abdominal pain so was sent to emergency department for evaluation.  Found to have acute kidney injury, severe metabolic acidosis, UTI with sepsis.  Patient required Levophed for blood pressure support. Dr Weldon discussed at length with family and they have decided not to pursue any dialysis.  Now off Levophed.  Has transferred to medical floor.  Her renal function continues to improve, almost back to the baseline.    She has responded well to diuretics with large urine output.    Allergies:  Morphine and related and Adhesive tape    Home Meds:  Medications Prior to Admission   Medication Sig Dispense Refill Last Dose   • acetaminophen (TYLENOL) 325 MG tablet Take 650 mg by mouth Every 4 (Four) Hours As Needed for Mild Pain  or Fever.   Past Month at Unknown time   • amLODIPine (NORVASC) 5 MG tablet Take 5 mg by mouth Daily.   5/6/2019   • Benzocaine (SORE THROAT RELIEF) 10 MG lozenge Dissolve 1  lozenge in the mouth 4 (Four) Times a Day As Needed (Sore throat).   Past Month at Unknown time   • carbidopa-levodopa (SINEMET)  MG per tablet Take 1 tablet by mouth 3 (Three) Times a Day.   5/6/2019 at Unknown time   • citalopram (CeleXA) 10 MG tablet Take 10 mg by mouth Every Night.   5/5/2019   • Cranberry 250 MG tablet Take 1 tablet by mouth 3 (Three) Times a Day.   5/6/2019   • cyanocobalamin 1000 MCG/ML injection Inject 1,000 mcg into the appropriate muscle as directed by prescriber Every 30 (Thirty) Days. Given on the 14th of every month.   4/14/2019   • furosemide (LASIX) 20 MG tablet Take 20 mg by mouth Every Other Day.   5/6/2019   • ibuprofen (ADVIL,MOTRIN) 600 MG tablet Take 600 mg by mouth 3 (Three) Times a Day.   5/6/2019   • loratadine (CLARITIN) 10 MG tablet Take 10 mg by mouth Daily.   5/6/2019   • losartan (COZAAR) 50 MG tablet Take 50 mg by mouth Daily.   5/6/2019   • Menthol, Topical Analgesic, (BIOFREEZE) 4 % gel Apply 1 application topically Every 6 (Six) Hours As Needed (Apply to knees and lower back).   Past Week at Unknown time   • metoprolol succinate XL (TOPROL-XL) 50 MG 24 hr tablet Take 50 mg by mouth Daily.   5/6/2019   • omeprazole (priLOSEC) 20 MG capsule Take 20 mg by mouth Daily.   5/6/2019   • Skin Protectants, Misc. (PERIGUARD) ointment Apply 1 application topically Every Evening. Apply to buttocks.   5/6/2019   • tiotropium bromide-olodaterol (STIOLTO RESPIMAT) 2.5-2.5 MCG/ACT aerosol solution inhaler Inhale 2 puffs Daily.   5/6/2019   • traMADol (ULTRAM) 50 MG tablet Take 50 mg by mouth Every 6 (Six) Hours As Needed for Moderate Pain .   Past Month at Unknown time       Medicines:  Current Facility-Administered Medications   Medication Dose Route Frequency Provider Last Rate Last Dose   • acetaminophen (TYLENOL) tablet 650 mg  650 mg Oral Q6H PRN Monster Zuniga MD       • acetaminophen (TYLENOL) tablet 650 mg  650 mg Oral Q4H PRN Monster Zuniga MD       •  ALPRAZolam (XANAX) tablet 0.5 mg  0.5 mg Oral Nightly Monster Zuniga MD   0.5 mg at 05/11/19 2025   • bumetanide (BUMEX) tablet 1 mg  1 mg Oral Daily Ramsey Weldon MD   1 mg at 05/12/19 0827   • calcitRIOL (ROCALTROL) solution 0.25 mcg  0.25 mcg Nasogastric Daily Monster Zuniga MD   0.25 mcg at 05/12/19 0827   • calcium carbonate (oyster shell) tablet 1,250 mg  1,250 mg Oral Daily Ramsey Weldon MD   1,250 mg at 05/12/19 0827   • calcium gluconate 1 g in sodium chloride 0.9 % 100 mL IVPB  1 g Intravenous PRN Monster Zuniga  mL/hr at 05/08/19 0451 1 g at 05/08/19 0451    And   • calcium gluconate 6 g in sodium chloride 0.9 % 500 mL IVPB  6 g Intravenous PRN Monster Zuniga MD 83.3 mL/hr at 05/08/19 0647 6 g at 05/08/19 0647   • carbidopa-levodopa (SINEMET)  MG per tablet 1 tablet  1 tablet Oral TID Monster Zuniga MD   1 tablet at 05/12/19 0827   • citalopram (CeleXA) tablet 10 mg  10 mg Oral Nightly Monster Zuniga MD   10 mg at 05/11/19 2025   • cyancobalamin (VITAMIN B-12) tablet 100 mcg  100 mcg Oral Daily Monster Zuniga MD   100 mcg at 05/12/19 0827   • famotidine (PEPCID) tablet 20 mg  20 mg Oral Daily Monster Zuniga MD   20 mg at 05/12/19 0827   • HYDROcodone-acetaminophen (NORCO) 7.5-325 MG per tablet 1 tablet  1 tablet Oral Q4H PRN Monster Zuniga MD       • sodium chloride 0.9 % flush 10 mL  10 mL Intravenous PRN Prosper Jiang DO       • sodium chloride 0.9 % flush 3 mL  3 mL Intravenous Q12H Monster Zuniga MD   3 mL at 05/12/19 0829   • sodium chloride 0.9 % flush 3-10 mL  3-10 mL Intravenous PRN Monster Zuniga MD       • vancomycin 500 mg/100 mL 0.9% NS IVPB ([facility])  500 mg Intravenous Q48H Monster Zuniga MD   Stopped at 05/12/19 0718       Past Medical History:  Past Medical History:   Diagnosis Date   • Anxiety    • Breast cancer (CMS/HCC)      LEFT WITH RECONSTRUCTION   • Colostomy in place  (CMS/HCC)    • Dementia    • Depression    • Edema     LOWER FEET   • Hydrocephalus     NORMAL PRESSURE   • Hypertension    • Incontinence    • Nausea    • Parastomal hernia    • Parkinson disease (CMS/HCC)    • Tremor     RIGHT HAND   • Unsteady gait        Past Surgical History:  Past Surgical History:   Procedure Laterality Date   • ABDOMINAL SURGERY     • APPENDECTOMY     • BREAST RECONSTRUCTION Left     LEFT   • CHOLECYSTECTOMY     • COLON SURGERY     • COLONOSCOPY     • COLOSTOMY     • HYSTERECTOMY     • MASTECTOMY Left    • PARASTOMAL HERNIA REPAIR N/A 3/17/2017    Procedure: REPAIR PARASTOMAL HERNIA;  Surgeon: Princess Walters MD;  Location: [facility];  Service:        Family History  History reviewed. No pertinent family history.    Social History  Social History     Socioeconomic History   • Marital status:      Spouse name: Not on file   • Number of children: Not on file   • Years of education: Not on file   • Highest education level: Not on file   Tobacco Use   • Smoking status: Former Smoker   • Smokeless tobacco: Never Used   Substance and Sexual Activity   • Alcohol use: No   • Drug use: No   • Sexual activity: Defer         Review of Systems:   History obtained from chart review and family  General ROS: negative  Respiratory ROS: no cough, shortness of breath, or wheezing  Cardiovascular ROS: no chest pain or dyspnea on exertion  Gastrointestinal ROS: no abdominal pain, change in bowel habits, or black or bloody stools  Genito-Urinary ROS: no dysuria, trouble voiding, or hematuria  Musculoskeletal ROS: negative  Neurological ROS: no TIA or stroke symptoms    Objective:  Patient Vitals for the past 24 hrs:   BP Temp Temp src Pulse Resp SpO2 Weight   05/12/19 1212 140/50 98 °F (36.7 °C) Oral 85 16 92 % --   05/12/19 0828 138/62 98.2 °F (36.8 °C) Oral 81 16 94 % --   05/12/19 0751 -- -- -- 83 -- 91 % --   05/12/19 0348 128/72 99.1 °F (37.3 °C) Oral 82 16 93 % --   05/11/19 2101 -- -- -- -- -- 91 %  --   05/1940 143/60 99.3 °F (37.4 °C) Oral 94 16 94 % 76.8 kg (169 lb 6.4 oz)   05/11/19 1712 152/44 99.3 °F (37.4 °C) Oral 81 16 94 % --       Intake/Output Summary (Last 24 hours) at 5/12/2019 1235  Last data filed at 5/12/2019 1211  Gross per 24 hour   Intake 3992 ml   Output 2625 ml   Net 1367 ml     General: awake, oriented X 1  HEENT: Normocephalic atraumatic head.  Neck: supple, no JVD  Chest:  clear to auscultation bilaterally without respiratory distress  CVS: regular rate and rhythm  Abdominal: Soft nondistended nontender, right upper quadrant ostomy  Extremities: no cyanosis or edema  Skin: warm and dry without rash  Neuro: no focal motor deficits    Labs:  Results from last 7 days   Lab Units 05/12/19  0632 05/11/19  0645 05/10/19  0607   WBC 10*3/mm3 12.35* 10.95* 10.85*   HEMOGLOBIN g/dL 7.3* 7.6* 7.6*   HEMATOCRIT % 22.3* 23.5* 23.0*   PLATELETS 10*3/mm3 71* 76* 92*         Results from last 7 days   Lab Units 05/12/19  0632 05/11/19  0645 05/10/19  0607   SODIUM mmol/L 134* 135 140   POTASSIUM mmol/L 4.1 4.1 3.5   CHLORIDE mmol/L 98 101 101   CO2 mmol/L 29.0 31.0 33.0*   BUN mg/dL 66* 75* 92*   CREATININE mg/dL 1.69* 1.94* 2.58*   CALCIUM mg/dL 8.4 7.9* 7.3*   BILIRUBIN mg/dL 0.3 0.4 0.2   ALK PHOS U/L 64 69 97   ALT (SGPT) U/L 23 24 32   AST (SGOT) U/L 66* 84* 112*   GLUCOSE mg/dL 105* 91 136*       Radiology:   Imaging Results (last 72 hours)     Procedure Component Value Units Date/Time    US Renal Bilateral [[id number]] Collected:  05/07/19 1124     Updated:  05/07/19 1129    Narrative:       EXAMINATION:  US RENAL BILATERAL-  5/7/2019 10:11 AM CDT     HISTORY: acute renal failure; A41.9-Sepsis, unspecified organism;  R65.20-Severe sepsis without septic shock; N39.0-Urinary tract  infection, site not specified; E87.5-Hyperkalemia; N17.9-Acute kidney  failure, unspecified; R41.82-Altered mental status, unspecified;  R13.19-Other dysphagia      COMPARISON: 05/10/2014 renal ultrasound.  05/06/2019 abdomen and pelvis  CT     TECHNIQUE:      Bilateral renal ultrasound was performed.     FINDINGS:      The right kidney measures 10.1 cm in [id number] length.     The left kidney measures 11 cm in yitj-zi-[id number] length.     There is mild cortical thinning and pelvic lipomatosis compatible with  age.     Normal echogenicity of renal cortex is observed without renal masses.     There are no perinephric abnormalities.     There is no pelvocaliectasis or obstructive uropathy changes.     The urinary bladder is decompressed with a Peralta catheter.       Impression:       1. Negative bilateral renal ultrasound.  This report was finalized on 05/07/2019 11:26 by Dr. Iban Queen MD.    CT Abdomen Pelvis Without Contrast [[id number]] Collected:  05/06/19 2236     Updated:  05/06/19 2242    Narrative:       EXAMINATION: CT ABDOMEN PELVIS WO CONTRAST-      5/6/2019 10:17 PM CDT     HISTORY: pain; r/o obstruction; A41.9-Sepsis, unspecified organism;  R65.20-Severe sepsis without septic shock; N39.0-Urinary tract  infection, site not specified; E87.5-Hyperkalemia; N17.9-Acute kidney  failure, unspecified; R41.82-Altered mental status, unspecified     In order to have a CT radiation dose as low as reasonably achievable  Automated Exposure Control was utilized for adjustment of the mA and/or  KV according to patient size.     DLP in mGycm= 343.     Noncontrast abdomen/pelvis CT.     Comparison is made with 04/12/2016.     Left lower quadrant ostomy with nonobstructing parastomal hernia.     No significant bowel dilation.     Mild urinary bladder distention.     Mild dilation of the renal collecting systems and ureters is probably  based on vesicoureteral reflux. There is no obstructing stone.     Normal heart size.  No acute abnormality at the lung bases.     Cholecystectomy clips.  Normal noncontrast appearance of the liver and spleen.  The pancreas is atrophic.     Aortic calcification with no aneurysm.      Summary:  1. Moderate fecal material throughout the colon.  2. Nonobstructing parastomal hernia.  3. Mild dilation of the renal collecting systems and ureters compatible  with vesicoureteral reflux.  4. No evidence of bowel obstruction, mass or abscess.                                   This report was finalized on 05/06/2019 22:39 by Dr. Raul Ricci MD.    CT Head Without Contrast [[id number]] Collected:  05/06/19 2234     Updated:  05/06/19 2238    Narrative:       EXAMINATION: CT HEAD WO CONTRAST-      5/6/2019 10:17 PM CDT     HISTORY: AMS; A41.9-Sepsis, unspecified organism; R65.20-Severe sepsis  without septic shock; N39.0-Urinary tract infection, site not specified;  E87.5-Hyperkalemia; N17.9-Acute kidney failure, unspecified;  R41.82-Altered mental status, unspecified     In order to have a CT radiation dose as low as reasonably achievable  Automated Exposure Control was utilized for adjustment of the mA and/or  KV according to patient size.     DLP in mGycm= 1214.     Noncontrast head CT compared with 01/17/2017.     Axial, sagittal, and coronal noncontrast CT imaging of the head.     The visualized paranasal sinuses are clear.     The brain and ventricles have an age appropriate appearance.  Moderate atrophy and small vessel disease.  There is no hemorrhage or mass-effect.   No acute infarction is seen.     No calvarial abnormality.       Impression:       1. No acute intracranial abnormality is seen.                                         This report was finalized on 05/06/2019 22:35 by Dr. Raul Ricci MD.    XR Chest 1 View [[id number]] Collected:  05/06/19 2216     Updated:  05/06/19 2219    Narrative:       EXAMINATION: XR CHEST 1 VW-     5/6/2019 10:11 PM CDT     HISTORY: Altered mental status; r/o pneumonia.     One view chest x-ray compared with 01/17/2017.     Heart size is normal.  The mediastinum is within normal limits.      The lungs are normally expanded with no pneumonia or  pneumothorax.  Chronic lung changes.  No congestive failure changes.                                                                       Impression:       1. No acute disease.           This report was finalized on 05/06/2019 22:16 by Dr. Raul Ricci MD.          Culture:  Blood Culture   Date Value Ref Range Status   05/06/2019 No growth at 24 hours  Preliminary   05/06/2019 Gram Positive Rods (A)  Preliminary   05/06/2019 Gram Positive Cocci (A)  Preliminary     Urine Culture   Date Value Ref Range Status   05/06/2019 >100,000 CFU/mL Escherichia coli (A)  Final   05/06/2019 70,000-80,000 CFU/mL Mixed Gram Positive Alison (A)  Final     Comment:     Probable Contaminant           Assessment   1.  Acute kidney injury/ATN/improved  2.  Septic shock-improved  3.  Urinary tract infection/improved  4.  Metabolic acidosis--resolved  5.  Stage III chronic kidney disease baseline.  6.  Hypocalcemia  7.  Advanced dementia  8.  History of colostomy  9.  Hypokalemia     Plan:  1.  Continue p.o. diuretics  2.  Intravenous Venofer.  3.  Possible discharge back to nursing home soon.      Ramsey Weldon MD  5/12/2019  12:35 PM   Double O-Z Plasty Text: The defect edges were debeveled with a #15 scalpel blade.  Given the location of the defect, shape of the defect and the proximity to free margins a Double O-Z plasty (double transposition flap) was deemed most appropriate.  Using a sterile surgical marker, the appropriate transposition flaps were drawn incorporating the defect and placing the expected incisions within the relaxed skin tension lines where possible. The area thus outlined was incised deep to adipose tissue with a #15 scalpel blade.  The skin margins were undermined to an appropriate distance in all directions utilizing iris scissors.  Hemostasis was achieved with electrocautery.  The flaps were then transposed into place, one clockwise and the other counterclockwise, and anchored with interrupted buried subcutaneous sutures.

## 2023-01-01 NOTE — PLAN OF CARE
Problem: Patient Care Overview (Adult)  Goal: Plan of Care Review  Outcome: Ongoing (interventions implemented as appropriate)    03/21/17 1652   Coping/Psychosocial Response Interventions   Plan Of Care Reviewed With patient   Patient Care Overview   Progress progress towards functional goals is fair   Outcome Evaluation   Outcome Summary/Follow up Plan Pt encouraged to be up in chair as much as possible and to work with PT. Toradol given for pain control today. No stool from ostomy but abd binder placed on pt.        Goal: Adult Individualization and Mutuality  Outcome: Ongoing (interventions implemented as appropriate)    03/17/17 1647 03/21/17 0325 03/21/17 1652   Individualization   Patient Specific Preferences --  --  Toradol given during the day for complaints of pain. Daughter wants pt to stay awake more.   Patient Specific Goals Pain controled at acceptable level. Return of normal stool habit from ostomy. --  --    Patient Specific Interventions Socical Services consulted. --  --    Mutuality/Individual Preferences   What Anxieties, Fears or Concerns Do You Have About Your Health or Care? --  --  Daughter is concerned pt will not be able to return to assisted living upon discharge.   What Questions Do You Have About Your Health or Care? --  None identified at this time. --        Goal: Discharge Needs Assessment  Outcome: Ongoing (interventions implemented as appropriate)    03/17/17 1545 03/18/17 0232 03/18/17 1511   Discharge Needs Assessment   Concerns To Be Addressed --  --  discharge planning concerns   Readmission Within The Last 30 Days --  --  no previous admission in last 30 days   Community Agency Name(S) --  dtr states patient cannot return to assisted living in current condition --    Equipment Needed After Discharge none --  --    Discharge Facility/Level Of Care Needs --  --  nursing facility, skilled   Current Discharge Risk --  --  physical impairment   Discharge Disposition --  --  nursing  Spoke with mom viktoriyates patient has an appointment at [facility] for patients Bili. Mom states [facility] states they do not have order in system to perform patients bili. Mom informed order are in the Epic system by RN will fax over order to hospitals central scheduling as well. Orders faxed. Confirmation received. Mom notified.    facility   Discharge Planning Comments --  --  --    Current Health   Outpatient/Agency/Support Group Needs --  --  assisted living facility (specify)   Anticipated Changes Related to Illness --  --  inability to care for self   Self-Care   Equipment Currently Used at Home --  --  wheelchair   Living Environment   Transportation Available --  --  car;family or friend will provide     03/21/17 0325   Discharge Needs Assessment   Concerns To Be Addressed --    Readmission Within The Last 30 Days --    Community Agency Name(S) --    Equipment Needed After Discharge --    Discharge Facility/Level Of Care Needs --    Current Discharge Risk --    Discharge Disposition --    Discharge Planning Comments Discharge planning continues.   Current Health   Outpatient/Agency/Support Group Needs --    Anticipated Changes Related to Illness --    Self-Care   Equipment Currently Used at Home --    Living Environment   Transportation Available --          Problem: Perioperative Period (Adult)  Goal: Signs and Symptoms of Listed Potential Problems Will be Absent or Manageable (Perioperative Period)  Outcome: Ongoing (interventions implemented as appropriate)    03/21/17 1652   Perioperative Period   Problems Assessed (Perioperative Period) all   Problems Present (Perioperative Period) pain;situational response         Problem: Fall Risk (Adult)  Goal: Identify Related Risk Factors and Signs and Symptoms  Outcome: Ongoing (interventions implemented as appropriate)    03/20/17 1505 03/21/17 1652   Fall Risk   Fall Risk: Related Risk Factors --  fatigue/slow reaction;gait/mobility problems   Fall Risk: Signs and Symptoms presence of risk factors --        Goal: Absence of Falls  Outcome: Ongoing (interventions implemented as appropriate)    03/21/17 1652   Fall Risk (Adult)   Absence of Falls making progress toward outcome

## (undated) DEVICE — DRN PENRS 5/8X18IN LTX

## (undated) DEVICE — SUT SILK 3/0 SUTUPAK TIES 24IN SA74H

## (undated) DEVICE — PK TURNOVER RM ADV

## (undated) DEVICE — TRY PREP SCRB VAG PVP

## (undated) DEVICE — ELECTRD BLD EXT EDGE 1P COAT 6.5IN STRL

## (undated) DEVICE — DRSNG TELFA PAD NONADH STR 1S 3X8IN

## (undated) DEVICE — PROXIMATE RH ROTATING HEAD SKIN STAPLERS (35 REGULAR) CONTAINS 35 STAINLESS STEEL STAPLES: Brand: PROXIMATE

## (undated) DEVICE — SUT SILK 2/0 SH 30IN K833H

## (undated) DEVICE — SUT PROLN 0 MO6 30IN 8418H

## (undated) DEVICE — SUT SILK 2/0 SUTUPAK TIES 24IN SA75H

## (undated) DEVICE — SPNG GZ WOVN 4X4IN 12PLY 10/BX STRL

## (undated) DEVICE — SUT PROLN 1 CT1 30IN 8425H

## (undated) DEVICE — ANTIBACTERIAL UNDYED BRAIDED (POLYGLACTIN 910), SYNTHETIC ABSORBABLE SUTURE: Brand: COATED VICRYL

## (undated) DEVICE — DRSNG SURESITE123 4X10IN

## (undated) DEVICE — PAD MAJOR: Brand: MEDLINE INDUSTRIES, INC.

## (undated) DEVICE — GLV SURG BIOGEL M LTX PF 7 1/2

## (undated) DEVICE — SUT SILK 2/0 SH CR8 18IN CR8 C012D